# Patient Record
Sex: FEMALE | Race: WHITE | NOT HISPANIC OR LATINO | Employment: OTHER | URBAN - METROPOLITAN AREA
[De-identification: names, ages, dates, MRNs, and addresses within clinical notes are randomized per-mention and may not be internally consistent; named-entity substitution may affect disease eponyms.]

---

## 2017-02-28 ENCOUNTER — OFFICE VISIT (OUTPATIENT)
Dept: NEUROLOGY | Facility: CLINIC | Age: 52
End: 2017-02-28
Payer: COMMERCIAL

## 2017-02-28 VITALS
WEIGHT: 228.6 LBS | DIASTOLIC BLOOD PRESSURE: 67 MMHG | TEMPERATURE: 98.5 F | BODY MASS INDEX: 39.24 KG/M2 | SYSTOLIC BLOOD PRESSURE: 113 MMHG | HEART RATE: 77 BPM

## 2017-02-28 DIAGNOSIS — G43.009 MIGRAINE WITHOUT AURA AND WITHOUT STATUS MIGRAINOSUS, NOT INTRACTABLE: Primary | ICD-10-CM

## 2017-02-28 DIAGNOSIS — G25.81 RESTLESS LEGS SYNDROME (RLS): ICD-10-CM

## 2017-02-28 PROCEDURE — 99214 OFFICE O/P EST MOD 30 MIN: CPT | Performed by: PSYCHIATRY & NEUROLOGY

## 2017-02-28 RX ORDER — PRAMIPEXOLE DIHYDROCHLORIDE 0.25 MG/1
0.25 TABLET ORAL AT BEDTIME
Qty: 90 TABLET | Refills: 1 | Status: SHIPPED | OUTPATIENT
Start: 2017-02-28 | End: 2017-10-02

## 2017-02-28 ASSESSMENT — PAIN SCALES - GENERAL: PAINLEVEL: NO PAIN (0)

## 2017-02-28 NOTE — NURSING NOTE
"Chief Complaint   Patient presents with     RECHECK     Migraine       Initial /67 (BP Location: Right arm, Patient Position: Chair, Cuff Size: Adult Large)  Pulse 77  Temp 98.5  F (36.9  C) (Oral)  Wt 228 lb 9.6 oz (103.7 kg)  LMP 02/14/2017 (Approximate)  Breastfeeding? No  BMI 39.24 kg/m2 Estimated body mass index is 39.24 kg/(m^2) as calculated from the following:    Height as of 11/21/16: 5' 4\" (1.626 m).    Weight as of this encounter: 228 lb 9.6 oz (103.7 kg).  Medication Reconciliation: complete    Patient prefers to be contacted: Stacey Resendiz to leave detailed message on voicemail: n/a    Kathi WEISS-CMA    "

## 2017-02-28 NOTE — MR AVS SNAPSHOT
After Visit Summary   2/28/2017    Socorro Oakes    MRN: [id number]           Patient Information     Date Of Birth          1965        Visit Information        Provider Department      2/28/2017 9:30 AM Nancy Reed MD [facility]        Today's Diagnoses     Migraine without aura and without status migrainosus, not intractable    -  1    Restless legs syndrome (RLS)           Follow-ups after your visit        Follow-up notes from your care team     Return in about 6 months (around 8/28/2017).      Your next 10 appointments already scheduled     Aug 28, 2017  8:00 AM CDT   Return Visit with Nancy Reed MD   [facility]    [address], [place] [address] [phone]              Who to contact     If you have questions or need follow up information about today's clinic visit or your schedule please contact [facility] directly at [phone].  Normal or non-critical lab and imaging results will be communicated to you by [employer], letter or phone within 4 business days after the clinic has received the results. If you do not hear from us within 7 days, please contact the clinic through [employer] or phone. If you have a critical or abnormal lab result, we will notify you by phone as soon as possible.  Submit refill requests through [employer] or call your pharmacy and they will forward the refill request to us. Please allow 3 business days for your refill to be completed.          Additional Information About Your Visit        MyChart Information     [employer] gives you secure access to your electronic health record. If you see a primary care provider, you can also send messages to your care team and make appointments. If you have questions, please call your primary care clinic.  If you do not have a primary care provider, please call [phone] and they will assist you.        Care EveryWhere ID     This is  your Care EveryWhere ID. This could be used by other organizations to access your [place] medical records  [id number]        Your Vitals Were     Pulse Temperature Last Period Breastfeeding? BMI (Body Mass Index)       77 98.5  F (36.9  C) (Oral) 02/14/2017 (Approximate) No 39.24 kg/m2        Blood Pressure from Last 3 Encounters:   02/28/17 113/67   11/21/16 136/84   10/07/16 (!) 138/92    Weight from Last 3 Encounters:   02/28/17 228 lb 9.6 oz (103.7 kg)   11/21/16 235 lb (106.6 kg)   10/07/16 231 lb 12.8 oz (105.1 kg)              Today, you had the following     No orders found for display         Today's Medication Changes          These changes are accurate as of: 2/28/17 10:27 AM.  If you have any questions, ask your nurse or doctor.               These medicines have changed or have updated prescriptions.        Dose/Directions    prazosin 2 MG capsule   Commonly known as:  MINIPRESS   This may have changed:  Another medication with the same name was removed. Continue taking this medication, and follow the directions you see here.   Changed by:  Jennifer Riley APRN CNP        Dose:  2 mg   Take 2 mg by mouth 2 times daily   Refills:  0            Where to get your medicines      [place] [facility] [place] [address] [facility]  [address] [address] [address]     Phone:  [phone]     pramipexole 0.25 MG tablet                Primary Care Provider Office Phone # Fax #    RAJ Macias [phone]       [facility] [address] [address]        Goals        General    I want to be able to understand more about my medications and need for taking them routinly  (pt-stated)     Notes - Note created  2/4/2015  2:07 PM by Leia Xiao LSW    As of today's date 2/4/2015 goal is met at 51 - 75%.   Goal Status:  Active  Setting up MTM referral         I want to improve my independent living skills  (pt-stated)     Notes - Note created  2/4/2015  2:05 PM by Leia Xiao LSW    As of today's date 2/4/2015 goal is met at 0 - 25%.   Goal Status:  just starting  Just starting out          Thank you!     Thank you for choosing [facility]  for your care. Our goal is always to provide you with excellent care. Hearing back from our patients is one way we can continue to improve our services. Please take a few minutes to complete the written survey that you may receive in the mail after your visit with us. Thank you!             Your Updated Medication List - Protect others around you: Learn how to safely use, store and throw away your medicines at www.disposemymeds.org.          This list is accurate as of: 2/28/17 10:27 AM.  Always use your most recent med list.                   Brand Name Dispense Instructions for use    ADVIL PO      Take 400 mg by mouth every 6 hours as needed.       AMBIEN PO      Take 10 mg by mouth At Bedtime       amphetamine-dextroamphetamine 30 MG per 24 hr capsule    ADDERALL XR     Take 30 mg by mouth 2 times daily       doxepin 75 MG capsule    SINEquan     Take 75 mg by mouth At Bedtime       lamoTRIgine 150 MG tablet    LaMICtal     Take 150 mg by mouth every evening       LATUDA 80 MG Tabs tablet   Generic drug:  lurasidone      Take 80 mg by mouth every evening       lisinopril 10 MG tablet    PRINIVIL/ZESTRIL    30 tablet    Take 1 tablet (10 mg) by mouth daily       omeprazole 40 MG capsule    priLOSEC    90 capsule    Take 1 capsule (40 mg) by mouth daily Take 30-60 minutes before a meal.       order for ADELA Quintanilla was set up on replacement machine: [employer]; Type  Airsense 10; Serial Number: [id number]; Modem Number: 023; Pressure: SET PRESSURE 9 CM H20;  Mask of choice: none given; per patient she just opened a new one at home; Heated tubing       pramipexole 0.25 MG tablet    MIRAPEX    90 tablet    Take 1 tablet (0.25 mg) by mouth At Bedtime       prazosin  2 MG capsule    MINIPRESS     Take 2 mg by mouth 2 times daily       PRISTIQ PO      Take 150 mg by mouth every evening       * SUMAtriptan Succinate Refill 6 MG/0.5ML Soln    IMITREX    0.5 mL    Inject 6mg as needed. MR x1 after 1-2 hrs as needed. Limit 2 doses in 24 hrs.       * SUMAtriptan 100 MG tablet    IMITREX    6 tablet    Take 1 tablet (100 mg) by mouth at onset of headache for migraine May repeat in 2 hours if needed: max 2/day;       TYLENOL 500 MG tablet   Generic drug:  acetaminophen      2 TABLETS ORALLY 4 TIMES DAILY AS NEEDED       verapamil 120 MG 24 hr capsule    VERELAN    180 capsule    Take 1 capsule (120 mg) by mouth 2 times daily       WELLBUTRIN  MG 24 hr tablet   Generic drug:  buPROPion     30 tablet    Take 450 mg by mouth every morning       XANAX PO      Take 0.5-1 mg by mouth daily as needed       * Notice:  This list has 2 medication(s) that are the same as other medications prescribed for you. Read the directions carefully, and ask your doctor or other care provider to review them with you.

## 2017-02-28 NOTE — PROGRESS NOTES
ESTABLISHED PATIENT NEUROLOGY NOTE    DATE OF VISIT: 2/28/2017  MRN: [id number]  PATIENT NAME: Socorro Oakes  YOB: 1965    Chief Complaint   Patient presents with     RECHECK     Migraine     SUBJECTIVE:                                                      HISTORY OF PRESENT ILLNESS:  Socorro is here for follow up regarding migraine headaches and restless legs syndrome. The patient tells me that both issues are stable. She cannot remember when the last time she had a typical migraine was. She is tolerating the verapamil without difficulty. She did have one cluster of sharp pains around the Right ear last week. These were self-resolving. No recurrence. She does not really have any neck pain. She has occasional scalp tenderness. She says that she has tearing of the Right eye at night, especially if laying on that side. She has not been to the eye doctor recently and admits that she does not use her eyeglasses or contacts because they do not seem to be correcting the vision right (she cannot see close with the contacts and cannot see far away with the glasses on). She denies sensory changes, weakness, difficulties with swallow, eye pain.     The restless legs syndrome are stable. No concerns regarding this.     The patient also has a history of memory problems which have been felt to be related to her mental health issues. Most recent neuropsych testing in our record was in 10.2015. The patient was  hospitalized last fall for suicidal ideation. She says that she has been doing better since. She tells me she was diagnosed with PTSD at that time. In terms of the memory, she says she now rarely forgets her medications or mixes the am/pm doses. No new concerns.     Liver enzymes were normal when checked in October 2016.      CURRENT MEDICATIONS:     Current Outpatient Prescriptions on File Prior to Visit:  prazosin (MINIPRESS) 2 MG capsule Take 2 mg by mouth 2 times daily    pramipexole (MIRAPEX) 0.25 MG  tablet Take 1 tablet (0.25 mg) by mouth At Bedtime   SUMAtriptan (IMITREX) 100 MG tablet Take 1 tablet (100 mg) by mouth at onset of headache for migraine May repeat in 2 hours if needed: max 2/day;   omeprazole (PRILOSEC) 40 MG capsule Take 1 capsule (40 mg) by mouth daily Take 30-60 minutes before a meal.   DoxePIN (SINEQUAN) 75 MG capsule Take 75 mg by mouth At Bedtime   verapamil (VERELAN) 120 MG 24 hr capsule Take 1 capsule (120 mg) by mouth 2 times daily   amphetamine-dextroamphetamine (ADDERALL XR) 30 MG per capsule Take 30 mg by mouth 2 times daily   buPROPion (WELLBUTRIN XL) 150 MG 24 hr tablet Take 450 mg by mouth every morning    Zolpidem Tartrate (AMBIEN PO) Take 10 mg by mouth At Bedtime   ALPRAZolam (XANAX PO) Take 0.5-1 mg by mouth daily as needed   lamoTRIgine (LAMICTAL) 150 MG tablet Take 150 mg by mouth every evening    SUMAtriptan Succinate Refill (IMITREX) 6 MG/0.5ML SOLN Inject 6mg as needed. MR x1 after 1-2 hrs as needed. Limit 2 doses in 24 hrs.   Desvenlafaxine Succinate (PRISTIQ PO) Take 150 mg by mouth every evening    lurasidone (LATUDA) 80 MG TABS tablet Take 80 mg by mouth every evening    Ibuprofen (ADVIL PO) Take 400 mg by mouth every 6 hours as needed.   TYLENOL EXTRA STRENGTH 500 MG OR TABS 2 TABLETS ORALLY 4 TIMES DAILY AS NEEDED   [DISCONTINUED] prazosin (MINIPRESS) 2 MG capsule Take 1 capsule (2 mg) by mouth At Bedtime   lisinopril (PRINIVIL,ZESTRIL) 10 MG tablet Take 1 tablet (10 mg) by mouth daily (Patient not taking: Reported on 2/28/2017)   ORDER FOR ADELA Quintanilla was set up on replacement machine: [employer]; Type  DOCUSYSse 10; Serial Number: [id number]; Modem Number: 023; Pressure: SET PRESSURE 9 CM H20; Mask of choice: none given; per patient she just opened a new one at home; Heated tubing     No current facility-administered medications on file prior to visit.     RECENT DIAGNOSTIC STUDIES:   Labs:   Results for orders placed or performed in visit on 10/07/16   ALT   Result  Value Ref Range    ALT 26 0 - 50 U/L   AST   Result Value Ref Range    AST 13 0 - 45 U/L       REVIEW OF SYSTEMS:                                                      10-point review of systems is negative except as mentioned above in HPI.     EXAM:                                                      Physical Exam:   Vitals: /67 (BP Location: Right arm, Patient Position: Chair, Cuff Size: Adult Large)  Pulse 77  Temp 98.5  F (36.9  C) (Oral)  Wt 228 lb 9.6 oz (103.7 kg)  LMP 2017 (Approximate)  Breastfeeding? No  BMI 39.24 kg/m2  BMI= Body mass index is 39.24 kg/(m^2).  GENERAL: NAD. Blunted affect.  HEENT: No TTP of neck, scalp. External auditory canals and tympanic membranes appear normal.   Focused Neurologic:  MENTAL STATUS: Alert, attentive. Speech is fluent. Normal comprehension. Mini-co/5. Normal concentration. Adequate fund of knowledge.   CRANIAL NERVES: Discs flat. Visual fields intact to confrontation. Pupils equally, round and reactive to light. Facial sensation and movement normal. EOM full. Hearing intact to conversation. Trapezius strength intact. Palate moves symmetrically. Tongue midline.  MOTOR: 5/5 in proximal and distal muscle groups of upper and lower extremities. Tone and bulk normal.   DTRs: Intact and symmetric in UEs and patellae.  SENSATION: Normal light touch throughout.   COORDINATION: Normal fine motor movements.  STATION AND GAIT: Romberg negative. Tandem normal.  CV: RRR. S1, S2.   NECK: No bruits.      ASSESSMENT and PLAN:                                                      Assessment and Plan:    ICD-10-CM    1. Migraine without aura and without status migrainosus, not intractable G43.009    2. Restless legs syndrome (RLS) G25.81         Ms. Oakes is a pleasant 50 yo woman with history of migraine headaches and restless legs syndrome, doing well. She seems to be doing better from a mental health standpoint as well. I did recommend that she have an eye exam,  especially with the new symptom of tearing of the Right eye at night. I explained that eye strain can contribute to headaches, She denies side effects from the verapamil or Mirapex so we will plan to continue her medications at the current doses. In terms of memory, she feels that this is better too, especially with the current mental health management changes. We will plan to meet again in 6 months. I asked the patient to let me know if the pain around her ear returns in the meantime. The patient understands and agrees with the plan.      Verapamil 120mg BID.   Mirapex 0.25mg QHS.  Imitrex as needed.  Labs at next visit.     Total Time: 25 minutes were spent with the patient. More than 50% of the time spent on counseling (as described above in Assessment and Plan) /coordinating the care.    Nancy Reed MD  Neurology

## 2017-03-20 ENCOUNTER — OFFICE VISIT (OUTPATIENT)
Dept: FAMILY MEDICINE | Facility: CLINIC | Age: 52
End: 2017-03-20
Payer: COMMERCIAL

## 2017-03-20 VITALS
SYSTOLIC BLOOD PRESSURE: 132 MMHG | WEIGHT: 227 LBS | HEIGHT: 64 IN | OXYGEN SATURATION: 97 % | DIASTOLIC BLOOD PRESSURE: 82 MMHG | BODY MASS INDEX: 38.76 KG/M2

## 2017-03-20 DIAGNOSIS — M25.512 CHRONIC LEFT SHOULDER PAIN: Primary | ICD-10-CM

## 2017-03-20 DIAGNOSIS — G89.29 CHRONIC LEFT SHOULDER PAIN: Primary | ICD-10-CM

## 2017-03-20 PROCEDURE — 99213 OFFICE O/P EST LOW 20 MIN: CPT | Performed by: NURSE PRACTITIONER

## 2017-03-20 NOTE — PATIENT INSTRUCTIONS
1. Schedule an appointment with physical therapy          Thank you for choosing [facility].  You may be receiving a survey in the mail from Lisa Norris regarding your visit today.  Please take a few minutes to complete and return the survey to let us know how we are doing.      If you have questions or concerns, please contact us via [employer] or you can contact your care team at [phone].    Our Clinic hours are:  Monday 6:40 am  to 7:00 pm  Tuesday -Friday 6:40 am to 5:00 pm    The [place] outpatient lab hours are:  Monday - Friday 6:10 am to 4:45 pm  Saturdays 7:00 am to 11:00 am  Appointments are required, call [phone]    If you have clinical questions after hours or would like to schedule an appointment,  call the clinic at [phone].

## 2017-03-20 NOTE — PROGRESS NOTES
SUBJECTIVE:                                                    Socorro Oakes is a 51 year old female who presents to clinic today for the following health issues:      Joint Pain     Onset: 3 months    Description:   Location: left shoulder  Character: Sharp and Gnawing    Intensity: moderate    Progression of Symptoms: worse    Accompanying Signs & Symptoms:  Other symptoms: radiation of pain to arm   History:   Previous similar pain: no       Precipitating factors:   Trauma or overuse: YES- lifting and transferring/throwing cases of oranges    Alleviating factors:  Improved by: NSAID - IBuprofen       Therapies Tried and outcome: Ibuprofen, ice, some relief    Patient hurt her shoulder while lifting up box of oranges at food shelf. She reports a constant ache and sharp pain that wakes her up at night. No numbness or tingling in arm/hand. Weakness when lifting heavy things.       -------------------------------------    Problem list and histories reviewed & adjusted, as indicated.  Additional history: as documented    Patient Active Problem List   Diagnosis     MRSA     h/o multiple concussion      Hyperlipidemia LDL goal <160     Health Care Home     GERD (gastroesophageal reflux disease)     DJD (degenerative joint disease), lumbar     Sleep apnea     Migraine     Moderate major depression (H)     Schizophrenia (H)     ADD (attention deficit disorder with hyperactivity)     Restless legs syndrome (RLS)     Memory loss     Essential hypertension, benign     Past Surgical History   Procedure Laterality Date     Surgical history of -   10/17/05     left knee surgery      Tonsillectomy & adenoidectomy       Surgical history of -   1982,1983,1984x2,1993,     TMJ Surgery x5     Surgical history of -        ENT Tubes       Surgical history of -   1996     toe     Cholecystectomy, laporoscopic  4-30-09     Laparoscopic appendectomy       Inject epidural lumbar  7/11/2011     Procedure:INJECT EPIDURAL LUMBAR; BRIE with  "Jeano-Michelle; Surgeon:GENERIC ANESTHESIA PROVIDER; Location:[facility]     Inject epidural lumbar  9/28/2011     Procedure:INJECT EPIDURAL LUMBAR; BRIE with Jeano--; Surgeon:GENERIC ANESTHESIA PROVIDER; Location:[facility]     Inject epidural lumbar  12/12/2011     Procedure:INJECT EPIDURAL LUMBAR; BRIE with Fluoro - Dr. Landry; Surgeon:GENERIC ANESTHESIA PROVIDER; Location:[facility]     Knee surgery       1- 15 yr ago     Mandible surgery       6x surgeries from 1983- 1994     Colonoscopy N/A 6/5/2015     Procedure: COLONOSCOPY;  Surgeon: Robe Delgado MD;  Location: [facility]       Social History   Substance Use Topics     Smoking status: Never Smoker     Smokeless tobacco: Never Used     Alcohol use No     Family History   Problem Relation Age of Onset     Arthritis Mother      rheumatoid/had knee replacement     Bipolar Disorder Mother      Migraines Mother      Depression Father      comitted suicide age 48     Suicide Father      Alcohol/Drug Brother      Allergies Sister      Migraines Sister      Anxiety Disorder Maternal Grandfather      Bipolar Disorder Daughter      Migraines Brother      Migraines Sister      Migraines Brother      Migraines Sister      CEREBROVASCULAR DISEASE No family hx of            Reviewed and updated as needed this visit by clinical staff       Reviewed and updated as needed this visit by Provider         ROS:  Constitutional, HEENT, cardiovascular, pulmonary, GI, , musculoskeletal, neuro, skin, endocrine and psych systems are negative, except as otherwise noted.    OBJECTIVE:                                                    /82 (BP Location: Left arm, Patient Position: Chair, Cuff Size: Adult Large)  Ht 5' 4\" (1.626 m)  Wt 227 lb (103 kg)  LMP 02/14/2017 (Approximate)  SpO2 97%  BMI 38.96 kg/m2  Body mass index is 38.96 kg/(m^2).  GENERAL APPEARANCE: healthy, alert and no distress  RESP: Unlabored  ORTHO:   SHOULDER Exam-Left   Inspection: no swelling, no " bruising, no discoloration, no obvious deformity, no asymmetry, no glenohumeral joint anterior bulge, no distal clavicle elevation, no muscle atrophy, no scapular winging   Tenderness of: SC joint- no, clavicle(prox-mid)- no, clavicle-(mid-distal)- no, AC joint- no, acromion- no, anterior capsule- no, prox bicep tendon- no, greater tuberosity- no, prox humerus- no, supraspinatous- no, infraspinatous- no, superior trapezious- no, rhomboids- no   Range of Motion: Active- limited due to pain.  Range of Motion: Passive- not examined           ASSESSMENT/PLAN:                                                      1. Chronic left shoulder pain  Left shoulder pain started 3 months ago after lifting box of oranges. ? Nerve impingement- vs rotator cuff tear   - PHYSICAL THERAPY REFERRAL- patient prefers to do physical therapy close to her home at [facility].   - recommend to avoid aggravating activities  - Tylenol/Ibuprofen prn pain  - ICE after activity.       RAJ Macias [facility]

## 2017-03-20 NOTE — NURSING NOTE
"Initial /82 (BP Location: Left arm, Patient Position: Chair, Cuff Size: Adult Large)  Ht 5' 4\" (1.626 m)  Wt 227 lb (103 kg)  LMP 02/14/2017 (Approximate)  SpO2 97%  BMI 38.96 kg/m2 Estimated body mass index is 38.96 kg/(m^2) as calculated from the following:    Height as of this encounter: 5' 4\" (1.626 m).    Weight as of this encounter: 227 lb (103 kg). .    Stacy Robles    "

## 2017-03-20 NOTE — MR AVS SNAPSHOT
After Visit Summary   3/20/2017    Socorro Oakes    MRN: [id number]           Patient Information     Date Of Birth          1965        Visit Information        Provider Department      3/20/2017 8:40 AM Jennifer Riley APRN CNP [facility]        Today's Diagnoses     Chronic left shoulder pain    -  1      Care Instructions    1. Schedule an appointment with physical therapy          Thank you for choosing [facility].  You may be receiving a survey in the mail from [employer] regarding your visit today.  Please take a few minutes to complete and return the survey to let us know how we are doing.      If you have questions or concerns, please contact us via [employer] or you can contact your care team at [phone].    Our Clinic hours are:  Monday 6:40 am  to 7:00 pm  Tuesday -Friday 6:40 am to 5:00 pm    The [place] outpatient lab hours are:  Monday - Friday 6:10 am to 4:45 pm  Saturdays 7:00 am to 11:00 am  Appointments are required, call [phone]    If you have clinical questions after hours or would like to schedule an appointment,  call the clinic at [phone].        Follow-ups after your visit        Additional Services     PHYSICAL THERAPY REFERRAL       *This therapy referral will be filtered to a centralized scheduling office at [facility] and the patient will receive a call to schedule an appointment at a [place] location most convenient for them. *     [facility] provides Physical Therapy evaluation and treatment and many specialty services across the [place] system.  If requesting a specialty program, please choose from the list below.    If you have not heard from the scheduling office within 2 business days, please call [phone] for all locations, with the exception of [place], please call [phone].  Treatment: Evaluation & Treatment  Special Instructions/Modalities:   Special Programs:  None    Please be aware that coverage of these services is subject to the terms and limitations of your health insurance plan.  Call member services at your health plan with any benefit or coverage questions.      **Note to Provider:  If you are referring outside of [place] for the therapy appointment, please list the name of the location in the  special instructions  above, print the referral and give to the patient to schedule the appointment.                  Your next 10 appointments already scheduled     Aug 28, 2017  8:00 AM CDT   [facility] Visit with Nancy Reed MD   [facility]    [address] [place] [address] [phone]              Who to contact     If you have questions or need follow up information about today's clinic visit or your schedule please contact [facility] directly at [phone].  Normal or non-critical lab and imaging results will be communicated to you by [employer], letter or phone within 4 business days after the clinic has received the results. If you do not hear from us within 7 days, please contact the clinic through [employer] or phone. If you have a critical or abnormal lab result, we will notify you by phone as soon as possible.  Submit refill requests through [employer] or call your pharmacy and they will forward the refill request to us. Please allow 3 business days for your refill to be completed.          Additional Information About Your Visit        [employer] Information     [employer] gives you secure access to your electronic health record. If you see a primary care provider, you can also send messages to your care team and make appointments. If you have questions, please call your primary care clinic.  If you do not have a primary care provider, please call [phone] and they will assist you.        Care EveryWhere ID     This is your Care EveryWhere ID. This could be used by other organizations to access  "your [place] medical records  [id number]        Your Vitals Were     Height Last Period Pulse Oximetry BMI (Body Mass Index)          5' 4\" (1.626 m) 02/14/2017 (Approximate) 97% 38.96 kg/m2         Blood Pressure from Last 3 Encounters:   03/20/17 132/82   02/28/17 113/67   11/21/16 136/84    Weight from Last 3 Encounters:   03/20/17 227 lb (103 kg)   02/28/17 228 lb 9.6 oz (103.7 kg)   11/21/16 235 lb (106.6 kg)              We Performed the Following     PHYSICAL THERAPY REFERRAL        Primary Care Provider Office Phone # Fax #    Jennifer Castrot, APRN [facility] [phone]       [facility] [address] [address]        Goals        General    I want to be able to understand more about my medications and need for taking them routinly  (pt-stated)     Notes - Note created  2/4/2015  2:07 PM by Leia Xiao LSW    As of today's date 2/4/2015 goal is met at 51 - 75%.   Goal Status:  Active  Setting up MTM referral         I want to improve my independent living skills (pt-stated)     Notes - Note created  2/4/2015  2:05 PM by Leia Xiao LSW    As of today's date 2/4/2015 goal is met at 0 - 25%.   Goal Status:  just starting  Just starting out          Thank you!     Thank you for choosing [facility]  for your care. Our goal is always to provide you with excellent care. Hearing back from our patients is one way we can continue to improve our services. Please take a few minutes to complete the written survey that you may receive in the mail after your visit with us. Thank you!             Your Updated Medication List - Protect others around you: Learn how to safely use, store and throw away your medicines at www.disposemymeds.org.          This list is accurate as of: 3/20/17  8:58 AM.  Always use your most recent med list.                   Brand Name Dispense Instructions for use    ADVIL PO      Take 400 mg by mouth every 6 hours as needed.       AMBIEN " PO      Take 10 mg by mouth At Bedtime       amphetamine-dextroamphetamine 30 MG per 24 hr capsule    ADDERALL XR     Take 30 mg by mouth 2 times daily       doxepin 75 MG capsule    SINEquan     Take 75 mg by mouth At Bedtime       lamoTRIgine 150 MG tablet    LaMICtal     Take 150 mg by mouth every evening       LATUDA 80 MG Tabs tablet   Generic drug:  lurasidone      Take 80 mg by mouth every evening       omeprazole 40 MG capsule    priLOSEC    90 capsule    Take 1 capsule (40 mg) by mouth daily Take 30-60 minutes before a meal.       order for ADELA Quintanilla was set up on replacement machine: [employer]; Type  vogogo 10; Serial Number: [id number]; Modem Number: 023; Pressure: SET PRESSURE 9 CM H20;  Mask of choice: none given; per patient she just opened a new one at home; Heated tubing       pramipexole 0.25 MG tablet    MIRAPEX    90 tablet    Take 1 tablet (0.25 mg) by mouth At Bedtime       prazosin 2 MG capsule    MINIPRESS     Take 2 mg by mouth 2 times daily       PRISTIQ PO      Take 150 mg by mouth every evening       * SUMAtriptan Succinate Refill 6 MG/0.5ML Soln    IMITREX    0.5 mL    Inject 6mg as needed. MR x1 after 1-2 hrs as needed. Limit 2 doses in 24 hrs.       * SUMAtriptan 100 MG tablet    IMITREX    6 tablet    Take 1 tablet (100 mg) by mouth at onset of headache for migraine May repeat in 2 hours if needed: max 2/day;       TYLENOL 500 MG tablet   Generic drug:  acetaminophen      2 TABLETS ORALLY 4 TIMES DAILY AS NEEDED       verapamil 120 MG 24 hr capsule    VERELAN    180 capsule    Take 1 capsule (120 mg) by mouth 2 times daily       WELLBUTRIN  MG 24 hr tablet   Generic drug:  buPROPion     30 tablet    Take 450 mg by mouth every morning       XANAX PO      Take 0.5-1 mg by mouth daily as needed       * Notice:  This list has 2 medication(s) that are the same as other medications prescribed for you. Read the directions carefully, and ask your doctor or other care provider to  review them with you.

## 2017-04-20 ENCOUNTER — TELEPHONE (OUTPATIENT)
Dept: FAMILY MEDICINE | Facility: CLINIC | Age: 52
End: 2017-04-20

## 2017-04-20 NOTE — TELEPHONE ENCOUNTER
PHQ9 Due by:6/21/17    Please contact patient to complete follow up PHQ9 before their DUE DATE.     This is important feedback for your care team to monitor how you are doing while taking Wellbutrin.     You completed this same questionnaire   PHQ-9 SCORE 11/21/2016   Total Score -   Total Score -   Total Score 10   Total Score -       MA STAFF: If upon calling patient and PHQ9 score is higher that 10 route to the provider. You may also seek an RN for review.

## 2017-04-25 ASSESSMENT — ANXIETY QUESTIONNAIRES
7. FEELING AFRAID AS IF SOMETHING AWFUL MIGHT HAPPEN: NOT AT ALL
GAD7 TOTAL SCORE: 2
3. WORRYING TOO MUCH ABOUT DIFFERENT THINGS: NOT AT ALL
2. NOT BEING ABLE TO STOP OR CONTROL WORRYING: NOT AT ALL
IF YOU CHECKED OFF ANY PROBLEMS ON THIS QUESTIONNAIRE, HOW DIFFICULT HAVE THESE PROBLEMS MADE IT FOR YOU TO DO YOUR WORK, TAKE CARE OF THINGS AT HOME, OR GET ALONG WITH OTHER PEOPLE: SOMEWHAT DIFFICULT
6. BECOMING EASILY ANNOYED OR IRRITABLE: NOT AT ALL
1. FEELING NERVOUS, ANXIOUS, OR ON EDGE: SEVERAL DAYS
5. BEING SO RESTLESS THAT IT IS HARD TO SIT STILL: NOT AT ALL

## 2017-04-25 ASSESSMENT — PATIENT HEALTH QUESTIONNAIRE - PHQ9: 5. POOR APPETITE OR OVEREATING: SEVERAL DAYS

## 2017-04-25 NOTE — TELEPHONE ENCOUNTER
Spoke with patient and completed PHQ9 and GAD7. Also notified patient she is due for a mammogram, transferred patient to schedulers desk. No Carney CMA      Panel Management Review      Patient has the following on her problem list:     Depression / Dysthymia review  PHQ-9 SCORE 8/16/2016 11/21/2016 4/25/2017   Total Score - - -   Total Score - - -   Total Score 8 10 3   Total Score - - -      Patient is due for:  PHQ9    Hypertension   Last three blood pressure readings:  BP Readings from Last 3 Encounters:   03/20/17 132/82   02/28/17 113/67   11/21/16 136/84     Blood pressure: Passed    HTN Guidelines:  Age 18-59 BP range:  Less than 140/90  Age 60-85 with Diabetes:  Less than 140/90  Age 60-85 without Diabetes:  less than 150/90      Composite cancer screening  Chart review shows that this patient is due/due soon for the following Mammogram  Summary:    Patient is due/failing the following:   MAMMOGRAM and PHQ9    Action needed:   Patient needs to do PHQ9. and schedule a mammogram    Type of outreach:    Phone, spoke to patient.  completed PHQ9 and GAD7, transferred to  to set appt for mammogram    Questions for provider review:    None                                                                                                                                    No Carney CMA         Chart routed to none .

## 2017-04-26 ASSESSMENT — PATIENT HEALTH QUESTIONNAIRE - PHQ9: SUM OF ALL RESPONSES TO PHQ QUESTIONS 1-9: 3

## 2017-04-26 ASSESSMENT — ANXIETY QUESTIONNAIRES: GAD7 TOTAL SCORE: 2

## 2017-05-23 ENCOUNTER — HOSPITAL ENCOUNTER (OUTPATIENT)
Dept: MAMMOGRAPHY | Facility: CLINIC | Age: 52
Discharge: HOME | End: 2017-05-23
Attending: NURSE PRACTITIONER | Admitting: NURSE PRACTITIONER
Payer: COMMERCIAL

## 2017-05-23 DIAGNOSIS — Z12.31 VISIT FOR SCREENING MAMMOGRAM: ICD-10-CM

## 2017-05-23 PROCEDURE — G0202 SCR MAMMO BI INCL CAD: HCPCS

## 2017-05-23 PROCEDURE — 77063 BREAST TOMOSYNTHESIS BI: CPT

## 2017-05-23 NOTE — PROGRESS NOTES
Alissa Quintanilla    Your mammogram results came back within normal limits. Please let us know if you have any questions.     Thanks for coming in to the clinic.    RAJ Keller CNP

## 2017-07-07 DIAGNOSIS — G47.33 OBSTRUCTIVE SLEEP APNEA (ADULT) (PEDIATRIC): Primary | ICD-10-CM

## 2017-07-18 ENCOUNTER — TELEPHONE (OUTPATIENT)
Dept: FAMILY MEDICINE | Facility: CLINIC | Age: 52
End: 2017-07-18

## 2017-07-18 NOTE — TELEPHONE ENCOUNTER
Patient requesting treatment for conjunctivitis.  Unable to treat per protocol due to reported sty in her left eye.  Patient is asking for treatment because she is currently out of town in [place].  Please advise.    Melita Lakhani RN      RN Conjunctivitis Protocol: Ages 2 and older  Socorro Oakes is a 51 year old female is having symptoms reviewed for possible conjunctivitis.      ASSESSMENT/PLAN:  Allergy to Sulfa?  No   1.  Medication Indicated:  2.  Education regarding contact precautions, hand washing, avoid wearing contacts until finished with drops or until symptoms resolve, contact clinic if there is no improvement of symptoms within 3 days and if develops eye pain or sensitivity to light.   3.  Follow-up:   4.  Patient verbalized understanding of this plan and is agreeable.    SUBJECTIVE:     Conjunctival symptoms: redness, burning, itching and watery or pus discharge   Location: both eyes  Onset: 2 days ago  In addition notes: has a sty in left eye  Contact Lens use?: No  Complicating factors    Reports:Sensitivity to light  Denies:NONE     OBJECTIVE:     Encounter handled by: Nurse Triage.     Melita Lakhani RN

## 2017-07-18 NOTE — TELEPHONE ENCOUNTER
Reason for Call:  Other prescription    Detailed comments: pt is out of state and has pink eye and wants this treated     Phone Number Patient can be reached at: Home number on file [phone] (home)    Best Time: any     Can we leave a detailed message on this number? YES    Call taken on 7/18/2017 at 10:10 AM by Deonna Quiñones

## 2017-07-19 ENCOUNTER — VIRTUAL VISIT (OUTPATIENT)
Dept: FAMILY MEDICINE | Facility: CLINIC | Age: 52
End: 2017-07-19
Payer: COMMERCIAL

## 2017-07-19 ENCOUNTER — TELEPHONE (OUTPATIENT)
Dept: FAMILY MEDICINE | Facility: CLINIC | Age: 52
End: 2017-07-19

## 2017-07-19 DIAGNOSIS — H00.025 HORDEOLUM INTERNUM OF LEFT LOWER EYELID: ICD-10-CM

## 2017-07-19 DIAGNOSIS — H00.025 HORDEOLUM INTERNUM OF LEFT LOWER EYELID: Primary | ICD-10-CM

## 2017-07-19 PROCEDURE — 99441 ZZC PHYSICIAN TELEPHONE EVALUATION 5-10 MIN: CPT | Performed by: NURSE PRACTITIONER

## 2017-07-19 NOTE — MR AVS SNAPSHOT
After Visit Summary   7/19/2017    Socorro Oakes    MRN: [id number]           Patient Information     Date Of Birth          1965        Visit Information        Provider Department      7/19/2017 11:40 AM Jennifer Riley APRN CNP [facility]        Today's Diagnoses     Hordeolum internum of left lower eyelid    -  1       Follow-ups after your visit        Your next 10 appointments already scheduled     Aug 28, 2017  8:00 AM CDT   [facility]   [facility]    [address] [place] [address]   [phone]              Who to contact     If you have questions or need follow up information about today's clinic visit or your schedule please contact [facility] directly at [phone].  Normal or non-critical lab and imaging results will be communicated to you by MyChart, letter or phone within 4 business days after the clinic has received the results. If you do not hear from us within 7 days, please contact the clinic through [employer] or phone. If you have a critical or abnormal lab result, we will notify you by phone as soon as possible.  Submit refill requests through [employer] or call your pharmacy and they will forward the refill request to us. Please allow 3 business days for your refill to be completed.          Additional Information About Your Visit        MyChart Information     [employer] gives you secure access to your electronic health record. If you see a primary care provider, you can also send messages to your care team and make appointments. If you have questions, please call your primary care clinic.  If you do not have a primary care provider, please call [phone] and they will assist you.        Care EveryWhere ID     This is your Care EveryWhere ID. This could be used by other organizations to access your [place] medical records  [id number]         Blood Pressure from  Last 3 Encounters:   03/20/17 132/82   02/28/17 113/67   11/21/16 136/84    Weight from Last 3 Encounters:   03/20/17 227 lb (103 kg)   02/28/17 228 lb 9.6 oz (103.7 kg)   11/21/16 235 lb (106.6 kg)              Today, you had the following     No orders found for display         Today's Medication Changes          These changes are accurate as of: 7/19/17 11:59 PM.  If you have any questions, ask your nurse or doctor.               Start taking these medicines.        Dose/Directions    erythromycin 2 % Oint   Used for:  Hordeolum internum of left lower eyelid   Started by:  Jennifer Riley APRN CNP        Apply 1 cm ribbon in left eyelid.   Quantity:  1 Tube   Refills:  0            Where to get your medicines      These medications were sent to [id number] - [place], [place] - [address] AT [facility] OF [address]  [address], [place] [address]     Phone:  [phone]     erythromycin 2 % Oint                Primary Care Provider Office Phone # Fax #    RAJ Macias [phone]       [address] [address]        Goals        General    I want to be able to understand more about my medications and need for taking them routinly  (pt-stated)     Notes - Note created  2/4/2015  2:07 PM by Leia Xiao LSW    As of today's date 2/4/2015 goal is met at 51 - 75%.   Goal Status:  Active  Setting up MTM referral         I want to improve my independent living skills (pt-stated)     Notes - Note created  2/4/2015  2:05 PM by Leia Xiao LSW    As of today's date 2/4/2015 goal is met at 0 - 25%.   Goal Status:  just starting  Just starting out          Equal Access to Services     CARLOS ZAVALA AH: Michael Mckeon, waaxda luqadaha, qaybta kaalmasuzy randolph. An [facility] [phone].    ATENCIÓN: Si merry morrissey, tiene a perdue disposición servicios  dianne de asistencia lingüística. Oscar montero [phone].    We comply with applicable federal civil rights laws and [place] laws. We do not discriminate on the basis of race, color, national origin, age, disability sex, sexual orientation or gender identity.            Thank you!     Thank you for choosing [facility]  for your care. Our goal is always to provide you with excellent care. Hearing back from our patients is one way we can continue to improve our services. Please take a few minutes to complete the written survey that you may receive in the mail after your visit with us. Thank you!             Your Updated Medication List - Protect others around you: Learn how to safely use, store and throw away your medicines at www.disposemymeds.org.          This list is accurate as of: 7/19/17 11:59 PM.  Always use your most recent med list.                   Brand Name Dispense Instructions for use Diagnosis    ADVIL PO      Take 400 mg by mouth every 6 hours as needed.        AMBIEN PO      Take 10 mg by mouth At Bedtime        amphetamine-dextroamphetamine 30 MG per 24 hr capsule    ADDERALL XR     Take 30 mg by mouth 2 times daily        doxepin 75 MG capsule    SINEquan     Take 75 mg by mouth At Bedtime        erythromycin 2 % Oint     1 Tube    Apply 1 cm ribbon in left eyelid.    Hordeolum internum of left lower eyelid       lamoTRIgine 150 MG tablet    LaMICtal     Take 150 mg by mouth every evening        LATUDA 80 MG Tabs tablet   Generic drug:  lurasidone      Take 80 mg by mouth every evening        omeprazole 40 MG capsule    priLOSEC    90 capsule    Take 1 capsule (40 mg) by mouth daily Take 30-60 minutes before a meal.    GERD (gastroesophageal reflux disease)       order for ADELA Quintanilla was set up on replacement machine: [employer]; Type  Airsense 10; Serial Number: [id number]; Modem Number: 023; Pressure: SET PRESSURE 9 CM H20;  Mask of choice: none given; per patient she just  opened a new one at home; Heated tubing        pramipexole 0.25 MG tablet    MIRAPEX    90 tablet    Take 1 tablet (0.25 mg) by mouth At Bedtime    Restless legs syndrome (RLS)       prazosin 2 MG capsule    MINIPRESS     Take 2 mg by mouth 2 times daily        PRISTIQ PO      Take 150 mg by mouth every evening        * SUMAtriptan Succinate Refill 6 MG/0.5ML Soln    IMITREX    0.5 mL    Inject 6mg as needed. MR x1 after 1-2 hrs as needed. Limit 2 doses in 24 hrs.    Migraines       * SUMAtriptan 100 MG tablet    IMITREX    6 tablet    Take 1 tablet (100 mg) by mouth at onset of headache for migraine May repeat in 2 hours if needed: max 2/day;    Migraine without aura and without status migrainosus, not intractable       TYLENOL 500 MG tablet   Generic drug:  acetaminophen      2 TABLETS ORALLY 4 TIMES DAILY AS NEEDED        verapamil 120 MG 24 hr capsule    VERELAN    180 capsule    TAKE ONE CAPSULE BY MOUTH TWICE A DAY    Headache       WELLBUTRIN  MG 24 hr tablet   Generic drug:  buPROPion     30 tablet    Take 450 mg by mouth every morning        XANAX PO      Take 0.5-1 mg by mouth daily as needed        * Notice:  This list has 2 medication(s) that are the same as other medications prescribed for you. Read the directions carefully, and ask your doctor or other care provider to review them with you.

## 2017-07-19 NOTE — PROGRESS NOTES
"  SUBJECTIVE:                                                    Socorro Oakes is a 51 year old female who presents to clinic today for the following health issuues:    Virtual Visit: Authorization for Virtual Medical Visit reviewed with patient, signed and sent to be scanned.  Eye(s) Problem  Onset: 4 days    Description: symptoms started with a stye in left eye which improved and now right eye is swollen, watering, sensitive to light. No double vision. Denies mattering. Bottom eye lid looks like it has a stye- eye is not scratchy- no foreign body  Location: bilateral  Pain: YES  Redness: YES- Mostly in the morning    Accompanying Signs & Symptoms:  Discharge/mattering: YES- Right eye is tearing and both eyes have discharge- eye is not red. \"Looks like stye in lower left eyelid\".   Swelling: YES  Visual changes: YES  Fever: no   Nasal Congestion: YES- Slight  Bothered by bright lights: YES    History:   Trauma: no   Foreign body exposure: no     Precipitating factors:   Wearing contacts: YES- But not currently wearing them    Alleviating factors:  Improved by: None    Therapies Tried and outcome: OTC eye gtts      -------------------------------------    Problem list and histories reviewed & adjusted, as indicated.  Additional history: as documented    Patient Active Problem List   Diagnosis     MRSA     h/o multiple concussion      Hyperlipidemia LDL goal <160     Health Care Home     GERD (gastroesophageal reflux disease)     DJD (degenerative joint disease), lumbar     Sleep apnea     Migraine     Moderate major depression (H)     Schizophrenia (H)     ADD (attention deficit disorder with hyperactivity)     Restless legs syndrome (RLS)     Memory loss     Essential hypertension, benign     Past Surgical History:   Procedure Laterality Date     CHOLECYSTECTOMY, LAPOROSCOPIC  4-30-09     COLONOSCOPY N/A 6/5/2015    Procedure: COLONOSCOPY;  Surgeon: Robe Delgado MD;  Location: [facility]     INJECT EPIDURAL " LUMBAR  7/11/2011    Procedure:INJECT EPIDURAL LUMBAR; BRIE with Flouro--; Surgeon:GENERIC ANESTHESIA PROVIDER; Location:[facility]     INJECT EPIDURAL LUMBAR  9/28/2011    Procedure:INJECT EPIDURAL LUMBAR; BRIE with Flouro--; Surgeon:GENERIC ANESTHESIA PROVIDER; Location:[facility]     INJECT EPIDURAL LUMBAR  12/12/2011    Procedure:INJECT EPIDURAL LUMBAR; BRIE with Fluoro - Dr. Landry; Surgeon:GENERIC ANESTHESIA PROVIDER; Location:[facility]     KNEE SURGERY      1- 15 yr ago     LAPAROSCOPIC APPENDECTOMY       MANDIBLE SURGERY      6x surgeries from 1983- 1994     SURGICAL HISTORY OF -   10/17/05    left knee surgery      SURGICAL HISTORY OF -   1982,1983,1984x2,1993,    TMJ Surgery x5     SURGICAL HISTORY OF -       ENT Tubes       SURGICAL HISTORY OF -   1996    toe     TONSILLECTOMY & ADENOIDECTOMY         Social History   Substance Use Topics     Smoking status: Never Smoker     Smokeless tobacco: Never Used     Alcohol use No     Family History   Problem Relation Age of Onset     Arthritis Mother      rheumatoid/had knee replacement     Bipolar Disorder Mother      Migraines Mother      Depression Father      comitted suicide age 48     Suicide Father      Alcohol/Drug Brother      Allergies Sister      Migraines Sister      Anxiety Disorder Maternal Grandfather      Bipolar Disorder Daughter      Migraines Brother      Migraines Sister      Migraines Brother      Migraines Sister      CEREBROVASCULAR DISEASE No family hx of              Reviewed and updated as needed this visit by clinical staff     Reviewed and updated as needed this visit by Provider         ROS:  Constitutional, HEENT, cardiovascular, pulmonary, GI, , musculoskeletal, neuro, skin, endocrine and psych systems are negative, except as otherwise noted.      OBJECTIVE:   There were no vitals taken for this visit.  There is no height or weight on file to calculate BMI.  No physical exam was performed as this was a telephone visit.      Diagnostic Test Results:  none     ASSESSMENT/PLAN:       1. Hordeolum internum of left lower eyelid  Patient reports previously stye in right eye that resolved now has been having similar symptoms in left eye- she notes a stye in lower left eyelid.   She is not concerned about conjunctivitis -denies left eye redness.   - erythromycin 2 % OINT; Apply 1 cm ribbon in left eyelid.  Dispense: 1 Tube; Refill: 0  - do not wear contact until symptoms resolved.   - apply warm compress      > 10 minutes was spent on the telephone with this patient.       RAJ Macias [facility]

## 2017-07-19 NOTE — TELEPHONE ENCOUNTER
Please set this up as telephone visit with patient- ok to overbook my schedule with a time that works for patient this morning preferably.

## 2017-07-20 ENCOUNTER — TELEPHONE (OUTPATIENT)
Dept: FAMILY MEDICINE | Facility: CLINIC | Age: 52
End: 2017-07-20

## 2017-07-20 NOTE — TELEPHONE ENCOUNTER
Is it ok to give verbal ok for Erythromycin 0.5 % cream,   erythromycin  1 Tube 0 7/20/2017  No      Sig: Apply 1 cm ribbon in left eyelid four times a day     Thank you  Leia HANSEN RN

## 2017-07-20 NOTE — TELEPHONE ENCOUNTER
Reason for Call:  Other med request    Detailed comments: Pharmacy is calling stating the the Rx for erythromycin is still not correct.  They want Erythromycin.5% (point 5%)    Phone Number Pharmacy can be reached at: Other phone number:  [phone]    Best Time: asap      Can we leave a detailed message on this number? YES    Call taken on 7/20/2017 at 1:21 PM by Caitlyn Black

## 2017-09-06 ENCOUNTER — OFFICE VISIT (OUTPATIENT)
Dept: NEUROLOGY | Facility: CLINIC | Age: 52
End: 2017-09-06
Payer: COMMERCIAL

## 2017-09-06 VITALS
DIASTOLIC BLOOD PRESSURE: 68 MMHG | HEART RATE: 79 BPM | OXYGEN SATURATION: 97 % | WEIGHT: 211.6 LBS | BODY MASS INDEX: 36.32 KG/M2 | SYSTOLIC BLOOD PRESSURE: 125 MMHG

## 2017-09-06 DIAGNOSIS — Z79.899 ENCOUNTER FOR LONG-TERM (CURRENT) USE OF MEDICATIONS: ICD-10-CM

## 2017-09-06 DIAGNOSIS — G43.109 MIGRAINE WITH AURA AND WITHOUT STATUS MIGRAINOSUS, NOT INTRACTABLE: Primary | ICD-10-CM

## 2017-09-06 DIAGNOSIS — G25.81 RESTLESS LEG SYNDROME: ICD-10-CM

## 2017-09-06 DIAGNOSIS — R41.3 MEMORY DIFFICULTIES: ICD-10-CM

## 2017-09-06 LAB
ALBUMIN SERPL-MCNC: 3.8 G/DL (ref 3.4–5)
ALP SERPL-CCNC: 74 U/L (ref 40–150)
ALT SERPL W P-5'-P-CCNC: 21 U/L (ref 0–50)
ANION GAP SERPL CALCULATED.3IONS-SCNC: 7 MMOL/L (ref 3–14)
AST SERPL W P-5'-P-CCNC: 10 U/L (ref 0–45)
BILIRUB SERPL-MCNC: 0.3 MG/DL (ref 0.2–1.3)
BUN SERPL-MCNC: 11 MG/DL (ref 7–30)
CALCIUM SERPL-MCNC: 9.7 MG/DL (ref 8.5–10.1)
CHLORIDE SERPL-SCNC: 102 MMOL/L (ref 94–109)
CO2 SERPL-SCNC: 28 MMOL/L (ref 20–32)
CREAT SERPL-MCNC: 0.93 MG/DL (ref 0.52–1.04)
ERYTHROCYTE [DISTWIDTH] IN BLOOD BY AUTOMATED COUNT: 17 % (ref 10–15)
GFR SERPL CREATININE-BSD FRML MDRD: 63 ML/MIN/1.7M2
GLUCOSE SERPL-MCNC: 100 MG/DL (ref 70–99)
HCT VFR BLD AUTO: 38.2 % (ref 35–47)
HGB BLD-MCNC: 12.4 G/DL (ref 11.7–15.7)
MCH RBC QN AUTO: 26.4 PG (ref 26.5–33)
MCHC RBC AUTO-ENTMCNC: 32.5 G/DL (ref 31.5–36.5)
MCV RBC AUTO: 81 FL (ref 78–100)
PLATELET # BLD AUTO: 263 10E9/L (ref 150–450)
POTASSIUM SERPL-SCNC: 4.2 MMOL/L (ref 3.4–5.3)
PROT SERPL-MCNC: 6.9 G/DL (ref 6.8–8.8)
RBC # BLD AUTO: 4.69 10E12/L (ref 3.8–5.2)
SODIUM SERPL-SCNC: 137 MMOL/L (ref 133–144)
TSH SERPL DL<=0.005 MIU/L-ACNC: 3.45 MU/L (ref 0.4–4)
VIT B12 SERPL-MCNC: 428 PG/ML (ref 193–986)
WBC # BLD AUTO: 11.3 10E9/L (ref 4–11)

## 2017-09-06 PROCEDURE — 82607 VITAMIN B-12: CPT | Performed by: PSYCHIATRY & NEUROLOGY

## 2017-09-06 PROCEDURE — 85027 COMPLETE CBC AUTOMATED: CPT | Performed by: PSYCHIATRY & NEUROLOGY

## 2017-09-06 PROCEDURE — 84443 ASSAY THYROID STIM HORMONE: CPT | Performed by: PSYCHIATRY & NEUROLOGY

## 2017-09-06 PROCEDURE — 99215 OFFICE O/P EST HI 40 MIN: CPT | Performed by: PSYCHIATRY & NEUROLOGY

## 2017-09-06 PROCEDURE — 36415 COLL VENOUS BLD VENIPUNCTURE: CPT | Performed by: PSYCHIATRY & NEUROLOGY

## 2017-09-06 PROCEDURE — 80053 COMPREHEN METABOLIC PANEL: CPT | Performed by: PSYCHIATRY & NEUROLOGY

## 2017-09-06 RX ORDER — VERAPAMIL HYDROCHLORIDE 120 MG/1
120 CAPSULE, EXTENDED RELEASE ORAL 2 TIMES DAILY
Qty: 180 CAPSULE | Refills: 1 | Status: SHIPPED | OUTPATIENT
Start: 2017-09-06 | End: 2018-03-07

## 2017-09-06 ASSESSMENT — MONTREAL COGNITIVE ASSESSMENT (MOCA)
6. READ LIST OF DIGITS [FORWARD/BACKWARD]: 2
4. NAME EACH OF THE THREE ANIMALS SHOWN: 3
8. SERIAL SUBTRACTION OF 7S: 3
WHAT IS THE TOTAL SCORE (OUT OF 30): 27
7. [VIGILENCE] TAP WHEN HEARING DESIGNATED LETTER: 1
VISUOSPATIAL/EXECUTIVE SUBSCORE: 3
WHAT LEVEL OF EDUCATION WAS ATTAINED: 0
12. MEMORY INDEX SCORE: 4
13. ORIENTATION SUBSCORE: 6
9. REPEAT EACH SENTENCE: 2
10. [FLUENCY] NAME WORDS STARTING WITH DESIGNATED LETTER: 1
11. FOR EACH PAIR OF WORDS, WHAT CATEGORY DO THEY BELONG TO (OUT OF 2): 2

## 2017-09-06 NOTE — NURSING NOTE
"Chief Complaint   Patient presents with     RECHECK     memory and migraine       Initial /68 (BP Location: Right arm, Patient Position: Chair, Cuff Size: Adult Regular)  Pulse 79  Wt 211 lb 9.6 oz (96 kg)  LMP 08/23/2017 (Approximate)  SpO2 97%  Breastfeeding? No  BMI 36.32 kg/m2 Estimated body mass index is 36.32 kg/(m^2) as calculated from the following:    Height as of 3/20/17: 5' 4\" (1.626 m).    Weight as of this encounter: 211 lb 9.6 oz (96 kg).  Medication Reconciliation: complete    Patient prefers to be contacted: Yunier Resendiz to leave detailed message on voicemail: n/a    Kathi WEISS-CMA    "

## 2017-09-06 NOTE — PATIENT INSTRUCTIONS
Plan:    Labs today. We will notify you of the results.   Follow-up with your mental health provider and primary care provider regarding medications and mood/hair loss.   Continue the current doses of Verapamil (120mg twice daily) and Mirapex (0.25mg before bedtime).   Return to clinic in 6 months, or sooner if headaches worsen or memory problems increase.

## 2017-09-06 NOTE — PROGRESS NOTES
ESTABLISHED PATIENT NEUROLOGY NOTE    DATE OF VISIT: 9/6/2017  MRN: [id number]  PATIENT NAME: Socorro Oakes  YOB: 1965    Chief Complaint   Patient presents with     RECHECK     memory and migraine     SUBJECTIVE:                                                      HISTORY OF PRESENT ILLNESS:  Socorro is here for follow up regarding migraine headaches and some memory concerns. She told me at her visit about 7 months ago that her headaches were stable. She is on verapamil and had no concerns about side effects from this or from her Mirapex.     She has history of memory problems previously felt to be consistent with her mental health issues (Neuropsych testing 10.2015).     The headaches occur once in a while. No big change, except that she has had more headaches lately due to exercise, which has always been a trigger (1 or 2 days per week). She does not some new visual auras with the psat 2 headaches. If needed, she takes Tylenol or ibuprofen before trying the Imitrex.     She thinks maybe someone tried Indomethacin at some point. She says she is not sure if this will be helpful because the headaches are random.     The restless legs are well-controlled if she takes the Mirapex regularly. Symptoms are worse if she misses a dose.      She feels that the memory has gotten worse. She recently had trouble remembering details about a book she recently read. She also had some trouble with short term memory with regards to some financial decisions. She paid off her car, but didn't mean to and had to work with her bank to fix this. She does not notice good days/bad days. She has felt disoriented. She feels that things have gotten worse since her prior Neuropsych testing. No problems with ADLs or function at home in terms of hygiene. She has left the stove on, but says this is not unusual for her. She has not gotten lost driving. The patient lives alone. She uses alarms on her phone to help her remember.  "    Mood is okay \"most of the time.\" She says that she feels edgy. She does have a mental health provider whom she is not scheduled to see until November. She is thinking about sending him a message about increased anxiety related to recent increase in travel. She says that she has been losing her hair over the past month. She denies increased stress or medication changes.    Tremor is stable.  Denies recognizable side effects from her medications.     CURRENT MEDICATIONS:     Current Outpatient Prescriptions on File Prior to Visit:  pramipexole (MIRAPEX) 0.25 MG tablet Take 1 tablet (0.25 mg) by mouth At Bedtime   prazosin (MINIPRESS) 2 MG capsule Take 2 mg by mouth 2 times daily    SUMAtriptan (IMITREX) 100 MG tablet Take 1 tablet (100 mg) by mouth at onset of headache for migraine May repeat in 2 hours if needed: max 2/day;   omeprazole (PRILOSEC) 40 MG capsule Take 1 capsule (40 mg) by mouth daily Take 30-60 minutes before a meal.   DoxePIN (SINEQUAN) 75 MG capsule Take 75 mg by mouth At Bedtime   amphetamine-dextroamphetamine (ADDERALL XR) 30 MG per capsule Take 30 mg by mouth 2 times daily   buPROPion (WELLBUTRIN XL) 150 MG 24 hr tablet Take 450 mg by mouth every morning    Zolpidem Tartrate (AMBIEN PO) Take 10 mg by mouth At Bedtime   ALPRAZolam (XANAX PO) Take 0.5-1 mg by mouth daily as needed   lamoTRIgine (LAMICTAL) 150 MG tablet Take 150 mg by mouth every evening    SUMAtriptan Succinate Refill (IMITREX) 6 MG/0.5ML SOLN Inject 6mg as needed. MR x1 after 1-2 hrs as needed. Limit 2 doses in 24 hrs.   Desvenlafaxine Succinate (PRISTIQ PO) Take 150 mg by mouth every evening    lurasidone (LATUDA) 80 MG TABS tablet Take 80 mg by mouth every evening    Ibuprofen (ADVIL PO) Take 400 mg by mouth every 6 hours as needed.   TYLENOL EXTRA STRENGTH 500 MG OR TABS 2 TABLETS ORALLY 4 TIMES DAILY AS NEEDED   [DISCONTINUED] verapamil (VERELAN) 120 MG 24 hr capsule TAKE ONE CAPSULE BY MOUTH TWICE A DAY   ORDER FOR DME " Socorro was set up on replacement machine: RESmed; Type  Airsense 10; Serial Number: [id number]; Modem Number: 023; Pressure: SET PRESSURE 9 CM H20; Mask of choice: none given; per patient she just opened a new one at home; Heated tubing     No current facility-administered medications on file prior to visit.       REVIEW OF SYSTEMS:                                                      10-point review of systems is negative except as mentioned above in HPI    EXAM:                                                      Physical Exam:   Vitals: /68 (BP Location: Right arm, Patient Position: Chair, Cuff Size: Adult Regular)  Pulse 79  Wt 211 lb 9.6 oz (96 kg)  LMP 08/23/2017 (Approximate)  SpO2 97%  Breastfeeding? No  BMI 36.32 kg/m2  BMI= Body mass index is 36.32 kg/(m^2).  GENERAL: NAD. Blunted affect.  HEENT: No TTP of neck, scalp.   Focused Neurologic:  MENTAL STATUS: Alert, attentive. Speech is fluent. Normal comprehension. MoCA: 27/30 (normal is 26 and above). Normal concentration. Adequate fund of knowledge.   CRANIAL NERVES: Discs flat. Visual fields intact to confrontation. Pupils equally, round and reactive to light. Facial sensation and movement normal. EOM full. Hearing intact to conversation. Trapezius strength intact. Palate moves symmetrically. Tongue midline.  MOTOR: 5/5 in proximal and distal muscle groups of upper and lower extremities. Tone and bulk normal.   DTRs: Intact and symmetric in UEs and patellae.  SENSATION: Normal light touch throughout.   COORDINATION: Normal fine motor movements.  STATION AND GAIT: Romberg negative. Tandem normal.  CV: RRR. S1, S2.   NECK: No bruits.  Mild, high frequency tremor of the hands with posture and action.    ASSESSMENT and PLAN:                                                      Assessment and Plan:    ICD-10-CM    1. Encounter for long-term (current) use of medications Z79.899 CBC with platelets     Comprehensive metabolic panel (BMP + Alb, Alk  Phos, ALT, AST, Total. Bili, TP)   2. Memory difficulties R41.3 TSH with free T4 reflex     Vitamin B12   3. Migraine with aura and without status migrainosus, not intractable G43.109 verapamil (VERELAN) 120 MG 24 hr capsule     Ms. Oakes is a pleasant 50 yo woman with a complicated psychiatric history, migraine headaches, restless legs syndrome and memory concerns. The headaches seems largely stable, with one change of recent visual auras. With otherwise-unchanged headaches and recent normal eye exam, I do not think we need to investigate this further at this time.  Will monitor for additional changes and continue the verapamil and as-needed Imitrex. Restless legs syndrome is stable.     Regarding the hair loss, I note that Verapamil has <1% rate of alopecia as a side effect. This medication has been long-standing though. I think that review of Socorro's other medications and re-consideration of mood/stress management may be helpful in evaluating this problem. I do think that her anxiety seems to be playing a role in the subjective memory concerns as well.    We discussed her prior work-up for memory. She continues to struggle with memory problems but denies functional impairment or safety concerns for the most part. She scored within normal on the MoCA today with some trouble on executive function tests. I did offer referral back to Neuropsych for retesting, but we agreed that addressing the stress and monitoring clinically would be reasonable for now.     The patient understands and agrees with the plan.     Patient Instructions:  There are no Patient Instructions on file for this visit.    Total Time: 40 minutes were spent with the patient. More than 50% of the time spent on counseling (as described above in Assessment and Plan) /coordinating the care.    Nancy Reed MD  Neurology    CC: Jennifer Riley, FABIANA

## 2017-09-06 NOTE — MR AVS SNAPSHOT
After Visit Summary   9/6/2017    Socorro Oakes    MRN: [id number]           Patient Information     Date Of Birth          1965        Visit Information        Provider Department      9/6/2017 11:00 AM Nancy Reed MD [facility]        Today's Diagnoses     Encounter for long-term (current) use of medications    -  1    Memory difficulties        Migraine with aura and without status migrainosus, not intractable          Care Instructions    Plan:    Labs today. We will notify you of the results.   Follow-up with your mental health provider and primary care provider regarding medications and mood/hair loss.   Continue the current doses of Verapamil (120mg twice daily) and Mirapex (0.25mg before bedtime).   Return to clinic in 6 months, or sooner if headaches worsen or memory problems increase.           Follow-ups after your visit        Follow-up notes from your care team     Return in about 6 months (around 3/6/2018).      Who to contact     If you have questions or need follow up information about today's clinic visit or your schedule please contact [facility] directly at [phone].  Normal or non-critical lab and imaging results will be communicated to you by [employer], letter or phone within 4 business days after the clinic has received the results. If you do not hear from us within 7 days, please contact the clinic through thesixtyonet or phone. If you have a critical or abnormal lab result, we will notify you by phone as soon as possible.  Submit refill requests through [employer] or call your pharmacy and they will forward the refill request to us. Please allow 3 business days for your refill to be completed.          Additional Information About Your Visit        MyChart Information     [employer] gives you secure access to your electronic health record. If you see a primary care provider, you can also send messages to your care team and make appointments. If you  have questions, please call your primary care clinic.  If you do not have a primary care provider, please call [phone] and they will assist you.        Care EveryWhere ID     This is your Care EveryWhere ID. This could be used by other organizations to access your [facility] medical records  [id number]        Your Vitals Were     Pulse Last Period Pulse Oximetry Breastfeeding? BMI (Body Mass Index)       79 08/23/2017 (Approximate) 97% No 36.32 kg/m2        Blood Pressure from Last 3 Encounters:   09/06/17 125/68   03/20/17 132/82   02/28/17 113/67    Weight from Last 3 Encounters:   09/06/17 211 lb 9.6 oz (96 kg)   03/20/17 227 lb (103 kg)   02/28/17 228 lb 9.6 oz (103.7 kg)              We Performed the Following     CBC with platelets     Comprehensive metabolic panel (BMP + Alb, Alk Phos, ALT, AST, Total. Bili, TP)     TSH with free T4 reflex     Vitamin B12          Today's Medication Changes          These changes are accurate as of: 9/6/17 11:52 AM.  If you have any questions, ask your nurse or doctor.               These medicines have changed or have updated prescriptions.        Dose/Directions    verapamil 120 MG 24 hr capsule   Commonly known as:  VERELAN   This may have changed:  See the new instructions.   Used for:  Migraine with aura and without status migrainosus, not intractable   Changed by:  Nancy Reed MD        Dose:  120 mg   Take 1 capsule (120 mg) by mouth 2 times daily   Quantity:  180 capsule   Refills:  1            Where to get your medicines      These medications were sent to [facility] [place] - [address] [facility]  [address] [facility] [address]     Phone:  [phone]     verapamil 120 MG 24 hr capsule                Primary Care Provider Office Phone # Fax #    RAJ Macias [facility] [phone] [address] [facility] [address]        Goals        General    I want to be able to understand more about my  medications and need for taking them routinly  (pt-stated)     Notes - Note created  2/4/2015  2:07 PM by Leia Xiao LSW    As of today's date 2/4/2015 goal is met at 51 - 75%.   Goal Status:  Active  Setting up MTM referral         I want to improve my independent living skills (pt-stated)     Notes - Note created  2/4/2015  2:05 PM by Leia Xiao LSW    As of today's date 2/4/2015 goal is met at 0 - 25%.   Goal Status:  just starting  Just starting out          Equal Access to Services     [facility] : Hadii aad ku hadasho Soomaali, waaxda luqadaha, qaybta kaalmada adeegyada, waxay idiin hayaan adeeg nikhilarabraulio noriega . So [facility] [phone].    ATENCIÓN: Si habla español, tiene a perdue disposición servicios gratuitos de asistencia lingüística. Llame al [phone].    We comply with applicable federal civil rights laws and [place] laws. We do not discriminate on the basis of race, color, national origin, age, disability sex, sexual orientation or gender identity.            Thank you!     Thank you for choosing [facility]  for your care. Our goal is always to provide you with excellent care. Hearing back from our patients is one way we can continue to improve our services. Please take a few minutes to complete the written survey that you may receive in the mail after your visit with us. Thank you!             Your Updated Medication List - Protect others around you: Learn how to safely use, store and throw away your medicines at www.disposemymeds.org.          This list is accurate as of: 9/6/17 11:52 AM.  Always use your most recent med list.                   Brand Name Dispense Instructions for use Diagnosis    ADVIL PO      Take 400 mg by mouth every 6 hours as needed.        AMBIEN PO      Take 10 mg by mouth At Bedtime        amphetamine-dextroamphetamine 30 MG per 24 hr capsule    ADDERALL XR     Take 30 mg by mouth 2 times daily        doxepin 75 MG capsule    SINEquan     Take  75 mg by mouth At Bedtime        lamoTRIgine 150 MG tablet    LaMICtal     Take 150 mg by mouth every evening        LATUDA 80 MG Tabs tablet   Generic drug:  lurasidone      Take 80 mg by mouth every evening        omeprazole 40 MG capsule    priLOSEC    90 capsule    Take 1 capsule (40 mg) by mouth daily Take 30-60 minutes before a meal.    GERD (gastroesophageal reflux disease)       order for ADELA Quintanilla was set up on replacement machine: RESmed; Type  Airsense 10; Serial Number: [id number]; Modem Number: 023; Pressure: SET PRESSURE 9 CM H20;  Mask of choice: none given; per patient she just opened a new one at home; Heated tubing        pramipexole 0.25 MG tablet    MIRAPEX    90 tablet    Take 1 tablet (0.25 mg) by mouth At Bedtime    Restless legs syndrome (RLS)       prazosin 2 MG capsule    MINIPRESS     Take 2 mg by mouth 2 times daily        PRISTIQ PO      Take 150 mg by mouth every evening        * SUMAtriptan Succinate Refill 6 MG/0.5ML Soln    IMITREX    0.5 mL    Inject 6mg as needed. MR x1 after 1-2 hrs as needed. Limit 2 doses in 24 hrs.    Migraines       * SUMAtriptan 100 MG tablet    IMITREX    6 tablet    Take 1 tablet (100 mg) by mouth at onset of headache for migraine May repeat in 2 hours if needed: max 2/day;    Migraine without aura and without status migrainosus, not intractable       TYLENOL 500 MG tablet   Generic drug:  acetaminophen      2 TABLETS ORALLY 4 TIMES DAILY AS NEEDED        verapamil 120 MG 24 hr capsule    VERELAN    180 capsule    Take 1 capsule (120 mg) by mouth 2 times daily    Migraine with aura and without status migrainosus, not intractable       WELLBUTRIN  MG 24 hr tablet   Generic drug:  buPROPion     30 tablet    Take 450 mg by mouth every morning        XANAX PO      Take 0.5-1 mg by mouth daily as needed        * Notice:  This list has 2 medication(s) that are the same as other medications prescribed for you. Read the directions carefully, and ask  your doctor or other care provider to review them with you.

## 2017-09-07 NOTE — PROGRESS NOTES
Please advise Socorro Oakes,  1965, that her lab results were unremarkable except for a slightly elevated WBC count. No obvious signs of infection when we saw her yesterday, but would watch for fever, etc.  [phone] (home)   Nancy Reed

## 2017-10-02 DIAGNOSIS — K21.9 GERD (GASTROESOPHAGEAL REFLUX DISEASE): ICD-10-CM

## 2017-10-02 DIAGNOSIS — G25.81 RESTLESS LEGS SYNDROME (RLS): ICD-10-CM

## 2017-10-02 RX ORDER — PRAMIPEXOLE DIHYDROCHLORIDE 0.25 MG/1
0.25 TABLET ORAL AT BEDTIME
Qty: 90 TABLET | Refills: 1 | Status: SHIPPED | OUTPATIENT
Start: 2017-10-02 | End: 2018-03-07

## 2017-10-02 NOTE — TELEPHONE ENCOUNTER
Mirapex     Last Written Prescription Date: 02/28/2017  Last Fill Quantity: 90, # refills: 1  Last Office Visit with [facility], [facility] or [facility] prescribing provider: 09/06/2017        BP Readings from Last 3 Encounters:   09/06/17 125/68   03/20/17 132/82   02/28/17 113/67       See Nate  Pharmacy Technician, [place]  [facility]  Phone: [phone]  Fax: [phone]

## 2017-10-02 NOTE — TELEPHONE ENCOUNTER
Omeprazole      Last Written Prescription Date: 06/22/17  Last Fill Quantity: 90,  # refills: 3   Last Office Visit with G, P or [facility] prescribing provider: 07/19/17

## 2017-10-03 RX ORDER — OMEPRAZOLE 40 MG/1
CAPSULE, DELAYED RELEASE ORAL
Qty: 90 CAPSULE | Refills: 0 | Status: SHIPPED | OUTPATIENT
Start: 2017-10-03 | End: 2018-01-16

## 2017-10-03 NOTE — TELEPHONE ENCOUNTER
Prescription approved per [facility] Refill Protocol.  Patient has not been seen for GERD > 1 year  One time refill    Hermila MARTINEZ Rn

## 2018-01-12 DIAGNOSIS — K21.9 GERD (GASTROESOPHAGEAL REFLUX DISEASE): ICD-10-CM

## 2018-01-12 RX ORDER — OMEPRAZOLE 40 MG/1
CAPSULE, DELAYED RELEASE ORAL
Qty: 90 CAPSULE | Refills: 0 | OUTPATIENT
Start: 2018-01-12

## 2018-01-16 ENCOUNTER — OFFICE VISIT (OUTPATIENT)
Dept: FAMILY MEDICINE | Facility: CLINIC | Age: 53
End: 2018-01-16
Payer: COMMERCIAL

## 2018-01-16 VITALS
DIASTOLIC BLOOD PRESSURE: 81 MMHG | SYSTOLIC BLOOD PRESSURE: 134 MMHG | RESPIRATION RATE: 16 BRPM | HEART RATE: 83 BPM | HEIGHT: 64 IN | WEIGHT: 238 LBS | TEMPERATURE: 97 F | BODY MASS INDEX: 40.63 KG/M2

## 2018-01-16 DIAGNOSIS — Z23 NEED FOR PROPHYLACTIC VACCINATION AND INOCULATION AGAINST INFLUENZA: ICD-10-CM

## 2018-01-16 DIAGNOSIS — K21.9 GASTROESOPHAGEAL REFLUX DISEASE WITHOUT ESOPHAGITIS: Primary | ICD-10-CM

## 2018-01-16 PROCEDURE — 99213 OFFICE O/P EST LOW 20 MIN: CPT | Mod: 25 | Performed by: NURSE PRACTITIONER

## 2018-01-16 PROCEDURE — 90686 IIV4 VACC NO PRSV 0.5 ML IM: CPT | Performed by: NURSE PRACTITIONER

## 2018-01-16 PROCEDURE — G0008 ADMIN INFLUENZA VIRUS VAC: HCPCS | Performed by: NURSE PRACTITIONER

## 2018-01-16 RX ORDER — DIAZEPAM 10 MG
10 TABLET ORAL EVERY 6 HOURS PRN
COMMUNITY
End: 2021-01-22

## 2018-01-16 ASSESSMENT — ANXIETY QUESTIONNAIRES
1. FEELING NERVOUS, ANXIOUS, OR ON EDGE: NEARLY EVERY DAY
7. FEELING AFRAID AS IF SOMETHING AWFUL MIGHT HAPPEN: SEVERAL DAYS
3. WORRYING TOO MUCH ABOUT DIFFERENT THINGS: SEVERAL DAYS
IF YOU CHECKED OFF ANY PROBLEMS ON THIS QUESTIONNAIRE, HOW DIFFICULT HAVE THESE PROBLEMS MADE IT FOR YOU TO DO YOUR WORK, TAKE CARE OF THINGS AT HOME, OR GET ALONG WITH OTHER PEOPLE: SOMEWHAT DIFFICULT
5. BEING SO RESTLESS THAT IT IS HARD TO SIT STILL: NOT AT ALL
6. BECOMING EASILY ANNOYED OR IRRITABLE: NEARLY EVERY DAY
GAD7 TOTAL SCORE: 11
2. NOT BEING ABLE TO STOP OR CONTROL WORRYING: NOT AT ALL

## 2018-01-16 ASSESSMENT — PATIENT HEALTH QUESTIONNAIRE - PHQ9
SUM OF ALL RESPONSES TO PHQ QUESTIONS 1-9: 17
5. POOR APPETITE OR OVEREATING: NEARLY EVERY DAY

## 2018-01-16 NOTE — NURSING NOTE
"Chief Complaint   Patient presents with     Gastrophageal Reflux     Recheck/refills.       Initial /81  Pulse 83  Temp 97  F (36.1  C) (Tympanic)  Resp 16  Ht 5' 3.5\" (1.613 m)  Wt 238 lb (108 kg)  LMP 01/21/2017  BMI 41.5 kg/m2 Estimated body mass index is 41.5 kg/(m^2) as calculated from the following:    Height as of this encounter: 5' 3.5\" (1.613 m).    Weight as of this encounter: 238 lb (108 kg).    Medication Reconciliation:  complete    Miguelina Jennings CMA (AAMA)  "

## 2018-01-16 NOTE — PROGRESS NOTES

## 2018-01-16 NOTE — MR AVS SNAPSHOT
After Visit Summary   1/16/2018    Socorro Oakes    MRN: [id number]           Patient Information     Date Of Birth          1965        Visit Information        Provider Department      1/16/2018 10:00 AM Jennifer Riley APRN CNP [facility]        Today's Diagnoses     Gastroesophageal reflux disease without esophagitis    -  1      Care Instructions    1. Try to take 1 capsule of Omeprazole verses 2             Thank you for choosing [facility].  You may be receiving a survey in the mail from [facility] regarding your visit today.  Please take a few minutes to complete and return the survey to let us know how we are doing.      If you have questions or concerns, please contact us via [employer] or you can contact your care team at [phone].    Our Clinic hours are:  Monday 6:40 am  to 7:00 pm  Tuesday -Friday 6:40 am to 5:00 pm    The [place] outpatient lab hours are:  Monday - Friday 6:10 am to 4:45 pm  Saturdays 7:00 am to 11:00 am  Appointments are required, call [phone]    If you have clinical questions after hours or would like to schedule an appointment,  call the clinic at [phone].            Follow-ups after your visit        Your next 10 appointments already scheduled     Mar 07, 2018 11:00 AM CST   [facility]    [address] [address] [phone]              Who to contact     If you have questions or need follow up information about today's clinic visit or your schedule please contact [facility] directly at [phone].  Normal or non-critical lab and imaging results will be communicated to you by MyChart, letter or phone within 4 business days after the clinic has received the results. If you do not hear from us within 7 days, please contact the clinic through [employer] or phone. If you have a critical or abnormal lab result, we  "will notify you by phone as soon as possible.  Submit refill requests through [employer] or call your pharmacy and they will forward the refill request to us. Please allow 3 business days for your refill to be completed.          Additional Information About Your Visit        [employer] Information     [employer] gives you secure access to your electronic health record. If you see a primary care provider, you can also send messages to your care team and make appointments. If you have questions, please call your primary care clinic.  If you do not have a primary care provider, please call [phone] and they will assist you.        Care EveryWhere ID     This is your Care EveryWhere ID. This could be used by other organizations to access your [facility] medical records  [id number]        Your Vitals Were     Pulse Temperature Respirations Height Last Period BMI (Body Mass Index)    83 97  F (36.1  C) (Tympanic) 16 5' 3.5\" (1.613 m) 01/21/2017 41.5 kg/m2       Blood Pressure from Last 3 Encounters:   01/16/18 134/81   09/06/17 125/68   03/20/17 132/82    Weight from Last 3 Encounters:   01/16/18 238 lb (108 kg)   09/06/17 211 lb 9.6 oz (96 kg)   03/20/17 227 lb (103 kg)              Today, you had the following     No orders found for display         Today's Medication Changes          These changes are accurate as of: 1/16/18 10:23 AM.  If you have any questions, ask your nurse or doctor.               These medicines have changed or have updated prescriptions.        Dose/Directions    omeprazole 20 MG CR capsule   Commonly known as:  priLOSEC   This may have changed:  See the new instructions.   Used for:  Gastroesophageal reflux disease without esophagitis   Changed by:  Jennifer Riley APRN CNP        Dose:  40 mg   Take 2 capsules (40 mg) by mouth daily   Quantity:  60 capsule   Refills:  3            Where to get your medicines      These medications were sent to [facility] [address]  [address] [facility] [address] [facility] [address]     Phone:  [phone]     omeprazole 20 MG CR capsule                Primary Care Provider Office Phone # Fax #    RAJ Macias [phone] [address] [address]        Goals        General    I want to be able to understand more about my medications and need for taking them routinly  (pt-stated)     Notes - Note created  2/4/2015  2:07 PM by Leia Xiao LSW    As of today's date 2/4/2015 goal is met at 51 - 75%.   Goal Status:  Active  Setting up MTM referral         I want to improve my independent living skills (pt-stated)     Notes - Note created  2/4/2015  2:05 PM by Leia Xiao LSW    As of today's date 2/4/2015 goal is met at 0 - 25%.   Goal Status:  just starting  Just starting out          Equal Access to Services     CARLOS ZAVALA AH: Hadii aad ku hadasho Soomaali, waaxda luqadaha, qaybta kaalmada adeegyada, waxay idiin hayaan hillaryeg ana noriega . So [facility] [phone].    ATENCIÓN: Si habla español, tiene a perdue disposición servicios gratuitos de asistencia lingüística. Llame al [phone].    We comply with applicable federal civil rights laws and [place] laws. We do not discriminate on the basis of race, color, national origin, age, disability, sex, sexual orientation, or gender identity.            Thank you!     Thank you for choosing [facility]  for your care. Our goal is always to provide you with excellent care. Hearing back from our patients is one way we can continue to improve our services. Please take a few minutes to complete the written survey that you may receive in the mail after your visit with us. Thank you!             Your Updated Medication List - Protect others around you: Learn how to safely use, store and throw away your medicines at www.disposemymeds.org.          This list is accurate as of: 1/16/18 10:23 AM.  Always use your most recent med list.                    Brand Name Dispense Instructions for use Diagnosis    ADVIL PO      Take 400 mg by mouth every 6 hours as needed.        AMBIEN PO      Take 10 mg by mouth At Bedtime        amphetamine-dextroamphetamine 30 MG per 24 hr capsule    ADDERALL XR     Take 30 mg by mouth 2 times daily        diazepam 10 MG tablet    VALIUM     Take 10 mg by mouth every 6 hours as needed for anxiety or sleep        doxepin 75 MG capsule    SINEquan     Take 75 mg by mouth At Bedtime        lamoTRIgine 150 MG tablet    LaMICtal     Take 150 mg by mouth every evening        LATUDA 80 MG Tabs tablet   Generic drug:  lurasidone      Take 80 mg by mouth every evening        omeprazole 20 MG CR capsule    priLOSEC    60 capsule    Take 2 capsules (40 mg) by mouth daily    Gastroesophageal reflux disease without esophagitis       order for ADELA      Socorro was set up on replacement machine: [employer]; Type  Airsense 10; Serial Number: [id number]; Modem Number: 023; Pressure: SET PRESSURE 9 CM H20;  Mask of choice: none given; per patient she just opened a new one at home; Heated tubing        pramipexole 0.25 MG tablet    MIRAPEX    90 tablet    Take 1 tablet (0.25 mg) by mouth At Bedtime    Restless legs syndrome (RLS)       prazosin 2 MG capsule    MINIPRESS     Take 2 mg by mouth 2 times daily        PRISTIQ PO      Take 150 mg by mouth every evening        * SUMAtriptan Succinate Refill 6 MG/0.5ML Soln    IMITREX    0.5 mL    Inject 6mg as needed. MR x1 after 1-2 hrs as needed. Limit 2 doses in 24 hrs.    Migraines       * SUMAtriptan 100 MG tablet    IMITREX    6 tablet    Take 1 tablet (100 mg) by mouth at onset of headache for migraine May repeat in 2 hours if needed: max 2/day;    Migraine without aura and without status migrainosus, not intractable       TYLENOL 500 MG tablet   Generic drug:  acetaminophen      2 TABLETS ORALLY 4 TIMES DAILY AS NEEDED        verapamil 120 MG 24 hr capsule    VERELAN    180 capsule    Take 1  capsule (120 mg) by mouth 2 times daily    Migraine with aura and without status migrainosus, not intractable       WELLBUTRIN  MG 24 hr tablet   Generic drug:  buPROPion     30 tablet    Take 450 mg by mouth every morning        XANAX PO      Take 0.5-1 mg by mouth daily as needed        * Notice:  This list has 2 medication(s) that are the same as other medications prescribed for you. Read the directions carefully, and ask your doctor or other care provider to review them with you.

## 2018-01-16 NOTE — PROGRESS NOTES
SUBJECTIVE:   Socorro Oakes is a 52 year old female who presents to clinic today for the following health issues:      Medication Followup of OMEPRAZOLE 40 MG    Taking Medication as prescribed: yes    Side Effects:  None    Medication Helping Symptoms:  Yes    - patient states that if she misses the dose she has a lot of heartburn and reflux symptoms    - states that any of the OTC medications do not work for her such as TUMS and Zantac  Patient had EGD in 2009- which was normal.     -------------------------------------    Problem list and histories reviewed & adjusted, as indicated.  Additional history: as documented    Patient Active Problem List   Diagnosis     MRSA     h/o multiple concussion      Hyperlipidemia LDL goal <160     Health Care Home     GERD (gastroesophageal reflux disease)     DJD (degenerative joint disease), lumbar     Sleep apnea     Migraine     Moderate major depression (H)     Schizophrenia (H)     ADD (attention deficit disorder with hyperactivity)     Restless legs syndrome (RLS)     Memory loss     Essential hypertension, benign     Past Surgical History:   Procedure Laterality Date     CHOLECYSTECTOMY, LAPOROSCOPIC  4-30-09     COLONOSCOPY N/A 6/5/2015    Procedure: COLONOSCOPY;  Surgeon: Robe Delgado MD;  Location: [facility]     INJECT EPIDURAL LUMBAR  7/11/2011    Procedure:INJECT EPIDURAL LUMBAR; BRIE with Jeano--; Surgeon:GENERIC ANESTHESIA PROVIDER; Location:[facility]     INJECT EPIDURAL LUMBAR  9/28/2011    Procedure:INJECT EPIDURAL LUMBAR; BRIE with Flouro--; Surgeon:GENERIC ANESTHESIA PROVIDER; Location:[facility]     INJECT EPIDURAL LUMBAR  12/12/2011    Procedure:INJECT EPIDURAL LUMBAR; BRIE with Fluoro - Dr. Landry; Surgeon:GENERIC ANESTHESIA PROVIDER; Location:[facility]     KNEE SURGERY      1- 15 yr ago     LAPAROSCOPIC APPENDECTOMY       MANDIBLE SURGERY      6x surgeries from 1983- 1994     SURGICAL HISTORY OF -   10/17/05    left knee surgery       "SURGICAL HISTORY OF -   1982,1983,1984x2,1993,    TMJ Surgery x5     SURGICAL HISTORY OF -       ENT Tubes       SURGICAL HISTORY OF -   1996    toe     TONSILLECTOMY & ADENOIDECTOMY         Social History   Substance Use Topics     Smoking status: Never Smoker     Smokeless tobacco: Never Used     Alcohol use No     Family History   Problem Relation Age of Onset     Arthritis Mother      rheumatoid/had knee replacement     Bipolar Disorder Mother      Migraines Mother      Depression Father      comitted suicide age 48     Suicide Father      Alcohol/Drug Brother      Allergies Sister      Migraines Sister      Anxiety Disorder Maternal Grandfather      Bipolar Disorder Daughter      Migraines Brother      Migraines Sister      Migraines Brother      Migraines Sister      CEREBROVASCULAR DISEASE No family hx of              Reviewed and updated as needed this visit by clinical staffTobacco  Allergies  Med Hx  Surg Hx  Fam Hx  Soc Hx      Reviewed and updated as needed this visit by Provider         ROS:  Constitutional, HEENT, cardiovascular, pulmonary, GI, , musculoskeletal, neuro, skin, endocrine and psych systems are negative, except as otherwise noted.      OBJECTIVE:   /81  Pulse 83  Temp 97  F (36.1  C) (Tympanic)  Resp 16  Ht 5' 3.5\" (1.613 m)  Wt 238 lb (108 kg)  LMP 01/21/2017  BMI 41.5 kg/m2  Body mass index is 41.5 kg/(m^2).  GENERAL: alert, no distress and obese  RESP: unlabored  MS: no gross musculoskeletal defects noted, no edema    Diagnostic Test Results:  none     ASSESSMENT/PLAN:       1. Gastroesophageal reflux disease without esophagitis    - omeprazole (PRILOSEC) 20 MG CR capsule; Take 2 capsules (40 mg) by mouth daily  Dispense: 60 capsule; Refill: 3  - encouraged to lose weight- keep a food diary     RAJ Macias [facility]  "

## 2018-01-16 NOTE — PATIENT INSTRUCTIONS
1. Try to take 1 capsule of Omeprazole verses 2             Thank you for choosing [facility].  You may be receiving a survey in the mail from [employer] regarding your visit today.  Please take a few minutes to complete and return the survey to let us know how we are doing.      If you have questions or concerns, please contact us via [employer] or you can contact your care team at [phone].    Our Clinic hours are:  Monday 6:40 am  to 7:00 pm  Tuesday -Friday 6:40 am to 5:00 pm    The [place] outpatient lab hours are:  Monday - Friday 6:10 am to 4:45 pm  Saturdays 7:00 am to 11:00 am  Appointments are required, call [phone]    If you have clinical questions after hours or would like to schedule an appointment,  call the clinic at [phone].

## 2018-01-17 ASSESSMENT — ANXIETY QUESTIONNAIRES: GAD7 TOTAL SCORE: 11

## 2018-03-07 ENCOUNTER — OFFICE VISIT (OUTPATIENT)
Dept: NEUROLOGY | Facility: CLINIC | Age: 53
End: 2018-03-07
Payer: COMMERCIAL

## 2018-03-07 VITALS
DIASTOLIC BLOOD PRESSURE: 74 MMHG | HEART RATE: 70 BPM | RESPIRATION RATE: 16 BRPM | HEIGHT: 64 IN | TEMPERATURE: 98.1 F | BODY MASS INDEX: 40.46 KG/M2 | WEIGHT: 237 LBS | SYSTOLIC BLOOD PRESSURE: 129 MMHG

## 2018-03-07 DIAGNOSIS — G43.109 MIGRAINE WITH AURA AND WITHOUT STATUS MIGRAINOSUS, NOT INTRACTABLE: Primary | ICD-10-CM

## 2018-03-07 DIAGNOSIS — R41.89 SUBJECTIVE MEMORY COMPLAINTS: ICD-10-CM

## 2018-03-07 DIAGNOSIS — G25.81 RESTLESS LEGS SYNDROME (RLS): ICD-10-CM

## 2018-03-07 PROCEDURE — 99215 OFFICE O/P EST HI 40 MIN: CPT | Performed by: PSYCHIATRY & NEUROLOGY

## 2018-03-07 RX ORDER — PRAMIPEXOLE DIHYDROCHLORIDE 0.25 MG/1
0.25 TABLET ORAL AT BEDTIME
Qty: 90 TABLET | Refills: 1 | Status: SHIPPED | OUTPATIENT
Start: 2018-03-07 | End: 2018-09-05

## 2018-03-07 RX ORDER — VERAPAMIL HYDROCHLORIDE 120 MG/1
120 CAPSULE, EXTENDED RELEASE ORAL 2 TIMES DAILY
Qty: 180 CAPSULE | Refills: 1 | Status: SHIPPED | OUTPATIENT
Start: 2018-03-07 | End: 2019-03-06

## 2018-03-07 ASSESSMENT — MONTREAL COGNITIVE ASSESSMENT (MOCA)
4. NAME EACH OF THE THREE ANIMALS SHOWN: 3
10. [FLUENCY] NAME WORDS STARTING WITH DESIGNATED LETTER: 1
8. SERIAL SUBTRACTION OF 7S: 3
VISUOSPATIAL/EXECUTIVE SUBSCORE: 5
WHAT LEVEL OF EDUCATION WAS ATTAINED: 0
7. [VIGILENCE] TAP WHEN HEARING DESIGNATED LETTER: 1
13. ORIENTATION SUBSCORE: 6
9. REPEAT EACH SENTENCE: 2
11. FOR EACH PAIR OF WORDS, WHAT CATEGORY DO THEY BELONG TO (OUT OF 2): 1
6. READ LIST OF DIGITS [FORWARD/BACKWARD]: 2
12. MEMORY INDEX SCORE: 2
WHAT IS THE TOTAL SCORE (OUT OF 30): 26

## 2018-03-07 NOTE — PROGRESS NOTES
ESTABLISHED PATIENT NEUROLOGY NOTE    DATE OF VISIT: 3/7/2018  MRN: [id number]  PATIENT NAME: Socorro Oakes  YOB: 1965    Chief Complaint   Patient presents with     RECHECK     H/A, Memory issues      SUBJECTIVE:                                                      HISTORY OF PRESENT ILLNESS:  Socorro is here for follow up regarding migraine headache sand memory concerns. When I met with the patient about 6 months ago, she reported that the headaches were stable on Verapamil, Imitrex, ibuprofen and Tylenol as needed.     Her main concerns was worsening memory (details, short term recall, reliance on alarms). She had undergone Neuropsych testing in the past, actually several times. Most recently by Dr. Dobbs in 2015 all of which had questionable validity/underperformance. Her subjective cognitive concerns were felt to be 2/2 functional (psychiatric) factors.     I tested her memory in clinic 6 months ago and the MoCA score was normal. I offered to re-refer her back to Neuropsych for formalized retesting, but she was not interested. The patient preferred to try addressing some of her life stressors instead.     She also has a history of restless legs syndrome. She is on Mirapex for this and it was reportedly well-controlled at her last visit.     TSH and B12 levels were acceptable in 9.2017.     Today the patient tells me that headaches continue to be well-controlled. She rarely uses the Imitrex. She does use chocolate bars with marijuana from her daughter in [place], which she says have a positive impact on the headaches. She does mention that she has a hard time telling if her headaches will progress to a migraine. If she takes the Imitrex early, it does continue to work. She denies side effects from the medications.     No changes in the memory. She says that she switched from Xanax to Valium. She and her psychiatrist also decided to stopped the Wellbutrin and the Adderall and change the prazosin  dosing. She feels that her mood is okay. Sleep is okay. No better, no worse. She has ALEXA.    CURRENT MEDICATIONS:     Current Outpatient Prescriptions on File Prior to Visit:  diazepam (VALIUM) 10 MG tablet Take 10 mg by mouth every 6 hours as needed for anxiety or sleep   omeprazole (PRILOSEC) 20 MG CR capsule Take 2 capsules (40 mg) by mouth daily   pramipexole (MIRAPEX) 0.25 MG tablet Take 1 tablet (0.25 mg) by mouth At Bedtime   verapamil (VERELAN) 120 MG 24 hr capsule Take 1 capsule (120 mg) by mouth 2 times daily   prazosin (MINIPRESS) 2 MG capsule Take 2 mg by mouth 2 times daily    SUMAtriptan (IMITREX) 100 MG tablet Take 1 tablet (100 mg) by mouth at onset of headache for migraine May repeat in 2 hours if needed: max 2/day;   DoxePIN (SINEQUAN) 75 MG capsule Take 75 mg by mouth At Bedtime   ORDER FOR DME Socorro was set up on replacement machine: [employer]; Type  Fablistic 10; Serial Number: [id number]; Modem Number: 023; Pressure: SET PRESSURE 9 CM H20; Mask of choice: none given; per patient she just opened a new one at home; Heated tubing   Zolpidem Tartrate (AMBIEN PO) Take 10 mg by mouth At Bedtime   lamoTRIgine (LAMICTAL) 150 MG tablet Take 150 mg by mouth every evening    SUMAtriptan Succinate Refill (IMITREX) 6 MG/0.5ML SOLN Inject 6mg as needed. MR x1 after 1-2 hrs as needed. Limit 2 doses in 24 hrs.   Desvenlafaxine Succinate (PRISTIQ PO) Take 150 mg by mouth every evening    lurasidone (LATUDA) 80 MG TABS tablet Take 80 mg by mouth every evening    Ibuprofen (ADVIL PO) Take 400 mg by mouth every 6 hours as needed.   TYLENOL EXTRA STRENGTH 500 MG OR TABS 2 TABLETS ORALLY 4 TIMES DAILY AS NEEDED     No current facility-administered medications on file prior to visit.     RECENT DIAGNOSTIC STUDIES:   Labs:   Results for orders placed or performed in visit on 09/06/17   CBC with platelets   Result Value Ref Range    WBC 11.3 (H) 4.0 - 11.0 10e9/L    RBC Count 4.69 3.8 - 5.2 10e12/L    Hemoglobin 12.4 11.7  "- 15.7 g/dL    Hematocrit 38.2 35.0 - 47.0 %    MCV 81 78 - 100 fl    MCH 26.4 (L) 26.5 - 33.0 pg    MCHC 32.5 31.5 - 36.5 g/dL    RDW 17.0 (H) 10.0 - 15.0 %    Platelet Count 263 150 - 450 10e9/L   Comprehensive metabolic panel (BMP + Alb, Alk Phos, ALT, AST, Total. Bili, TP)   Result Value Ref Range    Sodium 137 133 - 144 mmol/L    Potassium 4.2 3.4 - 5.3 mmol/L    Chloride 102 94 - 109 mmol/L    Carbon Dioxide 28 20 - 32 mmol/L    Anion Gap 7 3 - 14 mmol/L    Glucose 100 (H) 70 - 99 mg/dL    Urea Nitrogen 11 7 - 30 mg/dL    Creatinine 0.93 0.52 - 1.04 mg/dL    GFR Estimate 63 >60 mL/min/1.7m2    GFR Estimate If Black 76 >60 mL/min/1.7m2    Calcium 9.7 8.5 - 10.1 mg/dL    Bilirubin Total 0.3 0.2 - 1.3 mg/dL    Albumin 3.8 3.4 - 5.0 g/dL    Protein Total 6.9 6.8 - 8.8 g/dL    Alkaline Phosphatase 74 40 - 150 U/L    ALT 21 0 - 50 U/L    AST 10 0 - 45 U/L   TSH with free T4 reflex   Result Value Ref Range    TSH 3.45 0.40 - 4.00 mU/L   Vitamin B12   Result Value Ref Range    Vitamin B12 428 193 - 986 pg/mL       REVIEW OF SYSTEMS:                                                      10-point review of systems is negative except as mentioned above in HPI.     EXAM:                                                      Physical Exam:   Vitals: /74 (BP Location: Right arm, Patient Position: Chair, Cuff Size: Adult Regular)  Pulse 70  Temp 98.1  F (36.7  C) (Tympanic)  Resp 16  Ht 1.613 m (5' 3.5\")  Wt 107.5 kg (237 lb)  BMI 41.32 kg/m2  BMI= Body mass index is 41.32 kg/(m^2).  GENERAL: NAD. Blunted affect.  HEENT: No TTP of neck, scalp.   Focused Neurologic:  MENTAL STATUS: Alert, attentive. Speech is fluent. Normal comprehension. MoCA: 26/30 (normal is 26 and above). Normal concentration. Adequate fund of knowledge.   CRANIAL NERVES: Discs flat. Visual fields intact to confrontation. Pupils equally, round and reactive to light. Facial sensation and movement normal. EOM full. Hearing intact to conversation. " Trapezius strength intact. Palate moves symmetrically. Tongue midline.  MOTOR: 5/5 in proximal and distal muscle groups of upper and lower extremities. Tone and bulk normal.   DTRs: Intact and symmetric in UEs and patellae.  SENSATION: Normal light touch throughout.   COORDINATION: Normal fine motor movements.  STATION AND GAIT: Romberg negative. Tandem normal.  CV: RRR. S1, S2.   NECK: No bruits.    ASSESSMENT and PLAN:                                                      Assessment and Plan:    ICD-10-CM    1. Migraine with aura and without status migrainosus, not intractable G43.109    2. Restless legs syndrome (RLS) G25.81    3. Subjective memory complaints R41.89         Ms. Oakes is a pleasant 53 yo woman with a complicated psychiatric history, seen in neurology for migraine headaches, restless legs syndrome and memory concerns. The headaches and restless legs syndrome symptoms seem to be well-controlled on her current medications, so we will not make any changes in these today. Plan is to continue the Verapamil 120mg BID, Imitrex as needed and the Mirapex 0.25mg QHS.     The patient's memory concerns are stable from her standpoint and she has not really had any additional functional impairment as a results of the memory. Her MoCA score was again normal in clinic today. I reassured the patient with continued efforts to stabilize her mood, I would expect to see improvement of the cognition. We may do some occupational therapy or repeat Neuropsych testing in the future. For now we will just plan to monitor clinically again in 6 months.    The patient understands and agrees with the plan.     Total Time: 40 minutes were spent with the patient. More than 50% of the time spent on counseling (as described above in Assessment and Plan) /coordinating the care.    Nancy Reed MD  Neurology

## 2018-03-07 NOTE — LETTER
3/7/2018         RE: Socorro Oakes  [address] [place] [address]        Dear Colleague,    Thank you for referring your patient, Socorro Oakes, to the [facility]. Please see a copy of my visit note below.    ESTABLISHED PATIENT NEUROLOGY NOTE    DATE OF VISIT: 3/7/2018  MRN: [id number]  PATIENT NAME: Socorro Oakes  YOB: 1965    Chief Complaint   Patient presents with     RECHECK     H/A, Memory issues      SUBJECTIVE:                                                      HISTORY OF PRESENT ILLNESS:  Socorro is here for follow up regarding migraine headache sand memory concerns. When I met with the patient about 6 months ago, she reported that the headaches were stable on Verapamil, Imitrex, ibuprofen and Tylenol as needed.     Her main concerns was worsening memory (details, short term recall, reliance on alarms). She had undergone Neuropsych testing in the past, actually several times. Most recently by Dr. Dobbs in 2015 all of which had questionable validity/underperformance. Her subjective cognitive concerns were felt to be 2/2 functional (psychiatric) factors.     I tested her memory in clinic 6 months ago and the MoCA score was normal. I offered to re-refer her back to Neuropsych for formalized retesting, but she was not interested. The patient preferred to try addressing some of her life stressors instead.     She also has a history of restless legs syndrome. She is on Mirapex for this and it was reportedly well-controlled at her last visit.     TSH and B12 levels were acceptable in 9.2017.     Today the patient tells me that headaches continue to be well-controlled. She rarely uses the Imitrex. She does use chocolate bars with marijuana from her daughter in [place], which she says have a positive impact on the headaches. She does mention that she has a hard time telling if her headaches will progress to a migraine. If she takes the Imitrex early, it does continue to work.  She denies side effects from the medications.     No changes in the memory. She says that she switched from Xanax to Valium. She and her psychiatrist also decided to stopped the Wellbutrin and the Adderall and change the prazosin dosing. She feels that her mood is okay. Sleep is okay. No better, no worse. She has ALEXA.    CURRENT MEDICATIONS:     Current Outpatient Prescriptions on File Prior to Visit:  diazepam (VALIUM) 10 MG tablet Take 10 mg by mouth every 6 hours as needed for anxiety or sleep   omeprazole (PRILOSEC) 20 MG CR capsule Take 2 capsules (40 mg) by mouth daily   pramipexole (MIRAPEX) 0.25 MG tablet Take 1 tablet (0.25 mg) by mouth At Bedtime   verapamil (VERELAN) 120 MG 24 hr capsule Take 1 capsule (120 mg) by mouth 2 times daily   prazosin (MINIPRESS) 2 MG capsule Take 2 mg by mouth 2 times daily    SUMAtriptan (IMITREX) 100 MG tablet Take 1 tablet (100 mg) by mouth at onset of headache for migraine May repeat in 2 hours if needed: max 2/day;   DoxePIN (SINEQUAN) 75 MG capsule Take 75 mg by mouth At Bedtime   ORDER FOR DME Socorro was set up on replacement machine: [employer]; Type  BeInSync 10; Serial Number: [id number]; Modem Number: 023; Pressure: SET PRESSURE 9 CM H20; Mask of choice: none given; per patient she just opened a new one at home; Heated tubing   Zolpidem Tartrate (AMBIEN PO) Take 10 mg by mouth At Bedtime   lamoTRIgine (LAMICTAL) 150 MG tablet Take 150 mg by mouth every evening    SUMAtriptan Succinate Refill (IMITREX) 6 MG/0.5ML SOLN Inject 6mg as needed. MR x1 after 1-2 hrs as needed. Limit 2 doses in 24 hrs.   Desvenlafaxine Succinate (PRISTIQ PO) Take 150 mg by mouth every evening    lurasidone (LATUDA) 80 MG TABS tablet Take 80 mg by mouth every evening    Ibuprofen (ADVIL PO) Take 400 mg by mouth every 6 hours as needed.   TYLENOL EXTRA STRENGTH 500 MG OR TABS 2 TABLETS ORALLY 4 TIMES DAILY AS NEEDED     No current facility-administered medications on file prior to visit.     RECENT  "DIAGNOSTIC STUDIES:   Labs:   Results for orders placed or performed in visit on 09/06/17   CBC with platelets   Result Value Ref Range    WBC 11.3 (H) 4.0 - 11.0 10e9/L    RBC Count 4.69 3.8 - 5.2 10e12/L    Hemoglobin 12.4 11.7 - 15.7 g/dL    Hematocrit 38.2 35.0 - 47.0 %    MCV 81 78 - 100 fl    MCH 26.4 (L) 26.5 - 33.0 pg    MCHC 32.5 31.5 - 36.5 g/dL    RDW 17.0 (H) 10.0 - 15.0 %    Platelet Count 263 150 - 450 10e9/L   Comprehensive metabolic panel (BMP + Alb, Alk Phos, ALT, AST, Total. Bili, TP)   Result Value Ref Range    Sodium 137 133 - 144 mmol/L    Potassium 4.2 3.4 - 5.3 mmol/L    Chloride 102 94 - 109 mmol/L    Carbon Dioxide 28 20 - 32 mmol/L    Anion Gap 7 3 - 14 mmol/L    Glucose 100 (H) 70 - 99 mg/dL    Urea Nitrogen 11 7 - 30 mg/dL    Creatinine 0.93 0.52 - 1.04 mg/dL    GFR Estimate 63 >60 mL/min/1.7m2    GFR Estimate If Black 76 >60 mL/min/1.7m2    Calcium 9.7 8.5 - 10.1 mg/dL    Bilirubin Total 0.3 0.2 - 1.3 mg/dL    Albumin 3.8 3.4 - 5.0 g/dL    Protein Total 6.9 6.8 - 8.8 g/dL    Alkaline Phosphatase 74 40 - 150 U/L    ALT 21 0 - 50 U/L    AST 10 0 - 45 U/L   TSH with free T4 reflex   Result Value Ref Range    TSH 3.45 0.40 - 4.00 mU/L   Vitamin B12   Result Value Ref Range    Vitamin B12 428 193 - 986 pg/mL       REVIEW OF SYSTEMS:                                                      10-point review of systems is negative except as mentioned above in HPI.     EXAM:                                                      Physical Exam:   Vitals: /74 (BP Location: Right arm, Patient Position: Chair, Cuff Size: Adult Regular)  Pulse 70  Temp 98.1  F (36.7  C) (Tympanic)  Resp 16  Ht 1.613 m (5' 3.5\")  Wt 107.5 kg (237 lb)  BMI 41.32 kg/m2  BMI= Body mass index is 41.32 kg/(m^2).  GENERAL: NAD. Blunted affect.  HEENT: No TTP of neck, scalp.   Focused Neurologic:  MENTAL STATUS: Alert, attentive. Speech is fluent. Normal comprehension. MoCA: 26/30 (normal is 26 and above). Normal " concentration. Adequate fund of knowledge.   CRANIAL NERVES: Discs flat. Visual fields intact to confrontation. Pupils equally, round and reactive to light. Facial sensation and movement normal. EOM full. Hearing intact to conversation. Trapezius strength intact. Palate moves symmetrically. Tongue midline.  MOTOR: 5/5 in proximal and distal muscle groups of upper and lower extremities. Tone and bulk normal.   DTRs: Intact and symmetric in UEs and patellae.  SENSATION: Normal light touch throughout.   COORDINATION: Normal fine motor movements.  STATION AND GAIT: Romberg negative. Tandem normal.  CV: RRR. S1, S2.   NECK: No bruits.    ASSESSMENT and PLAN:                                                      Assessment and Plan:    ICD-10-CM    1. Migraine with aura and without status migrainosus, not intractable G43.109    2. Restless legs syndrome (RLS) G25.81    3. Subjective memory complaints R41.89         Ms. Oakes is a pleasant 53 yo woman with a complicated psychiatric history, seen in neurology for migraine headaches, restless legs syndrome and memory concerns. The headaches and restless legs syndrome symptoms seem to be well-controlled on her current medications, so we will not make any changes in these today. Plan is to continue the Verapamil 120mg BID, Imitrex as needed and the Mirapex 0.25mg QHS.     The patient's memory concerns are stable from her standpoint and she has not really had any additional functional impairment as a results of the memory. Her MoCA score was again normal in clinic today. I reassured the patient with continued efforts to stabilize her mood, I would expect to see improvement of the cognition. We may do some occupational therapy or repeat Neuropsych testing in the future. For now we will just plan to monitor clinically again in 6 months.    The patient understands and agrees with the plan.     Total Time: 40 minutes were spent with the patient. More than 50% of the time spent on  counseling (as described above in Assessment and Plan) /coordinating the care.    Nancy Reed MD  Neurology                    Again, thank you for allowing me to participate in the care of your patient.        Sincerely,        Nancy Reed MD

## 2018-03-07 NOTE — NURSING NOTE
"Chief Complaint   Patient presents with     RECHECK     H/A, Memory issues        Initial /74 (BP Location: Right arm, Patient Position: Chair, Cuff Size: Adult Regular)  Pulse 70  Temp 98.1  F (36.7  C) (Tympanic)  Resp 16  Ht 1.613 m (5' 3.5\")  Wt 107.5 kg (237 lb)  BMI 41.32 kg/m2 Estimated body mass index is 41.32 kg/(m^2) as calculated from the following:    Height as of this encounter: 1.613 m (5' 3.5\").    Weight as of this encounter: 107.5 kg (237 lb).  BP completed using cuff size: regular long   Medications and allergies reviewed.      Winter REYNOLDS CMA     "

## 2018-03-12 ENCOUNTER — OFFICE VISIT (OUTPATIENT)
Dept: FAMILY MEDICINE | Facility: CLINIC | Age: 53
End: 2018-03-12
Payer: COMMERCIAL

## 2018-03-12 VITALS
BODY MASS INDEX: 40.98 KG/M2 | DIASTOLIC BLOOD PRESSURE: 75 MMHG | HEART RATE: 70 BPM | OXYGEN SATURATION: 98 % | WEIGHT: 235 LBS | SYSTOLIC BLOOD PRESSURE: 124 MMHG | TEMPERATURE: 96.8 F

## 2018-03-12 DIAGNOSIS — E55.9 VITAMIN D DEFICIENCY: ICD-10-CM

## 2018-03-12 DIAGNOSIS — B35.9 RINGWORM: Primary | ICD-10-CM

## 2018-03-12 DIAGNOSIS — Z79.899 HIGH RISK MEDICATIONS (NOT ANTICOAGULANTS) LONG-TERM USE: Primary | ICD-10-CM

## 2018-03-12 LAB
ALBUMIN SERPL-MCNC: 3.5 G/DL (ref 3.4–5)
ALP SERPL-CCNC: 72 U/L (ref 40–150)
ALT SERPL W P-5'-P-CCNC: 29 U/L (ref 0–50)
AST SERPL W P-5'-P-CCNC: 20 U/L (ref 0–45)
BASOPHILS # BLD AUTO: 0.1 10E9/L (ref 0–0.2)
BASOPHILS NFR BLD AUTO: 1.2 %
BILIRUB DIRECT SERPL-MCNC: <0.1 MG/DL (ref 0–0.2)
BILIRUB SERPL-MCNC: 0.3 MG/DL (ref 0.2–1.3)
CHOLEST SERPL-MCNC: 227 MG/DL
DEPRECATED CALCIDIOL+CALCIFEROL SERPL-MC: 26 UG/L (ref 20–75)
DIFFERENTIAL METHOD BLD: ABNORMAL
EOSINOPHIL # BLD AUTO: 0.2 10E9/L (ref 0–0.7)
EOSINOPHIL NFR BLD AUTO: 2.2 %
ERYTHROCYTE [DISTWIDTH] IN BLOOD BY AUTOMATED COUNT: 16.1 % (ref 10–15)
GLUCOSE SERPL-MCNC: 96 MG/DL (ref 70–99)
HBA1C MFR BLD: 6 % (ref 4.3–6)
HCT VFR BLD AUTO: 37.6 % (ref 35–47)
HDLC SERPL-MCNC: 58 MG/DL
HGB BLD-MCNC: 12 G/DL (ref 11.7–15.7)
LDLC SERPL CALC-MCNC: 137 MG/DL
LYMPHOCYTES # BLD AUTO: 1.8 10E9/L (ref 0.8–5.3)
LYMPHOCYTES NFR BLD AUTO: 22.9 %
MCH RBC QN AUTO: 25.2 PG (ref 26.5–33)
MCHC RBC AUTO-ENTMCNC: 31.9 G/DL (ref 31.5–36.5)
MCV RBC AUTO: 79 FL (ref 78–100)
MONOCYTES # BLD AUTO: 0.7 10E9/L (ref 0–1.3)
MONOCYTES NFR BLD AUTO: 9.4 %
NEUTROPHILS # BLD AUTO: 4.9 10E9/L (ref 1.6–8.3)
NEUTROPHILS NFR BLD AUTO: 64.3 %
NONHDLC SERPL-MCNC: 169 MG/DL
PLATELET # BLD AUTO: 292 10E9/L (ref 150–450)
PROLACTIN SERPL-MCNC: 62 UG/L (ref 3–27)
PROT SERPL-MCNC: 7 G/DL (ref 6.8–8.8)
RBC # BLD AUTO: 4.77 10E12/L (ref 3.8–5.2)
T4 FREE SERPL-MCNC: 0.81 NG/DL (ref 0.76–1.46)
TRIGL SERPL-MCNC: 162 MG/DL
TSH SERPL DL<=0.005 MIU/L-ACNC: 3.95 MU/L (ref 0.4–4)
WBC # BLD AUTO: 7.6 10E9/L (ref 4–11)

## 2018-03-12 PROCEDURE — 82947 ASSAY GLUCOSE BLOOD QUANT: CPT | Performed by: PSYCHIATRY & NEUROLOGY

## 2018-03-12 PROCEDURE — 84146 ASSAY OF PROLACTIN: CPT | Performed by: PSYCHIATRY & NEUROLOGY

## 2018-03-12 PROCEDURE — 84439 ASSAY OF FREE THYROXINE: CPT | Performed by: PSYCHIATRY & NEUROLOGY

## 2018-03-12 PROCEDURE — 83036 HEMOGLOBIN GLYCOSYLATED A1C: CPT | Performed by: PSYCHIATRY & NEUROLOGY

## 2018-03-12 PROCEDURE — 85025 COMPLETE CBC W/AUTO DIFF WBC: CPT | Performed by: PSYCHIATRY & NEUROLOGY

## 2018-03-12 PROCEDURE — 80076 HEPATIC FUNCTION PANEL: CPT | Performed by: PSYCHIATRY & NEUROLOGY

## 2018-03-12 PROCEDURE — 80061 LIPID PANEL: CPT | Performed by: PSYCHIATRY & NEUROLOGY

## 2018-03-12 PROCEDURE — 84443 ASSAY THYROID STIM HORMONE: CPT | Performed by: PSYCHIATRY & NEUROLOGY

## 2018-03-12 PROCEDURE — 99213 OFFICE O/P EST LOW 20 MIN: CPT | Performed by: NURSE PRACTITIONER

## 2018-03-12 PROCEDURE — 82784 ASSAY IGA/IGD/IGG/IGM EACH: CPT | Performed by: PSYCHIATRY & NEUROLOGY

## 2018-03-12 PROCEDURE — 36415 COLL VENOUS BLD VENIPUNCTURE: CPT | Performed by: PSYCHIATRY & NEUROLOGY

## 2018-03-12 PROCEDURE — 82306 VITAMIN D 25 HYDROXY: CPT | Performed by: PSYCHIATRY & NEUROLOGY

## 2018-03-12 RX ORDER — CLOTRIMAZOLE 1 %
CREAM (GRAM) TOPICAL 2 TIMES DAILY
Qty: 15 G | Refills: 1 | Status: SHIPPED | OUTPATIENT
Start: 2018-03-12 | End: 2018-09-05

## 2018-03-12 NOTE — PROGRESS NOTES
SUBJECTIVE:   Socorro Oakes is a 52 year old female who presents to clinic today for the following health issues:      Rash  Onset: 6 weeks    Description:   Location: right arm  Character: round, blotchy, raised, red  Itching (Pruritis): YES    Progression of Symptoms:  improving    Accompanying Signs & Symptoms:  Fever: no   Body aches or joint pain: no   Sore throat symptoms: no   Recent cold symptoms: no     History:   Previous similar rash: no     Precipitating factors:   Exposure to similar rash: no   New exposures: None   Recent travel: no     Alleviating factors:  Vanicream    Therapies Tried and outcome: Vanicream    -------------------------------------    Problem list and histories reviewed & adjusted, as indicated.  Additional history: as documented    Patient Active Problem List   Diagnosis     MRSA     h/o multiple concussion      Hyperlipidemia LDL goal <160     Health Care Home     GERD (gastroesophageal reflux disease)     DJD (degenerative joint disease), lumbar     Sleep apnea     Migraine     Moderate major depression (H)     Schizophrenia (H)     ADD (attention deficit disorder with hyperactivity)     Restless legs syndrome (RLS)     Memory loss     Essential hypertension, benign     Past Surgical History:   Procedure Laterality Date     CHOLECYSTECTOMY, LAPOROSCOPIC  4-30-09     COLONOSCOPY N/A 6/5/2015    Procedure: COLONOSCOPY;  Surgeon: Robe Delgado MD;  Location: [facility]     INJECT EPIDURAL LUMBAR  7/11/2011    Procedure:INJECT EPIDURAL LUMBAR; BRIE with Jeano--; Surgeon:GENERIC ANESTHESIA PROVIDER; Location:[facility]     INJECT EPIDURAL LUMBAR  9/28/2011    Procedure:INJECT EPIDURAL LUMBAR; BRIE with Flouro--; Surgeon:GENERIC ANESTHESIA PROVIDER; Location:[facility]     INJECT EPIDURAL LUMBAR  12/12/2011    Procedure:INJECT EPIDURAL LUMBAR; BRIE with Isela Landry; Surgeon:GENERIC ANESTHESIA PROVIDER; Location:[facility]     KNEE SURGERY      1- 15 yr ago      LAPAROSCOPIC APPENDECTOMY       MANDIBLE SURGERY      6x surgeries from 1983- 1994     SURGICAL HISTORY OF -   10/17/05    left knee surgery      SURGICAL HISTORY OF -   1982,1983,1984x2,1993,    TMJ Surgery x5     SURGICAL HISTORY OF -       ENT Tubes       SURGICAL HISTORY OF -   1996    toe     TONSILLECTOMY & ADENOIDECTOMY         Social History   Substance Use Topics     Smoking status: Never Smoker     Smokeless tobacco: Never Used     Alcohol use No     Family History   Problem Relation Age of Onset     Arthritis Mother      rheumatoid/had knee replacement     Bipolar Disorder Mother      Migraines Mother      Depression Father      comitted suicide age 48     Suicide Father      Alcohol/Drug Brother      Allergies Sister      Migraines Sister      Anxiety Disorder Maternal Grandfather      Bipolar Disorder Daughter      Migraines Brother      Migraines Sister      Migraines Brother      Migraines Sister      CEREBROVASCULAR DISEASE No family hx of            Reviewed and updated as needed this visit by clinical staff       Reviewed and updated as needed this visit by Provider         ROS:  Constitutional, HEENT, cardiovascular, pulmonary, GI, , musculoskeletal, neuro, skin, endocrine and psych systems are negative, except as otherwise noted.    OBJECTIVE:     /75 (BP Location: Left arm, Patient Position: Chair, Cuff Size: Adult Large)  Pulse 70  Temp 96.8  F (36  C) (Tympanic)  Wt 235 lb (106.6 kg)  SpO2 98%  BMI 40.98 kg/m2  Body mass index is 40.98 kg/(m^2).  GENERAL: healthy, alert and no distress  SKIN: right anti cubital fossa with circular patch     Diagnostic Test Results:  none     ASSESSMENT/PLAN:       1. Ringworm    - clotrimazole (LOTRIMIN) 1 % cream; Apply topically 2 times daily  Dispense: 15 g; Refill: 1        RAJ Macias [facility]

## 2018-03-12 NOTE — MR AVS SNAPSHOT
After Visit Summary   3/12/2018    Socorro Oakes    MRN: [id number]           Patient Information     Date Of Birth          1965        Visit Information        Provider Department      3/12/2018 8:00 AM Jennifer Riley APRN [facility]        Today's Diagnoses     Ringworm    -  1      Care Instructions      Ringworm of the Skin    Ringworm is a fungal infection of the skin. Despite the name, a worm doesn't cause it. The cause of ringworm is a fungus that infects the outer layers of the skin. It is also not caused by bed bugs, scabies, or lice. These are totally different.  The medical term for ringworm is tinea. It can affect most parts of your body, although it seems to do better in moist areas of the body and around hair. It can be on almost any part of your body, including:    Arms, hands, legs, chest, feet, and back    Scalp    Beard    Groin    Between the toes  Depending on where it is located, sometimes the name changes:    Tinea capitis (scalp)    Tinea cruris (groin)    Tinea corporis (body)    Tinea pedis (feet)  Causes  Ringworm is very common all over the world, including the [place] It can take less than 1 week up to 2 weeks before you develop the infection after being exposed. So, you may not figure out the exact cause.  It is spread through direct contact with:    An infected person or animal    Infected soil, or objects such as towels, clothing, and steele  Symptoms  At first you might not notice ringworm. Or you may just see a small, red, often raised itchy spot or pimple. Sometimes there may only be one spot. At other times there may be several. Ringworm can look slightly different on different parts of the body, but there are some things are always present:    Irregular, round, oval or ring-shaped, which is why it's called ringworm    Clearer or lighter color at the center, since it spreads from the center of the spot outward    Red or inflamed  look    Raised    Itchy    Scaly, dry, or flaky  Home care  Follow these tips to help care for yourself at home:    Leave it alone. Don't scratch at the rash or pick it. This can increase the chance of infection and scarring.    Take medicine as prescribed. If you were prescribed a cream, apply it exactly as directed. Make sure to put the cream not just on the rash, but also on the skin 1 or 2 inches around it. Medicine by mouth is sometimes needed, particularly for ringworm on the scalp. Take it as directed and until your healthcare provider says to stop.    Keep it from spreading to others. Untreated ringworm of the skin is contagious by skin-to-skin contact. Your child may return to school 2 days after treatment has started.  Prevention  To some degree, prevention depends on what part of your body was affected. In general, the following good hygiene can help.    Clean up after you get dirty or sweaty, or after using a locker room.    When possible, don t share steele and brushes.    Avoid having your skin and feet wet or damp for long periods.    Wear clean, loose-fitting underwear.  Follow-up care  Follow up with your healthcare provider as advised by our staff if the rash does not improve after 10 days of treatment or if the rash spreads to other areas of the body.  When to seek medical advice  Call your healthcare provider right away if any of these occur:    Redness around the rash gets worse    Fluid drains from the rash    Fever of 100.4 F (38 C) or higher, or as directed by your healthcare provider  Date Last Reviewed: 8/1/2016 2000-2017 [employer]. [address] [place] [address], [place] [address]. All rights reserved. This information is not intended as a substitute for professional medical care. Always follow your healthcare professional's instructions.                Follow-ups after your visit        Your next 10 appointments already scheduled     Mar 12, 2018  8:30 AM [place]   LAB with [facility]    [facility]    [address] [address]   [phone]           Please do not eat 10-12 hours before your appointment if you are coming in fasting for labs on lipids, cholesterol, or glucose (sugar). This does not apply to pregnant women. Water, hot tea and black coffee (with nothing added) are okay. Do not drink other fluids, diet soda or chew gum.            Sep 05, 2018 11:00 AM CDT   Return Visit with Nancy Reed MD   [facility]    [address] [address]   [phone]              Who to contact     If you have questions or need follow up information about today's clinic visit or your schedule please contact [facility] directly at [phone].  Normal or non-critical lab and imaging results will be communicated to you by MyChart, letter or phone within 4 business days after the clinic has received the results. If you do not hear from us within 7 days, please contact the clinic through [employer] or phone. If you have a critical or abnormal lab result, we will notify you by phone as soon as possible.  Submit refill requests through [employer] or call your pharmacy and they will forward the refill request to us. Please allow 3 business days for your refill to be completed.          Additional Information About Your Visit        [employer] Information     [employer] gives you secure access to your electronic health record. If you see a primary care provider, you can also send messages to your care team and make appointments. If you have questions, please call your primary care clinic.  If you do not have a primary care provider, please call [phone] and they will assist you.        Care EveryWhere ID     This is your Care EveryWhere ID. This could be used by other organizations to access your [place] medical records  [id number]        Your Vitals Were     Pulse  Temperature Pulse Oximetry BMI (Body Mass Index)          70 96.8  F (36  C) (Tympanic) 98% 40.98 kg/m2         Blood Pressure from Last 3 Encounters:   03/12/18 124/75   03/07/18 129/74   01/16/18 134/81    Weight from Last 3 Encounters:   03/12/18 235 lb (106.6 kg)   03/07/18 237 lb (107.5 kg)   01/16/18 238 lb (108 kg)              Today, you had the following     No orders found for display         Today's Medication Changes          These changes are accurate as of 3/12/18  8:15 AM.  If you have any questions, ask your nurse or doctor.               Start taking these medicines.        Dose/Directions    clotrimazole 1 % cream   Commonly known as:  LOTRIMIN   Used for:  Ringworm   Started by:  Jennifer Riley APRN CNP        Apply topically 2 times daily   Quantity:  15 g   Refills:  1            Where to get your medicines      These medications were sent to [facility] [id number] [facility] [address]     Phone:  [phone]     clotrimazole 1 % cream                Primary Care Provider Office Phone # Fax #    RAJ Macias [phone] [address] [address]        Goals        General    I want to be able to understand more about my medications and need for taking them routinly  (pt-stated)     Notes - Note created  2/4/2015  2:07 PM by Leia Xiao LSW    As of today's date 2/4/2015 goal is met at 51 - 75%.   Goal Status:  Active  Setting up MTM referral         I want to improve my independent living skills (pt-stated)     Notes - Note created  2/4/2015  2:05 PM by Leia Xiao LSW    As of today's date 2/4/2015 goal is met at 0 - 25%.   Goal Status:  just starting  Just starting out          Equal Access to Services     JAE ZAVALA AH: Hadii cele ramirez hadasho Soomaali, waaxda luqadaha, qaybta kaalmada adeegyashanelle, suzy whaley. [facility] [phone].    ATENCIÓN: Si  merry morrissey, tiene a perdue disposición servicios gratuitos de asistencia lingüística. Oscar montero [phone].    We comply with applicable federal civil rights laws and [place] laws. We do not discriminate on the basis of race, color, national origin, age, disability, sex, sexual orientation, or gender identity.            Thank you!     Thank you for choosing [facility]  for your care. Our goal is always to provide you with excellent care. Hearing back from our patients is one way we can continue to improve our services. Please take a few minutes to complete the written survey that you may receive in the mail after your visit with us. Thank you!             Your Updated Medication List - Protect others around you: Learn how to safely use, store and throw away your medicines at www.disposemymeds.org.          This list is accurate as of 3/12/18  8:15 AM.  Always use your most recent med list.                   Brand Name Dispense Instructions for use Diagnosis    ADVIL PO      Take 400 mg by mouth every 6 hours as needed.        AMBIEN PO      Take 10 mg by mouth At Bedtime        clotrimazole 1 % cream    LOTRIMIN    15 g    Apply topically 2 times daily    Ringworm       diazepam 10 MG tablet    VALIUM     Take 10 mg by mouth every 6 hours as needed for anxiety or sleep        doxepin 75 MG capsule    SINEquan     Take 75 mg by mouth At Bedtime        lamoTRIgine 150 MG tablet    LaMICtal     Take 150 mg by mouth every evening        LATUDA 80 MG Tabs tablet   Generic drug:  lurasidone      Take 80 mg by mouth every evening        omeprazole 20 MG CR capsule    priLOSEC    60 capsule    Take 2 capsules (40 mg) by mouth daily    Gastroesophageal reflux disease without esophagitis       order for DME      Socorro was set up on replacement machine: "[employer] Type  Airsense 10; Serial Number: [id number]; Modem Number: 023; Pressure: SET PRESSURE 9 CM H20;  Mask of choice: none given; per patient she just  opened a new one at home; Heated tubing        pramipexole 0.25 MG tablet    MIRAPEX    90 tablet    Take 1 tablet (0.25 mg) by mouth At Bedtime    Restless legs syndrome (RLS)       prazosin 2 MG capsule    MINIPRESS     Take 2 mg by mouth 2 times daily        PRISTIQ PO      Take 150 mg by mouth every evening        * SUMAtriptan Succinate Refill 6 MG/0.5ML Soln    IMITREX    0.5 mL    Inject 6mg as needed. MR x1 after 1-2 hrs as needed. Limit 2 doses in 24 hrs.    Migraines       * SUMAtriptan 100 MG tablet    IMITREX    6 tablet    Take 1 tablet (100 mg) by mouth at onset of headache for migraine May repeat in 2 hours if needed: max 2/day;    Migraine without aura and without status migrainosus, not intractable       TYLENOL 500 MG tablet   Generic drug:  acetaminophen      2 TABLETS ORALLY 4 TIMES DAILY AS NEEDED        verapamil 120 MG 24 hr capsule    VERELAN    180 capsule    Take 1 capsule (120 mg) by mouth 2 times daily    Migraine with aura and without status migrainosus, not intractable       * Notice:  This list has 2 medication(s) that are the same as other medications prescribed for you. Read the directions carefully, and ask your doctor or other care provider to review them with you.

## 2018-03-12 NOTE — PATIENT INSTRUCTIONS
Ringworm of the Skin    Ringworm is a fungal infection of the skin. Despite the name, a worm doesn't cause it. The cause of ringworm is a fungus that infects the outer layers of the skin. It is also not caused by bed bugs, scabies, or lice. These are totally different.  The medical term for ringworm is tinea. It can affect most parts of your body, although it seems to do better in moist areas of the body and around hair. It can be on almost any part of your body, including:    Arms, hands, legs, chest, feet, and back    Scalp    Beard    Groin    Between the toes  Depending on where it is located, sometimes the name changes:    Tinea capitis (scalp)    Tinea cruris (groin)    Tinea corporis (body)    Tinea pedis (feet)  Causes  Ringworm is very common all over the world, including the [place] It can take less than 1 week up to 2 weeks before you develop the infection after being exposed. So, you may not figure out the exact cause.  It is spread through direct contact with:    An infected person or animal    Infected soil, or objects such as towels, clothing, and steele  Symptoms  At first you might not notice ringworm. Or you may just see a small, red, often raised itchy spot or pimple. Sometimes there may only be one spot. At other times there may be several. Ringworm can look slightly different on different parts of the body, but there are some things are always present:    Irregular, round, oval or ring-shaped, which is why it's called ringworm    Clearer or lighter color at the center, since it spreads from the center of the spot outward    Red or inflamed look    Raised    Itchy    Scaly, dry, or flaky  Home care  Follow these tips to help care for yourself at home:    Leave it alone. Don't scratch at the rash or pick it. This can increase the chance of infection and scarring.    Take medicine as prescribed. If you were prescribed a cream, apply it exactly as directed. Make sure to put the cream not just on the  rash, but also on the skin 1 or 2 inches around it. Medicine by mouth is sometimes needed, particularly for ringworm on the scalp. Take it as directed and until your healthcare provider says to stop.    Keep it from spreading to others. Untreated ringworm of the skin is contagious by skin-to-skin contact. Your child may return to school 2 days after treatment has started.  Prevention  To some degree, prevention depends on what part of your body was affected. In general, the following good hygiene can help.    Clean up after you get dirty or sweaty, or after using a locker room.    When possible, don t share steele and brushes.    Avoid having your skin and feet wet or damp for long periods.    Wear clean, loose-fitting underwear.  Follow-up care  Follow up with your healthcare provider as advised by our staff if the rash does not improve after 10 days of treatment or if the rash spreads to other areas of the body.  When to seek medical advice  Call your healthcare provider right away if any of these occur:    Redness around the rash gets worse    Fluid drains from the rash    Fever of 100.4 F (38 C) or higher, or as directed by your healthcare provider  Date Last Reviewed: 8/1/2016 2000-2017 [employer]. [address] [place] [address], [place] [address]. All rights reserved. This information is not intended as a substitute for professional medical care. Always follow your healthcare professional's instructions.

## 2018-03-12 NOTE — NURSING NOTE
"Initial /75 (BP Location: Left arm, Patient Position: Chair, Cuff Size: Adult Large)  Pulse 70  Temp 96.8  F (36  C) (Tympanic)  Wt 235 lb (106.6 kg)  SpO2 98%  BMI 40.98 kg/m2 Estimated body mass index is 40.98 kg/(m^2) as calculated from the following:    Height as of 3/7/18: 5' 3.5\" (1.613 m).    Weight as of this encounter: 235 lb (106.6 kg). .    Stacy Robles    "

## 2018-03-13 ENCOUNTER — MYC MEDICAL ADVICE (OUTPATIENT)
Dept: FAMILY MEDICINE | Facility: CLINIC | Age: 53
End: 2018-03-13

## 2018-03-13 LAB — IGG SERPL-MCNC: 952 MG/DL (ref 695–1620)

## 2018-03-14 NOTE — TELEPHONE ENCOUNTER
Nissa-   Please see her mychart note.   Recent Labs   Lab Test  03/12/18   0828  03/13/14   1048  03/06/12   1051   CHOL  227*  214*  265*   HDL  58  65  67   LDL  137*  134*  171*   TRIG  162*  81  135   CHOLHDLRATIO   --   3.0  4.0     Elizabeth Reinoso RNC

## 2018-03-15 ENCOUNTER — OFFICE VISIT (OUTPATIENT)
Dept: FAMILY MEDICINE | Facility: CLINIC | Age: 53
End: 2018-03-15
Payer: COMMERCIAL

## 2018-03-15 VITALS
WEIGHT: 236.4 LBS | HEART RATE: 77 BPM | DIASTOLIC BLOOD PRESSURE: 72 MMHG | SYSTOLIC BLOOD PRESSURE: 129 MMHG | RESPIRATION RATE: 16 BRPM | BODY MASS INDEX: 40.36 KG/M2 | HEIGHT: 64 IN | TEMPERATURE: 97.5 F

## 2018-03-15 DIAGNOSIS — E78.5 HYPERLIPIDEMIA LDL GOAL <130: Primary | ICD-10-CM

## 2018-03-15 PROCEDURE — 99213 OFFICE O/P EST LOW 20 MIN: CPT | Performed by: NURSE PRACTITIONER

## 2018-03-15 ASSESSMENT — PATIENT HEALTH QUESTIONNAIRE - PHQ9: 5. POOR APPETITE OR OVEREATING: NEARLY EVERY DAY

## 2018-03-15 ASSESSMENT — ANXIETY QUESTIONNAIRES
IF YOU CHECKED OFF ANY PROBLEMS ON THIS QUESTIONNAIRE, HOW DIFFICULT HAVE THESE PROBLEMS MADE IT FOR YOU TO DO YOUR WORK, TAKE CARE OF THINGS AT HOME, OR GET ALONG WITH OTHER PEOPLE: VERY DIFFICULT
2. NOT BEING ABLE TO STOP OR CONTROL WORRYING: MORE THAN HALF THE DAYS
3. WORRYING TOO MUCH ABOUT DIFFERENT THINGS: MORE THAN HALF THE DAYS
5. BEING SO RESTLESS THAT IT IS HARD TO SIT STILL: MORE THAN HALF THE DAYS
6. BECOMING EASILY ANNOYED OR IRRITABLE: NEARLY EVERY DAY
GAD7 TOTAL SCORE: 16
7. FEELING AFRAID AS IF SOMETHING AWFUL MIGHT HAPPEN: SEVERAL DAYS
1. FEELING NERVOUS, ANXIOUS, OR ON EDGE: NEARLY EVERY DAY

## 2018-03-15 NOTE — NURSING NOTE
"Chief Complaint   Patient presents with     Lipids     Discuss results from 3/12/18, not currently on medication.       Initial /72 (BP Location: Left arm, Patient Position: Chair, Cuff Size: Adult Large)  Pulse 77  Temp 97.5  F (36.4  C) (Tympanic)  Resp 16  Ht 5' 3.5\" (1.613 m)  Wt 236 lb 6.4 oz (107.2 kg)  BMI 41.22 kg/m2 Estimated body mass index is 41.22 kg/(m^2) as calculated from the following:    Height as of this encounter: 5' 3.5\" (1.613 m).    Weight as of this encounter: 236 lb 6.4 oz (107.2 kg).  Medication Reconciliation: complete  "

## 2018-03-15 NOTE — PROGRESS NOTES
SUBJECTIVE:   Socorro Oakes is a 52 year old female who presents to clinic today for the following health issues:      Hyperlipidemia Follow-Up      Rate your low fat/cholesterol diet?: good- partial palio diet     Taking statin?  No    Other lipid medications/supplements?:  none      Amount of exercise or physical activity: 4 days per week for 1 hour- Has not been going as often as she typically does    Problems taking medications regularly: No    Medication side effects: none    Diet: low fat/cholesterol    No family history of CAD- patient is not a smoker     Triglycerides   Date Value Ref Range Status   03/12/2018 162 (H) <150 mg/dL Final     Comment:     Borderline high:  150-199 mg/dl  High:             200-499 mg/dl  Very high:       >499 mg/dl  Fasting specimen     ]  HDL Cholesterol   Date Value Ref Range Status   03/12/2018 58 >49 mg/dL Final   ]  LDL Cholesterol Calculated   Date Value Ref Range Status   03/12/2018 137 (H) <100 mg/dL Final     Comment:     Above desirable:  100-129 mg/dl  Borderline High:  130-159 mg/dL  High:             160-189 mg/dL  Very high:       >189 mg/dl     ]          -------------------------------------    Problem list and histories reviewed & adjusted, as indicated.  Additional history: as documented    Patient Active Problem List   Diagnosis     MRSA     h/o multiple concussion      Hyperlipidemia LDL goal <130     Health Care Home     GERD (gastroesophageal reflux disease)     DJD (degenerative joint disease), lumbar     Sleep apnea     Migraine     Moderate major depression (H)     Schizophrenia (H)     ADD (attention deficit disorder with hyperactivity)     Restless legs syndrome (RLS)     Memory loss     Essential hypertension, benign     Past Surgical History:   Procedure Laterality Date     CHOLECYSTECTOMY, LAPOROSCOPIC  4-30-09     COLONOSCOPY N/A 6/5/2015    Procedure: COLONOSCOPY;  Surgeon: Robe Delgado MD;  Location: [facility]     INJECT EPIDURAL LUMBAR   "7/11/2011    Procedure:INJECT EPIDURAL LUMBAR; BRIE with Flouro--; Surgeon:GENERIC ANESTHESIA PROVIDER; Location:[facility]     INJECT EPIDURAL LUMBAR  9/28/2011    Procedure:INJECT EPIDURAL LUMBAR; BRIE with Flouro--; Surgeon:GENERIC ANESTHESIA PROVIDER; Location:[facility]     INJECT EPIDURAL LUMBAR  12/12/2011    Procedure:INJECT EPIDURAL LUMBAR; BRIE with Fluoro - Dr. Landry; Surgeon:GENERIC ANESTHESIA PROVIDER; Location:[facility]     KNEE SURGERY      1- 15 yr ago     LAPAROSCOPIC APPENDECTOMY       MANDIBLE SURGERY      6x surgeries from 1983- 1994     SURGICAL HISTORY OF -   10/17/05    left knee surgery      SURGICAL HISTORY OF -   1982,1983,1984x2,1993,    TMJ Surgery x5     SURGICAL HISTORY OF -       ENT Tubes       SURGICAL HISTORY OF -   1996    toe     TONSILLECTOMY & ADENOIDECTOMY         Social History   Substance Use Topics     Smoking status: Never Smoker     Smokeless tobacco: Never Used     Alcohol use No     Family History   Problem Relation Age of Onset     Arthritis Mother      rheumatoid/had knee replacement     Bipolar Disorder Mother      Migraines Mother      Depression Father      comitted suicide age 48     Suicide Father      Alcohol/Drug Brother      Allergies Sister      Migraines Sister      Anxiety Disorder Maternal Grandfather      Bipolar Disorder Daughter      Migraines Brother      Migraines Sister      Migraines Brother      Migraines Sister      CEREBROVASCULAR DISEASE No family hx of            Reviewed and updated as needed this visit by clinical staff       Reviewed and updated as needed this visit by Provider         ROS:  Constitutional, HEENT, cardiovascular, pulmonary, GI, , musculoskeletal, neuro, skin, endocrine and psych systems are negative, except as otherwise noted.    OBJECTIVE:     /72 (BP Location: Left arm, Patient Position: Chair, Cuff Size: Adult Large)  Pulse 77  Temp 97.5  F (36.4  C) (Tympanic)  Resp 16  Ht 5' 3.5\" (1.613 m)  Wt 236 " "lb 6.4 oz (107.2 kg)  BMI 41.22 kg/m2  Body mass index is 41.22 kg/(m^2).  GENERAL: alert, no distress and over weight  RESP: lungs clear to auscultation - no rales, rhonchi or wheezes  CV: regular rate and rhythm, normal S1 S2, no S3 or S4, no murmur, click or rub, no peripheral edema and peripheral pulses strong  MS: no gross musculoskeletal defects noted, no edema    Diagnostic Test Results:  none     ASSESSMENT/PLAN:         1. Hyperlipidemia LDL goal <130  Patient LDL at 137- goal would be < 130  ASCVD risk score is 1.7% therefore medical therapy not indicated at this time- counseled patient on diet and exercise.     2. Class 3 obesity due to excess calories without serious comorbidity with body mass index (BMI) of 40.0 to 44.9 in adult (H)  Counseled patient on diet and exercise      Patient Instructions         Thank you for choosing [facility].  You may be receiving a survey in the mail from [employer] regarding your visit today.  Please take a few minutes to complete and return the survey to let us know how we are doing.      If you have questions or concerns, please contact us via [employer] or you can contact your care team at [phone].    Our Clinic hours are:  Monday 6:40 am  to 7:00 pm  Tuesday -Friday 6:40 am to 5:00 pm    The [place] outpatient lab hours are:  Monday - Friday 6:10 am to 4:45 pm  Saturdays 7:00 am to 11:00 am  Appointments are required, call [phone]    If you have clinical questions after hours or would like to schedule an appointment,  call the clinic at [phone].    Lipid Panel with Total Cholesterol: HDL Ratio  Does this test have other names?  Total cholesterol, HDL cholesterol, cholesterol HDL ratio, cholesterol panel  What is this test?  This group of tests measures the amount of cholesterol and triglycerides in your blood.  Cholesterol and triglycerides are fats (lipids). This panel measures:    Total cholesterol    HDL (\"good\") cholesterol    LDL (\"bad\") " cholesterol    Triglycerides  Total cholesterol is a measurement of both good and bad cholesterol. LDL cholesterol moves cholesterol into your arteries. HDL cholesterol moves cholesterol out of your arteries. A high HDL cholesterol number lowers your risk for coronary heart disease. A high LDL cholesterol number raises your risk for coronary heart disease.  By comparing your total cholesterol number with your HDL cholesterol number, your healthcare provider can get another number called your total-cholesterol-to-HDL ratio. These combined numbers help figure out your risk for coronary heart disease and stroke.  The American Heart Association recommends that all adults older than 20 have a lipid profile once every 5 years.  Why do I need this test?  You may have this test as part of your regular medical checkup. You may have this test done more often than every 5 years if:    Your total cholesterol is above 200 mg/dL    You are a woman older than 50    You are a man older than 45    You have other risk factors for coronary heart disease    Your HDL cholesterol is less than 40 mg/dL  What other tests might I have along with this test?  Your healthcare provider may also order other tests to check for other coronary heart disease risk factors. These may include other blood tests. They may also include tests for diabetes and diseases of your thyroid, liver, or kidneys.  What do my test results mean?  Many things may affect your lab test results. These include the method each lab uses to do the test. Even if your test results are different from the normal value, you may not have a problem. To learn what the results mean for you, talk with your healthcare provider.  Results are given in milligrams per deciliter (mg/dL). Here are the ranges for total cholesterol in adults:    Normal: Less than 200 mg/dL    Borderline high: 200 to 239 mg/dL    High: At or above 240 mg/dL  If your total cholesterol is high, you have twice the  risk for heart disease as a person with normal total cholesterol.  Here is the adult range for HDL cholesterol:    Normal: 35 to 65 mg/dL for men, 35 to 80 mg/dL for women  If your number is less than 25 mg/dL, your risk for coronary heart disease is doubled.  If your number is between 60 and 74 mg/dL, your risk for coronary heart disease is below average.  Your total-cholesterol-to-HDL ratio can be figured out by dividing your total cholesterol number by your HDL cholesterol number. Together these numbers provide more information about your coronary heart disease risk than knowing only one of the numbers.  In general:    The higher the ratio, the higher the risk.    Most healthcare providers want the ratio to be below 5:1.    A ratio below 3.5:1 is considered very good.  How is this test done?  The test requires a blood sample, which is drawn through a needle from a vein in your arm.  Does this test pose any risks?  Taking a blood sample with a needle carries risks that include bleeding, infection, bruising, or feeling dizzy. When the needle pricks your arm, you may feel a slight stinging sensation or pain. Afterward, the site may be slightly sore.  What might affect my test results?  Many things can affect your results. These include medicines, diet, physical activity, pregnancy, and recent heart attacks.  How do I get ready for this test?  You will need to not eat or drink anything but water for 9 to 12 hours before this test. Also let your healthcare provider know if:    Your diet has changed a lot in the past week    You've been drinking alcohol in the last 2 days    You've had a heart attack in the last 3 months  In addition, be sure your provider knows about all medicines, herbs, vitamins, and supplements you are taking. This includes medicines that don't need a prescription and any illicit drugs you may use.       3824-8977 [employer]. [address] [place] [address], [place] [address]. All rights  "reserved. This information is not intended as a substitute for professional medical care. Always follow your healthcare professional's instructions.        Lipid Panel with Non-HDL Cholesterol  Does this test have other names?  Non-HDL-C  What is this test?  This blood test checks the levels of cholesterol in your body. A lipid panel will show the levels of your total cholesterol, your LDL (\"bad\") cholesterol, and your HDL (\"good\") cholesterol. In general, the higher your total and LDL cholesterol levels, the higher your risk for coronary heart disease. But some heart attacks happen in people who don't have a high LDL level.  Some researchers believe that measuring your non-HDL cholesterol levels gives a better assessment of the risk for heart disease than measuring only LDL. This is especially true if you have high triglycerides. Your non-HDL cholesterol level is found by subtracting your HDL cholesterol from your total cholesterol.    Why do I need this test?  High cholesterol is one of the things that can tell you how likely you are to get heart disease, so it's important to know your numbers. When your LDL cholesterol level is high and HDL cholesterol is low, you may be at risk for a heart attack or stroke.  Here's a breakdown of LDL cholesterol levels and health:    Less than 70 milligrams per deciliter (mg/dL) - this is your goal if you're at very high risk for a heart attack    Less than 100 mg/dL - the goal for people with heart disease or diabetes    100 to 129 mg/dL - near or above the ideal level    130 to 159 mg/dL - borderline high    160 to 189 mg/dL - high    190 mg/dL and above - very high  Here's a breakdown of total cholesterol levels and health:    Less than 200 mg/dL - you are at low risk for heart disease    200 to 240 mg/dL - borderline high    240 mg/dL and above - high   Ideally, HDL cholesterol should be above 40 mg/dL for men and above 50 mg/dL for women. The higher your HDL level, the " better.  The test for non-HDL cholesterol isn't usually part of screening for your total cholesterol. But if you have high blood pressure, diabetes, or other risks for heart disease, your chances of having a heart attack are higher than normal. In these cases, your provider may calculate your non-HDL cholesterol, too.  Your provider may also order this test if a blood test shows you have high levels of triglycerides, another type of fat in the blood. A high non-HDL cholesterol level alone isn't a warning sign that something is wrong with your arteries or heart, but if your triglycerides measure more than 200 mg/dL, your provider may prescribe medicine to help lower both your LDL and your non-HDL cholesterol.   What other tests might I have along with this test?  If your provider suspects you have heart disease, you may also get these tests:    Electrocardiogram (ECG). This tests your heart's electrical impulses to see if your heart is beating normally.    Stress test. This test is done while you have an ECG. You may have to walk or run on a treadmill.    Echocardiogram. This is a picture of your heart made from sound waves.    Cardiac catheterization. A tube is put into your blood vessel and dye is injected. Clogs will then show up on an X-ray.  People with a history of artery disease, stroke, kidney disease, or diabetes are also at higher risk for heart disease, so tests may be done to look for these problems, too.   What do my test results mean?  Many things may affect your lab test results. These include the method each lab uses to do the test. Even if your test results are different from the normal value, you may not have a problem. To learn what the results mean for you, talk with your healthcare provider.  Although no clear standards exist for non-HDL levels, most medical experts believe that lowering LDL and non-HDL cholesterol at the same time will cut your heart disease risk.  According to federal  cholesterol program guidelines, your non-HDL cholesterol level goal should be 30 mg/dL higher than your LDL cholesterol level goal. For example, if you are aiming for an LDL cholesterol of 100 mg/dL, then your goal for non-HDL should be 130 mg/dL.  If you have diabetes, smoke, have a family history of heart disease, or have other risk factors, your cholesterol levels may need to be much lower. Talk with your healthcare provider about where your cholesterol levels should be.   How is this test done?  The test requires a blood sample, which is drawn through a needle from a vein in your arm.  Does this test pose any risks?  Taking a blood sample with a needle carries risks that include bleeding, infection, bruising, or feeling dizzy. When the needle pricks your arm, you may feel a slight stinging sensation or pain. Afterward, the site may be slightly sore.  What might affect my test results?  What you eat, how often you exercise, and whether you smoke can affect your cholesterol levels. So can certain medicines.  How do I get ready for this test?  A lipid test can be done with or without fasting. But if your triglycerides are going to be measured, you may need to fast. This means you can have nothing but water for 12 to 14 hours before the test.     [id number] [employer]. [address] [place] [address], [place] [id number]. All rights reserved. This information is not intended as a substitute for professional medical care. Always follow your healthcare professional's instructions.        Eating Heart-Healthy Foods  Eating has a big impact on your heart health. In fact, eating healthier can improve several of your heart risks at once. For instance, it helps you manage weight, cholesterol, and blood pressure. Here are ideas to help you make heart-healthy changes without giving up all the foods and flavors you love.  Getting started    Talk with your health care provider about eating plans, such as the DASH or  Mediterranean diet. You may also be referred to a dietitian.    Change a few things at a time. Give yourself time to get used to a few eating changes before adding more.    Work to create a tasty, healthy eating plan that you can stick to for the rest of your life.    Goals for healthy eating  Below are some tips to improve your eating habits:    Limit saturated fats and trans fats. Saturated fats raise your levels of cholesterol, so keep these fats to a minimum. They are found in foods such as fatty meats, whole milk, cheese, and palm and coconut oils. Avoid trans fats because they lower good cholesterol as well as raise bad cholesterol. Trans fats are most often found in processed foods.    Reduce sodium (salt) intake. Eating too much salt may increase your blood pressure. Limit your sodium intake to 2,300 milligrams (mg) per day, or less if your health care provider recommends it. Dining out less often and eating fewer processed foods are two great ways to decrease the amount of salt you consume.    Managing calories. A calorie is a unit of energy. Your body burns calories for fuel, but if you eat more calories than your body burns, the extras are stored as fat. Your health care provider can help you create a diet plan to manage your calories. This will likely include eating healthier foods as well as exercising regularly. To help you track your progress, keep a diary to record what you eat and how often you exercise.  Choose the right foods  Aim to make these foods staples of your diet. If you have diabetes, you may have different recommendations than what is listed here:    Fruits and vegetable provide plenty of nutrients without a lot of calories. At meals, fill half your plate with these foods. Split the other half of your plate between whole grains and lean protein.    Whole grains are high in fiber and rich in vitamins and nutrients. Good choices include whole-wheat bread, pasta, and brown rice.    Lean  proteins give you nutrition with less fat. Good choices include fish, skinless chicken, and beans.    Low-fat or nonfat dairy provides nutrients without a lot of fat. Try low-fat or nonfat milk, cheese, or yogurt.    Healthy fats can be good for you in small amounts. These are unsaturated fats, such as olive oil, nuts, and fish. Try to have at least 2 servings per week of fatty fish such as salmon, sardines, mackerel, rainbow trout, and albacore tuna. These contain omega-3 fatty acids, which are good for your heart. Flaxseed is another source of a heart-healthy fat.  More on heart healthy eating    Read food labels  Healthy eating starts at the grocery store. Be sure to pay attention to food labels on packaged foods. Look for products that are high in fiber and protein, and low in saturated fat, cholesterol, and sodium. Avoid products that contain trans fat. And pay close attention to serving size. For instance, if you plan to eat two servings, double all the numbers on the label.  Prepare food right  A key part of healthy cooking is cutting down on added fat and salt. Look on the internet for lower-fat, lower-sodium recipes. Also, try these tips:    Remove fat from meat and skin from poultry before cooking.    Skim fat from the surface of soups and sauces.    Broil, boil, bake, steam, grill, and microwave food without added fats.    Choose ingredients that spice up your food without adding calories, fat, or sodium. Try these items: horseradish, hot sauce, lemon, mustard, nonfat salad dressings, and vinegar. For salt-free herbs and spices, try basil, cilantro, cinnamon, pepper, and rosemary.  Date Last Reviewed: 6/25/2015 2000-2017 [employer] [address] [place] [address], [place] [address]. All rights reserved. This information is not intended as a substitute for professional medical care. Always follow your healthcare professional's instructions.            RAJ Macias [facility]  [place]

## 2018-03-15 NOTE — MR AVS SNAPSHOT
"              After Visit Summary   3/15/2018    Socorro Oakes    MRN: [id number]           Patient Information     Date Of Birth          1965        Visit Information        Provider Department      3/15/2018 10:40 AM Jennifer Riley APRN [facility]        Care Instructions          Thank you for choosing [facility].  You may be receiving a survey in the mail from [facility] regarding your visit today.  Please take a few minutes to complete and return the survey to let us know how we are doing.      If you have questions or concerns, please contact us via EasyPost or you can contact your care team at [phone].    Our Clinic hours are:  Monday 6:40 am  to 7:00 pm  Tuesday -Friday 6:40 am to 5:00 pm    The [place] outpatient lab hours are:  Monday - Friday 6:10 am to 4:45 pm  Saturdays 7:00 am to 11:00 am  Appointments are required, call [phone]    If you have clinical questions after hours or would like to schedule an appointment,  call the clinic at [phone].    Lipid Panel with Total Cholesterol: HDL Ratio  Does this test have other names?  Total cholesterol, HDL cholesterol, cholesterol HDL ratio, cholesterol panel  What is this test?  This group of tests measures the amount of cholesterol and triglycerides in your blood.  Cholesterol and triglycerides are fats (lipids). This panel measures:    Total cholesterol    HDL (\"good\") cholesterol    LDL (\"bad\") cholesterol    Triglycerides  Total cholesterol is a measurement of both good and bad cholesterol. LDL cholesterol moves cholesterol into your arteries. HDL cholesterol moves cholesterol out of your arteries. A high HDL cholesterol number lowers your risk for coronary heart disease. A high LDL cholesterol number raises your risk for coronary heart disease.  By comparing your total cholesterol number with your HDL cholesterol number, your healthcare provider can get another number called your total-cholesterol-to-HDL " ratio. These combined numbers help figure out your risk for coronary heart disease and stroke.  The American Heart Association recommends that all adults older than 20 have a lipid profile once every 5 years.  Why do I need this test?  You may have this test as part of your regular medical checkup. You may have this test done more often than every 5 years if:    Your total cholesterol is above 200 mg/dL    You are a woman older than 50    You are a man older than 45    You have other risk factors for coronary heart disease    Your HDL cholesterol is less than 40 mg/dL  What other tests might I have along with this test?  Your healthcare provider may also order other tests to check for other coronary heart disease risk factors. These may include other blood tests. They may also include tests for diabetes and diseases of your thyroid, liver, or kidneys.  What do my test results mean?  Many things may affect your lab test results. These include the method each lab uses to do the test. Even if your test results are different from the normal value, you may not have a problem. To learn what the results mean for you, talk with your healthcare provider.  Results are given in milligrams per deciliter (mg/dL). Here are the ranges for total cholesterol in adults:    Normal: Less than 200 mg/dL    Borderline high: 200 to 239 mg/dL    High: At or above 240 mg/dL  If your total cholesterol is high, you have twice the risk for heart disease as a person with normal total cholesterol.  Here is the adult range for HDL cholesterol:    Normal: 35 to 65 mg/dL for men, 35 to 80 mg/dL for women  If your number is less than 25 mg/dL, your risk for coronary heart disease is doubled.  If your number is between 60 and 74 mg/dL, your risk for coronary heart disease is below average.  Your total-cholesterol-to-HDL ratio can be figured out by dividing your total cholesterol number by your HDL cholesterol number. Together these numbers provide  "more information about your coronary heart disease risk than knowing only one of the numbers.  In general:    The higher the ratio, the higher the risk.    Most healthcare providers want the ratio to be below 5:1.    A ratio below 3.5:1 is considered very good.  How is this test done?  The test requires a blood sample, which is drawn through a needle from a vein in your arm.  Does this test pose any risks?  Taking a blood sample with a needle carries risks that include bleeding, infection, bruising, or feeling dizzy. When the needle pricks your arm, you may feel a slight stinging sensation or pain. Afterward, the site may be slightly sore.  What might affect my test results?  Many things can affect your results. These include medicines, diet, physical activity, pregnancy, and recent heart attacks.  How do I get ready for this test?  You will need to not eat or drink anything but water for 9 to 12 hours before this test. Also let your healthcare provider know if:    Your diet has changed a lot in the past week    You've been drinking alcohol in the last 2 days    You've had a heart attack in the last 3 months  In addition, be sure your provider knows about all medicines, herbs, vitamins, and supplements you are taking. This includes medicines that don't need a prescription and any illicit drugs you may use.       6081-9492 [employer]. [address] [place] [address]. All rights reserved. This information is not intended as a substitute for professional medical care. Always follow your healthcare professional's instructions.        Lipid Panel with Non-HDL Cholesterol  Does this test have other names?  Non-HDL-C  What is this test?  This blood test checks the levels of cholesterol in your body. A lipid panel will show the levels of your total cholesterol, your LDL (\"bad\") cholesterol, and your HDL (\"good\") cholesterol. In general, the higher your total and LDL cholesterol levels, the higher " your risk for coronary heart disease. But some heart attacks happen in people who don't have a high LDL level.  Some researchers believe that measuring your non-HDL cholesterol levels gives a better assessment of the risk for heart disease than measuring only LDL. This is especially true if you have high triglycerides. Your non-HDL cholesterol level is found by subtracting your HDL cholesterol from your total cholesterol.    Why do I need this test?  High cholesterol is one of the things that can tell you how likely you are to get heart disease, so it's important to know your numbers. When your LDL cholesterol level is high and HDL cholesterol is low, you may be at risk for a heart attack or stroke.  Here's a breakdown of LDL cholesterol levels and health:    Less than 70 milligrams per deciliter (mg/dL) - this is your goal if you're at very high risk for a heart attack    Less than 100 mg/dL - the goal for people with heart disease or diabetes    100 to 129 mg/dL - near or above the ideal level    130 to 159 mg/dL - borderline high    160 to 189 mg/dL - high    190 mg/dL and above - very high  Here's a breakdown of total cholesterol levels and health:    Less than 200 mg/dL - you are at low risk for heart disease    200 to 240 mg/dL - borderline high    240 mg/dL and above - high   Ideally, HDL cholesterol should be above 40 mg/dL for men and above 50 mg/dL for women. The higher your HDL level, the better.  The test for non-HDL cholesterol isn't usually part of screening for your total cholesterol. But if you have high blood pressure, diabetes, or other risks for heart disease, your chances of having a heart attack are higher than normal. In these cases, your provider may calculate your non-HDL cholesterol, too.  Your provider may also order this test if a blood test shows you have high levels of triglycerides, another type of fat in the blood. A high non-HDL cholesterol level alone isn't a warning sign that  something is wrong with your arteries or heart, but if your triglycerides measure more than 200 mg/dL, your provider may prescribe medicine to help lower both your LDL and your non-HDL cholesterol.   What other tests might I have along with this test?  If your provider suspects you have heart disease, you may also get these tests:    Electrocardiogram (ECG). This tests your heart's electrical impulses to see if your heart is beating normally.    Stress test. This test is done while you have an ECG. You may have to walk or run on a treadmill.    Echocardiogram. This is a picture of your heart made from sound waves.    Cardiac catheterization. A tube is put into your blood vessel and dye is injected. Clogs will then show up on an X-ray.  People with a history of artery disease, stroke, kidney disease, or diabetes are also at higher risk for heart disease, so tests may be done to look for these problems, too.   What do my test results mean?  Many things may affect your lab test results. These include the method each lab uses to do the test. Even if your test results are different from the normal value, you may not have a problem. To learn what the results mean for you, talk with your healthcare provider.  Although no clear standards exist for non-HDL levels, most medical experts believe that lowering LDL and non-HDL cholesterol at the same time will cut your heart disease risk.  According to federal cholesterol program guidelines, your non-HDL cholesterol level goal should be 30 mg/dL higher than your LDL cholesterol level goal. For example, if you are aiming for an LDL cholesterol of 100 mg/dL, then your goal for non-HDL should be 130 mg/dL.  If you have diabetes, smoke, have a family history of heart disease, or have other risk factors, your cholesterol levels may need to be much lower. Talk with your healthcare provider about where your cholesterol levels should be.   How is this test done?  The test requires a  blood sample, which is drawn through a needle from a vein in your arm.  Does this test pose any risks?  Taking a blood sample with a needle carries risks that include bleeding, infection, bruising, or feeling dizzy. When the needle pricks your arm, you may feel a slight stinging sensation or pain. Afterward, the site may be slightly sore.  What might affect my test results?  What you eat, how often you exercise, and whether you smoke can affect your cholesterol levels. So can certain medicines.  How do I get ready for this test?  A lipid test can be done with or without fasting. But if your triglycerides are going to be measured, you may need to fast. This means you can have nothing but water for 12 to 14 hours before the test.     1276-0727 [employer]. [address] [place] [address], [place] [id number]. All rights reserved. This information is not intended as a substitute for professional medical care. Always follow your healthcare professional's instructions.        Eating Heart-Healthy Foods  Eating has a big impact on your heart health. In fact, eating healthier can improve several of your heart risks at once. For instance, it helps you manage weight, cholesterol, and blood pressure. Here are ideas to help you make heart-healthy changes without giving up all the foods and flavors you love.  Getting started    Talk with your health care provider about eating plans, such as the DASH or Mediterranean diet. You may also be referred to a dietitian.    Change a few things at a time. Give yourself time to get used to a few eating changes before adding more.    Work to create a tasty, healthy eating plan that you can stick to for the rest of your life.    Goals for healthy eating  Below are some tips to improve your eating habits:    Limit saturated fats and trans fats. Saturated fats raise your levels of cholesterol, so keep these fats to a minimum. They are found in foods such as fatty meats, whole milk, cheese,  and palm and coconut oils. Avoid trans fats because they lower good cholesterol as well as raise bad cholesterol. Trans fats are most often found in processed foods.    Reduce sodium (salt) intake. Eating too much salt may increase your blood pressure. Limit your sodium intake to 2,300 milligrams (mg) per day, or less if your health care provider recommends it. Dining out less often and eating fewer processed foods are two great ways to decrease the amount of salt you consume.    Managing calories. A calorie is a unit of energy. Your body burns calories for fuel, but if you eat more calories than your body burns, the extras are stored as fat. Your health care provider can help you create a diet plan to manage your calories. This will likely include eating healthier foods as well as exercising regularly. To help you track your progress, keep a diary to record what you eat and how often you exercise.  Choose the right foods  Aim to make these foods staples of your diet. If you have diabetes, you may have different recommendations than what is listed here:    Fruits and vegetable provide plenty of nutrients without a lot of calories. At meals, fill half your plate with these foods. Split the other half of your plate between whole grains and lean protein.    Whole grains are high in fiber and rich in vitamins and nutrients. Good choices include whole-wheat bread, pasta, and brown rice.    Lean proteins give you nutrition with less fat. Good choices include fish, skinless chicken, and beans.    Low-fat or nonfat dairy provides nutrients without a lot of fat. Try low-fat or nonfat milk, cheese, or yogurt.    Healthy fats can be good for you in small amounts. These are unsaturated fats, such as olive oil, nuts, and fish. Try to have at least 2 servings per week of fatty fish such as salmon, sardines, mackerel, rainbow trout, and albacore tuna. These contain omega-3 fatty acids, which are good for your heart. Flaxseed is  another source of a heart-healthy fat.  More on heart healthy eating    Read food labels  Healthy eating starts at the grocery store. Be sure to pay attention to food labels on packaged foods. Look for products that are high in fiber and protein, and low in saturated fat, cholesterol, and sodium. Avoid products that contain trans fat. And pay close attention to serving size. For instance, if you plan to eat two servings, double all the numbers on the label.  Prepare food right  A key part of healthy cooking is cutting down on added fat and salt. Look on the internet for lower-fat, lower-sodium recipes. Also, try these tips:    Remove fat from meat and skin from poultry before cooking.    Skim fat from the surface of soups and sauces.    Broil, boil, bake, steam, grill, and microwave food without added fats.    Choose ingredients that spice up your food without adding calories, fat, or sodium. Try these items: horseradish, hot sauce, lemon, mustard, nonfat salad dressings, and vinegar. For salt-free herbs and spices, try basil, cilantro, cinnamon, pepper, and rosemary.  Date Last Reviewed: 6/25/2015 2000-2017 [employer]. [address] [place] [address], [place] [address]. All rights reserved. This information is not intended as a substitute for professional medical care. Always follow your healthcare professional's instructions.                Follow-ups after your visit        Your next 10 appointments already scheduled     Sep 05, 2018 11:00 AM CDT   Return Visit with Nancy Reed MD   [facility] ([facility])    [address] [place] [id number] [phone]              Who to contact     If you have questions or need follow up information about today's clinic visit or your schedule please contact [facility] directly at [phone].  Normal or non-critical lab and imaging results will be communicated to you by MyChart, letter or phone within 4  "business days after the clinic has received the results. If you do not hear from us within 7 days, please contact the clinic through NUOFFER or phone. If you have a critical or abnormal lab result, we will notify you by phone as soon as possible.  Submit refill requests through [employer]ER or call your pharmacy and they will forward the refill request to us. Please allow 3 business days for your refill to be completed.          Additional Information About Your Visit        [employer] Information     [employer] gives you secure access to your electronic health record. If you see a primary care provider, you can also send messages to your care team and make appointments. If you have questions, please call your primary care clinic.  If you do not have a primary care provider, please call [phone] and they will assist you.        Care EveryWhere ID     This is your Care EveryWhere ID. This could be used by other organizations to access your [place] medical records  [id number]        Your Vitals Were     Pulse Temperature Respirations Height BMI (Body Mass Index)       77 97.5  F (36.4  C) (Tympanic) 16 5' 3.5\" (1.613 m) 41.22 kg/m2        Blood Pressure from Last 3 Encounters:   03/15/18 129/72   03/12/18 124/75   03/07/18 129/74    Weight from Last 3 Encounters:   03/15/18 236 lb 6.4 oz (107.2 kg)   03/12/18 235 lb (106.6 kg)   03/07/18 237 lb (107.5 kg)              Today, you had the following     No orders found for display       Primary Care Provider Office Phone # Fax #    Jennifer GrigsbyRAJ [facility] [phone] [address] [facility] [address]        Goals        General    I want to be able to understand more about my medications and need for taking them routinly  (pt-stated)     Notes - Note created  2/4/2015  2:07 PM by Leia Xiao LSW    As of today's date 2/4/2015 goal is met at 51 - 75%.   Goal Status:  Active  Setting up MTM referral         I want to improve my independent " living skills (pt-stated)     Notes - Note created  2/4/2015  2:05 PM by Leia Xiao LSW    As of today's date 2/4/2015 goal is met at 0 - 25%.   Goal Status:  just starting  Just starting out          Equal Access to Services     CARLOS LUCAS AH: Hadii aad ku hadraphaelo Soomaali, waaxda luqadaha, qaybta kaalmada adeegyada, suzy idiin hayanan hillaryrodger perez laIsraelgisela whaley. So [facility] [phone].    ATENCIÓN: Si habla español, tiene a perdue disposición servicios gratuitos de asistencia lingüística. Llame al [phone].    We comply with applicable federal civil rights laws and [place] laws. We do not discriminate on the basis of race, color, national origin, age, disability, sex, sexual orientation, or gender identity.            Thank you!     Thank you for choosing [facility]  for your care. Our goal is always to provide you with excellent care. Hearing back from our patients is one way we can continue to improve our services. Please take a few minutes to complete the written survey that you may receive in the mail after your visit with us. Thank you!             Your Updated Medication List - Protect others around you: Learn how to safely use, store and throw away your medicines at www.disposemymeds.org.          This list is accurate as of 3/15/18 11:04 AM.  Always use your most recent med list.                   Brand Name Dispense Instructions for use Diagnosis    ADVIL PO      Take 400 mg by mouth every 6 hours as needed.        AMBIEN PO      Take 10 mg by mouth At Bedtime        clotrimazole 1 % cream    LOTRIMIN    15 g    Apply topically 2 times daily    Ringworm       diazepam 10 MG tablet    VALIUM     Take 10 mg by mouth every 6 hours as needed for anxiety or sleep        doxepin 75 MG capsule    SINEquan     Take 75 mg by mouth At Bedtime        lamoTRIgine 150 MG tablet    LaMICtal     Take 150 mg by mouth every evening        LATUDA 80 MG Tabs tablet   Generic drug:  lurasidone      Take 80  mg by mouth every evening        omeprazole 20 MG CR capsule    priLOSEC    60 capsule    Take 2 capsules (40 mg) by mouth daily    Gastroesophageal reflux disease without esophagitis       order for ADELA Quintanilla was set up on replacement machine: Digital Message Display; Type  Airsense 10; Serial Number: [id number]; Modem Number: 023; Pressure: SET PRESSURE 9 CM H20;  Mask of choice: none given; per patient she just opened a new one at home; Heated tubing        pramipexole 0.25 MG tablet    MIRAPEX    90 tablet    Take 1 tablet (0.25 mg) by mouth At Bedtime    Restless legs syndrome (RLS)       prazosin 2 MG capsule    MINIPRESS     Take 2 mg by mouth 2 times daily        PRISTIQ PO      Take 150 mg by mouth every evening        * SUMAtriptan Succinate Refill 6 MG/0.5ML Soln    IMITREX    0.5 mL    Inject 6mg as needed. MR x1 after 1-2 hrs as needed. Limit 2 doses in 24 hrs.    Migraines       * SUMAtriptan 100 MG tablet    IMITREX    6 tablet    Take 1 tablet (100 mg) by mouth at onset of headache for migraine May repeat in 2 hours if needed: max 2/day;    Migraine without aura and without status migrainosus, not intractable       TYLENOL 500 MG tablet   Generic drug:  acetaminophen      2 TABLETS ORALLY 4 TIMES DAILY AS NEEDED        verapamil 120 MG 24 hr capsule    VERELAN    180 capsule    Take 1 capsule (120 mg) by mouth 2 times daily    Migraine with aura and without status migrainosus, not intractable       * Notice:  This list has 2 medication(s) that are the same as other medications prescribed for you. Read the directions carefully, and ask your doctor or other care provider to review them with you.

## 2018-03-15 NOTE — PATIENT INSTRUCTIONS
"      Thank you for choosing [facility].  You may be receiving a survey in the mail from Lisa Norris regarding your visit today.  Please take a few minutes to complete and return the survey to let us know how we are doing.      If you have questions or concerns, please contact us via YuuConnect or you can contact your care team at [phone].    Our Clinic hours are:  Monday 6:40 am  to 7:00 pm  Tuesday -Friday 6:40 am to 5:00 pm    The [place] outpatient lab hours are:  Monday - Friday 6:10 am to 4:45 pm  Saturdays 7:00 am to 11:00 am  Appointments are required, call [phone]    If you have clinical questions after hours or would like to schedule an appointment,  call the clinic at [phone].    Lipid Panel with Total Cholesterol: HDL Ratio  Does this test have other names?  Total cholesterol, HDL cholesterol, cholesterol HDL ratio, cholesterol panel  What is this test?  This group of tests measures the amount of cholesterol and triglycerides in your blood.  Cholesterol and triglycerides are fats (lipids). This panel measures:    Total cholesterol    HDL (\"good\") cholesterol    LDL (\"bad\") cholesterol    Triglycerides  Total cholesterol is a measurement of both good and bad cholesterol. LDL cholesterol moves cholesterol into your arteries. HDL cholesterol moves cholesterol out of your arteries. A high HDL cholesterol number lowers your risk for coronary heart disease. A high LDL cholesterol number raises your risk for coronary heart disease.  By comparing your total cholesterol number with your HDL cholesterol number, your healthcare provider can get another number called your total-cholesterol-to-HDL ratio. These combined numbers help figure out your risk for coronary heart disease and stroke.  The American Heart Association recommends that all adults older than 20 have a lipid profile once every 5 years.  Why do I need this test?  You may have this test as part of your regular medical checkup. You may " have this test done more often than every 5 years if:    Your total cholesterol is above 200 mg/dL    You are a woman older than 50    You are a man older than 45    You have other risk factors for coronary heart disease    Your HDL cholesterol is less than 40 mg/dL  What other tests might I have along with this test?  Your healthcare provider may also order other tests to check for other coronary heart disease risk factors. These may include other blood tests. They may also include tests for diabetes and diseases of your thyroid, liver, or kidneys.  What do my test results mean?  Many things may affect your lab test results. These include the method each lab uses to do the test. Even if your test results are different from the normal value, you may not have a problem. To learn what the results mean for you, talk with your healthcare provider.  Results are given in milligrams per deciliter (mg/dL). Here are the ranges for total cholesterol in adults:    Normal: Less than 200 mg/dL    Borderline high: 200 to 239 mg/dL    High: At or above 240 mg/dL  If your total cholesterol is high, you have twice the risk for heart disease as a person with normal total cholesterol.  Here is the adult range for HDL cholesterol:    Normal: 35 to 65 mg/dL for men, 35 to 80 mg/dL for women  If your number is less than 25 mg/dL, your risk for coronary heart disease is doubled.  If your number is between 60 and 74 mg/dL, your risk for coronary heart disease is below average.  Your total-cholesterol-to-HDL ratio can be figured out by dividing your total cholesterol number by your HDL cholesterol number. Together these numbers provide more information about your coronary heart disease risk than knowing only one of the numbers.  In general:    The higher the ratio, the higher the risk.    Most healthcare providers want the ratio to be below 5:1.    A ratio below 3.5:1 is considered very good.  How is this test done?  The test requires a  "blood sample, which is drawn through a needle from a vein in your arm.  Does this test pose any risks?  Taking a blood sample with a needle carries risks that include bleeding, infection, bruising, or feeling dizzy. When the needle pricks your arm, you may feel a slight stinging sensation or pain. Afterward, the site may be slightly sore.  What might affect my test results?  Many things can affect your results. These include medicines, diet, physical activity, pregnancy, and recent heart attacks.  How do I get ready for this test?  You will need to not eat or drink anything but water for 9 to 12 hours before this test. Also let your healthcare provider know if:    Your diet has changed a lot in the past week    You've been drinking alcohol in the last 2 days    You've had a heart attack in the last 3 months  In addition, be sure your provider knows about all medicines, herbs, vitamins, and supplements you are taking. This includes medicines that don't need a prescription and any illicit drugs you may use.       4679-1971 [employer]. [address] [place] [address]. All rights reserved. This information is not intended as a substitute for professional medical care. Always follow your healthcare professional's instructions.        Lipid Panel with Non-HDL Cholesterol  Does this test have other names?  Non-HDL-C  What is this test?  This blood test checks the levels of cholesterol in your body. A lipid panel will show the levels of your total cholesterol, your LDL (\"bad\") cholesterol, and your HDL (\"good\") cholesterol. In general, the higher your total and LDL cholesterol levels, the higher your risk for coronary heart disease. But some heart attacks happen in people who don't have a high LDL level.  Some researchers believe that measuring your non-HDL cholesterol levels gives a better assessment of the risk for heart disease than measuring only LDL. This is especially true if you have high " triglycerides. Your non-HDL cholesterol level is found by subtracting your HDL cholesterol from your total cholesterol.    Why do I need this test?  High cholesterol is one of the things that can tell you how likely you are to get heart disease, so it's important to know your numbers. When your LDL cholesterol level is high and HDL cholesterol is low, you may be at risk for a heart attack or stroke.  Here's a breakdown of LDL cholesterol levels and health:    Less than 70 milligrams per deciliter (mg/dL)   this is your goal if you're at very high risk for a heart attack    Less than 100 mg/dL   the goal for people with heart disease or diabetes    100 to 129 mg/dL   near or above the ideal level    130 to 159 mg/dL   borderline high    160 to 189 mg/dL   high    190 mg/dL and above   very high  Here's a breakdown of total cholesterol levels and health:    Less than 200 mg/dL   you are at low risk for heart disease    200 to 240 mg/dL   borderline high    240 mg/dL and above   high   Ideally, HDL cholesterol should be above 40 mg/dL for men and above 50 mg/dL for women. The higher your HDL level, the better.  The test for non-HDL cholesterol isn't usually part of screening for your total cholesterol. But if you have high blood pressure, diabetes, or other risks for heart disease, your chances of having a heart attack are higher than normal. In these cases, your provider may calculate your non-HDL cholesterol, too.  Your provider may also order this test if a blood test shows you have high levels of triglycerides, another type of fat in the blood. A high non-HDL cholesterol level alone isn't a warning sign that something is wrong with your arteries or heart, but if your triglycerides measure more than 200 mg/dL, your provider may prescribe medicine to help lower both your LDL and your non-HDL cholesterol.   What other tests might I have along with this test?  If your provider suspects you have heart disease, you may  also get these tests:    Electrocardiogram (ECG). This tests your heart's electrical impulses to see if your heart is beating normally.    Stress test. This test is done while you have an ECG. You may have to walk or run on a treadmill.    Echocardiogram. This is a picture of your heart made from sound waves.    Cardiac catheterization. A tube is put into your blood vessel and dye is injected. Clogs will then show up on an X-ray.  People with a history of artery disease, stroke, kidney disease, or diabetes are also at higher risk for heart disease, so tests may be done to look for these problems, too.   What do my test results mean?  Many things may affect your lab test results. These include the method each lab uses to do the test. Even if your test results are different from the normal value, you may not have a problem. To learn what the results mean for you, talk with your healthcare provider.  Although no clear standards exist for non-HDL levels, most medical experts believe that lowering LDL and non-HDL cholesterol at the same time will cut your heart disease risk.  According to federal cholesterol program guidelines, your non-HDL cholesterol level goal should be 30 mg/dL higher than your LDL cholesterol level goal. For example, if you are aiming for an LDL cholesterol of 100 mg/dL, then your goal for non-HDL should be 130 mg/dL.  If you have diabetes, smoke, have a family history of heart disease, or have other risk factors, your cholesterol levels may need to be much lower. Talk with your healthcare provider about where your cholesterol levels should be.   How is this test done?  The test requires a blood sample, which is drawn through a needle from a vein in your arm.  Does this test pose any risks?  Taking a blood sample with a needle carries risks that include bleeding, infection, bruising, or feeling dizzy. When the needle pricks your arm, you may feel a slight stinging sensation or pain. Afterward, the  site may be slightly sore.  What might affect my test results?  What you eat, how often you exercise, and whether you smoke can affect your cholesterol levels. So can certain medicines.  How do I get ready for this test?  A lipid test can be done with or without fasting. But if your triglycerides are going to be measured, you may need to fast. This means you can have nothing but water for 12 to 14 hours before the test.     0718-5128 [employer]. [address] [place] [address], [place] [id number]. All rights reserved. This information is not intended as a substitute for professional medical care. Always follow your healthcare professional's instructions.        Eating Heart-Healthy Foods  Eating has a big impact on your heart health. In fact, eating healthier can improve several of your heart risks at once. For instance, it helps you manage weight, cholesterol, and blood pressure. Here are ideas to help you make heart-healthy changes without giving up all the foods and flavors you love.  Getting started    Talk with your health care provider about eating plans, such as the DASH or Mediterranean diet. You may also be referred to a dietitian.    Change a few things at a time. Give yourself time to get used to a few eating changes before adding more.    Work to create a tasty, healthy eating plan that you can stick to for the rest of your life.    Goals for healthy eating  Below are some tips to improve your eating habits:    Limit saturated fats and trans fats. Saturated fats raise your levels of cholesterol, so keep these fats to a minimum. They are found in foods such as fatty meats, whole milk, cheese, and palm and coconut oils. Avoid trans fats because they lower good cholesterol as well as raise bad cholesterol. Trans fats are most often found in processed foods.    Reduce sodium (salt) intake. Eating too much salt may increase your blood pressure. Limit your sodium intake to 2,300 milligrams (mg) per day,  or less if your health care provider recommends it. Dining out less often and eating fewer processed foods are two great ways to decrease the amount of salt you consume.    Managing calories. A calorie is a unit of energy. Your body burns calories for fuel, but if you eat more calories than your body burns, the extras are stored as fat. Your health care provider can help you create a diet plan to manage your calories. This will likely include eating healthier foods as well as exercising regularly. To help you track your progress, keep a diary to record what you eat and how often you exercise.  Choose the right foods  Aim to make these foods staples of your diet. If you have diabetes, you may have different recommendations than what is listed here:    Fruits and vegetable provide plenty of nutrients without a lot of calories. At meals, fill half your plate with these foods. Split the other half of your plate between whole grains and lean protein.    Whole grains are high in fiber and rich in vitamins and nutrients. Good choices include whole-wheat bread, pasta, and brown rice.    Lean proteins give you nutrition with less fat. Good choices include fish, skinless chicken, and beans.    Low-fat or nonfat dairy provides nutrients without a lot of fat. Try low-fat or nonfat milk, cheese, or yogurt.    Healthy fats can be good for you in small amounts. These are unsaturated fats, such as olive oil, nuts, and fish. Try to have at least 2 servings per week of fatty fish such as salmon, sardines, mackerel, rainbow trout, and albacore tuna. These contain omega-3 fatty acids, which are good for your heart. Flaxseed is another source of a heart-healthy fat.  More on heart healthy eating    Read food labels  Healthy eating starts at the grocery store. Be sure to pay attention to food labels on packaged foods. Look for products that are high in fiber and protein, and low in saturated fat, cholesterol, and sodium. Avoid products  that contain trans fat. And pay close attention to serving size. For instance, if you plan to eat two servings, double all the numbers on the label.  Prepare food right  A key part of healthy cooking is cutting down on added fat and salt. Look on the internet for lower-fat, lower-sodium recipes. Also, try these tips:    Remove fat from meat and skin from poultry before cooking.    Skim fat from the surface of soups and sauces.    Broil, boil, bake, steam, grill, and microwave food without added fats.    Choose ingredients that spice up your food without adding calories, fat, or sodium. Try these items: horseradish, hot sauce, lemon, mustard, nonfat salad dressings, and vinegar. For salt-free herbs and spices, try basil, cilantro, cinnamon, pepper, and rosemary.  Date Last Reviewed: 6/25/2015 2000-2017 [employer]. [address] [place] [address], [place] [address]. All rights reserved. This information is not intended as a substitute for professional medical care. Always follow your healthcare professional's instructions.

## 2018-03-16 ASSESSMENT — PATIENT HEALTH QUESTIONNAIRE - PHQ9: SUM OF ALL RESPONSES TO PHQ QUESTIONS 1-9: 20

## 2018-03-16 ASSESSMENT — ANXIETY QUESTIONNAIRES: GAD7 TOTAL SCORE: 16

## 2018-03-17 ENCOUNTER — HEALTH MAINTENANCE LETTER (OUTPATIENT)
Age: 53
End: 2018-03-17

## 2018-07-29 ENCOUNTER — HEALTH MAINTENANCE LETTER (OUTPATIENT)
Age: 53
End: 2018-07-29

## 2018-09-05 ENCOUNTER — OFFICE VISIT (OUTPATIENT)
Dept: NEUROLOGY | Facility: CLINIC | Age: 53
End: 2018-09-05
Payer: COMMERCIAL

## 2018-09-05 VITALS
RESPIRATION RATE: 16 BRPM | BODY MASS INDEX: 43.77 KG/M2 | HEART RATE: 99 BPM | WEIGHT: 251 LBS | DIASTOLIC BLOOD PRESSURE: 67 MMHG | SYSTOLIC BLOOD PRESSURE: 120 MMHG | TEMPERATURE: 98.3 F

## 2018-09-05 DIAGNOSIS — G25.81 RESTLESS LEGS SYNDROME (RLS): ICD-10-CM

## 2018-09-05 DIAGNOSIS — G43.009 MIGRAINE WITHOUT AURA AND WITHOUT STATUS MIGRAINOSUS, NOT INTRACTABLE: ICD-10-CM

## 2018-09-05 DIAGNOSIS — R41.3 MEMORY PROBLEM: ICD-10-CM

## 2018-09-05 DIAGNOSIS — Z79.899 ENCOUNTER FOR LONG-TERM (CURRENT) USE OF MEDICATIONS: Primary | ICD-10-CM

## 2018-09-05 LAB
ALBUMIN SERPL-MCNC: 3.5 G/DL (ref 3.4–5)
ALP SERPL-CCNC: 87 U/L (ref 40–150)
ALT SERPL W P-5'-P-CCNC: 44 U/L (ref 0–50)
ANION GAP SERPL CALCULATED.3IONS-SCNC: 5 MMOL/L (ref 3–14)
AST SERPL W P-5'-P-CCNC: 27 U/L (ref 0–45)
BILIRUB SERPL-MCNC: 0.2 MG/DL (ref 0.2–1.3)
BUN SERPL-MCNC: 7 MG/DL (ref 7–30)
CALCIUM SERPL-MCNC: 9.3 MG/DL (ref 8.5–10.1)
CHLORIDE SERPL-SCNC: 107 MMOL/L (ref 94–109)
CO2 SERPL-SCNC: 29 MMOL/L (ref 20–32)
CREAT SERPL-MCNC: 0.74 MG/DL (ref 0.52–1.04)
ERYTHROCYTE [DISTWIDTH] IN BLOOD BY AUTOMATED COUNT: 16.7 % (ref 10–15)
GFR SERPL CREATININE-BSD FRML MDRD: 82 ML/MIN/1.7M2
GLUCOSE SERPL-MCNC: 133 MG/DL (ref 70–99)
HCT VFR BLD AUTO: 32.9 % (ref 35–47)
HGB BLD-MCNC: 10.3 G/DL (ref 11.7–15.7)
MCH RBC QN AUTO: 24.4 PG (ref 26.5–33)
MCHC RBC AUTO-ENTMCNC: 31.3 G/DL (ref 31.5–36.5)
MCV RBC AUTO: 78 FL (ref 78–100)
PLATELET # BLD AUTO: 243 10E9/L (ref 150–450)
POTASSIUM SERPL-SCNC: 3.7 MMOL/L (ref 3.4–5.3)
PROT SERPL-MCNC: 6.9 G/DL (ref 6.8–8.8)
RBC # BLD AUTO: 4.22 10E12/L (ref 3.8–5.2)
SODIUM SERPL-SCNC: 141 MMOL/L (ref 133–144)
VIT B12 SERPL-MCNC: 470 PG/ML (ref 193–986)
WBC # BLD AUTO: 7.1 10E9/L (ref 4–11)

## 2018-09-05 PROCEDURE — 82607 VITAMIN B-12: CPT | Performed by: PSYCHIATRY & NEUROLOGY

## 2018-09-05 PROCEDURE — 83921 ORGANIC ACID SINGLE QUANT: CPT | Mod: 90 | Performed by: PSYCHIATRY & NEUROLOGY

## 2018-09-05 PROCEDURE — 80053 COMPREHEN METABOLIC PANEL: CPT | Performed by: PSYCHIATRY & NEUROLOGY

## 2018-09-05 PROCEDURE — 99000 SPECIMEN HANDLING OFFICE-LAB: CPT | Performed by: PSYCHIATRY & NEUROLOGY

## 2018-09-05 PROCEDURE — 99215 OFFICE O/P EST HI 40 MIN: CPT | Performed by: PSYCHIATRY & NEUROLOGY

## 2018-09-05 PROCEDURE — 36415 COLL VENOUS BLD VENIPUNCTURE: CPT | Performed by: PSYCHIATRY & NEUROLOGY

## 2018-09-05 PROCEDURE — 85027 COMPLETE CBC AUTOMATED: CPT | Performed by: PSYCHIATRY & NEUROLOGY

## 2018-09-05 RX ORDER — SUMATRIPTAN 100 MG/1
100 TABLET, FILM COATED ORAL
Qty: 6 TABLET | Refills: 11 | Status: CANCELLED | OUTPATIENT
Start: 2018-09-05

## 2018-09-05 RX ORDER — PRAMIPEXOLE DIHYDROCHLORIDE 0.25 MG/1
0.25 TABLET ORAL AT BEDTIME
Qty: 90 TABLET | Refills: 1 | Status: SHIPPED | OUTPATIENT
Start: 2018-09-05

## 2018-09-05 ASSESSMENT — MONTREAL COGNITIVE ASSESSMENT (MOCA)
8. SERIAL SUBTRACTION OF 7S: 3
6. READ LIST OF DIGITS [FORWARD/BACKWARD]: 2
10. [FLUENCY] NAME WORDS STARTING WITH DESIGNATED LETTER: 1
11. FOR EACH PAIR OF WORDS, WHAT CATEGORY DO THEY BELONG TO (OUT OF 2): 2
VISUOSPATIAL/EXECUTIVE SUBSCORE: 4
13. ORIENTATION SUBSCORE: 6
9. REPEAT EACH SENTENCE: 1
4. NAME EACH OF THE THREE ANIMALS SHOWN: 3
12. MEMORY INDEX SCORE: 2
WHAT LEVEL OF EDUCATION WAS ATTAINED: 0
WHAT IS THE TOTAL SCORE (OUT OF 30): 25
7. [VIGILENCE] TAP WHEN HEARING DESIGNATED LETTER: 1

## 2018-09-05 ASSESSMENT — PAIN SCALES - GENERAL: PAINLEVEL: NO PAIN (0)

## 2018-09-05 NOTE — NURSING NOTE
Patient prefers to be contacted: Yunier Mendozaay to leave detailed message on voicemail: n/a     Kathi WEISS-JOELLE

## 2018-09-05 NOTE — MR AVS SNAPSHOT
After Visit Summary   9/5/2018    Socorro Oakes    MRN: [id number]           Patient Information     Date Of Birth          1965        Visit Information        Provider Department      9/5/2018 11:00 AM Nancy Williamson MD [facility]        Today's Diagnoses     Encounter for long-term (current) use of medications    -  1    Migraine without aura and without status migrainosus, not intractable        Restless legs syndrome (RLS)        Memory problem          Care Instructions      See Assessment and Plan          Follow-ups after your visit        Follow-up notes from your care team     Return in about 6 months (around 3/5/2019).      Your next 10 appointments already scheduled     Sep 06, 2018  9:30 AM CDT   Return Sleep Patient with Everton Martínez MD   [facility] ([facility])    [address]  [facility] [address]   [phone]            Mar 06, 2019 10:15 AM CST   Return Visit with Nancy Bazan MD   [facility]    [address] [facility] [address]   [phone]              Who to contact     If you have questions or need follow up information about today's clinic visit or your schedule please contact [facility] directly at [phone].  Normal or non-critical lab and imaging results will be communicated to you by MyChart, letter or phone within 4 business days after the clinic has received the results. If you do not hear from us within 7 days, please contact the clinic through MyChart or phone. If you have a critical or abnormal lab result, we will notify you by phone as soon as possible.  Submit refill requests through [employer] or call your pharmacy and they will forward the refill request to us. Please allow 3 business days for your refill to be completed.          Additional Information About Your Visit        MyChart  Information     Stacey gives you secure access to your electronic health record. If you see a primary care provider, you can also send messages to your care team and make appointments. If you have questions, please call your primary care clinic.  If you do not have a primary care provider, please call [phone] and they will assist you.        Care EveryWhere ID     This is your Care EveryWhere ID. This could be used by other organizations to access your [facility] medical records  [id number]        Your Vitals Were     Pulse Temperature Respirations Last Period Breastfeeding? BMI (Body Mass Index)    99 98.3  F (36.8  C) (Oral) 16 08/15/2018 (Approximate) No 43.77 kg/m2       Blood Pressure from Last 3 Encounters:   09/05/18 120/67   03/15/18 129/72   03/12/18 124/75    Weight from Last 3 Encounters:   09/05/18 113.9 kg (251 lb)   03/15/18 107.2 kg (236 lb 6.4 oz)   03/12/18 106.6 kg (235 lb)              We Performed the Following     CBC with platelets     Comprehensive metabolic panel (BMP + Alb, Alk Phos, ALT, AST, Total. Bili, TP)     Methylmalonic acid     Vitamin B12          Where to get your medicines      These medications were sent to [facility] [address] [address]     Phone:  [phone]     pramipexole 0.25 MG tablet          Primary Care Provider Office Phone # Fax #    Jennifer Matos Osvaldo, RAJ [facility] [phone] [address] [facility] [address]        Goals        General    I want to be able to understand more about my medications and need for taking them routinly  (pt-stated)     Notes - Note created  2/4/2015  2:07 PM by Leia Xiao LSW    As of today's date 2/4/2015 goal is met at 51 - 75%.   Goal Status:  Active  Setting up MTM referral         I want to improve my independent living skills (pt-stated)     Notes - Note created  2/4/2015  2:05 PM by Leia Xiao LSW    As of  today's date 2/4/2015 goal is met at 0 - 25%.   Goal Status:  just starting  Just starting out          Equal Access to Services     MARCELAJAE LUCAS : Hadii cele ramirez jaja Mckeon, watatianada luibeth, qabeata kaalmada harjeet, suzy perez laIsraelgisela jovana. So [facility] [phone].    ATENCIÓN: Si habla español, tiene a perdue disposición servicios gratuitos de asistencia lingüística. Llame al [phone].    We comply with applicable federal civil rights laws and [place] laws. We do not discriminate on the basis of race, color, national origin, age, disability, sex, sexual orientation, or gender identity.            Thank you!     Thank you for choosing [facility]  for your care. Our goal is always to provide you with excellent care. Hearing back from our patients is one way we can continue to improve our services. Please take a few minutes to complete the written survey that you may receive in the mail after your visit with us. Thank you!             Your Updated Medication List - Protect others around you: Learn how to safely use, store and throw away your medicines at www.disposemymeds.org.          This list is accurate as of 9/5/18 11:50 AM.  Always use your most recent med list.                   Brand Name Dispense Instructions for use Diagnosis    ADVIL PO      Take 400 mg by mouth every 6 hours as needed.        AMBIEN PO      Take 10 mg by mouth At Bedtime        diazepam 10 MG tablet    VALIUM     Take 10 mg by mouth every 6 hours as needed for anxiety or sleep        doxepin 75 MG capsule    SINEquan     Take 75 mg by mouth At Bedtime        lamoTRIgine 150 MG tablet    LaMICtal     Take 150 mg by mouth every evening        LATUDA 80 MG Tabs tablet   Generic drug:  lurasidone      Take 60 mg by mouth every evening        omeprazole 20 MG CR capsule    priLOSEC    60 capsule    Take 2 capsules (40 mg) by mouth daily    Gastroesophageal reflux disease without esophagitis       order for  ADELA Quintanilla was set up on replacement machine: RESmed; Type  Airsense 10; Serial Number: [id number]; Modem Number: 023; Pressure: SET PRESSURE 9 CM H20;  Mask of choice: none given; per patient she just opened a new one at home; Heated tubing        pramipexole 0.25 MG tablet    MIRAPEX    90 tablet    Take 1 tablet (0.25 mg) by mouth At Bedtime    Restless legs syndrome (RLS)       prazosin 2 MG capsule    MINIPRESS     Take 2 mg by mouth 2 times daily        PRISTIQ PO      Take 150 mg by mouth every evening        * SUMAtriptan Succinate Refill 6 MG/0.5ML Soln    IMITREX    0.5 mL    Inject 6mg as needed. MR x1 after 1-2 hrs as needed. Limit 2 doses in 24 hrs.    Migraines       * SUMAtriptan 100 MG tablet    IMITREX    6 tablet    Take 1 tablet (100 mg) by mouth at onset of headache for migraine May repeat in 2 hours if needed: max 2/day;    Migraine without aura and without status migrainosus, not intractable       TYLENOL 500 MG tablet   Generic drug:  acetaminophen      2 TABLETS ORALLY 4 TIMES DAILY AS NEEDED        verapamil 120 MG 24 hr capsule    VERELAN    180 capsule    Take 1 capsule (120 mg) by mouth 2 times daily    Migraine with aura and without status migrainosus, not intractable       * Notice:  This list has 2 medication(s) that are the same as other medications prescribed for you. Read the directions carefully, and ask your doctor or other care provider to review them with you.

## 2018-09-05 NOTE — PROGRESS NOTES
ESTABLISHED PATIENT NEUROLOGY NOTE    DATE OF VISIT: 9/5/2018  MRN: [id number]  PATIENT NAME: Socorro Oakes  YOB: 1965    Chief Complaint   Patient presents with     RECHECK     Headache and memory     SUBJECTIVE:                                                      HISTORY OF PRESENT ILLNESS:  Socorro is here for follow up regarding headaches and subjective memory concerns.   MoCA x 2 have been normal in the past, most recently at our previous visit in March 2018. Neuropsych test results were previously questionable in terms of validity/underperformance. She has not been interested in repeat testing or occupational therapy, which I have offered in the past.    She reported good headache control at the previous visit. Regimen is Verapamil 120mg BID and as needed Imitrex. The patient tells me the headaches are stable. She cannot recall the last headache. No changes in headache characteristics. She is not sure when she last used Imitrex. She has used over the counter pain medications in the recent past for a tooth extraction and some bodily aches. No daily use.    The patient also has additional history of restless legs syndrome. She is asking for a Mirapex refill today. She takes 0.25mg at bedtime. She says this dose still adequately controls her symptoms. She denies changes in the memory. No functional changes at home in terms of memory. She says she did pull back on some volunteering just to simplify her life and she just did not feel motivated to continue. 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     No new concerns except that her cats are not getting along which causes some disruption at home. Sleep is ok most of the time. She does use CPAP.     She says that her mood is okay. She is on lamotrigine for mood, and Latuda (lurasidone). She mentions that she went off of one of her medications for a while (Latuda) and did not do well, so this was resumed. She sees  Kristopher Mchugh through [facility] for mental health services. She is very happy with the care she receives there.     No visual changes. No weakness or sensory changes. Bladder and bowel functions are normal.     CURRENT MEDICATIONS:     Current Outpatient Prescriptions on File Prior to Visit:  Desvenlafaxine Succinate (PRISTIQ PO) Take 150 mg by mouth every evening    diazepam (VALIUM) 10 MG tablet Take 10 mg by mouth every 6 hours as needed for anxiety or sleep   DoxePIN (SINEQUAN) 75 MG capsule Take 75 mg by mouth At Bedtime   Ibuprofen (ADVIL PO) Take 400 mg by mouth every 6 hours as needed.   lamoTRIgine (LAMICTAL) 150 MG tablet Take 150 mg by mouth every evening    lurasidone (LATUDA) 80 MG TABS tablet Take 60 mg by mouth every evening    omeprazole (PRILOSEC) 20 MG CR capsule Take 2 capsules (40 mg) by mouth daily   prazosin (MINIPRESS) 2 MG capsule Take 2 mg by mouth 2 times daily    SUMAtriptan (IMITREX) 100 MG tablet Take 1 tablet (100 mg) by mouth at onset of headache for migraine May repeat in 2 hours if needed: max 2/day;   SUMAtriptan Succinate Refill (IMITREX) 6 MG/0.5ML SOLN Inject 6mg as needed. MR x1 after 1-2 hrs as needed. Limit 2 doses in 24 hrs.   TYLENOL EXTRA STRENGTH 500 MG OR TABS 2 TABLETS ORALLY 4 TIMES DAILY AS NEEDED   verapamil (VERELAN) 120 MG 24 hr capsule Take 1 capsule (120 mg) by mouth 2 times daily   Zolpidem Tartrate (AMBIEN PO) Take 10 mg by mouth At Bedtime   ORDER FOR ADELA Quintanilla was set up on replacement machine: [employer]; Type  Airsense 10; Serial Number: [id number]; Modem Number: 023; Pressure: SET PRESSURE 9 CM H20; Mask of choice: none given; per patient she just opened a new one at home; Heated tubing   [DISCONTINUED] pramipexole (MIRAPEX) 0.25 MG tablet Take 1 tablet (0.25 mg) by mouth At Bedtime     No current facility-administered medications on file prior to visit.     RECENT DIAGNOSTIC STUDIES:   Labs:   Results for orders placed or performed in visit on  03/12/18   Prolactin   Result Value Ref Range    Prolactin 62 (H) 3 - 27 ug/L   Hepatic panel (Albumin, ALT, AST, Bili, Alk Phos, TP)   Result Value Ref Range    Bilirubin Direct <0.1 0.0 - 0.2 mg/dL    Bilirubin Total 0.3 0.2 - 1.3 mg/dL    Albumin 3.5 3.4 - 5.0 g/dL    Protein Total 7.0 6.8 - 8.8 g/dL    Alkaline Phosphatase 72 40 - 150 U/L    ALT 29 0 - 50 U/L    AST 20 0 - 45 U/L   Glucose   Result Value Ref Range    Glucose 96 70 - 99 mg/dL   Hemoglobin A1c   Result Value Ref Range    Hemoglobin A1C 6.0 4.3 - 6.0 %   Lipid panel reflex to direct LDL Fasting   Result Value Ref Range    Cholesterol 227 (H) <200 mg/dL    Triglycerides 162 (H) <150 mg/dL    HDL Cholesterol 58 >49 mg/dL    LDL Cholesterol Calculated 137 (H) <100 mg/dL    Non HDL Cholesterol 169 (H) <130 mg/dL   Vitamin D Deficiency   Result Value Ref Range    Vitamin D Deficiency screening 26 20 - 75 ug/L   IgG   Result Value Ref Range     695 - 1620 mg/dL   TSH   Result Value Ref Range    TSH 3.95 0.40 - 4.00 mU/L   T4, free   Result Value Ref Range    T4 Free 0.81 0.76 - 1.46 ng/dL   CBC with platelets and differential   Result Value Ref Range    WBC 7.6 4.0 - 11.0 10e9/L    RBC Count 4.77 3.8 - 5.2 10e12/L    Hemoglobin 12.0 11.7 - 15.7 g/dL    Hematocrit 37.6 35.0 - 47.0 %    MCV 79 78 - 100 fl    MCH 25.2 (L) 26.5 - 33.0 pg    MCHC 31.9 31.5 - 36.5 g/dL    RDW 16.1 (H) 10.0 - 15.0 %    Platelet Count 292 150 - 450 10e9/L    Diff Method Automated Method     % Neutrophils 64.3 %    % Lymphocytes 22.9 %    % Monocytes 9.4 %    % Eosinophils 2.2 %    % Basophils 1.2 %    Absolute Neutrophil 4.9 1.6 - 8.3 10e9/L    Absolute Lymphocytes 1.8 0.8 - 5.3 10e9/L    Absolute Monocytes 0.7 0.0 - 1.3 10e9/L    Absolute Eosinophils 0.2 0.0 - 0.7 10e9/L    Absolute Basophils 0.1 0.0 - 0.2 10e9/L       REVIEW OF SYSTEMS:                                                      10-point review of systems is negative except as mentioned above in HPI.     EXAM:                                                       Physical Exam:   Vitals: /67 (BP Location: Right arm, Patient Position: Sitting, Cuff Size: Adult Large)  Pulse 99  Temp 98.3  F (36.8  C) (Oral)  Resp 16  Wt 113.9 kg (251 lb)  LMP 08/15/2018 (Approximate)  Breastfeeding? No  BMI 43.77 kg/m2  BMI= Body mass index is 43.77 kg/(m^2).   GENERAL: NAD. Blunted affect.  HEENT: NC/AT.  CV: RRR. S1, S2.   NECK: No bruits.  Focused Neurologic:  MENTAL STATUS: Alert, attentive. Speech is fluent. Normal comprehension. MoCA: 25/30 (normal is 26 and above). Normal concentration. Adequate fund of knowledge.   CRANIAL NERVES: Discs flat. Visual fields intact to confrontation. Pupils equally, round and reactive to light. Facial sensation and movement normal. EOM full. Hearing intact to conversation. Trapezius strength intact. Palate moves symmetrically. Tongue midline.  MOTOR: 5/5 in proximal and distal muscle groups of upper and lower extremities. Tone and bulk normal.   DTRs: Brisk and symmetric in UEs and patellae. Babinski down-going bilaterally.  SENSATION: Normal light touch, pinprick throughout. Normal vibration and proprioception.   COORDINATION: Normal finger to nose, heel to shin. Mild high frequency/low amplitude tremor in hands with posture.   STATION AND GAIT: Romberg negative. Casual gait normal.    ASSESSMENT and PLAN:                                                      Assessment and Plan:    ICD-10-CM    1. Encounter for long-term (current) use of medications Z79.899 Comprehensive metabolic panel (BMP + Alb, Alk Phos, ALT, AST, Total. Bili, TP)     CBC with platelets   2. Migraine without aura and without status migrainosus, not intractable G43.009 Vitamin B12     Methylmalonic acid     Comprehensive metabolic panel (BMP + Alb, Alk Phos, ALT, AST, Total. Bili, TP)     CBC with platelets   3. Restless legs syndrome (RLS) G25.81 pramipexole (MIRAPEX) 0.25 MG tablet   4. Memory problem R41.3 Vitamin  "B12     Methylmalonic acid       Ms. Oakes is a pleasant 51 yo woman with complicated psychiatric history seen in neurology for headache and restless legs syndrome management as well as review of memory concerns. She scored 25/30 on the MoCA today which does slip one point below \"normal,\" but is close to the prior score. She is not experiencing any functional impairment at home except for perhaps a decrease in motivation, likely related to mood disorder and not memory. I did, however, offer to order repeat Neuropsych testing but she prefers to continue to monitor clinically for now.     The headaches continue to be under good control with the daily verapamil and rare Imitrex use. Mirapex is currently adequate as well, for the restless legs syndrome. We will not make any medication changes at this time. I would like to check some basic labs again for medication-monitoring purposes and updated B12. We will follow-up again in clinic in 6 months. The patient understands and agrees with the plan.     Total Time: 40 minutes were spent with the patient. More than 50% of the time spent on counseling (as described above in Assessment and Plan) /coordinating the care.    Nancy Bazan MD  Neurology                "

## 2018-09-05 NOTE — LETTER
9/5/2018         RE: Socorro Oakes  [address] [place] [address]        Dear Colleague,    Thank you for referring your patient, Socorro Oakes, to the [facility]. Please see a copy of my visit note below.    ESTABLISHED PATIENT NEUROLOGY NOTE    DATE OF VISIT: 9/5/2018  MRN: [id number]  PATIENT NAME: Socorro Oakes  YOB: 1965    Chief Complaint   Patient presents with     RECHECK     Headache and memory     SUBJECTIVE:                                                      HISTORY OF PRESENT ILLNESS:  Socorro is here for follow up regarding headaches and subjective memory concerns.   MoCA x 2 have been normal in the past, most recently at our previous visit in March 2018. Neuropsych test results were previously questionable in terms of validity/underperformance. She has not been interested in repeat testing or occupational therapy, which I have offered in the past.    She reported good headache control at the previous visit. Regimen is Verapamil 120mg BID and as needed Imitrex. The patient tells me the headaches are stable. She cannot recall the last headache. No changes in headache characteristics. She is not sure when she last used Imitrex. She has used over the counter pain medications in the recent past for a tooth extraction and some bodily aches. No daily use.    The patient also has additional history of restless legs syndrome. She is asking for a Mirapex refill today. She takes 0.25mg at bedtime. She says this dose still adequately controls her symptoms. She denies changes in the memory. No functional changes at home in terms of memory. She says she did pull back on some volunteering just to simplify her life and she just did not feel motivated to continue. 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     No new concerns except that her cats are not getting along which causes some disruption at home. Sleep is ok most of the time. She does use  CPAP.     She says that her mood is okay. She is on lamotrigine for mood, and Latuda (lurasidone). She mentions that she went off of one of her medications for a while (Latuda) and did not do well, so this was resumed. She sees Kristopher Mchugh through [facility] for mental health services. She is very happy with the care she receives there.     No visual changes. No weakness or sensory changes. Bladder and bowel functions are normal.     CURRENT MEDICATIONS:     Current Outpatient Prescriptions on File Prior to Visit:  Desvenlafaxine Succinate (PRISTIQ PO) Take 150 mg by mouth every evening    diazepam (VALIUM) 10 MG tablet Take 10 mg by mouth every 6 hours as needed for anxiety or sleep   DoxePIN (SINEQUAN) 75 MG capsule Take 75 mg by mouth At Bedtime   Ibuprofen (ADVIL PO) Take 400 mg by mouth every 6 hours as needed.   lamoTRIgine (LAMICTAL) 150 MG tablet Take 150 mg by mouth every evening    lurasidone (LATUDA) 80 MG TABS tablet Take 60 mg by mouth every evening    omeprazole (PRILOSEC) 20 MG CR capsule Take 2 capsules (40 mg) by mouth daily   prazosin (MINIPRESS) 2 MG capsule Take 2 mg by mouth 2 times daily    SUMAtriptan (IMITREX) 100 MG tablet Take 1 tablet (100 mg) by mouth at onset of headache for migraine May repeat in 2 hours if needed: max 2/day;   SUMAtriptan Succinate Refill (IMITREX) 6 MG/0.5ML SOLN Inject 6mg as needed. MR x1 after 1-2 hrs as needed. Limit 2 doses in 24 hrs.   TYLENOL EXTRA STRENGTH 500 MG OR TABS 2 TABLETS ORALLY 4 TIMES DAILY AS NEEDED   verapamil (VERELAN) 120 MG 24 hr capsule Take 1 capsule (120 mg) by mouth 2 times daily   Zolpidem Tartrate (AMBIEN PO) Take 10 mg by mouth At Bedtime   ORDER FOR ADELA Socorro was set up on replacement machine: [employer]; Type  [employer] 10; Serial Number: [id number]; Modem Number: 023; Pressure: SET PRESSURE 9 CM H20; Mask of choice: none given; per patient she just opened a new one at home; Heated tubing   [DISCONTINUED] pramipexole  (MIRAPEX) 0.25 MG tablet Take 1 tablet (0.25 mg) by mouth At Bedtime     No current facility-administered medications on file prior to visit.     RECENT DIAGNOSTIC STUDIES:   Labs:   Results for orders placed or performed in visit on 03/12/18   Prolactin   Result Value Ref Range    Prolactin 62 (H) 3 - 27 ug/L   Hepatic panel (Albumin, ALT, AST, Bili, Alk Phos, TP)   Result Value Ref Range    Bilirubin Direct <0.1 0.0 - 0.2 mg/dL    Bilirubin Total 0.3 0.2 - 1.3 mg/dL    Albumin 3.5 3.4 - 5.0 g/dL    Protein Total 7.0 6.8 - 8.8 g/dL    Alkaline Phosphatase 72 40 - 150 U/L    ALT 29 0 - 50 U/L    AST 20 0 - 45 U/L   Glucose   Result Value Ref Range    Glucose 96 70 - 99 mg/dL   Hemoglobin A1c   Result Value Ref Range    Hemoglobin A1C 6.0 4.3 - 6.0 %   Lipid panel reflex to direct LDL Fasting   Result Value Ref Range    Cholesterol 227 (H) <200 mg/dL    Triglycerides 162 (H) <150 mg/dL    HDL Cholesterol 58 >49 mg/dL    LDL Cholesterol Calculated 137 (H) <100 mg/dL    Non HDL Cholesterol 169 (H) <130 mg/dL   Vitamin D Deficiency   Result Value Ref Range    Vitamin D Deficiency screening 26 20 - 75 ug/L   IgG   Result Value Ref Range     695 - 1620 mg/dL   TSH   Result Value Ref Range    TSH 3.95 0.40 - 4.00 mU/L   T4, free   Result Value Ref Range    T4 Free 0.81 0.76 - 1.46 ng/dL   CBC with platelets and differential   Result Value Ref Range    WBC 7.6 4.0 - 11.0 10e9/L    RBC Count 4.77 3.8 - 5.2 10e12/L    Hemoglobin 12.0 11.7 - 15.7 g/dL    Hematocrit 37.6 35.0 - 47.0 %    MCV 79 78 - 100 fl    MCH 25.2 (L) 26.5 - 33.0 pg    MCHC 31.9 31.5 - 36.5 g/dL    RDW 16.1 (H) 10.0 - 15.0 %    Platelet Count 292 150 - 450 10e9/L    Diff Method Automated Method     % Neutrophils 64.3 %    % Lymphocytes 22.9 %    % Monocytes 9.4 %    % Eosinophils 2.2 %    % Basophils 1.2 %    Absolute Neutrophil 4.9 1.6 - 8.3 10e9/L    Absolute Lymphocytes 1.8 0.8 - 5.3 10e9/L    Absolute Monocytes 0.7 0.0 - 1.3 10e9/L    Absolute  Eosinophils 0.2 0.0 - 0.7 10e9/L    Absolute Basophils 0.1 0.0 - 0.2 10e9/L       REVIEW OF SYSTEMS:                                                      10-point review of systems is negative except as mentioned above in HPI.     EXAM:                                                      Physical Exam:   Vitals: /67 (BP Location: Right arm, Patient Position: Sitting, Cuff Size: Adult Large)  Pulse 99  Temp 98.3  F (36.8  C) (Oral)  Resp 16  Wt 113.9 kg (251 lb)  LMP 08/15/2018 (Approximate)  Breastfeeding? No  BMI 43.77 kg/m2  BMI= Body mass index is 43.77 kg/(m^2).   GENERAL: NAD. Blunted affect.  HEENT: NC/AT.  CV: RRR. S1, S2.   NECK: No bruits.  Focused Neurologic:  MENTAL STATUS: Alert, attentive. Speech is fluent. Normal comprehension. MoCA: 25/30 (normal is 26 and above). Normal concentration. Adequate fund of knowledge.   CRANIAL NERVES: Discs flat. Visual fields intact to confrontation. Pupils equally, round and reactive to light. Facial sensation and movement normal. EOM full. Hearing intact to conversation. Trapezius strength intact. Palate moves symmetrically. Tongue midline.  MOTOR: 5/5 in proximal and distal muscle groups of upper and lower extremities. Tone and bulk normal.   DTRs: Brisk and symmetric in UEs and patellae. Babinski down-going bilaterally.  SENSATION: Normal light touch, pinprick throughout. Normal vibration and proprioception.   COORDINATION: Normal finger to nose, heel to shin. Mild high frequency/low amplitude tremor in hands with posture.   STATION AND GAIT: Romberg negative. Casual gait normal.    ASSESSMENT and PLAN:                                                      Assessment and Plan:    ICD-10-CM    1. Encounter for long-term (current) use of medications Z79.899 Comprehensive metabolic panel (BMP + Alb, Alk Phos, ALT, AST, Total. Bili, TP)     CBC with platelets   2. Migraine without aura and without status migrainosus, not intractable G43.009 Vitamin B12      "Methylmalonic acid     Comprehensive metabolic panel (BMP + Alb, Alk Phos, ALT, AST, Total. Bili, TP)     CBC with platelets   3. Restless legs syndrome (RLS) G25.81 pramipexole (MIRAPEX) 0.25 MG tablet   4. Memory problem R41.3 Vitamin B12     Methylmalonic acid       Ms. Oakes is a pleasant 51 yo woman with complicated psychiatric history seen in neurology for headache and restless legs syndrome management as well as review of memory concerns. She scored 25/30 on the MoCA today which does slip one point below \"normal,\" but is close to the prior score. She is not experiencing any functional impairment at home except for perhaps a decrease in motivation, likely related to mood disorder and not memory. I did, however, offer to order repeat Neuropsych testing but she prefers to continue to monitor clinically for now.     The headaches continue to be under good control with the daily verapamil and rare Imitrex use. Mirapex is currently adequate as well, for the restless legs syndrome. We will not make any medication changes at this time. I would like to check some basic labs again for medication-monitoring purposes and updated B12. We will follow-up again in clinic in 6 months. The patient understands and agrees with the plan.     Total Time: 40 minutes were spent with the patient. More than 50% of the time spent on counseling (as described above in Assessment and Plan) /coordinating the care.    Nancy Bazan MD  Neurology                  Again, thank you for allowing me to participate in the care of your patient.        Sincerely,        Nancy Bazan MD    "

## 2018-09-06 ENCOUNTER — OFFICE VISIT (OUTPATIENT)
Dept: SLEEP MEDICINE | Facility: CLINIC | Age: 53
End: 2018-09-06
Payer: COMMERCIAL

## 2018-09-06 VITALS
HEIGHT: 64 IN | SYSTOLIC BLOOD PRESSURE: 126 MMHG | BODY MASS INDEX: 43.02 KG/M2 | DIASTOLIC BLOOD PRESSURE: 72 MMHG | HEART RATE: 77 BPM | WEIGHT: 252 LBS | OXYGEN SATURATION: 95 %

## 2018-09-06 DIAGNOSIS — G47.33 OSA (OBSTRUCTIVE SLEEP APNEA): Primary | ICD-10-CM

## 2018-09-06 PROCEDURE — 99214 OFFICE O/P EST MOD 30 MIN: CPT | Performed by: FAMILY MEDICINE

## 2018-09-06 NOTE — PATIENT INSTRUCTIONS
Your Body mass index is 43.94 kg/(m^2).  Weight management is a personal decision.  If you are interested in exploring weight loss strategies, the following discussion covers the approaches that may be successful. Body mass index (BMI) is one way to tell whether you are at a healthy weight, overweight, or obese. It measures your weight in relation to your height.  A BMI of 18.5 to 24.9 is in the healthy range. A person with a BMI of 25 to 29.9 is considered overweight, and someone with a BMI of 30 or greater is considered obese. More than two-thirds of American adults are considered overweight or obese.  Being overweight or obese increases the risk for further weight gain. Excess weight may lead to heart disease and diabetes.  Creating and following plans for healthy eating and physical activity may help you improve your health.  Weight control is part of healthy lifestyle and includes exercise, emotional health, and healthy eating habits. Careful eating habits lifelong are the mainstay of weight control. Though there are significant health benefits from weight loss, long-term weight loss with diet alone may be very difficult to achieve- studies show long-term success with dietary management in less than 10% of people. Attaining a healthy weight may be especially difficult to achieve in those with severe obesity. In some cases, medications, devices and surgical management might be considered.  What can you do?  If you are overweight or obese and are interested in methods for weight loss, you should discuss this with your provider.     Consider reducing daily calorie intake by 500 calories.     Keep a food journal.     Avoiding skipping meals, consider cutting portions instead.    Diet combined with exercise helps maintain muscle while optimizing fat loss. Strength training is particularly important for building and maintaining muscle mass. Exercise helps reduce stress, increase energy, and improves fitness.  Increasing exercise without diet control, however, may not burn enough calories to loose weight.       Start walking three days a week 10-20 minutes at a time    Work towards walking thirty minutes five days a week     Eventually, increase the speed of your walking for 1-2 minutes at time    In addition, we recommend that you review healthy lifestyles and methods for weight loss available through the National Institutes of Health patient information sites:  http://win.niddk.nih.gov/publications/index.htm    And look into health and wellness programs that may be available through your health insurance provider, employer, local community center, or babs club.    Weight management plan: Patient was referred to their PCP to discuss a diet and exercise plan.

## 2018-09-06 NOTE — MR AVS SNAPSHOT
After Visit Summary   9/6/2018    Socorro Oakes    MRN: [id number]           Patient Information     Date Of Birth          1965        Visit Information        Provider Department      9/6/2018 9:30 AM Everton Martínez MD [facility]        Today's Diagnoses     ALEXA (obstructive sleep apnea)    -  1      Care Instructions        Your Body mass index is 43.94 kg/(m^2).  Weight management is a personal decision.  If you are interested in exploring weight loss strategies, the following discussion covers the approaches that may be successful. Body mass index (BMI) is one way to tell whether you are at a healthy weight, overweight, or obese. It measures your weight in relation to your height.  A BMI of 18.5 to 24.9 is in the healthy range. A person with a BMI of 25 to 29.9 is considered overweight, and someone with a BMI of 30 or greater is considered obese. More than two-thirds of American adults are considered overweight or obese.  Being overweight or obese increases the risk for further weight gain. Excess weight may lead to heart disease and diabetes.  Creating and following plans for healthy eating and physical activity may help you improve your health.  Weight control is part of healthy lifestyle and includes exercise, emotional health, and healthy eating habits. Careful eating habits lifelong are the mainstay of weight control. Though there are significant health benefits from weight loss, long-term weight loss with diet alone may be very difficult to achieve- studies show long-term success with dietary management in less than 10% of people. Attaining a healthy weight may be especially difficult to achieve in those with severe obesity. In some cases, medications, devices and surgical management might be considered.  What can you do?  If you are overweight or obese and are interested in methods for weight loss, you should discuss this with your provider.     Consider  reducing daily calorie intake by 500 calories.     Keep a food journal.     Avoiding skipping meals, consider cutting portions instead.    Diet combined with exercise helps maintain muscle while optimizing fat loss. Strength training is particularly important for building and maintaining muscle mass. Exercise helps reduce stress, increase energy, and improves fitness. Increasing exercise without diet control, however, may not burn enough calories to loose weight.       Start walking three days a week 10-20 minutes at a time    Work towards walking thirty minutes five days a week     Eventually, increase the speed of your walking for 1-2 minutes at time    In addition, we recommend that you review healthy lifestyles and methods for weight loss available through the National Institutes of Health patient information sites:  http://win.niddk.nih.gov/publications/index.htm    And look into health and wellness programs that may be available through your health insurance provider, employer, local community center, or babs club.    Weight management plan: Patient was referred to their PCP to discuss a diet and exercise plan.            Follow-ups after your visit        Follow-up notes from your care team     Return in 2 years (on 9/6/2020) for PAP follow up.      Your next 10 appointments already scheduled     Mar 06, 2019 10:15 AM CST   [facility]    [address] [address] [phone]              Who to contact     If you have questions or need follow up information about today's clinic visit or your schedule please contact [facility] directly at [phone].  Normal or non-critical lab and imaging results will be communicated to you by MyChart, letter or phone within 4 business days after the clinic has received the results. If you do not hear from us within 7 days, please contact  "the clinic through [employer] or phone. If you have a critical or abnormal lab result, we will notify you by phone as soon as possible.  Submit refill requests through [employer] or call your pharmacy and they will forward the refill request to us. Please allow 3 business days for your refill to be completed.          Additional Information About Your Visit        [employer] Information     [employer] gives you secure access to your electronic health record. If you see a primary care provider, you can also send messages to your care team and make appointments. If you have questions, please call your primary care clinic.  If you do not have a primary care provider, please call [phone] and they will assist you.        Care EveryWhere ID     This is your Care EveryWhere ID. This could be used by other organizations to access your [facility] medical records  [id number]        Your Vitals Were     Pulse Height Last Period Pulse Oximetry BMI (Body Mass Index)       77 1.613 m (5' 3.5\") 08/15/2018 (Approximate) 95% 43.94 kg/m2        Blood Pressure from Last 3 Encounters:   09/06/18 126/72   09/05/18 120/67   03/15/18 129/72    Weight from Last 3 Encounters:   09/06/18 114.3 kg (252 lb)   09/05/18 113.9 kg (251 lb)   03/15/18 107.2 kg (236 lb 6.4 oz)              We Performed the Following     Comprehensive DME        Primary Care Provider Office Phone # Fax #    Jennifer Castrot, APRMARCY [facility] [phone] [address] [address]        Goals        General    I want to be able to understand more about my medications and need for taking them routinly  (pt-stated)     Notes - Note created  2/4/2015  2:07 PM by Leia Xiao LSW    As of today's date 2/4/2015 goal is met at 51 - 75%.   Goal Status:  Active  Setting up MTM referral         I want to improve my independent living skills (pt-stated)     Notes - Note created  2/4/2015  2:05 PM by Leia Xiao LSW    As of today's date 2/4/2015 goal " is met at 0 - 25%.   Goal Status:  just starting  Just starting out          Equal Access to Services     MARCELAJAE ORDONEZDADY : Hadii aad ku hadraphaelo Sokacieali, waaxda luqadaha, qaybta kayudida hillarydavid, suzy rodriguez renitagabrielle thorntonrodger perez leann whaley. So [facility] [phone].    ATENCIÓN: Si habla español, tiene a perdue disposición servicios gratuitos de asistencia lingüística. Llame al [phone].    We comply with applicable federal civil rights laws and [place] laws. We do not discriminate on the basis of race, color, national origin, age, disability, sex, sexual orientation, or gender identity.            Thank you!     Thank you for choosing [facility]  for your care. Our goal is always to provide you with excellent care. Hearing back from our patients is one way we can continue to improve our services. Please take a few minutes to complete the written survey that you may receive in the mail after your visit with us. Thank you!             Your Updated Medication List - Protect others around you: Learn how to safely use, store and throw away your medicines at www.disposemymeds.org.          This list is accurate as of 9/6/18 10:04 AM.  Always use your most recent med list.                   Brand Name Dispense Instructions for use Diagnosis    ADVIL PO      Take 400 mg by mouth every 6 hours as needed.        AMBIEN PO      Take 10 mg by mouth At Bedtime        diazepam 10 MG tablet    VALIUM     Take 10 mg by mouth every 6 hours as needed for anxiety or sleep        doxepin 75 MG capsule    SINEquan     Take 75 mg by mouth At Bedtime        lamoTRIgine 150 MG tablet    LaMICtal     Take 150 mg by mouth every evening        LATUDA 80 MG Tabs tablet   Generic drug:  lurasidone      Take 60 mg by mouth every evening        omeprazole 20 MG CR capsule    priLOSEC    60 capsule    Take 2 capsules (40 mg) by mouth daily    Gastroesophageal reflux disease without esophagitis       order for ADELA Quintanilla was set  up on replacement machine: RESmed; Type  Airsense 10; Serial Number: [id number]; Modem Number: 023; Pressure: SET PRESSURE 9 CM H20;  Mask of choice: none given; per patient she just opened a new one at home; Heated tubing        pramipexole 0.25 MG tablet    MIRAPEX    90 tablet    Take 1 tablet (0.25 mg) by mouth At Bedtime    Restless legs syndrome (RLS)       prazosin 2 MG capsule    MINIPRESS     Take 2 mg by mouth 2 times daily        PRISTIQ PO      Take 150 mg by mouth every evening        * SUMAtriptan Succinate Refill 6 MG/0.5ML Soln    IMITREX    0.5 mL    Inject 6mg as needed. MR x1 after 1-2 hrs as needed. Limit 2 doses in 24 hrs.    Migraines       * SUMAtriptan 100 MG tablet    IMITREX    6 tablet    Take 1 tablet (100 mg) by mouth at onset of headache for migraine May repeat in 2 hours if needed: max 2/day;    Migraine without aura and without status migrainosus, not intractable       TYLENOL 500 MG tablet   Generic drug:  acetaminophen      2 TABLETS ORALLY 4 TIMES DAILY AS NEEDED        verapamil 120 MG 24 hr capsule    VERELAN    180 capsule    Take 1 capsule (120 mg) by mouth 2 times daily    Migraine with aura and without status migrainosus, not intractable       * Notice:  This list has 2 medication(s) that are the same as other medications prescribed for you. Read the directions carefully, and ask your doctor or other care provider to review them with you.

## 2018-09-07 LAB — METHYLMALONATE SERPL-SCNC: 0.13 UMOL/L (ref 0–0.4)

## 2018-11-30 NOTE — PROGRESS NOTES
Please advise Socorro Oakes,  1965, that her lab results look ok except the glucose was a little high and she appears to be anemic (low blood count). I recommend she follow up with her primary care provider about these findings.  [phone] (home)   Nancy Bazan   Refer To    ORTHOPEDICS REFER TO:  [facility], Dr. Traylor, Dr. Miguel, Dr. Rand, Dr. Cross, Forest Barreto MD [address], [place] Office [phone]     [facility]; [address], [place] Office [phone] fax [phone]     Wilfred Morales MD; [address], [place] [phone] fax [phone]     CST

## 2018-12-18 DIAGNOSIS — K21.9 GASTROESOPHAGEAL REFLUX DISEASE WITHOUT ESOPHAGITIS: ICD-10-CM

## 2018-12-18 NOTE — TELEPHONE ENCOUNTER
"Requested Prescriptions   Pending Prescriptions Disp Refills     omeprazole (PRILOSEC) 20 MG DR capsule [Pharmacy Med Name: OMEPRAZOLE 20MG CPDR] 60 capsule 3    Last Written Prescription Date:  1/16/18  Last Fill Quantity: 60,  # refills: 3   Last office visit: 3/15/2018 with prescribing provider:  Osvaldo   Future Office Visit:   Next 5 appointments (look out 90 days)    Mar 06, 2019 10:15 AM CST  Return Visit with Nancy Bazan MD  [facility] [address] [place] [address]  [phone]          Sig: TAKE TWO CAPSULES BY MOUTH EVERY DAY    PPI Protocol Passed - 12/18/2018  5:08 AM       Passed - Not on Clopidogrel (unless Pantoprazole ordered)       Passed - No diagnosis of osteoporosis on record       Passed - Recent (12 mo) or future (30 days) visit within the authorizing provider's specialty    Patient had office visit in the last 12 months or has a visit in the next 30 days with authorizing provider or within the authorizing provider's specialty.  See \"Patient Info\" tab in inbasket, or \"Choose Columns\" in Meds & Orders section of the refill encounter.             Passed - Patient is age 18 or older       Passed - No active pregnacy on record       Passed - No positive pregnancy test in past 12 months          "

## 2018-12-19 NOTE — TELEPHONE ENCOUNTER
Prescription approved per [facility] Refill Protocol.    Kathi FONG RN

## 2019-03-06 ENCOUNTER — OFFICE VISIT (OUTPATIENT)
Dept: NEUROLOGY | Facility: CLINIC | Age: 54
End: 2019-03-06
Payer: COMMERCIAL

## 2019-03-06 VITALS
SYSTOLIC BLOOD PRESSURE: 135 MMHG | WEIGHT: 249 LBS | TEMPERATURE: 97.5 F | DIASTOLIC BLOOD PRESSURE: 75 MMHG | RESPIRATION RATE: 14 BRPM | BODY MASS INDEX: 43.42 KG/M2 | HEART RATE: 81 BPM

## 2019-03-06 DIAGNOSIS — G25.81 RESTLESS LEGS SYNDROME (RLS): ICD-10-CM

## 2019-03-06 DIAGNOSIS — R41.89 SUBJECTIVE MEMORY COMPLAINTS: ICD-10-CM

## 2019-03-06 DIAGNOSIS — G43.009 MIGRAINE WITHOUT AURA AND WITHOUT STATUS MIGRAINOSUS, NOT INTRACTABLE: Primary | ICD-10-CM

## 2019-03-06 PROCEDURE — 99215 OFFICE O/P EST HI 40 MIN: CPT | Performed by: PSYCHIATRY & NEUROLOGY

## 2019-03-06 RX ORDER — ZIPRASIDONE HYDROCHLORIDE 40 MG/1
40 CAPSULE ORAL DAILY
COMMUNITY
End: 2019-12-02

## 2019-03-06 ASSESSMENT — PAIN SCALES - GENERAL: PAINLEVEL: NO PAIN (0)

## 2019-03-06 NOTE — NURSING NOTE
Patient prefers to be contacted: Stacey Mendozaay to leave detailed message on voicemail: n/a     Kathi WEISSA

## 2019-03-06 NOTE — LETTER
3/6/2019         RE: Socorro Oakes  [address] [place] [address]        Dear Colleague,    Thank you for referring your patient, Socorro Oakes, to the [facility]. Please see a copy of my visit note below.    ESTABLISHED PATIENT NEUROLOGY NOTE    DATE OF VISIT: 3/6/2019  MRN: [id number]  PATIENT NAME: Socorro Oakes  YOB: 1965    Chief Complaint   Patient presents with     RECHECK     Migraine, RLS, memory     SUBJECTIVE:                                                      HISTORY OF PRESENT ILLNESS:  Socorro is here for follow up regarding headaches and subjective memory concerns. As previously documented by me (9.5.18):     MoCA x 2 have been normal in the past, most recently at our previous visit in March 2018. Neuropsych test results were previously questionable in terms of validity/underperformance. She has not been interested in repeat testing or occupational therapy, which I have offered in the past.     She reported good headache control at the previous visit. Regimen is Verapamil 120mg BID and as needed Imitrex. The patient tells me the headaches are stable. She cannot recall the last headache. No changes in headache characteristics. She is not sure when she last used Imitrex. She has used over the counter pain medications in the recent past for a tooth extraction and some bodily aches. No daily use.     The patient also has additional history of restless legs syndrome. She is asking for a Mirapex refill today. She takes 0.25mg at bedtime. She says this dose still adequately controls her symptoms. She denies changes in the memory. No functional changes at home in terms of memory. She says she did pull back on some volunteering just to simplify her life and she just did not feel motivated to continue. 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     MoCA was 25/30 at our previous visit. She did not describe any concerning functional  "problems at home at the time, but I did not that she described some mood-related amotivationalism. We decided to continue to monitor the memory clinically.     No changes were made in the Verapamil (120mg BID). She is on Imitrex as needed for the headaches. Mirapex 0.25mg for restless legs syndrome.    She says that the only medication change recently is replacing the Latuda with Geodon. Mood is \"ok.\" The Geodon has changed her taste buds. She no longer likes chocolate. This is fine with her.     She feels that the headaches are well-controlled. She ran out of the Verapamil in December and has not had it refilled. She has only minor headaches, and these are tolerable. She does not think she has any Imitrex that isn't . Not clear if she needs it.     She feels that the Mirapex is still working for the restless legs syndrome. Her psychiatrist has most recently filled this for her, she says. No change. She has not had symptoms for a long time, even when she misses doses. She thinks the memory is \"maybe the same.\" She does not thinks it is an issue. She says she does get lost if she gets pulled from a conversation. No safety concerns such as getting lost or function at home.     She does a lot of traveling. She takes care of her grandchild as well. She reminds me that she is disables so she does not work, though she does some volunteering.     CURRENT MEDICATIONS:     Current Outpatient Medications on File Prior to Visit:  Desvenlafaxine Succinate (PRISTIQ PO) Take 150 mg by mouth every evening    diazepam (VALIUM) 10 MG tablet Take 10 mg by mouth every 6 hours as needed for anxiety or sleep   DoxePIN (SINEQUAN) 75 MG capsule Take 75 mg by mouth At Bedtime   Ibuprofen (ADVIL PO) Take 400 mg by mouth every 6 hours as needed.   lamoTRIgine (LAMICTAL) 150 MG tablet Take 150 mg by mouth every evening    omeprazole (PRILOSEC) 20 MG DR capsule TAKE TWO CAPSULES BY MOUTH EVERY DAY   pramipexole (MIRAPEX) 0.25 MG tablet " Take 1 tablet (0.25 mg) by mouth At Bedtime   prazosin (MINIPRESS) 2 MG capsule Take 2 mg by mouth 2 times daily    SUMAtriptan (IMITREX) 100 MG tablet Take 1 tablet (100 mg) by mouth at onset of headache for migraine May repeat in 2 hours if needed: max 2/day;   SUMAtriptan Succinate Refill (IMITREX) 6 MG/0.5ML SOLN Inject 6mg as needed. MR x1 after 1-2 hrs as needed. Limit 2 doses in 24 hrs.   TYLENOL EXTRA STRENGTH 500 MG OR TABS 2 TABLETS ORALLY 4 TIMES DAILY AS NEEDED   ziprasidone (GEODON) 40 MG capsule Take 40 mg by mouth daily   Zolpidem Tartrate (AMBIEN PO) Take 10 mg by mouth At Bedtime   ORDER FOR ADELA Quintanilla was set up on replacement machine: [employer]; Type  Airsense 10; Serial Number: [id number]; Modem Number: 023; Pressure: SET PRESSURE 9 CM H20; Mask of choice: none given; per patient she just opened a new one at home; Heated tubing     No current facility-administered medications on file prior to visit.     RECENT DIAGNOSTIC STUDIES:   Labs:   Results for orders placed or performed in visit on 09/05/18   Vitamin B12   Result Value Ref Range    Vitamin B12 470 193 - 986 pg/mL   Methylmalonic acid   Result Value Ref Range    Methylmalonic Acid 0.13 0.00 - 0.40 umol/L   Comprehensive metabolic panel (BMP + Alb, Alk Phos, ALT, AST, Total. Bili, TP)   Result Value Ref Range    Sodium 141 133 - 144 mmol/L    Potassium 3.7 3.4 - 5.3 mmol/L    Chloride 107 94 - 109 mmol/L    Carbon Dioxide 29 20 - 32 mmol/L    Anion Gap 5 3 - 14 mmol/L    Glucose 133 (H) 70 - 99 mg/dL    Urea Nitrogen 7 7 - 30 mg/dL    Creatinine 0.74 0.52 - 1.04 mg/dL    GFR Estimate 82 >60 mL/min/1.7m2    GFR Estimate If Black >90 >60 mL/min/1.7m2    Calcium 9.3 8.5 - 10.1 mg/dL    Bilirubin Total 0.2 0.2 - 1.3 mg/dL    Albumin 3.5 3.4 - 5.0 g/dL    Protein Total 6.9 6.8 - 8.8 g/dL    Alkaline Phosphatase 87 40 - 150 U/L    ALT 44 0 - 50 U/L    AST 27 0 - 45 U/L   CBC with platelets   Result Value Ref Range    WBC 7.1 4.0 - 11.0 10e9/L    RBC  Count 4.22 3.8 - 5.2 10e12/L    Hemoglobin 10.3 (L) 11.7 - 15.7 g/dL    Hematocrit 32.9 (L) 35.0 - 47.0 %    MCV 78 78 - 100 fl    MCH 24.4 (L) 26.5 - 33.0 pg    MCHC 31.3 (L) 31.5 - 36.5 g/dL    RDW 16.7 (H) 10.0 - 15.0 %    Platelet Count 243 150 - 450 10e9/L       REVIEW OF SYSTEMS:                                                      10-point review of systems is negative except as mentioned above in HPI.     EXAM:                                                      Physical Exam:   Vitals: /75 (BP Location: Right arm, Patient Position: Sitting, Cuff Size: Adult Large)   Pulse 81   Temp 97.5  F (36.4  C) (Tympanic)   Resp 14   Wt 112.9 kg (249 lb)   BMI 43.42 kg/m     BMI= Body mass index is 43.42 kg/m .  GENERAL: NAD. Blunted affect. Appropriate expressions with interactions.  HEENT: NC/AT.  CV: RRR. S1, S2.   NECK: No bruits.  Focused Neurologic:  MENTAL STATUS: Alert, attentive. Speech is fluent. Normal comprehension. Mini-Co/5 (missed one word on recall). Normal concentration. Adequate fund of knowledge.   CRANIAL NERVES: Discs flat. Visual fields intact to confrontation. Pupils equally, round and reactive to light. Facial sensation and movement normal. EOM full. Hearing intact to conversation. Trapezius strength intact. Palate moves symmetrically. Tongue midline.  MOTOR: 5/5 in proximal and distal muscle groups of upper and lower extremities. Tone and bulk normal.   DTRs: Brisk and symmetric in UEs and patellae. Babinski down-going bilaterally.  SENSATION: Normal light touch, pinprick throughout. Normal vibration and proprioception.   COORDINATION: Normal finger to nose, heel to shin. Mild high frequency/low amplitude tremor in hands with posture.   STATION AND GAIT: Romberg negative. Casual gait normal.    ASSESSMENT and PLAN:                                                      Assessment and Plan:    ICD-10-CM    1. Migraine without aura and without status migrainosus, not intractable  G43.009    2. Restless legs syndrome (RLS) G25.81    3. Subjective memory complaints R41.89         Ms. Oakes is a pleasant 51 yo woman with a complicated psychiatric history seen in neurology for headache and restless legs syndrome management as well as review of memory concerns. She feels that all conditions are stable today. Regarding the memory, she did not think additional testing was necessary so MoCA was deferred for today.     She is no longer on the Verapamil for headaches and it has been a long time since she took the Imitrex for abortive treatment. She does not feel that she needs to resume either of these for now.     The restless legs syndrome is controlled with the Mirapex, and she is now having this medication filled through another provider. Because she is doing so well with regards to the conditions we met for, I told the patient that she can come back in 6 months if she'd like or otherwise just follow-up as needed, if things change.     Patient Instructions:  Monitor headaches and memory for now. I would like to see you back in 6 months unless you are feeling great and want to push this off until a change occurs.     Total Time: 40 minutes were spent with the patient. More than 50% of the time spent on counseling (as described above in Assessment and Plan/Instructions) /coordinating the care.    Nancy Bazan MD  Neurology    CC: Jennifer Riley CNP                    Again, thank you for allowing me to participate in the care of your patient.        Sincerely,        Nancy Bazan MD

## 2019-03-06 NOTE — PROGRESS NOTES
ESTABLISHED PATIENT NEUROLOGY NOTE    DATE OF VISIT: 3/6/2019  MRN: [id number]  PATIENT NAME: Socorro Oakes  YOB: 1965    Chief Complaint   Patient presents with     RECHECK     Migraine, RLS, memory     SUBJECTIVE:                                                      HISTORY OF PRESENT ILLNESS:  Socorro is here for follow up regarding headaches and subjective memory concerns. As previously documented by me (9.5.18):     MoCA x 2 have been normal in the past, most recently at our previous visit in March 2018. Neuropsych test results were previously questionable in terms of validity/underperformance. She has not been interested in repeat testing or occupational therapy, which I have offered in the past.     She reported good headache control at the previous visit. Regimen is Verapamil 120mg BID and as needed Imitrex. The patient tells me the headaches are stable. She cannot recall the last headache. No changes in headache characteristics. She is not sure when she last used Imitrex. She has used over the counter pain medications in the recent past for a tooth extraction and some bodily aches. No daily use.     The patient also has additional history of restless legs syndrome. She is asking for a Mirapex refill today. She takes 0.25mg at bedtime. She says this dose still adequately controls her symptoms. She denies changes in the memory. No functional changes at home in terms of memory. She says she did pull back on some volunteering just to simplify her life and she just did not feel motivated to continue. 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     MoCA was 25/30 at our previous visit. She did not describe any concerning functional problems at home at the time, but I did not that she described some mood-related amotivationalism. We decided to continue to monitor the memory clinically.     No changes were made in the Verapamil (120mg BID). She is on Imitrex as needed  "for the headaches. Mirapex 0.25mg for restless legs syndrome.    She says that the only medication change recently is replacing the Latuda with Geodon. Mood is \"ok.\" The Geodon has changed her taste buds. She no longer likes chocolate. This is fine with her.     She feels that the headaches are well-controlled. She ran out of the Verapamil in December and has not had it refilled. She has only minor headaches, and these are tolerable. She does not think she has any Imitrex that isn't . Not clear if she needs it.     She feels that the Mirapex is still working for the restless legs syndrome. Her psychiatrist has most recently filled this for her, she says. No change. She has not had symptoms for a long time, even when she misses doses. She thinks the memory is \"maybe the same.\" She does not thinks it is an issue. She says she does get lost if she gets pulled from a conversation. No safety concerns such as getting lost or function at home.     She does a lot of traveling. She takes care of her grandchild as well. She reminds me that she is disables so she does not work, though she does some volunteering.     CURRENT MEDICATIONS:     Current Outpatient Medications on File Prior to Visit:  Desvenlafaxine Succinate (PRISTIQ PO) Take 150 mg by mouth every evening    diazepam (VALIUM) 10 MG tablet Take 10 mg by mouth every 6 hours as needed for anxiety or sleep   DoxePIN (SINEQUAN) 75 MG capsule Take 75 mg by mouth At Bedtime   Ibuprofen (ADVIL PO) Take 400 mg by mouth every 6 hours as needed.   lamoTRIgine (LAMICTAL) 150 MG tablet Take 150 mg by mouth every evening    omeprazole (PRILOSEC) 20 MG DR capsule TAKE TWO CAPSULES BY MOUTH EVERY DAY   pramipexole (MIRAPEX) 0.25 MG tablet Take 1 tablet (0.25 mg) by mouth At Bedtime   prazosin (MINIPRESS) 2 MG capsule Take 2 mg by mouth 2 times daily    SUMAtriptan (IMITREX) 100 MG tablet Take 1 tablet (100 mg) by mouth at onset of headache for migraine May repeat in 2 hours " if needed: max 2/day;   SUMAtriptan Succinate Refill (IMITREX) 6 MG/0.5ML SOLN Inject 6mg as needed. MR x1 after 1-2 hrs as needed. Limit 2 doses in 24 hrs.   TYLENOL EXTRA STRENGTH 500 MG OR TABS 2 TABLETS ORALLY 4 TIMES DAILY AS NEEDED   ziprasidone (GEODON) 40 MG capsule Take 40 mg by mouth daily   Zolpidem Tartrate (AMBIEN PO) Take 10 mg by mouth At Bedtime   ORDER FOR ADELA Quintanilla was set up on replacement machine: RESmed; Type  Airsense 10; Serial Number: [id number]; Modem Number: 023; Pressure: SET PRESSURE 9 CM H20; Mask of choice: none given; per patient she just opened a new one at home; Heated tubing     No current facility-administered medications on file prior to visit.     RECENT DIAGNOSTIC STUDIES:   Labs:   Results for orders placed or performed in visit on 09/05/18   Vitamin B12   Result Value Ref Range    Vitamin B12 470 193 - 986 pg/mL   Methylmalonic acid   Result Value Ref Range    Methylmalonic Acid 0.13 0.00 - 0.40 umol/L   Comprehensive metabolic panel (BMP + Alb, Alk Phos, ALT, AST, Total. Bili, TP)   Result Value Ref Range    Sodium 141 133 - 144 mmol/L    Potassium 3.7 3.4 - 5.3 mmol/L    Chloride 107 94 - 109 mmol/L    Carbon Dioxide 29 20 - 32 mmol/L    Anion Gap 5 3 - 14 mmol/L    Glucose 133 (H) 70 - 99 mg/dL    Urea Nitrogen 7 7 - 30 mg/dL    Creatinine 0.74 0.52 - 1.04 mg/dL    GFR Estimate 82 >60 mL/min/1.7m2    GFR Estimate If Black >90 >60 mL/min/1.7m2    Calcium 9.3 8.5 - 10.1 mg/dL    Bilirubin Total 0.2 0.2 - 1.3 mg/dL    Albumin 3.5 3.4 - 5.0 g/dL    Protein Total 6.9 6.8 - 8.8 g/dL    Alkaline Phosphatase 87 40 - 150 U/L    ALT 44 0 - 50 U/L    AST 27 0 - 45 U/L   CBC with platelets   Result Value Ref Range    WBC 7.1 4.0 - 11.0 10e9/L    RBC Count 4.22 3.8 - 5.2 10e12/L    Hemoglobin 10.3 (L) 11.7 - 15.7 g/dL    Hematocrit 32.9 (L) 35.0 - 47.0 %    MCV 78 78 - 100 fl    MCH 24.4 (L) 26.5 - 33.0 pg    MCHC 31.3 (L) 31.5 - 36.5 g/dL    RDW 16.7 (H) 10.0 - 15.0 %    Platelet  Count 243 150 - 450 10e9/L       REVIEW OF SYSTEMS:                                                      10-point review of systems is negative except as mentioned above in HPI.     EXAM:                                                      Physical Exam:   Vitals: /75 (BP Location: Right arm, Patient Position: Sitting, Cuff Size: Adult Large)   Pulse 81   Temp 97.5  F (36.4  C) (Tympanic)   Resp 14   Wt 112.9 kg (249 lb)   BMI 43.42 kg/m    BMI= Body mass index is 43.42 kg/m .  GENERAL: NAD. Blunted affect. Appropriate expressions with interactions.  HEENT: NC/AT.  CV: RRR. S1, S2.   NECK: No bruits.  Focused Neurologic:  MENTAL STATUS: Alert, attentive. Speech is fluent. Normal comprehension. Mini-Co/5 (missed one word on recall). Normal concentration. Adequate fund of knowledge.   CRANIAL NERVES: Discs flat. Visual fields intact to confrontation. Pupils equally, round and reactive to light. Facial sensation and movement normal. EOM full. Hearing intact to conversation. Trapezius strength intact. Palate moves symmetrically. Tongue midline.  MOTOR: 5/5 in proximal and distal muscle groups of upper and lower extremities. Tone and bulk normal.   DTRs: Brisk and symmetric in UEs and patellae. Babinski down-going bilaterally.  SENSATION: Normal light touch, pinprick throughout. Normal vibration and proprioception.   COORDINATION: Normal finger to nose, heel to shin. Mild high frequency/low amplitude tremor in hands with posture.   STATION AND GAIT: Romberg negative. Casual gait normal.    ASSESSMENT and PLAN:                                                      Assessment and Plan:    ICD-10-CM    1. Migraine without aura and without status migrainosus, not intractable G43.009    2. Restless legs syndrome (RLS) G25.81    3. Subjective memory complaints R41.89         Ms. Oakes is a pleasant 51 yo woman with a complicated psychiatric history seen in neurology for headache and restless legs syndrome  management as well as review of memory concerns. She feels that all conditions are stable today. Regarding the memory, she did not think additional testing was necessary so MoCA was deferred for today.     She is no longer on the Verapamil for headaches and it has been a long time since she took the Imitrex for abortive treatment. She does not feel that she needs to resume either of these for now.     The restless legs syndrome is controlled with the Mirapex, and she is now having this medication filled through another provider. Because she is doing so well with regards to the conditions we met for, I told the patient that she can come back in 6 months if she'd like or otherwise just follow-up as needed, if things change.     Patient Instructions:  Monitor headaches and memory for now. I would like to see you back in 6 months unless you are feeling great and want to push this off until a change occurs.     Total Time: 40 minutes were spent with the patient. More than 50% of the time spent on counseling (as described above in Assessment and Plan/Instructions) /coordinating the care.    Nancy Bazan MD  Neurology    CC: Jennifer Riley, CNP

## 2019-03-06 NOTE — PATIENT INSTRUCTIONS
Plan:    Monitor headaches and memory for now. I would like to see you back in 6 months unless you are feeling great and want to push this off until a change occurs.

## 2019-05-14 ENCOUNTER — OFFICE VISIT - HEALTHEAST (OUTPATIENT)
Dept: FAMILY MEDICINE | Facility: CLINIC | Age: 54
End: 2019-05-14

## 2019-05-14 ENCOUNTER — OFFICE VISIT (OUTPATIENT)
Dept: FAMILY MEDICINE | Facility: CLINIC | Age: 54
End: 2019-05-14
Payer: COMMERCIAL

## 2019-05-14 ENCOUNTER — RECORDS - HEALTHEAST (OUTPATIENT)
Dept: ADMINISTRATIVE | Facility: OTHER | Age: 54
End: 2019-05-14

## 2019-05-14 ENCOUNTER — COMMUNICATION - HEALTHEAST (OUTPATIENT)
Dept: TELEHEALTH | Facility: CLINIC | Age: 54
End: 2019-05-14

## 2019-05-14 VITALS
HEIGHT: 64 IN | SYSTOLIC BLOOD PRESSURE: 118 MMHG | WEIGHT: 248 LBS | TEMPERATURE: 97.7 F | OXYGEN SATURATION: 97 % | DIASTOLIC BLOOD PRESSURE: 82 MMHG | RESPIRATION RATE: 15 BRPM | BODY MASS INDEX: 42.34 KG/M2 | HEART RATE: 75 BPM

## 2019-05-14 DIAGNOSIS — Z12.11 SCREEN FOR COLON CANCER: ICD-10-CM

## 2019-05-14 DIAGNOSIS — Z12.31 VISIT FOR SCREENING MAMMOGRAM: ICD-10-CM

## 2019-05-14 DIAGNOSIS — E88.810 METABOLIC SYNDROME X: ICD-10-CM

## 2019-05-14 DIAGNOSIS — E66.813 CLASS 3 SEVERE OBESITY DUE TO EXCESS CALORIES WITH SERIOUS COMORBIDITY AND BODY MASS INDEX (BMI) OF 40.0 TO 44.9 IN ADULT (H): ICD-10-CM

## 2019-05-14 DIAGNOSIS — G47.33 OSA (OBSTRUCTIVE SLEEP APNEA): ICD-10-CM

## 2019-05-14 DIAGNOSIS — Z13.220 SCREENING FOR CHOLESTEROL LEVEL: ICD-10-CM

## 2019-05-14 DIAGNOSIS — I10 BENIGN ESSENTIAL HYPERTENSION: ICD-10-CM

## 2019-05-14 DIAGNOSIS — R73.03 PREDIABETES: ICD-10-CM

## 2019-05-14 DIAGNOSIS — G47.30 SLEEP APNEA, UNSPECIFIED TYPE: ICD-10-CM

## 2019-05-14 DIAGNOSIS — R73.09 ELEVATED GLUCOSE: ICD-10-CM

## 2019-05-14 DIAGNOSIS — E66.01 CLASS 3 SEVERE OBESITY DUE TO EXCESS CALORIES WITH SERIOUS COMORBIDITY AND BODY MASS INDEX (BMI) OF 40.0 TO 44.9 IN ADULT (H): ICD-10-CM

## 2019-05-14 DIAGNOSIS — E66.01 CLASS 3 SEVERE OBESITY WITHOUT SERIOUS COMORBIDITY WITH BODY MASS INDEX (BMI) OF 40.0 TO 44.9 IN ADULT, UNSPECIFIED OBESITY TYPE (H): Primary | ICD-10-CM

## 2019-05-14 DIAGNOSIS — E66.813 CLASS 3 SEVERE OBESITY WITHOUT SERIOUS COMORBIDITY WITH BODY MASS INDEX (BMI) OF 40.0 TO 44.9 IN ADULT, UNSPECIFIED OBESITY TYPE (H): Primary | ICD-10-CM

## 2019-05-14 LAB — HBA1C MFR BLD: 5.9 % (ref 0–5.6)

## 2019-05-14 PROCEDURE — 36415 COLL VENOUS BLD VENIPUNCTURE: CPT | Performed by: NURSE PRACTITIONER

## 2019-05-14 PROCEDURE — 99214 OFFICE O/P EST MOD 30 MIN: CPT | Performed by: NURSE PRACTITIONER

## 2019-05-14 PROCEDURE — 83036 HEMOGLOBIN GLYCOSYLATED A1C: CPT | Performed by: NURSE PRACTITIONER

## 2019-05-14 RX ORDER — DEXTROAMPHETAMINE SACCHARATE, AMPHETAMINE ASPARTATE, DEXTROAMPHETAMINE SULFATE AND AMPHETAMINE SULFATE 7.5; 7.5; 7.5; 7.5 MG/1; MG/1; MG/1; MG/1
30 TABLET ORAL 2 TIMES DAILY
COMMUNITY
End: 2022-08-30

## 2019-05-14 ASSESSMENT — MIFFLIN-ST. JEOR
SCORE: 1701.98
SCORE: 1687.44

## 2019-05-14 NOTE — PROGRESS NOTES
SUBJECTIVE:   Socorro Oakes is a 53 year old female who presents to clinic today for the following   health issues:      Patient would like a referral to the weight loss clinic. She is planning to go to the [facility]. Patient's current weight is 248- patient states that her weight gain is due to antipsychotic medications. Patient feels very deconditioned- not currently working out . Feels like she does not eat very much during the day.     Patient would like her A1C level checked- she reports feeling shaking in the morning after eating breakfast and then feels tired and needs a nap. Patient has history of ALEXA and uses CPAP.   Lab Results   Component Value Date    A1C 6.0 03/12/2018       -------------------------------------    Additional history: as documented    Reviewed  and updated as needed this visit by clinical staff         Reviewed and updated as needed this visit by Provider         Patient Active Problem List   Diagnosis     MRSA     h/o multiple concussion      Hyperlipidemia LDL goal <130     Health Care Home     GERD (gastroesophageal reflux disease)     DJD (degenerative joint disease), lumbar     Sleep apnea     Migraine     Moderate major depression (H)     Schizophrenia (H)     ADD (attention deficit disorder with hyperactivity)     Restless legs syndrome (RLS)     Memory loss     Essential hypertension, benign     Class 3 obesity due to excess calories without serious comorbidity with body mass index (BMI) of 40.0 to 44.9 in adult     Morbid obesity (H)     Past Surgical History:   Procedure Laterality Date     CHOLECYSTECTOMY, LAPOROSCOPIC  4-30-09     COLONOSCOPY N/A 6/5/2015    Procedure: COLONOSCOPY;  Surgeon: Robe Delgado MD;  Location: [facility]     INJECT EPIDURAL LUMBAR  7/11/2011    Procedure:INJECT EPIDURAL LUMBAR; BRIE with Flouro--; Surgeon:GENERIC ANESTHESIA PROVIDER; Location:[facility]     INJECT EPIDURAL LUMBAR  9/28/2011     "Procedure:INJECT EPIDURAL LUMBAR; BRIE with Flouro--; Surgeon:GENERIC ANESTHESIA PROVIDER; Location:[facility]     INJECT EPIDURAL LUMBAR  12/12/2011    Procedure:INJECT EPIDURAL LUMBAR; BRIE with Fluoro - Dr. Landry; Surgeon:GENERIC ANESTHESIA PROVIDER; Location:[facility]     KNEE SURGERY      1- 15 yr ago     LAPAROSCOPIC APPENDECTOMY       MANDIBLE SURGERY      6x surgeries from 1983- 1994     SURGICAL HISTORY OF -   10/17/05    left knee surgery      SURGICAL HISTORY OF -   1982,1983,1984x2,1993,    TMJ Surgery x5     SURGICAL HISTORY OF -       ENT Tubes       SURGICAL HISTORY OF -   1996    toe     TONSILLECTOMY & ADENOIDECTOMY         Social History     Tobacco Use     Smoking status: Never Smoker     Smokeless tobacco: Never Used   Substance Use Topics     Alcohol use: No     Alcohol/week: 0.0 oz     Family History   Problem Relation Age of Onset     Arthritis Mother         rheumatoid/had knee replacement     Bipolar Disorder Mother      Migraines Mother      Depression Father         comitted suicide age 48     Suicide Father      Alcohol/Drug Brother      Allergies Sister      Migraines Sister      Anxiety Disorder Maternal Grandfather      Bipolar Disorder Daughter      Migraines Brother      Migraines Sister      Migraines Brother      Migraines Sister      Cerebrovascular Disease No family hx of            ROS:  Constitutional, HEENT, cardiovascular, pulmonary, GI, , musculoskeletal, neuro, skin, endocrine and psych systems are negative, except as otherwise noted.    OBJECTIVE:     /82   Pulse 75   Temp 97.7  F (36.5  C)   Resp 15   Ht 1.613 m (5' 3.5\")   Wt 112.5 kg (248 lb)   SpO2 97%   BMI 43.24 kg/m    Body mass index is 43.24 kg/m .  GENERAL: alert, no distress and obese  RESP: lungs clear to auscultation - no rales, rhonchi or wheezes  CV: regular rate and rhythm, normal S1 S2, no S3 or S4, no murmur, click or rub, no peripheral edema and peripheral pulses strong  MS: no gross " musculoskeletal defects noted, no edema    Diagnostic Test Results:  none     ASSESSMENT/PLAN:       1. Class 3 severe obesity without serious comorbidity with body mass index (BMI) of 40.0 to 44.9 in adult, unspecified obesity type (H)  - counseled on diet and exercise- patient reports weigh gain due to psych meds- she is working with her psychiatrist   - BARIATRIC ADULT REFERRAL    2. Elevated glucose  - patient reports feeling shaky before and after meals. Wants to r/u diabetes mellitus   - Hemoglobin A1c    3. Sleep apnea, unspecified type  Patient complaint with CPAP but may need updated appointment since she feels tired and requires a nap after breakfast. Recommend to follow up with sleep clinic         RAJ Macias CNP  [facility]

## 2019-05-14 NOTE — PATIENT INSTRUCTIONS
Thank you for choosing [facility].  You may be receiving an email and/or telephone survey request from [facility] [employer] Experience regarding your visit today.  Please take a few minutes to respond to the survey to let us know how we are doing.      If you have questions or concerns, please contact us via [employer] or you can contact your care team at [phone].    Our Clinic hours are:  Monday 6:40 am  to 7:00 pm  Tuesday -Friday 6:40 am to 5:00 pm    The [place] outpatient lab hours are:  Monday - Friday 6:10 am to 4:45 pm  Saturdays 7:00 am to 11:00 am  Appointments are required, call [phone]    If you have clinical questions after hours or would like to schedule an appointment,  call the clinic at [phone].

## 2019-05-15 ENCOUNTER — AMBULATORY - HEALTHEAST (OUTPATIENT)
Dept: LAB | Facility: CLINIC | Age: 54
End: 2019-05-15

## 2019-05-15 DIAGNOSIS — E66.01 CLASS 3 SEVERE OBESITY DUE TO EXCESS CALORIES WITH SERIOUS COMORBIDITY AND BODY MASS INDEX (BMI) OF 40.0 TO 44.9 IN ADULT (H): ICD-10-CM

## 2019-05-15 DIAGNOSIS — Z13.220 SCREENING FOR CHOLESTEROL LEVEL: ICD-10-CM

## 2019-05-15 DIAGNOSIS — E88.810 METABOLIC SYNDROME X: ICD-10-CM

## 2019-05-15 DIAGNOSIS — R73.03 PREDIABETES: ICD-10-CM

## 2019-05-15 DIAGNOSIS — I10 BENIGN ESSENTIAL HYPERTENSION: ICD-10-CM

## 2019-05-15 DIAGNOSIS — E66.813 CLASS 3 SEVERE OBESITY DUE TO EXCESS CALORIES WITH SERIOUS COMORBIDITY AND BODY MASS INDEX (BMI) OF 40.0 TO 44.9 IN ADULT (H): ICD-10-CM

## 2019-05-15 LAB
ALT SERPL W P-5'-P-CCNC: 25 U/L (ref 0–45)
ANION GAP SERPL CALCULATED.3IONS-SCNC: 10 MMOL/L (ref 5–18)
AST SERPL W P-5'-P-CCNC: 16 U/L (ref 0–40)
BUN SERPL-MCNC: 12 MG/DL (ref 8–22)
CALCIUM SERPL-MCNC: 9.3 MG/DL (ref 8.5–10.5)
CHLORIDE BLD-SCNC: 107 MMOL/L (ref 98–107)
CHOLEST SERPL-MCNC: 207 MG/DL
CO2 SERPL-SCNC: 22 MMOL/L (ref 22–31)
CREAT SERPL-MCNC: 0.8 MG/DL (ref 0.6–1.1)
FASTING STATUS PATIENT QL REPORTED: YES
GFR SERPL CREATININE-BSD FRML MDRD: >60 ML/MIN/1.73M2
GLUCOSE BLD-MCNC: 93 MG/DL (ref 70–125)
HDLC SERPL-MCNC: 50 MG/DL
LDLC SERPL CALC-MCNC: 132 MG/DL
POTASSIUM BLD-SCNC: 3.8 MMOL/L (ref 3.5–5)
SODIUM SERPL-SCNC: 139 MMOL/L (ref 136–145)
TRIGL SERPL-MCNC: 125 MG/DL

## 2019-05-22 ENCOUNTER — COMMUNICATION - HEALTHEAST (OUTPATIENT)
Dept: FAMILY MEDICINE | Facility: CLINIC | Age: 54
End: 2019-05-22

## 2019-06-17 ENCOUNTER — OFFICE VISIT - HEALTHEAST (OUTPATIENT)
Dept: FAMILY MEDICINE | Facility: CLINIC | Age: 54
End: 2019-06-17

## 2019-06-17 DIAGNOSIS — E66.01 CLASS 3 SEVERE OBESITY DUE TO EXCESS CALORIES WITH SERIOUS COMORBIDITY AND BODY MASS INDEX (BMI) OF 40.0 TO 44.9 IN ADULT (H): ICD-10-CM

## 2019-06-17 DIAGNOSIS — E66.813 CLASS 3 SEVERE OBESITY DUE TO EXCESS CALORIES WITH SERIOUS COMORBIDITY AND BODY MASS INDEX (BMI) OF 40.0 TO 44.9 IN ADULT (H): ICD-10-CM

## 2019-06-17 DIAGNOSIS — I10 BENIGN ESSENTIAL HYPERTENSION: ICD-10-CM

## 2019-07-15 ENCOUNTER — TRANSFERRED RECORDS (OUTPATIENT)
Dept: HEALTH INFORMATION MANAGEMENT | Facility: CLINIC | Age: 54
End: 2019-07-15

## 2019-07-15 ENCOUNTER — OFFICE VISIT - HEALTHEAST (OUTPATIENT)
Dept: FAMILY MEDICINE | Facility: CLINIC | Age: 54
End: 2019-07-15

## 2019-07-15 DIAGNOSIS — I10 BENIGN ESSENTIAL HYPERTENSION: ICD-10-CM

## 2019-07-15 DIAGNOSIS — E66.01 OBESITY, CLASS III, BMI 40-49.9 (MORBID OBESITY) (H): ICD-10-CM

## 2019-07-15 DIAGNOSIS — Z71.3 NUTRITIONAL COUNSELING: ICD-10-CM

## 2019-07-15 ASSESSMENT — MIFFLIN-ST. JEOR: SCORE: 1593.55

## 2019-07-22 ENCOUNTER — OFFICE VISIT - HEALTHEAST (OUTPATIENT)
Dept: FAMILY MEDICINE | Facility: CLINIC | Age: 54
End: 2019-07-22

## 2019-07-22 DIAGNOSIS — I10 BENIGN ESSENTIAL HYPERTENSION: ICD-10-CM

## 2019-07-22 DIAGNOSIS — E66.01 CLASS 2 SEVERE OBESITY DUE TO EXCESS CALORIES WITH SERIOUS COMORBIDITY AND BODY MASS INDEX (BMI) OF 39.0 TO 39.9 IN ADULT (H): ICD-10-CM

## 2019-07-22 DIAGNOSIS — R73.03 PREDIABETES: ICD-10-CM

## 2019-07-22 DIAGNOSIS — E66.812 CLASS 2 SEVERE OBESITY DUE TO EXCESS CALORIES WITH SERIOUS COMORBIDITY AND BODY MASS INDEX (BMI) OF 39.0 TO 39.9 IN ADULT (H): ICD-10-CM

## 2019-07-22 DIAGNOSIS — E88.810 METABOLIC SYNDROME X: ICD-10-CM

## 2019-08-29 ENCOUNTER — OFFICE VISIT - HEALTHEAST (OUTPATIENT)
Dept: FAMILY MEDICINE | Facility: CLINIC | Age: 54
End: 2019-08-29

## 2019-08-29 DIAGNOSIS — E66.01 CLASS 3 SEVERE OBESITY DUE TO EXCESS CALORIES WITH SERIOUS COMORBIDITY AND BODY MASS INDEX (BMI) OF 40.0 TO 44.9 IN ADULT (H): ICD-10-CM

## 2019-08-29 DIAGNOSIS — F32.3: ICD-10-CM

## 2019-08-29 DIAGNOSIS — R73.03 PREDIABETES: ICD-10-CM

## 2019-08-29 DIAGNOSIS — I10 BENIGN ESSENTIAL HYPERTENSION: ICD-10-CM

## 2019-08-29 DIAGNOSIS — E66.812 CLASS 2 SEVERE OBESITY DUE TO EXCESS CALORIES WITH SERIOUS COMORBIDITY AND BODY MASS INDEX (BMI) OF 38.0 TO 38.9 IN ADULT (H): ICD-10-CM

## 2019-08-29 DIAGNOSIS — E88.810 METABOLIC SYNDROME X: ICD-10-CM

## 2019-08-29 DIAGNOSIS — E66.813 CLASS 3 SEVERE OBESITY DUE TO EXCESS CALORIES WITH SERIOUS COMORBIDITY AND BODY MASS INDEX (BMI) OF 40.0 TO 44.9 IN ADULT (H): ICD-10-CM

## 2019-08-29 DIAGNOSIS — E66.01 CLASS 2 SEVERE OBESITY DUE TO EXCESS CALORIES WITH SERIOUS COMORBIDITY AND BODY MASS INDEX (BMI) OF 38.0 TO 38.9 IN ADULT (H): ICD-10-CM

## 2019-08-29 DIAGNOSIS — Z12.31 VISIT FOR SCREENING MAMMOGRAM: ICD-10-CM

## 2019-09-30 ENCOUNTER — OFFICE VISIT - HEALTHEAST (OUTPATIENT)
Dept: FAMILY MEDICINE | Facility: CLINIC | Age: 54
End: 2019-09-30

## 2019-09-30 DIAGNOSIS — E66.01 CLASS 2 SEVERE OBESITY DUE TO EXCESS CALORIES WITH SERIOUS COMORBIDITY AND BODY MASS INDEX (BMI) OF 38.0 TO 38.9 IN ADULT (H): ICD-10-CM

## 2019-09-30 DIAGNOSIS — E88.810 METABOLIC SYNDROME X: ICD-10-CM

## 2019-09-30 DIAGNOSIS — I10 BENIGN ESSENTIAL HYPERTENSION: ICD-10-CM

## 2019-09-30 DIAGNOSIS — Z12.31 VISIT FOR SCREENING MAMMOGRAM: ICD-10-CM

## 2019-09-30 DIAGNOSIS — R73.03 PREDIABETES: ICD-10-CM

## 2019-09-30 DIAGNOSIS — E66.812 CLASS 2 SEVERE OBESITY DUE TO EXCESS CALORIES WITH SERIOUS COMORBIDITY AND BODY MASS INDEX (BMI) OF 38.0 TO 38.9 IN ADULT (H): ICD-10-CM

## 2019-10-07 ENCOUNTER — TRANSFERRED RECORDS (OUTPATIENT)
Dept: HEALTH INFORMATION MANAGEMENT | Facility: CLINIC | Age: 54
End: 2019-10-07

## 2019-11-03 ENCOUNTER — HEALTH MAINTENANCE LETTER (OUTPATIENT)
Age: 54
End: 2019-11-03

## 2019-11-11 ENCOUNTER — RECORDS - HEALTHEAST (OUTPATIENT)
Dept: FAMILY MEDICINE | Facility: CLINIC | Age: 54
End: 2019-11-11

## 2019-11-11 ENCOUNTER — COMMUNICATION - HEALTHEAST (OUTPATIENT)
Dept: FAMILY MEDICINE | Facility: CLINIC | Age: 54
End: 2019-11-11

## 2019-11-11 DIAGNOSIS — R73.03 PREDIABETES: ICD-10-CM

## 2019-11-11 DIAGNOSIS — E66.813 CLASS 3 SEVERE OBESITY DUE TO EXCESS CALORIES WITH SERIOUS COMORBIDITY AND BODY MASS INDEX (BMI) OF 40.0 TO 44.9 IN ADULT (H): ICD-10-CM

## 2019-11-11 DIAGNOSIS — E88.810 METABOLIC SYNDROME X: ICD-10-CM

## 2019-11-11 DIAGNOSIS — E66.01 CLASS 3 SEVERE OBESITY DUE TO EXCESS CALORIES WITH SERIOUS COMORBIDITY AND BODY MASS INDEX (BMI) OF 40.0 TO 44.9 IN ADULT (H): ICD-10-CM

## 2019-11-25 ASSESSMENT — ENCOUNTER SYMPTOMS
COUGH: 0
BREAST MASS: 0
DIZZINESS: 0
MYALGIAS: 0
ABDOMINAL PAIN: 0
HEMATOCHEZIA: 0
HEMATURIA: 0
PALPITATIONS: 0
PARESTHESIAS: 0
SHORTNESS OF BREATH: 0
SORE THROAT: 0
FEVER: 0
ARTHRALGIAS: 0
CONSTIPATION: 0
WEAKNESS: 0
HEADACHES: 0
NERVOUS/ANXIOUS: 1
CHILLS: 0
EYE PAIN: 0
NAUSEA: 0
JOINT SWELLING: 0
DIARRHEA: 0
DYSURIA: 0
HEARTBURN: 1
FREQUENCY: 0

## 2019-11-25 ASSESSMENT — ACTIVITIES OF DAILY LIVING (ADL)
CURRENT_FUNCTION: MONEY MANAGEMENT REQUIRES ASSISTANCE
CURRENT_FUNCTION: NO ASSISTANCE NEEDED

## 2019-11-25 ASSESSMENT — PATIENT HEALTH QUESTIONNAIRE - PHQ9
10. IF YOU CHECKED OFF ANY PROBLEMS, HOW DIFFICULT HAVE THESE PROBLEMS MADE IT FOR YOU TO DO YOUR WORK, TAKE CARE OF THINGS AT HOME, OR GET ALONG WITH OTHER PEOPLE: EXTREMELY DIFFICULT
SUM OF ALL RESPONSES TO PHQ QUESTIONS 1-9: 16
SUM OF ALL RESPONSES TO PHQ QUESTIONS 1-9: 16

## 2019-11-26 ENCOUNTER — TELEPHONE (OUTPATIENT)
Dept: FAMILY MEDICINE | Facility: CLINIC | Age: 54
End: 2019-11-26

## 2019-11-26 ENCOUNTER — RESULT FOLLOW UP (OUTPATIENT)
Dept: FAMILY MEDICINE | Facility: CLINIC | Age: 54
End: 2019-11-26

## 2019-11-26 ASSESSMENT — PATIENT HEALTH QUESTIONNAIRE - PHQ9: SUM OF ALL RESPONSES TO PHQ QUESTIONS 1-9: 16

## 2019-11-26 NOTE — TELEPHONE ENCOUNTER
Please call patient to see if she is suicidal /has any active thoughts/plans of hurting herself or others. Recommend ER or you will need to call 911 to report patient concerns/wellfare check.

## 2019-11-26 NOTE — TELEPHONE ENCOUNTER
LULY-7 SCORE 4/25/2017 1/16/2018 3/15/2018   Total Score - - -   Total Score 2 11 16   Total Score BEH Adult - - -     PHQ-9 SCORE 1/16/2018 3/15/2018 11/25/2019   PHQ-9 Total Score - - -   PHQ-9 Total Score MyChart - - 16 (Moderately severe depression)   PHQ-9 Total Score - - -   PHQ-9 Total Score 17 20 16   PHQ-9 Total Score - - -

## 2019-11-26 NOTE — TELEPHONE ENCOUNTER
"S-(situation): I called pt.  She explains that she actually feeling \"pretty good.\"  She was updating her phq9.  Due to suicidal thoughts that were reported, RN was asked to reach out to pt.  Pt clarifies that she thinks about suicide but states that she would not act on it.  Pt denies a plan.  Pt says that she thinks about suicide when she gets \"frustrated.\"   Pt says that she is seeing a therapist weekly and sees her psychiatrist every 3 months.  Pt remains active.   She visits her daughter who lives in [place].  She went twice to [place] in October.  Pt volunteers weekly at a food shelf.  This work is very demanding and exhausts pt emotionally at times but it is very rewarding too.   Pt plays Bridge on Wednesdays.  Pt says that she isolates herself often.  If she does not have obligations, she does not leave her home.  Pt says that she has crisis line phone numbers and assures she would seek emergency assistance if needed.    B-(background): depression and anxiety.  Pt sees a therapist, a psychiatrist, and is working with a weight loss provider in [place].  Last seen by PCP 5/2019.    A-(assessment): suicidal ideation noted when pt was updating her phq9.    R-(recommendations): Routed to provider.  Pt states that she is stable.  She thinks about suicide when she gets frustrated but states she would not follow through nor has a plan.  Pt is engaged in mental health services.    Further instructions from provider please.    Thank you.  Blaire Henderson RN    "

## 2019-12-02 ENCOUNTER — OFFICE VISIT (OUTPATIENT)
Dept: FAMILY MEDICINE | Facility: CLINIC | Age: 54
End: 2019-12-02
Payer: COMMERCIAL

## 2019-12-02 ENCOUNTER — AMBULATORY - HEALTHEAST (OUTPATIENT)
Dept: MULTI SPECIALTY CLINIC | Facility: CLINIC | Age: 54
End: 2019-12-02

## 2019-12-02 VITALS
OXYGEN SATURATION: 99 % | RESPIRATION RATE: 12 BRPM | WEIGHT: 216.2 LBS | HEART RATE: 86 BPM | SYSTOLIC BLOOD PRESSURE: 136 MMHG | TEMPERATURE: 97.3 F | BODY MASS INDEX: 37.7 KG/M2 | DIASTOLIC BLOOD PRESSURE: 84 MMHG

## 2019-12-02 DIAGNOSIS — G47.33 OSA (OBSTRUCTIVE SLEEP APNEA): Primary | ICD-10-CM

## 2019-12-02 DIAGNOSIS — Z01.419 SMEAR, VAGINAL, AS PART OF ROUTINE GYNECOLOGICAL EXAMINATION: Primary | ICD-10-CM

## 2019-12-02 DIAGNOSIS — Z12.72 SMEAR, VAGINAL, AS PART OF ROUTINE GYNECOLOGICAL EXAMINATION: Primary | ICD-10-CM

## 2019-12-02 DIAGNOSIS — K21.9 GASTROESOPHAGEAL REFLUX DISEASE WITHOUT ESOPHAGITIS: ICD-10-CM

## 2019-12-02 LAB
HPV_EXT - HISTORICAL: NORMAL
PAP SMEAR - HIM PATIENT REPORTED: NORMAL

## 2019-12-02 PROCEDURE — G0145 SCR C/V CYTO,THINLAYER,RESCR: HCPCS | Performed by: NURSE PRACTITIONER

## 2019-12-02 PROCEDURE — G0476 HPV COMBO ASSAY CA SCREEN: HCPCS | Performed by: NURSE PRACTITIONER

## 2019-12-02 PROCEDURE — 99396 PREV VISIT EST AGE 40-64: CPT | Performed by: NURSE PRACTITIONER

## 2019-12-02 ASSESSMENT — ENCOUNTER SYMPTOMS
DYSURIA: 0
DIARRHEA: 0
WEAKNESS: 0
ABDOMINAL PAIN: 0
EYE PAIN: 0
COUGH: 0
HEARTBURN: 1
NAUSEA: 0
HEMATURIA: 0
MYALGIAS: 0
PARESTHESIAS: 0
FREQUENCY: 0
CHILLS: 0
ARTHRALGIAS: 0
HEADACHES: 0
PALPITATIONS: 0
CONSTIPATION: 0
SHORTNESS OF BREATH: 0
FEVER: 0
DIZZINESS: 0
JOINT SWELLING: 0
NERVOUS/ANXIOUS: 1
HEMATOCHEZIA: 0
SORE THROAT: 0
BREAST MASS: 0

## 2019-12-02 ASSESSMENT — ACTIVITIES OF DAILY LIVING (ADL)
CURRENT_FUNCTION: NO ASSISTANCE NEEDED
CURRENT_FUNCTION: MONEY MANAGEMENT REQUIRES ASSISTANCE

## 2019-12-02 NOTE — PATIENT INSTRUCTIONS
Thank you for choosing [facility].  You may be receiving an email and/or telephone survey request from [facility] Customer Experience regarding your visit today.  Please take a few minutes to respond to the survey to let us know how we are doing.      If you have questions or concerns, please contact us via [email or web address] or you can contact your care team at [phone].    Our Clinic hours are:  Monday 6:40 am  to 7:00 pm  Tuesday -Friday 6:40 am to 5:00 pm    The [place] outpatient lab hours are:  Monday - Friday 6:10 am to 4:45 pm  Saturdays 7:00 am to 11:00 am  Appointments are required, call [phone]    If you have clinical questions after hours or would like to schedule an appointment,  call the clinic at [phone].

## 2019-12-02 NOTE — PROGRESS NOTES
"   SUBJECTIVE:   CC: Socorro Oakes is an 53 year old woman who presents for preventive health visit.     Healthy Habits:     In general, how would you rate your overall health?  Fair    Frequency of exercise:  2-3 days/week    Duration of exercise:  15-30 minutes    Do you usually eat at least 4 servings of fruit and vegetables a day, include whole grains    & fiber and avoid regularly eating high fat or \"junk\" foods?  Yes    Taking medications regularly:  No    Barriers to taking medications:  Cost of medication and Side effects    Medication side effects:  Other    Ability to successfully perform activities of daily living:  Money management requires assistance and no assistance needed    Home Safety:  No safety concerns identified    Hearing Impairment:  No hearing concerns    In the past 6 months, have you been bothered by leaking of urine?  No    In general, how would you rate your overall mental or emotional health?  Fair      PHQ-2 Total Score: 4    Additional concerns today:  No    Dr. Mchugh- patient's psychiatrist. [facility]. Also see's Psychologist.     Weight management - Dr. Carvalho- at [facility]. Patient lost 40 lbs.       Medication Followup of omeprazole- only uses PRN.    Taking Medication as prescribed: yes    Side Effects:  None    Medication Helping Symptoms:  yes       Today's PHQ-2 Score:   PHQ-2 ( 1999 [employer]) 11/25/2019   Q1: Little interest or pleasure in doing things 3   Q2: Feeling down, depressed or hopeless 1   PHQ-2 Score 4   Q1: Little interest or pleasure in doing things Nearly every day   Q2: Feeling down, depressed or hopeless Several days   PHQ-2 Score 4       Abuse: Current or Past(Physical, Sexual or Emotional)- No  Do you feel safe in your environment? Yes        Social History     Tobacco Use     Smoking status: Never Smoker     Smokeless tobacco: Never Used   Substance Use Topics     Alcohol use: No     Alcohol/week: 0.0 standard drinks     If " you drink alcohol do you typically have >3 drinks per day or >7 drinks per week? No    Alcohol Use 11/25/2019   Prescreen: >3 drinks/day or >7 drinks/week? Not Applicable   Prescreen: >3 drinks/day or >7 drinks/week? -       Reviewed orders with patient.  Reviewed health maintenance and updated orders accordingly - Yes  BP Readings from Last 3 Encounters:   12/02/19 136/84   05/14/19 118/82   03/06/19 135/75    Wt Readings from Last 3 Encounters:   12/02/19 98.1 kg (216 lb 3.2 oz)   05/14/19 112.5 kg (248 lb)   03/06/19 112.9 kg (249 lb)                    Mammogram Screening: Patient over age 50, mutual decision to screen reflected in health maintenance.    Pertinent mammograms are reviewed under the imaging tab.  History of abnormal Pap smear:   Last 3 Pap and HPV Results:   PAP / HPV 3/13/2014 3/13/2014 1/7/2011   PAP - NIL NIL   HPVSUR RESULT Negative  Reference range: Negative  (Note)  INTERPRETIVE INFORMATION: HPV High Risk, Hybrid Capture,  SurePath    The performance characteristics of HPV testing using the  SurePath sample medium were determined by [employer]  in a validation study. The FDA has not approved the  SurePath sample medium for HPV testing. Specimens collected  in SurePath sample medium may produce false-negative  results under certain conditions, e.g., when specimens  exceed stability requirements. For HPV results using an  FDA-approved test, laboratories should collect and  transport specimens according to the instructions of  FDA-approved kits (e.g., ThinPrep medium or HPV Digene  collection kit).    This test detects the high-risk HPV genotypes 16, 18, 31,  33, 35, 39, 45, 51, 52, 56, 58, 59, and 68 associated with  cervical cancer and its precursor lesions. However,  cross-reactions with other genotypes may occur. Results  should be correlated with cytologic and histologic  findings. Sensitivity may be affected by specimen  cellularity.    This test is intended for medical purposes  only and is not  valid for the evaluation of suspected sexual abuse or for  other forensic purposes.    HPV testing should not be used for screening or management  of atypical squamous cells of undetermined significance  (ASCUS) in women under age 21.    Test developed and characteristics determined by [facility]. See Compliance Statement B: [employer]  Performed by [facility],  [address] [facility] [place] [address] [phone]  [email or web address], Jose Estrella MD, Lab. Director Duplicate request -     PAP / HPV 3/13/2014 3/13/2014 1/7/2011   PAP - NIL NIL   HPVSUR RESULT Negative  Reference range: Negative  (Note)  INTERPRETIVE INFORMATION: HPV High Risk, Hybrid Capture,  SurePath    The performance characteristics of HPV testing using the  SurePath sample medium were determined by [facility]  in a validation study. The FDA has not approved the  SurePath sample medium for HPV testing. Specimens collected  in SurePath sample medium may produce false-negative  results under certain conditions, e.g., when specimens  exceed stability requirements. For HPV results using an  FDA-approved test, laboratories should collect and  transport specimens according to the instructions of  FDA-approved kits (e.g., ThinPrep medium or HPV Digene  collection kit).    This test detects the high-risk HPV genotypes 16, 18, 31,  33, 35, 39, 45, 51, 52, 56, 58, 59, and 68 associated with  cervical cancer and its precursor lesions. However,  cross-reactions with other genotypes may occur. Results  should be correlated with cytologic and histologic  findings. Sensitivity may be affected by specimen  cellularity.    This test is intended for medical purposes only and is not  valid for the evaluation of suspected sexual abuse or for  other forensic purposes.    HPV testing should not be used for screening or management  of atypical squamous cells of undetermined significance  (ASCUS) in women under age 21.    Test  developed and characteristics determined by [facility]. See Compliance Statement B: [employer]  Performed by [facility],  [address] [place] [address] [phone]  [email or web address], Jose Estrella MD, Lab. Director Duplicate request -     Reviewed and updated as needed this visit by clinical staff  Meds         Reviewed and updated as needed this visit by Provider            Review of Systems   Constitutional: Negative for chills and fever.   HENT: Positive for congestion. Negative for ear pain, hearing loss and sore throat.    Eyes: Negative for pain and visual disturbance.   Respiratory: Negative for cough and shortness of breath.    Cardiovascular: Negative for chest pain, palpitations and peripheral edema.   Gastrointestinal: Positive for heartburn. Negative for abdominal pain, constipation, diarrhea, hematochezia and nausea.   Breasts:  Negative for tenderness, breast mass and discharge.   Genitourinary: Negative for dysuria, frequency, genital sores, hematuria, pelvic pain, urgency, vaginal bleeding and vaginal discharge.   Musculoskeletal: Negative for arthralgias, joint swelling and myalgias.   Skin: Negative for rash.   Neurological: Negative for dizziness, weakness, headaches and paresthesias.   Psychiatric/Behavioral: Positive for mood changes. The patient is nervous/anxious.      CONSTITUTIONAL: NEGATIVE for fever, chills, change in weight  INTEGUMENTARU/SKIN: NEGATIVE for worrisome rashes, moles or lesions  EYES: NEGATIVE for vision changes or irritation  ENT: NEGATIVE for ear, mouth and throat problems  RESP: NEGATIVE for significant cough or SOB  BREAST: NEGATIVE for masses, tenderness or discharge  CV: NEGATIVE for chest pain, palpitations or peripheral edema  GI: NEGATIVE for nausea, abdominal pain, heartburn, or change in bowel habits  : NEGATIVE for unusual urinary or vaginal symptoms. Periods are regular.  MUSCULOSKELETAL: NEGATIVE for significant arthralgias or  "myalgia  NEURO: NEGATIVE for weakness, dizziness or paresthesias  PSYCHIATRIC: NEGATIVE for changes in mood or affect     OBJECTIVE:   There were no vitals taken for this visit.  Physical Exam  GENERAL: healthy, alert and no distress  EYES: Eyes grossly normal to inspection, PERRL and conjunctivae and sclerae normal  HENT: ear canals and TM's normal, nose and mouth without ulcers or lesions  NECK: no adenopathy, no asymmetry, masses, or scars and thyroid normal to palpation  RESP: lungs clear to auscultation - no rales, rhonchi or wheezes  BREAST: normal without masses, tenderness or nipple discharge and no palpable axillary masses or adenopathy  CV: regular rate and rhythm, normal S1 S2, no S3 or S4, no murmur, click or rub, no peripheral edema and peripheral pulses strong  ABDOMEN: soft, nontender, no hepatosplenomegaly, no masses and bowel sounds normal   (female): normal female external genitalia, normal urethral meatus, vaginal mucosa pink, moist, well rugated, and normal cervix/adnexa/uterus without masses or discharge  MS: no gross musculoskeletal defects noted, no edema  SKIN: no suspicious lesions or rashes  NEURO: Normal strength and tone, mentation intact and speech normal  PSYCH: mentation appears normal, affect normal/bright    Diagnostic Test Results:  Labs reviewed in Epic    ASSESSMENT/PLAN:   1. Smear, vaginal, as part of routine gynecological examination    - Pap imaged thin layer screen with HPV - recommended age 30 - 65 years (select HPV order below)    2. Gastroesophageal reflux disease without esophagitis  Ok to taper use of Omeprazole- tyr to use Pepcid/TUMS OTC      COUNSELING:  Reviewed preventive health counseling, as reflected in patient instructions       Regular exercise       Healthy diet/nutrition    Estimated body mass index is 43.24 kg/m  as calculated from the following:    Height as of 5/14/19: 1.613 m (5' 3.5\").    Weight as of 5/14/19: 112.5 kg (248 lb).    Weight management " plan: Specific weight management program [facility] clinic [facility]     reports that she has never smoked. She has never used smokeless tobacco.      Counseling Resources:  ATP IV Guidelines  Pooled Cohorts Equation Calculator  Breast Cancer Risk Calculator  FRAX Risk Assessment  ICSI Preventive Guidelines  Dietary Guidelines for Americans, 2010  USDA's MyPlate  ASA Prophylaxis  Lung CA Screening    RAJ Macias CNP  [facility]  Answers for HPI/ROS submitted by the patient on 11/25/2019   Annual Exam:  If you checked off any problems, how difficult have these problems made it for you to do your work, take care of things at home, or get along with other people?: Extremely difficult  PHQ9 TOTAL SCORE: 16

## 2019-12-04 LAB
COPATH REPORT: NORMAL
PAP: NORMAL

## 2019-12-06 LAB
FINAL DIAGNOSIS: NORMAL
HPV HR 12 DNA CVX QL NAA+PROBE: NEGATIVE
HPV16 DNA SPEC QL NAA+PROBE: NEGATIVE
HPV18 DNA SPEC QL NAA+PROBE: NEGATIVE
SPECIMEN DESCRIPTION: NORMAL
SPECIMEN SOURCE CVX/VAG CYTO: NORMAL

## 2019-12-26 ENCOUNTER — HOSPITAL ENCOUNTER (OUTPATIENT)
Dept: MAMMOGRAPHY | Facility: CLINIC | Age: 54
Discharge: HOME | End: 2019-12-26
Attending: NURSE PRACTITIONER | Admitting: NURSE PRACTITIONER
Payer: COMMERCIAL

## 2019-12-26 ENCOUNTER — AMBULATORY - HEALTHEAST (OUTPATIENT)
Dept: MULTI SPECIALTY CLINIC | Facility: CLINIC | Age: 54
End: 2019-12-26

## 2019-12-26 DIAGNOSIS — Z12.31 VISIT FOR SCREENING MAMMOGRAM: ICD-10-CM

## 2019-12-26 PROCEDURE — 77063 BREAST TOMOSYNTHESIS BI: CPT

## 2020-01-15 ENCOUNTER — OFFICE VISIT (OUTPATIENT)
Dept: FAMILY MEDICINE | Facility: CLINIC | Age: 55
End: 2020-01-15
Payer: COMMERCIAL

## 2020-01-15 VITALS
WEIGHT: 211 LBS | HEART RATE: 70 BPM | DIASTOLIC BLOOD PRESSURE: 68 MMHG | HEIGHT: 64 IN | OXYGEN SATURATION: 95 % | BODY MASS INDEX: 36.02 KG/M2 | RESPIRATION RATE: 16 BRPM | SYSTOLIC BLOOD PRESSURE: 130 MMHG | TEMPERATURE: 96.9 F

## 2020-01-15 DIAGNOSIS — R09.89 CHEST CONGESTION: Primary | ICD-10-CM

## 2020-01-15 PROCEDURE — 99213 OFFICE O/P EST LOW 20 MIN: CPT | Performed by: NURSE PRACTITIONER

## 2020-01-15 ASSESSMENT — MIFFLIN-ST. JEOR: SCORE: 1529.15

## 2020-01-15 NOTE — PATIENT INSTRUCTIONS
Start cetirizine for allergies - 10 mg once daily.    May use Mucinex as an expectorant.      If no improvement in 2 weeks, restart the omeprazole 20 mg daily.    Then if no improvement in 2 weeks, follow up in clinic.        Thank you for choosing [facility].  You may be receiving an email and/or telephone survey request from [facility] Customer Experience regarding your visit today.  Please take a few minutes to respond to the survey to let us know how we are doing.      If you have questions or concerns, please contact us via [email or web address] or you can contact your care team at [phone].    Our Clinic hours are:  Monday 6:40 am  to 7:00 pm  Tuesday -Friday 6:40 am to 5:00 pm    The [place] outpatient lab hours are:  Monday - Friday 6:10 am to 4:45 pm  Saturdays 7:00 am to 11:00 am  Appointments are required, call [phone]    If you have clinical questions after hours or would like to schedule an appointment,  call the clinic at [phone].

## 2020-01-15 NOTE — PROGRESS NOTES
"Subjective     Socorro Oakes is a 54 year old female who presents to clinic today for the following health issues:    HPI   Throat problem      Duration: 4 months    Description (location/character/radiation): patient always feels like she is \"gagging on phlegm\"    Constant phlegm stuck in throat    Not coughing much    Throat clearing     Feels that her upper chest is \"congested\"    Otherwise doesn't feel sick    Worse in the morning and has trouble sleeping    Intensity:  moderate    Accompanying signs and symptoms:     No facial congestion    No runny nose    No sinus symptoms - no post nasal drip.    No fever or chills.    No sore throat.    History (similar episodes/previous evaluation): None    Patient denies h/o allergies.    Denies any new exposures.    Has h/o GERD - hasn't taken her omeprazole in one year - she doesn't feel that this is her acid reflux.    Precipitating or alleviating factors: None    Therapies tried and outcome:  Sudafed- doesn't help               Reviewed and updated as needed this visit by Provider         Review of Systems   ROS COMP: Constitutional, HEENT, cardiovascular, pulmonary, gi and gu systems are negative, except as otherwise noted.      Objective    /68 (BP Location: Right arm)   Pulse 70   Temp 96.9  F (36.1  C) (Tympanic)   Resp 16   Ht 1.613 m (5' 3.5\")   Wt 95.7 kg (211 lb)   SpO2 95%   BMI 36.79 kg/m    Body mass index is 36.79 kg/m .  Physical Exam   GENERAL: healthy, alert and no distress  HENT: ear canals and TM's normal, nose and mouth without ulcers or lesions  NECK: no adenopathy, no asymmetry, masses, or scars and thyroid normal to palpation  RESP: lungs clear to auscultation - no rales, rhonchi or wheezes  CV: regular rate and rhythm, normal S1 S2, no S3 or S4, no murmur, click or rub, no peripheral edema and peripheral pulses strong            Assessment & Plan       ICD-10-CM    1. Chest congestion R09.89         Patient Instructions   Start " cetirizine for allergies - 10 mg once daily.    May use Mucinex as an expectorant.      If no improvement in 2 weeks, restart the omeprazole 20 mg daily.    Then if no improvement in 2 weeks, follow up in clinic.        Thank you for choosing [facility].  You may be receiving an email and/or telephone survey request from [facility] Customer Experience regarding your visit today.  Please take a few minutes to respond to the survey to let us know how we are doing.      If you have questions or concerns, please contact us via [email or web address] or you can contact your care team at [phone].    Our Clinic hours are:  Monday 6:40 am  to 7:00 pm  Tuesday -Friday 6:40 am to 5:00 pm    The [place] outpatient lab hours are:  Monday - Friday 6:10 am to 4:45 pm  Saturdays 7:00 am to 11:00 am  Appointments are required, call [phone]    If you have clinical questions after hours or would like to schedule an appointment,  call the clinic at [phone].        Return in about 4 weeks (around 2/12/2020), or if symptoms worsen or fail to improve.    The risks, benefits and treatment options of prescribed medications or other treatments have been discussed with the patient. The patient verbalized their understanding and should call or follow up if no improvement or if they develop further problems.    RAJ Mccullough CNP  [facility]

## 2020-03-03 ENCOUNTER — HOSPITAL ENCOUNTER (EMERGENCY)
Facility: CLINIC | Age: 55
Discharge: HOME | End: 2020-03-03
Attending: EMERGENCY MEDICINE | Admitting: EMERGENCY MEDICINE
Payer: COMMERCIAL

## 2020-03-03 ENCOUNTER — NURSE TRIAGE (OUTPATIENT)
Dept: NURSING | Facility: CLINIC | Age: 55
End: 2020-03-03

## 2020-03-03 VITALS
RESPIRATION RATE: 18 BRPM | DIASTOLIC BLOOD PRESSURE: 96 MMHG | TEMPERATURE: 98.1 F | BODY MASS INDEX: 34.66 KG/M2 | OXYGEN SATURATION: 97 % | HEIGHT: 64 IN | SYSTOLIC BLOOD PRESSURE: 167 MMHG | WEIGHT: 203 LBS | HEART RATE: 90 BPM

## 2020-03-03 DIAGNOSIS — F32.1 MODERATE MAJOR DEPRESSION (H): ICD-10-CM

## 2020-03-03 LAB
CA-I SERPL ISE-MCNC: 4.9 MG/DL (ref 4.4–5.2)
TSH SERPL DL<=0.005 MIU/L-ACNC: 2.63 MU/L (ref 0.4–4)

## 2020-03-03 PROCEDURE — 82607 VITAMIN B-12: CPT | Performed by: EMERGENCY MEDICINE

## 2020-03-03 PROCEDURE — 99283 EMERGENCY DEPT VISIT LOW MDM: CPT | Performed by: EMERGENCY MEDICINE

## 2020-03-03 PROCEDURE — 82330 ASSAY OF CALCIUM: CPT | Performed by: EMERGENCY MEDICINE

## 2020-03-03 PROCEDURE — 84443 ASSAY THYROID STIM HORMONE: CPT | Performed by: EMERGENCY MEDICINE

## 2020-03-03 PROCEDURE — 99284 EMERGENCY DEPT VISIT MOD MDM: CPT | Mod: Z6 | Performed by: EMERGENCY MEDICINE

## 2020-03-03 PROCEDURE — 82306 VITAMIN D 25 HYDROXY: CPT | Performed by: EMERGENCY MEDICINE

## 2020-03-03 PROCEDURE — 82746 ASSAY OF FOLIC ACID SERUM: CPT | Performed by: EMERGENCY MEDICINE

## 2020-03-03 ASSESSMENT — ENCOUNTER SYMPTOMS
GASTROINTESTINAL NEGATIVE: 1
MUSCULOSKELETAL NEGATIVE: 1
CONSTITUTIONAL NEGATIVE: 1

## 2020-03-03 ASSESSMENT — MIFFLIN-ST. JEOR: SCORE: 1500.8

## 2020-03-03 NOTE — TELEPHONE ENCOUNTER
Pt states her psych MD ordered labs that must be done tonight. This doctor gave her a hard copy order. She asks where she may get these labs done tonight. She states she has to be at the airport tomorrow at 6 AM to go out of town. Advised pt try an Urgent Care Clinic to get the labs done. She wants to go to a non-[facility] in [place]. Advised pt check w/ her insurance to be sure this location is covered.     Reason for Disposition    General information question, no triage required and triager able to answer question    Additional Information    Negative: [1] Caller is not with the adult (patient) AND [2] reporting urgent symptoms    Negative: Lab result questions    Negative: Medication questions    Negative: Caller can't be reached by phone    Negative: Caller has already spoken to PCP or another triager    Negative: RN needs further essential information from caller in order to complete triage    Negative: Requesting regular office appointment    Negative: [1] Caller requesting NON-URGENT health information AND [2] PCP's office is the best resource    Negative: Health Information question, no triage required and triager able to answer question    Protocols used: INFORMATION ONLY CALL-A-

## 2020-03-03 NOTE — ED AVS SNAPSHOT
[facility]  [address] [address]  Phone:  [phone]  Fax:  [phone]                                    Socorro Oakes   MRN: [id number]    Department:  [facility] Emergency Department   Date of Visit:  3/3/2020           After Visit Summary Signature Page    I have received my discharge instructions, and my questions have been answered. I have discussed any challenges I see with this plan with the nurse or doctor.    ..........................................................................................................................................  Patient/Patient Representative Signature      ..........................................................................................................................................  Patient Representative Print Name and Relationship to Patient    ..................................................               ................................................  Date                                   Time    ..........................................................................................................................................  Reviewed by Signature/Title    ...................................................              ..............................................  Date                                               Time          22EPIC Rev 08/18

## 2020-03-04 LAB
DEPRECATED CALCIDIOL+CALCIFEROL SERPL-MC: 23 UG/L (ref 20–75)
FOLATE SERPL-MCNC: 13 NG/ML
VIT B12 SERPL-MCNC: 492 PG/ML (ref 193–986)

## 2020-03-04 NOTE — ED TRIAGE NOTES
Presents requesting lab work she sates her psychiatrist wants before she goes out of town tomorrow    Seen by dr curry

## 2020-03-04 NOTE — DISCHARGE INSTRUCTIONS
A few of your labs are pending.  These won't result for a day or two.   You can look at [employer] to review these.

## 2020-03-04 NOTE — ED PROVIDER NOTES
History     Chief Complaint   Patient presents with     Eval/Assessment     want slabs done per her psych dr request     HPI  Socorro Oakes is a 54 year old female who has past medical history significant for major depression disorder with psychotic features, presenting to the emergency department requesting that lab work be performed.  Patient tells me that her psychiatrist from [facility] requested that patient have blood work performed.  She brings with a copy of paper orders for various laboratory studies including B12 and folic acid, calcium, TSH and vitamin D levels.  Patient denies any suicidal or homicidal ideation.  States that she has been getting rid of many positions recently in her house.  States that she is leaving to the [facility] to visit family members tomorrow morning and wanted blood work performed.    Allergies:  Allergies   Allergen Reactions     Latex Rash     Linezolid Other (See Comments)     Zyvox, was told after surgery that she was allergic to this     Other [Seasonal Allergies]      Black dye from knee brace caused skin breakdown.     Paraphenylenediamine Itching, Swelling and Rash     Black dye       Problem List:    Patient Active Problem List    Diagnosis Date Noted     Health Care Home 04/20/2011     Priority: High     DX V65.8 REPLACED WITH 155[facility]ALTH CARE HOME (04/08/2013)         MRSA      Priority: High     SITE - WOUND 7/13/05; 8/12/2009:  Pt stated that she should be discontinued MRSA status as she had screening done. We could not obtain these records in her Medical chart, so we could not validate this.  Pt still MRSA + STATUS.       Morbid obesity (H) 05/14/2019     Priority: Medium     Class 3 obesity due to excess calories without serious comorbidity with body mass index (BMI) of 40.0 to 44.9 in adult 03/19/2018     Priority: Medium     Essential hypertension, benign 11/14/2016     Priority: Medium     Memory loss 11/27/2012     Priority: Medium      Moderate major depression (H) 03/06/2012     Priority: Medium     Schizophrenia (H) 03/06/2012     Priority: Medium     Follow with psychiatry        ADD (attention deficit disorder with hyperactivity) 03/06/2012     Priority: Medium     Problem list name updated by automated process. Provider to review and confirm       Restless legs syndrome (RLS) 03/06/2012     Priority: Medium     Migraine 01/25/2012     Priority: Medium     Started on verapamil.        Sleep apnea 10/26/2011     Priority: Medium     GERD (gastroesophageal reflux disease) 04/26/2011     Priority: Medium     DJD (degenerative joint disease), lumbar 04/26/2011     Priority: Medium     [facility] PAIN MANAGEMENT   ON VICODIN # 15   FLEXERIL # 60   PAIN CONTRACT SIGNED.        Hyperlipidemia LDL goal <130 12/23/2010     Priority: Medium     h/o multiple concussion  11/05/2008     Priority: Medium        Past Medical History:    Past Medical History:   Diagnosis Date     Arthritis      Cellulitis and abscess of unspecified site      Disturbance of skin sensation      Dizziness and giddiness      Drug-seeking behavior      GERD (gastroesophageal reflux disease)      MEDICAL HISTORY OF - 2006,2007,2008,2009     Moderate major depression (H) 3/6/2012     Other convulsions      Restless leg syndrome      Schizophrenia (H) 3/6/2012     Sleep apnea      Unspecified hereditary and idiopathic peripheral neuropathy      Variants of migraine, not elsewhere classified, without mention of intractable migraine without mention of status migrainosus        Past Surgical History:    Past Surgical History:   Procedure Laterality Date     CHOLECYSTECTOMY, LAPOROSCOPIC  4-30-09     COLONOSCOPY N/A 6/5/2015    Procedure: COLONOSCOPY;  Surgeon: Robe Delgado MD;  Location: [facility]     INJECT EPIDURAL LUMBAR  7/11/2011    Procedure:INJECT EPIDURAL LUMBAR; BRIE with Jeano--; Surgeon:GENERIC ANESTHESIA PROVIDER; Location:[facility]     INJECT EPIDURAL LUMBAR   9/28/2011    Procedure:INJECT EPIDURAL LUMBAR; BRIE with Flouro--; Surgeon:GENERIC ANESTHESIA PROVIDER; Location:[facility]     INJECT EPIDURAL LUMBAR  12/12/2011    Procedure:INJECT EPIDURAL LUMBAR; BRIE with Fluoro - Dr. Landry; Surgeon:GENERIC ANESTHESIA PROVIDER; Location:[facility]     KNEE SURGERY      1- 15 yr ago     LAPAROSCOPIC APPENDECTOMY       MANDIBLE SURGERY      6x surgeries from 1983- 1994     SURGICAL HISTORY OF -   10/17/05    left knee surgery      SURGICAL HISTORY OF -   1982,1983,1984x2,1993,    TMJ Surgery x5     SURGICAL HISTORY OF -       ENT Tubes       SURGICAL HISTORY OF -   1996    toe     TONSILLECTOMY & ADENOIDECTOMY         Family History:    Family History   Problem Relation Age of Onset     Arthritis Mother         rheumatoid/had knee replacement     Bipolar Disorder Mother      Migraines Mother      Depression Father         comitted suicide age 48     Suicide Father      Mental Illness Father      Alcohol/Drug Brother      Anxiety Disorder Brother      Mental Illness Brother      Allergies Sister      Migraines Sister      Anxiety Disorder Maternal Grandfather      Bipolar Disorder Daughter      Migraines Brother      Substance Abuse Brother      Migraines Sister      Migraines Brother      Migraines Sister      Anesthesia Reaction Other      Cerebrovascular Disease No family hx of        Social History:  Marital Status:   [4]  Social History     Tobacco Use     Smoking status: Never Smoker     Smokeless tobacco: Never Used   Substance Use Topics     Alcohol use: No     Alcohol/week: 0.0 standard drinks     Drug use: Never        Medications:    amphetamine-dextroamphetamine (ADDERALL) 30 MG tablet  Desvenlafaxine Succinate (PRISTIQ PO)  diazepam (VALIUM) 10 MG tablet  DoxePIN (SINEQUAN) 75 MG capsule  Ibuprofen (ADVIL PO)  lamoTRIgine (LAMICTAL) 150 MG tablet  metFORMIN (GLUCOPHAGE) 500 MG tablet  omeprazole (PRILOSEC) 20 MG DR capsule  ORDER FOR [facility]  pramipexole  "(MIRAPEX) 0.25 MG tablet  TYLENOL EXTRA STRENGTH 500 MG OR TABS  Zolpidem Tartrate (AMBIEN PO)          Review of Systems   Constitutional: Negative.    Gastrointestinal: Negative.    Musculoskeletal: Negative.        Physical Exam   BP: (!) 167/96  Pulse: 90  Temp: 98.1  F (36.7  C)  Resp: 18  Height: 162.6 cm (5' 4\")  Weight: 92.1 kg (203 lb)  SpO2: 97 %      Physical Exam  BP (!) 167/96   Pulse 90   Temp 98.1  F (36.7  C) (Oral)   Resp 18   Ht 1.626 m (5' 4\")   Wt 92.1 kg (203 lb)   SpO2 97%   BMI 34.84 kg/m    General: alert and in no acute distress  Head: atraumatic, normocephalic  Abd: nondistended  Musculoskel/Extremities: normal extremities, no apparent edema, and full AROM of major joints  Skin: no rashes, no diaphoresis and skin color normal  Neuro: Patient awake, alert, oriented, speech is fluent, gait is normal  Psychiatric: affect/mood normal, cooperative, normal judgement/insight and memory intact      ED Course        Procedures               Critical Care time:  none               Results for orders placed or performed during the hospital encounter of 03/03/20 (from the past 24 hour(s))   Calcium ionized   Result Value Ref Range    Calcium Ionized 4.9 4.4 - 5.2 mg/dL   TSH with free T4 reflex   Result Value Ref Range    TSH 2.63 0.40 - 4.00 mU/L       Medications - No data to display    Assessments & Plan (with Medical Decision Making)  54 year old female, presenting to the emergency department with request for blood draw.  Patient has history of depression with psychotic features, and is requesting blood work be performed as the patient had been recommended to have blood work performed by psychiatrist at [facility].  Patient denies any significant physical complaints.  Mood is been okay.  Has had follow-up with primary care providers.  No indication for additional blood testing, however given patient's request, I will order these blood tests.  I discussed that she can follow-up as " an outpatient as well for these blood test.  Patient to follow-up with primary care provider for remainder of blood test results.  I also discussed patient could follow-up in care everywhere.  I had notified patient prior to blood testing that not all of the results would return this evening.     I have reviewed the nursing notes.    I have reviewed the findings, diagnosis, plan and need for follow up with the patient.       Discharge Medication List as of 3/3/2020  9:31 PM          Final diagnoses:   Moderate major depression (H)       3/3/2020   [facility] EMERGENCY DEPARTMENT     Michael Castañeda MD  03/03/20 [id number]

## 2020-04-14 ENCOUNTER — MYC MEDICAL ADVICE (OUTPATIENT)
Dept: FAMILY MEDICINE | Facility: CLINIC | Age: 55
End: 2020-04-14

## 2020-04-20 ENCOUNTER — VIRTUAL VISIT (OUTPATIENT)
Dept: FAMILY MEDICINE | Facility: CLINIC | Age: 55
End: 2020-04-20
Payer: COMMERCIAL

## 2020-04-20 DIAGNOSIS — J30.2 SEASONAL ALLERGIC RHINITIS, UNSPECIFIED TRIGGER: ICD-10-CM

## 2020-04-20 DIAGNOSIS — R09.89 PHLEGM IN THROAT: ICD-10-CM

## 2020-04-20 DIAGNOSIS — K21.9 GASTROESOPHAGEAL REFLUX DISEASE WITHOUT ESOPHAGITIS: Primary | ICD-10-CM

## 2020-04-20 PROCEDURE — 99213 OFFICE O/P EST LOW 20 MIN: CPT | Mod: 95 | Performed by: NURSE PRACTITIONER

## 2020-04-20 RX ORDER — FEXOFENADINE HCL 60 MG/1
60 TABLET, FILM COATED ORAL 2 TIMES DAILY
Qty: 30 TABLET | Refills: 4 | Status: SHIPPED | OUTPATIENT
Start: 2020-04-20 | End: 2021-01-22

## 2020-04-20 RX ORDER — LITHIUM CARBONATE 300 MG/1
300 CAPSULE ORAL 2 TIMES DAILY WITH MEALS
COMMUNITY
End: 2020-09-09

## 2020-04-20 RX ORDER — BUPROPION HYDROCHLORIDE 300 MG/1
300 TABLET ORAL EVERY MORNING
COMMUNITY
End: 2021-04-21

## 2020-04-20 RX ORDER — HALOPERIDOL 5 MG/1
5 TABLET ORAL
COMMUNITY
End: 2020-09-09

## 2020-04-20 RX ORDER — OMEPRAZOLE 20 MG/1
20 TABLET, DELAYED RELEASE ORAL DAILY
Qty: 30 TABLET | Refills: 4 | Status: SHIPPED | OUTPATIENT
Start: 2020-04-20 | End: 2021-01-22

## 2020-05-05 ENCOUNTER — COMMUNICATION - HEALTHEAST (OUTPATIENT)
Dept: FAMILY MEDICINE | Facility: CLINIC | Age: 55
End: 2020-05-05

## 2020-05-06 ENCOUNTER — COMMUNICATION - HEALTHEAST (OUTPATIENT)
Dept: SURGERY | Facility: CLINIC | Age: 55
End: 2020-05-06

## 2020-05-07 ENCOUNTER — OFFICE VISIT - HEALTHEAST (OUTPATIENT)
Dept: FAMILY MEDICINE | Facility: CLINIC | Age: 55
End: 2020-05-07

## 2020-05-07 DIAGNOSIS — E66.01 CLASS 2 SEVERE OBESITY DUE TO EXCESS CALORIES WITH SERIOUS COMORBIDITY AND BODY MASS INDEX (BMI) OF 38.0 TO 38.9 IN ADULT (H): ICD-10-CM

## 2020-05-07 DIAGNOSIS — R73.03 PREDIABETES: ICD-10-CM

## 2020-05-07 DIAGNOSIS — E88.810 METABOLIC SYNDROME X: ICD-10-CM

## 2020-05-07 DIAGNOSIS — I10 BENIGN ESSENTIAL HYPERTENSION: ICD-10-CM

## 2020-05-07 DIAGNOSIS — E66.812 CLASS 2 SEVERE OBESITY DUE TO EXCESS CALORIES WITH SERIOUS COMORBIDITY AND BODY MASS INDEX (BMI) OF 38.0 TO 38.9 IN ADULT (H): ICD-10-CM

## 2020-09-09 VITALS — WEIGHT: 198 LBS | HEIGHT: 64 IN | BODY MASS INDEX: 33.8 KG/M2

## 2020-09-09 ASSESSMENT — MIFFLIN-ST. JEOR: SCORE: 1478.12

## 2020-09-09 NOTE — PATIENT INSTRUCTIONS
Your BMI is Body mass index is 33.99 kg/m .  Weight management is a personal decision.  If you are interested in exploring weight loss strategies, the following discussion covers the approaches that may be successful. Body mass index (BMI) is one way to tell whether you are at a healthy weight, overweight, or obese. It measures your weight in relation to your height.  A BMI of 18.5 to 24.9 is in the healthy range. A person with a BMI of 25 to 29.9 is considered overweight, and someone with a BMI of 30 or greater is considered obese. More than two-thirds of American adults are considered overweight or obese.  Being overweight or obese increases the risk for further weight gain. Excess weight may lead to heart disease and diabetes.  Creating and following plans for healthy eating and physical activity may help you improve your health.  Weight control is part of healthy lifestyle and includes exercise, emotional health, and healthy eating habits. Careful eating habits lifelong are the mainstay of weight control. Though there are significant health benefits from weight loss, long-term weight loss with diet alone may be very difficult to achieve- studies show long-term success with dietary management in less than 10% of people. Attaining a healthy weight may be especially difficult to achieve in those with severe obesity. In some cases, medications, devices and surgical management might be considered.  What can you do?  If you are overweight or obese and are interested in methods for weight loss, you should discuss this with your provider.     Consider reducing daily calorie intake by 500 calories.     Keep a food journal.     Avoiding skipping meals, consider cutting portions instead.    Diet combined with exercise helps maintain muscle while optimizing fat loss. Strength training is particularly important for building and maintaining muscle mass. Exercise helps reduce stress, increase energy, and improves fitness.  Increasing exercise without diet control, however, may not burn enough calories to loose weight.       Start walking three days a week 10-20 minutes at a time    Work towards walking thirty minutes five days a week     Eventually, increase the speed of your walking for 1-2 minutes at time    In addition, we recommend that you review healthy lifestyles and methods for weight loss available through the National Institutes of Health patient information sites:  http://win.niddk.nih.gov/publications/index.htm    And look into health and wellness programs that may be available through your health insurance provider, employer, local community center, or babs club.    Weight management plan: Discussed healthy diet and exercise guidelines

## 2020-09-09 NOTE — PROGRESS NOTES
"Socorro Oakes is a 54 year old female who is being evaluated via a billable video visit.      The patient has been notified of following:     \"This video visit will be conducted via a call between you and your physician/provider. We have found that certain health care needs can be provided without the need for an in-person physical exam.  This service lets us provide the care you need with a video conversation.  If a prescription is necessary we can send it directly to your pharmacy.  If lab work is needed we can place an order for that and you can then stop by our lab to have the test done at a later time.    Video visits are billed at different rates depending on your insurance coverage.  Please reach out to your insurance provider with any questions.    If during the course of the call the physician/provider feels a video visit is not appropriate, you will not be charged for this service.\"    Patient has given verbal consent for Video visit? Yes  How would you like to obtain your AVS? MyChart  If you are dropped from the video visit, the video invite should be resent to: Text to cell phone: [phone]   Will anyone else be joining your video visit? No      Video-Visit Details    Type of service:  Video Visit    Video Start Time: 2pm  Video End Time: 2:25pm    Originating Location (pt. Location): Home    Distant Location (provider location):  [facility]     Platform used for Video Visit: [employer]    Virtual visit for annual follow-up of moderate ALEXA managed with CPAP.    Assessment:  - Moderate ALEXA  - Obesity  - Nocturnal nasal / upper airway congestion    Plan:  - Appears well controlled with CPAP 10 cm H2O  - Continue CPAP on current settings  - Trial of fluticasone 1-2 sprays in each nostril at bed time    Pertinent PMHx of ADD, schizoaffective disorder, obesity.    LOV on 9/6/2018 and was doing well with CPAP 10 cm H2O.  No changes.    Today, she presents for annual follow-up to get " updated supply prescription and also is interested in a replacement CPAP (last supplied ~3/2015).  Otherwise, she feels she is sleeping well and has no concerns.  Denies difficulty falling or staying asleep.  Feels well rested.    CPAP download over the past 30 days on set pressure 10 cm H2O.  Used 30/30 days.  Average usage 579 minutes.  AHI 0.3.    Sleep Testing To Date:  11/16/2007 - Split-night PSG with AHI 19.2, cristopher desat listed as 59% and period of sustained hypoxemia, CPAP 9 optimal. Partial MSLT performed following day with no sleep nor SOREM on 2 naps  4/19/2016 - JOSE on CPAP 9 cm H2O.  Cristopher desat 75%, time < 88% of 27.4 minutes.  Graph suggestive of one period of sustained hypoxemia and two periods of repetitive oxygen desaturations consistent with residual ALEXA.  4/26/2016 - Titration PSG, CPAP 10 appearing optimal.    10 point ROS of systems including Constitutional, Eyes, Respiratory, Cardiovascular, Gastroenterology, Genitourinary, Integumentary, Muscularskeletal, Psychiatric were all negative except for pertinent positives noted in my HPI.    Physical Exam  Constitutional:       General: She is not in acute distress.     Appearance: Normal appearance. She is obese. She is not ill-appearing, toxic-appearing or diaphoretic.   HENT:      Head: Normocephalic and atraumatic.      Right Ear: External ear normal.      Left Ear: External ear normal.      Nose: Nose normal.   Eyes:      Conjunctiva/sclera: Conjunctivae normal.   Pulmonary:      Effort: Pulmonary effort is normal. No respiratory distress.   Skin:     Coloration: Skin is not jaundiced or pale.      Findings: No bruising or erythema.   Neurological:      General: No focal deficit present.      Mental Status: She is alert and oriented to person, place, and time.   Psychiatric:         Mood and Affect: Mood normal.         Behavior: Behavior normal.         Thought Content: Thought content normal.         Judgment: Judgment normal.            Everton Martínez MD, MD

## 2020-09-10 ENCOUNTER — VIRTUAL VISIT (OUTPATIENT)
Dept: SLEEP MEDICINE | Facility: CLINIC | Age: 55
End: 2020-09-10
Payer: COMMERCIAL

## 2020-09-10 DIAGNOSIS — G47.33 OSA (OBSTRUCTIVE SLEEP APNEA): Primary | ICD-10-CM

## 2020-09-10 PROCEDURE — 99214 OFFICE O/P EST MOD 30 MIN: CPT | Mod: 95 | Performed by: FAMILY MEDICINE

## 2020-09-10 RX ORDER — FLUTICASONE PROPIONATE 50 MCG
1 SPRAY, SUSPENSION (ML) NASAL AT BEDTIME
Qty: 18.2 ML | Refills: 1 | Status: SHIPPED | OUTPATIENT
Start: 2020-09-10 | End: 2021-01-22

## 2020-10-01 ENCOUNTER — DOCUMENTATION ONLY (OUTPATIENT)
Dept: SLEEP MEDICINE | Facility: CLINIC | Age: 55
End: 2020-10-01

## 2020-10-01 NOTE — PROGRESS NOTES
Patient was offered choice of vendor and chose [facility].  Patient Socorro Oakes was set up at [place]  on October 1, 2020. Patient received a Resmed AirSense 10 Auto. Pressures were set at 10 cm H2O.   Patient s ramp is 5 cm H2O for Auto and FLEX/EPR is 2.  Patient received a Resmed Mask name: [id number]  Nasal mask size Medium, heated tubing and heated humidifier.  Patient does need to meet compliance. Patient has a follow up on 11/13/20 with Dr. Martínez.    Ninfa Bernal

## 2020-10-05 ENCOUNTER — DOCUMENTATION ONLY (OUTPATIENT)
Dept: SLEEP MEDICINE | Facility: CLINIC | Age: 55
End: 2020-10-05

## 2020-10-05 NOTE — PROGRESS NOTES
3 DAY STM VISIT         Patient contacted for 3 day STM visit    Confirmed with patient at time of call- No Patient is still interested in STM service     Subjective measures:  Pt states things are going well and has no issues or complaints.  Pt is benefiting from therapy.      Replacement device: Yes  STM ordered by provider: Yes     Device type: CPAP  PAP settings from order::        CPAP fixed 10 cm  H20  Mask type:    Nasal Mask     Device settings from machine            CPAP fixed 10.0 cm  H20      EPR level Setting: THREE         Assessment: Nightly usage over four hours.  Action plan: Patient removed from STM, STM not required or ordered. . Patient has a follow up visit scheduled:   yes within 31-90 days of set up.     Total time spent on accessing and  interpreting remote patient PAP therapy data  12 minutes  Total time spent counseling, coaching  and reviewing PAP therapy data with patient  2 minutes  [id number] no

## 2020-11-12 NOTE — PATIENT INSTRUCTIONS

## 2020-11-12 NOTE — PROGRESS NOTES
"Socorro Oakes is a 54 year old female who is being evaluated via a billable video visit.      The patient has been notified of following:     \"This video visit will be conducted via a call between you and your physician/provider. We have found that certain health care needs can be provided without the need for an in-person physical exam.  This service lets us provide the care you need with a video conversation.  If a prescription is necessary we can send it directly to your pharmacy.  If lab work is needed we can place an order for that and you can then stop by our lab to have the test done at a later time.    Video visits are billed at different rates depending on your insurance coverage.  Please reach out to your insurance provider with any questions.    If during the course of the call the physician/provider feels a video visit is not appropriate, you will not be charged for this service.\"    Patient has given verbal consent for Video visit? Yes  How would you like to obtain your AVS? MyChart  If you are dropped from the video visit, the video invite should be resent to: Send to e-mail at: [email or web address]  Will anyone else be joining your video visit? No      Video-Visit Details    Type of service:  Video Visit    Video Start Time: 11:30am  Video End Time: 11:50am    Originating Location (pt. Location): Home    Distant Location (provider location):  [facility]     Platform used for Video Visit: [employer]     Virtual visit for CPAP compliance follow-up after receiving new CPAP equipment for management of moderate ALEXA.    Assessment:  - Moderate ALEXA  - Obesity  - Nocturnal nasal / upper airway congestion     Plan:  - Appears well controlled with CPAP 10 cm H2O  - Continue CPAP on current settings  - Trial of xyzal 5mg PO Qevening    53 yo F with PMHx of ADD, schizoaffective disorder, obesity.    Overall, she feels the CPAP is working well and no specific concerns with it.  Usage appears " adequate, AHI < 2.  She felt the nasal fluticasone had minimal benefit with primarily nocturnal nasal congestion / nasal drainage.  She recently tried changing antihistamine from zyrtec to claritin and omeprazole to lanproprazole without signficant change.    Sleep Testing To Date:  11/16/2007 - Split-night PSG with AHI 19.2, cristopher desat listed as 59% and period of sustained hypoxemia, CPAP 9 optimal. Partial MSLT performed following day with no sleep nor SOREM on 2 naps  4/19/2016 - JOSE on CPAP 9 cm H2O.  Cristopher desat 75%, time < 88% of 27.4 minutes.  Graph suggestive of one period of sustained hypoxemia and two periods of repetitive oxygen desaturations consistent with residual ALEXA.  4/26/2016 - Titration PSG, CPAP 10 appearing optimal.     10 point ROS of systems including Constitutional, Eyes, Respiratory, Cardiovascular, Gastroenterology, Genitourinary, Integumentary, Muscularskeletal, Psychiatric were all negative except for pertinent positives noted in my HPI.     Physical Exam  Constitutional:       General: She is not in acute distress.     Appearance: Normal appearance. She is obese. She is not ill-appearing, toxic-appearing or diaphoretic.   HENT:      Head: Normocephalic and atraumatic.      Right Ear: External ear normal.      Left Ear: External ear normal.      Nose: Nose normal.   Eyes:      Conjunctiva/sclera: Conjunctivae normal.   Pulmonary:      Effort: Pulmonary effort is normal. No respiratory distress.   Skin:     Coloration: Skin is not jaundiced or pale.      Findings: No bruising or erythema.   Neurological:      General: No focal deficit present.      Mental Status: She is alert and oriented to person, place, and time.   Psychiatric:         Mood and Affect: Mood normal.         Behavior: Behavior normal.         Thought Content: Thought content normal.         Judgment: Judgment normal.     Everton Martínez MD, MD

## 2020-11-13 ENCOUNTER — VIRTUAL VISIT (OUTPATIENT)
Dept: SLEEP MEDICINE | Facility: CLINIC | Age: 55
End: 2020-11-13
Payer: COMMERCIAL

## 2020-11-13 DIAGNOSIS — R09.81 CHRONIC NASAL CONGESTION: Primary | ICD-10-CM

## 2020-11-13 PROCEDURE — 99214 OFFICE O/P EST MOD 30 MIN: CPT | Mod: 95 | Performed by: FAMILY MEDICINE

## 2020-11-13 RX ORDER — LEVOCETIRIZINE DIHYDROCHLORIDE 5 MG/1
5 TABLET, FILM COATED ORAL EVERY EVENING
Qty: 30 TABLET | Refills: 3 | Status: SHIPPED | OUTPATIENT
Start: 2020-11-13 | End: 2021-04-13

## 2020-11-16 ENCOUNTER — HEALTH MAINTENANCE LETTER (OUTPATIENT)
Age: 55
End: 2020-11-16

## 2020-11-17 NOTE — NURSING NOTE
1 year reminder post card has been created to send to pt for follow up.  Chanel Bonilla [facility]

## 2020-12-21 ENCOUNTER — COMMUNICATION - HEALTHEAST (OUTPATIENT)
Dept: FAMILY MEDICINE | Facility: CLINIC | Age: 55
End: 2020-12-21

## 2020-12-21 DIAGNOSIS — E66.01 CLASS 3 SEVERE OBESITY DUE TO EXCESS CALORIES WITH SERIOUS COMORBIDITY AND BODY MASS INDEX (BMI) OF 40.0 TO 44.9 IN ADULT (H): ICD-10-CM

## 2020-12-21 DIAGNOSIS — E66.813 CLASS 3 SEVERE OBESITY DUE TO EXCESS CALORIES WITH SERIOUS COMORBIDITY AND BODY MASS INDEX (BMI) OF 40.0 TO 44.9 IN ADULT (H): ICD-10-CM

## 2020-12-21 DIAGNOSIS — E88.810 METABOLIC SYNDROME X: ICD-10-CM

## 2020-12-21 DIAGNOSIS — R73.03 PREDIABETES: ICD-10-CM

## 2021-01-15 ENCOUNTER — HEALTH MAINTENANCE LETTER (OUTPATIENT)
Age: 56
End: 2021-01-15

## 2021-01-22 ENCOUNTER — OFFICE VISIT (OUTPATIENT)
Dept: FAMILY MEDICINE | Facility: CLINIC | Age: 56
End: 2021-01-22
Payer: COMMERCIAL

## 2021-01-22 VITALS
WEIGHT: 202.2 LBS | SYSTOLIC BLOOD PRESSURE: 138 MMHG | TEMPERATURE: 98.9 F | BODY MASS INDEX: 34.52 KG/M2 | OXYGEN SATURATION: 97 % | HEIGHT: 64 IN | HEART RATE: 72 BPM | RESPIRATION RATE: 14 BRPM | DIASTOLIC BLOOD PRESSURE: 86 MMHG

## 2021-01-22 DIAGNOSIS — F32.1 MODERATE MAJOR DEPRESSION (H): ICD-10-CM

## 2021-01-22 DIAGNOSIS — R09.81 CHRONIC NASAL CONGESTION: Primary | ICD-10-CM

## 2021-01-22 DIAGNOSIS — F20.9 SCHIZOPHRENIA, UNSPECIFIED TYPE (H): ICD-10-CM

## 2021-01-22 PROCEDURE — 99214 OFFICE O/P EST MOD 30 MIN: CPT | Performed by: INTERNAL MEDICINE

## 2021-01-22 RX ORDER — AZELASTINE 1 MG/ML
1 SPRAY, METERED NASAL 2 TIMES DAILY
Qty: 30 ML | Refills: 1 | Status: SHIPPED | OUTPATIENT
Start: 2021-01-22 | End: 2021-04-05

## 2021-01-22 RX ORDER — ALPRAZOLAM 1 MG
1 TABLET ORAL 2 TIMES DAILY PRN
COMMUNITY
End: 2024-10-02

## 2021-01-22 RX ORDER — FLUTICASONE PROPIONATE 50 MCG
1 SPRAY, SUSPENSION (ML) NASAL 2 TIMES DAILY
Qty: 18.2 ML | Refills: 1 | Status: SHIPPED | OUTPATIENT
Start: 2021-01-22 | End: 2021-04-05

## 2021-01-22 ASSESSMENT — MIFFLIN-ST. JEOR: SCORE: 1488.2

## 2021-01-22 ASSESSMENT — ANXIETY QUESTIONNAIRES
3. WORRYING TOO MUCH ABOUT DIFFERENT THINGS: MORE THAN HALF THE DAYS
2. NOT BEING ABLE TO STOP OR CONTROL WORRYING: NOT AT ALL
6. BECOMING EASILY ANNOYED OR IRRITABLE: MORE THAN HALF THE DAYS
1. FEELING NERVOUS, ANXIOUS, OR ON EDGE: MORE THAN HALF THE DAYS
5. BEING SO RESTLESS THAT IT IS HARD TO SIT STILL: NEARLY EVERY DAY
7. FEELING AFRAID AS IF SOMETHING AWFUL MIGHT HAPPEN: NOT AT ALL
GAD7 TOTAL SCORE: 9
IF YOU CHECKED OFF ANY PROBLEMS ON THIS QUESTIONNAIRE, HOW DIFFICULT HAVE THESE PROBLEMS MADE IT FOR YOU TO DO YOUR WORK, TAKE CARE OF THINGS AT HOME, OR GET ALONG WITH OTHER PEOPLE: SOMEWHAT DIFFICULT

## 2021-01-22 ASSESSMENT — PATIENT HEALTH QUESTIONNAIRE - PHQ9
SUM OF ALL RESPONSES TO PHQ QUESTIONS 1-9: 16
5. POOR APPETITE OR OVEREATING: NOT AT ALL

## 2021-01-22 NOTE — PATIENT INSTRUCTIONS
Try taking both the Flonase and Astelin nasal sprays twice per day.   When spraying into the nose, aim towards the ears for the best effect.   Consider using these after doing a sinus rinse to make them more effective.

## 2021-01-22 NOTE — PROGRESS NOTES
Assessment & Plan     Chronic nasal congestion    Has not responded to multiple PO antihistamines, PPI, sinus flushes, saline nasal spray, or Flonase.  We'll try having her continue her continue Xyzal and resume Flonase but increase this to BID and add in Astelin BID as well.  If this still is not helping, see ENT for further eval.     - azelastine (ASTELIN) 0.1 % nasal spray; Spray 1 spray into both nostrils 2 times daily  - fluticasone (FLONASE) 50 MCG/ACT nasal spray; Spray 1 spray into both nostrils 2 times daily  - OTOLARYNGOLOGY REFERRAL    Moderate major depression (H)  and  Schizophrenia, unspecified type (H)     She reports mood is stable.  She is following with Chris.  She has no active SI and feels safe at home.      Nate Cuello MD  [facility] is a 55 year old who presents to clinic today for the following health issues     HPI       Concern - sinus/nasal congestion  Onset: about a year and a half. Longer than a year  Description: drainage. Gagging. No food tastes right  Intensity: severe at night. Wakes up patient.   Progression of Symptoms:  Worsening and constant  Accompanying Signs & Symptoms: very little coughing. No SOB.  No sinus pain, ear pain.  Not too much itching in the eye or throat.   Previous history of similar problem: on going as noted  Precipitating factors:        Worsened by: at night. No elevation     No known environmental allergies, but she does think she may have some seasonal allergies  Alleviating factors:        Improved by: nothing  Therapies tried and outcome: has tried antihistamines (Xyzal.  She has tried Zyrtec and Allegra in the past as well. And Claritin) and Neti pot. Nasal sprays (Flonase and saline).  Has tried a couple of different PPIs.  Nothing has helped      Depression and Anxiety Follow-Up  How are you doing with your depression since your last visit? No change    How are you doing with your anxiety since your  last visit?  No change    Are you having other symptoms that might be associated with depression or anxiety? No    Have you had a significant life event? No     Do you have any concerns with your use of alcohol or other drugs? No     She sees an outside provider for mental health at [facility]- Dr. Mchugh    Doesn't like to take the Xanax often    She reports some passive SI, no active thoughts or plans, feels safe at home      She checks her BPs at home and they are usually 130s/80s.      Social History     Tobacco Use     Smoking status: Never Smoker     Smokeless tobacco: Never Used   Substance Use Topics     Alcohol use: No     Alcohol/week: 0.0 standard drinks     Drug use: Never     PHQ 3/15/2018 11/25/2019 1/22/2021   PHQ-9 Total Score 20 16 16   Q9: Thoughts of better off dead/self-harm past 2 weeks Several days Several days Several days   F/U: Thoughts of suicide or self-harm - No -   F/U: Safety concerns - No -     LULY-7 SCORE 1/16/2018 3/15/2018 1/22/2021   Total Score - - -   Total Score 11 16 9   Total Score BEH Adult - - -     Last PHQ-9 1/22/2021   1.  Little interest or pleasure in doing things 1   2.  Feeling down, depressed, or hopeless 1   3.  Trouble falling or staying asleep, or sleeping too much 3   4.  Feeling tired or having little energy 2   5.  Poor appetite or overeating 3   6.  Feeling bad about yourself 0   7.  Trouble concentrating 2   8.  Moving slowly or restless 3   Q9: Thoughts of better off dead/self-harm past 2 weeks 1   PHQ-9 Total Score 16   Difficulty at work, home, or with people Somewhat difficult   In the past two weeks have you had thoughts of suicide or self harm? -   Do you have concerns about your personal safety or the safety of others? -     LULY-7  1/22/2021   1. Feeling nervous, anxious, or on edge 2   2. Not being able to stop or control worrying 0   3. Worrying too much about different things 2   4. Trouble relaxing 0   5. Being so restless that it is hard to sit still  "3   6. Becoming easily annoyed or irritable 2   7. Feeling afraid, as if something awful might happen 0   LULY-7 Total Score 9   If you checked any problems, how difficult have they made it for you to do your work, take care of things at home, or get along with other people? Somewhat difficult       Suicide Assessment Five-step Evaluation and Treatment (SAFE-T)      How many servings of fruits and vegetables do you eat daily?  2-3    On average, how many sweetened beverages do you drink each day (Examples: soda, juice, sweet tea, etc.  Do NOT count diet or artificially sweetened beverages)?   1    How many days per week do you exercise enough to make your heart beat faster? 3 or less    How many minutes a day do you exercise enough to make your heart beat faster? 30 - 60    How many days per week do you miss taking your medication? Have missed taking adderall 2-3 times in the last month          Review of Systems   Constitutional, HEENT systems are negative, except as otherwise noted.      Objective    /86   Pulse 72   Temp 98.9  F (37.2  C) (Tympanic)   Resp 14   Ht 1.619 m (5' 3.75\")   Wt 91.7 kg (202 lb 3.2 oz)   SpO2 97%   BMI 34.98 kg/m    Body mass index is 34.98 kg/m .  Physical Exam   GENERAL: healthy, alert and no distress  HENT: ear canals and TM's normal, nose and mouth without ulcers or lesions  NECK: no adenopathy, no asymmetry, masses, or scars and thyroid normal to palpation  RESP: lungs clear to auscultation - no rales, rhonchi or wheezes  CV: regular rate and rhythm, normal S1 S2, no S3 or S4, no murmur, click or rub            "

## 2021-01-23 ASSESSMENT — ANXIETY QUESTIONNAIRES: GAD7 TOTAL SCORE: 9

## 2021-02-07 ENCOUNTER — HEALTH MAINTENANCE LETTER (OUTPATIENT)
Age: 56
End: 2021-02-07

## 2021-02-19 DIAGNOSIS — F41.1 GENERALIZED ANXIETY DISORDER: Primary | ICD-10-CM

## 2021-02-19 DIAGNOSIS — Z00.00 ROUTINE HEALTH MAINTENANCE: ICD-10-CM

## 2021-02-19 DIAGNOSIS — F33.3 SEVERE RECURRENT MAJOR DEPRESSIVE DISORDER WITH PSYCHOTIC SYMPTOMS (H): ICD-10-CM

## 2021-02-19 LAB
ANION GAP SERPL CALCULATED.3IONS-SCNC: 7 MMOL/L (ref 3–14)
BUN SERPL-MCNC: 9 MG/DL (ref 7–30)
CALCIUM SERPL-MCNC: 9 MG/DL (ref 8.5–10.1)
CHLORIDE SERPL-SCNC: 106 MMOL/L (ref 94–109)
CHOLEST SERPL-MCNC: 217 MG/DL
CO2 SERPL-SCNC: 26 MMOL/L (ref 20–32)
CREAT SERPL-MCNC: 0.72 MG/DL (ref 0.52–1.04)
DEPRECATED CALCIDIOL+CALCIFEROL SERPL-MC: 26 UG/L (ref 20–75)
FERRITIN SERPL-MCNC: 4 NG/ML (ref 8–252)
FOLATE SERPL-MCNC: 13.6 NG/ML
GFR SERPL CREATININE-BSD FRML MDRD: >90 ML/MIN/{1.73_M2}
GLUCOSE SERPL-MCNC: 98 MG/DL (ref 70–99)
HBA1C MFR BLD: 5.9 % (ref 0–5.6)
HDLC SERPL-MCNC: 69 MG/DL
IRON SERPL-MCNC: 12 UG/DL (ref 35–180)
LDLC SERPL CALC-MCNC: 113 MG/DL
MAGNESIUM SERPL-MCNC: 1.9 MG/DL (ref 1.6–2.3)
NONHDLC SERPL-MCNC: 148 MG/DL
POTASSIUM SERPL-SCNC: 3.4 MMOL/L (ref 3.4–5.3)
SODIUM SERPL-SCNC: 139 MMOL/L (ref 133–144)
T3 SERPL-MCNC: 118 NG/DL (ref 60–181)
T4 FREE SERPL-MCNC: 0.96 NG/DL (ref 0.76–1.46)
TRIGL SERPL-MCNC: 177 MG/DL
TSH SERPL DL<=0.005 MIU/L-ACNC: 3.47 MU/L (ref 0.4–4)
VIT B12 SERPL-MCNC: 429 PG/ML (ref 193–986)

## 2021-02-19 PROCEDURE — 84439 ASSAY OF FREE THYROXINE: CPT | Performed by: PSYCHIATRY & NEUROLOGY

## 2021-02-19 PROCEDURE — 84480 ASSAY TRIIODOTHYRONINE (T3): CPT | Performed by: PSYCHIATRY & NEUROLOGY

## 2021-02-19 PROCEDURE — 83540 ASSAY OF IRON: CPT | Performed by: PSYCHIATRY & NEUROLOGY

## 2021-02-19 PROCEDURE — 82746 ASSAY OF FOLIC ACID SERUM: CPT | Performed by: PSYCHIATRY & NEUROLOGY

## 2021-02-19 PROCEDURE — 80061 LIPID PANEL: CPT | Performed by: PSYCHIATRY & NEUROLOGY

## 2021-02-19 PROCEDURE — 82306 VITAMIN D 25 HYDROXY: CPT | Performed by: PSYCHIATRY & NEUROLOGY

## 2021-02-19 PROCEDURE — 82607 VITAMIN B-12: CPT | Performed by: PSYCHIATRY & NEUROLOGY

## 2021-02-19 PROCEDURE — 99000 SPECIMEN HANDLING OFFICE-LAB: CPT | Performed by: PSYCHIATRY & NEUROLOGY

## 2021-02-19 PROCEDURE — 83735 ASSAY OF MAGNESIUM: CPT | Performed by: PSYCHIATRY & NEUROLOGY

## 2021-02-19 PROCEDURE — 36415 COLL VENOUS BLD VENIPUNCTURE: CPT | Performed by: PSYCHIATRY & NEUROLOGY

## 2021-02-19 PROCEDURE — 84630 ASSAY OF ZINC: CPT | Mod: 90 | Performed by: PSYCHIATRY & NEUROLOGY

## 2021-02-19 PROCEDURE — 83036 HEMOGLOBIN GLYCOSYLATED A1C: CPT | Performed by: PSYCHIATRY & NEUROLOGY

## 2021-02-19 PROCEDURE — 82728 ASSAY OF FERRITIN: CPT | Performed by: PSYCHIATRY & NEUROLOGY

## 2021-02-19 PROCEDURE — 84443 ASSAY THYROID STIM HORMONE: CPT | Performed by: PSYCHIATRY & NEUROLOGY

## 2021-02-19 PROCEDURE — 80048 BASIC METABOLIC PNL TOTAL CA: CPT | Performed by: PSYCHIATRY & NEUROLOGY

## 2021-02-21 LAB — ZINC SERPL-MCNC: 71.7 UG/DL (ref 60–120)

## 2021-02-26 ENCOUNTER — HOSPITAL ENCOUNTER (EMERGENCY)
Facility: CLINIC | Age: 56
Discharge: HOME | End: 2021-02-26
Attending: NURSE PRACTITIONER | Admitting: NURSE PRACTITIONER
Payer: COMMERCIAL

## 2021-02-26 VITALS
WEIGHT: 195 LBS | BODY MASS INDEX: 33.29 KG/M2 | TEMPERATURE: 98.5 F | OXYGEN SATURATION: 99 % | DIASTOLIC BLOOD PRESSURE: 102 MMHG | HEART RATE: 75 BPM | SYSTOLIC BLOOD PRESSURE: 139 MMHG | RESPIRATION RATE: 16 BRPM | HEIGHT: 64 IN

## 2021-02-26 DIAGNOSIS — R11.0 NAUSEA: ICD-10-CM

## 2021-02-26 DIAGNOSIS — K29.00 ACUTE GASTRITIS, PRESENCE OF BLEEDING UNSPECIFIED, UNSPECIFIED GASTRITIS TYPE: ICD-10-CM

## 2021-02-26 DIAGNOSIS — D50.9 IRON DEFICIENCY ANEMIA, UNSPECIFIED IRON DEFICIENCY ANEMIA TYPE: ICD-10-CM

## 2021-02-26 DIAGNOSIS — R10.13 ABDOMINAL PAIN, EPIGASTRIC: ICD-10-CM

## 2021-02-26 DIAGNOSIS — R19.7 DIARRHEA, UNSPECIFIED TYPE: ICD-10-CM

## 2021-02-26 LAB
ALBUMIN SERPL-MCNC: 3.7 G/DL (ref 3.4–5)
ALBUMIN UR-MCNC: 30 MG/DL
ALP SERPL-CCNC: 81 U/L (ref 40–150)
ALT SERPL W P-5'-P-CCNC: 21 U/L (ref 0–50)
ANION GAP SERPL CALCULATED.3IONS-SCNC: 4 MMOL/L (ref 3–14)
APPEARANCE UR: CLEAR
AST SERPL W P-5'-P-CCNC: 11 U/L (ref 0–45)
BACTERIA #/AREA URNS HPF: ABNORMAL /HPF
BASOPHILS # BLD AUTO: 0.1 10E9/L (ref 0–0.2)
BASOPHILS NFR BLD AUTO: 0.7 %
BILIRUB SERPL-MCNC: 0.2 MG/DL (ref 0.2–1.3)
BILIRUB UR QL STRIP: NEGATIVE
BUN SERPL-MCNC: 12 MG/DL (ref 7–30)
CALCIUM SERPL-MCNC: 8.9 MG/DL (ref 8.5–10.1)
CHLORIDE SERPL-SCNC: 109 MMOL/L (ref 94–109)
CO2 SERPL-SCNC: 25 MMOL/L (ref 20–32)
COLOR UR AUTO: YELLOW
CREAT SERPL-MCNC: 0.85 MG/DL (ref 0.52–1.04)
DIFFERENTIAL METHOD BLD: ABNORMAL
EOSINOPHIL # BLD AUTO: 0.1 10E9/L (ref 0–0.7)
EOSINOPHIL NFR BLD AUTO: 1.1 %
ERYTHROCYTE [DISTWIDTH] IN BLOOD BY AUTOMATED COUNT: 20.2 % (ref 10–15)
GFR SERPL CREATININE-BSD FRML MDRD: 77 ML/MIN/{1.73_M2}
GLUCOSE SERPL-MCNC: 97 MG/DL (ref 70–99)
GLUCOSE UR STRIP-MCNC: NEGATIVE MG/DL
HCT VFR BLD AUTO: 30 % (ref 35–47)
HGB BLD-MCNC: 8.8 G/DL (ref 11.7–15.7)
HGB UR QL STRIP: ABNORMAL
IMM GRANULOCYTES # BLD: 0.1 10E9/L (ref 0–0.4)
IMM GRANULOCYTES NFR BLD: 0.7 %
KETONES UR STRIP-MCNC: NEGATIVE MG/DL
LEUKOCYTE ESTERASE UR QL STRIP: NEGATIVE
LYMPHOCYTES # BLD AUTO: 1.6 10E9/L (ref 0.8–5.3)
LYMPHOCYTES NFR BLD AUTO: 14.7 %
MCH RBC QN AUTO: 20.5 PG (ref 26.5–33)
MCHC RBC AUTO-ENTMCNC: 29.3 G/DL (ref 31.5–36.5)
MCV RBC AUTO: 70 FL (ref 78–100)
MONOCYTES # BLD AUTO: 0.9 10E9/L (ref 0–1.3)
MONOCYTES NFR BLD AUTO: 7.9 %
MUCOUS THREADS #/AREA URNS LPF: PRESENT /LPF
NEUTROPHILS # BLD AUTO: 8.3 10E9/L (ref 1.6–8.3)
NEUTROPHILS NFR BLD AUTO: 74.9 %
NITRATE UR QL: NEGATIVE
NRBC # BLD AUTO: 0 10*3/UL
NRBC BLD AUTO-RTO: 0 /100
PH UR STRIP: 6 PH (ref 5–7)
PLATELET # BLD AUTO: 372 10E9/L (ref 150–450)
POTASSIUM SERPL-SCNC: 3.4 MMOL/L (ref 3.4–5.3)
PROT SERPL-MCNC: 7.2 G/DL (ref 6.8–8.8)
RBC # BLD AUTO: 4.3 10E12/L (ref 3.8–5.2)
RBC #/AREA URNS AUTO: >182 /HPF (ref 0–2)
SODIUM SERPL-SCNC: 138 MMOL/L (ref 133–144)
SOURCE: ABNORMAL
SP GR UR STRIP: 1 (ref 1–1.03)
SQUAMOUS #/AREA URNS AUTO: <1 /HPF (ref 0–1)
TSH SERPL DL<=0.005 MIU/L-ACNC: 3.4 MU/L (ref 0.4–4)
UROBILINOGEN UR STRIP-MCNC: 0 MG/DL (ref 0–2)
WBC # BLD AUTO: 11.1 10E9/L (ref 4–11)
WBC #/AREA URNS AUTO: 6 /HPF (ref 0–5)
WBC CLUMPS #/AREA URNS HPF: PRESENT /HPF

## 2021-02-26 PROCEDURE — 258N000003 HC RX IP 258 OP 636: Performed by: NURSE PRACTITIONER

## 2021-02-26 PROCEDURE — 81001 URINALYSIS AUTO W/SCOPE: CPT | Performed by: NURSE PRACTITIONER

## 2021-02-26 PROCEDURE — 85025 COMPLETE CBC W/AUTO DIFF WBC: CPT | Performed by: EMERGENCY MEDICINE

## 2021-02-26 PROCEDURE — 87086 URINE CULTURE/COLONY COUNT: CPT | Performed by: NURSE PRACTITIONER

## 2021-02-26 PROCEDURE — 99284 EMERGENCY DEPT VISIT MOD MDM: CPT | Mod: 25 | Performed by: NURSE PRACTITIONER

## 2021-02-26 PROCEDURE — 250N000011 HC RX IP 250 OP 636: Performed by: NURSE PRACTITIONER

## 2021-02-26 PROCEDURE — 80053 COMPREHEN METABOLIC PANEL: CPT | Performed by: EMERGENCY MEDICINE

## 2021-02-26 PROCEDURE — 96374 THER/PROPH/DIAG INJ IV PUSH: CPT | Performed by: NURSE PRACTITIONER

## 2021-02-26 PROCEDURE — 80175 DRUG SCREEN QUAN LAMOTRIGINE: CPT | Performed by: NURSE PRACTITIONER

## 2021-02-26 PROCEDURE — 87088 URINE BACTERIA CULTURE: CPT | Performed by: NURSE PRACTITIONER

## 2021-02-26 PROCEDURE — 87186 SC STD MICRODIL/AGAR DIL: CPT | Performed by: NURSE PRACTITIONER

## 2021-02-26 PROCEDURE — 84443 ASSAY THYROID STIM HORMONE: CPT | Performed by: NURSE PRACTITIONER

## 2021-02-26 PROCEDURE — 96361 HYDRATE IV INFUSION ADD-ON: CPT | Performed by: NURSE PRACTITIONER

## 2021-02-26 PROCEDURE — 99284 EMERGENCY DEPT VISIT MOD MDM: CPT | Performed by: NURSE PRACTITIONER

## 2021-02-26 RX ORDER — FAMOTIDINE 10 MG
10 TABLET ORAL 2 TIMES DAILY PRN
Qty: 20 TABLET | Refills: 0 | Status: SHIPPED | OUTPATIENT
Start: 2021-02-26 | End: 2023-01-30

## 2021-02-26 RX ORDER — ONDANSETRON 4 MG/1
4 TABLET, ORALLY DISINTEGRATING ORAL ONCE
Status: DISCONTINUED | OUTPATIENT
Start: 2021-02-26 | End: 2021-02-26 | Stop reason: HOSPADM

## 2021-02-26 RX ORDER — ONDANSETRON 2 MG/ML
4 INJECTION INTRAMUSCULAR; INTRAVENOUS ONCE
Status: COMPLETED | OUTPATIENT
Start: 2021-02-26 | End: 2021-02-26

## 2021-02-26 RX ADMIN — SODIUM CHLORIDE 1000 ML: 9 INJECTION, SOLUTION INTRAVENOUS at 15:54

## 2021-02-26 RX ADMIN — ONDANSETRON 4 MG: 2 INJECTION INTRAMUSCULAR; INTRAVENOUS at 15:54

## 2021-02-26 ASSESSMENT — ENCOUNTER SYMPTOMS
FATIGUE: 0
ABDOMINAL PAIN: 1
COUGH: 0
FEVER: 0
VOMITING: 0
FREQUENCY: 0
WEAKNESS: 1
CHILLS: 0
NAUSEA: 1
DIARRHEA: 1
MUSCULOSKELETAL NEGATIVE: 1
HEADACHES: 0
DIZZINESS: 1
SHORTNESS OF BREATH: 0
DYSURIA: 0
BLOOD IN STOOL: 0

## 2021-02-26 ASSESSMENT — MIFFLIN-ST. JEOR: SCORE: 1459.51

## 2021-02-26 NOTE — ED PROVIDER NOTES
"  History     Chief Complaint   Patient presents with     Abdominal Pain     Diarrhea     HPI     History difficulty to obtain because patient twitching her arms and legs, squinting eyelids on/off. Stops for brief moments to talk to me then starts twitching again.    Socorro Oakes is a 55 year old female with history of schizophrenia, ADD, GERD, RLS, Htn, memory loss, hyperlipidemia, and multiple concussions who presents to the emergency department for evaluation of abdominal pain, weakness and dizziness. Patient reports sudden onset of abdominal pain this morning immediately after she ate some food and drank milk. Described as abdominal cramping, epigastric region. She sat on the toilet on and off for 4 hours and then did have a large watery stool. Describes stool as muddy brown. No black or bloody stools. The abdominal pain resolved after the bowel movement. She started to feel weak and dizzy. A friend stopped over to visit that she has not seen in 6 months and suggested she come to the emergency department for evaluation because she was shaking, and states \"I feel like I have restless leg syndrome all over my body\". Complains of ongoing GERD symptoms for the last few months. Has been taking lansoprazole on/off, but did recently started taking it again. Reports recent med changes with her psychiatrist on 2/18 due to anxiety.   Lamictal increased from 200 mg daily to 250 mg daily.   Bupropion increased from 300 mg daily to 450 mg daily. Patient is taking her medications as prescribed. States her psychiatrist is checking her for anemia. (? Related to her lamictal dose increase)   Additionally, she is taking mirapex 0.25 mg at HS, adderrall (did not take yet today) 30 mg twice a day, Doxepin 75 mg at HS, Metformin 500 mg twice a day, and lansoprazxole (not taking).  Has Xanax at home but is not taking because she doesn't like the \"xanax hangover\", she did take it 1 week ago prior to dental " appt.        Allergies:  Allergies   Allergen Reactions     Latex Rash     Linezolid Other (See Comments)     Zyvox, was told after surgery that she was allergic to this     Other [Seasonal Allergies]      Black dye from knee brace caused skin breakdown.     Paraphenylenediamine Itching, Swelling and Rash     Black dye       Problem List:    Patient Active Problem List    Diagnosis Date Noted     Health Care Home 04/20/2011     Priority: High     DX V65.8 REPLACED WITH 03083 HEALTH CARE HOME (04/08/2013)         MRSA      Priority: High     SITE - WOUND 7/13/05; 8/12/2009:  Pt stated that she should be discontinued MRSA status as she had screening done. We could not obtain these records in her Medical chart, so we could not validate this.  Pt still MRSA + STATUS.       Morbid obesity (H) 05/14/2019     Priority: Medium     Class 3 obesity due to excess calories without serious comorbidity with body mass index (BMI) of 40.0 to 44.9 in adult 03/19/2018     Priority: Medium     Essential hypertension, benign 11/14/2016     Priority: Medium     Memory loss 11/27/2012     Priority: Medium     Moderate major depression (H) 03/06/2012     Priority: Medium     Schizophrenia (H) 03/06/2012     Priority: Medium     Follow with psychiatry        ADD (attention deficit disorder with hyperactivity) 03/06/2012     Priority: Medium     Problem list name updated by automated process. Provider to review and confirm       Restless legs syndrome (RLS) 03/06/2012     Priority: Medium     Migraine 01/25/2012     Priority: Medium     Started on verapamil.        Sleep apnea 10/26/2011     Priority: Medium     GERD (gastroesophageal reflux disease) 04/26/2011     Priority: Medium     DJD (degenerative joint disease), lumbar 04/26/2011     Priority: Medium     [facility] PAIN MANAGEMENT   ON VICODIN # 15   FLEXERIL # 60   PAIN CONTRACT SIGNED.        Hyperlipidemia LDL goal <130 12/23/2010     Priority: Medium     h/o multiple concussion   11/05/2008     Priority: Medium        Past Medical History:    Past Medical History:   Diagnosis Date     Arthritis      Cellulitis and abscess of unspecified site      Disturbance of skin sensation      Dizziness and giddiness      Drug-seeking behavior      GERD (gastroesophageal reflux disease)      MEDICAL HISTORY OF - 2006,2007,2008,2009     Moderate major depression (H) 3/6/2012     Other convulsions      Restless leg syndrome      Schizophrenia (H) 3/6/2012     Sleep apnea      Unspecified hereditary and idiopathic peripheral neuropathy      Variants of migraine, not elsewhere classified, without mention of intractable migraine without mention of status migrainosus        Past Surgical History:    Past Surgical History:   Procedure Laterality Date     CHOLECYSTECTOMY, LAPOROSCOPIC  4-30-09     COLONOSCOPY N/A 6/5/2015    Procedure: COLONOSCOPY;  Surgeon: Robe Delgado MD;  Location: [facility]     INJECT EPIDURAL LUMBAR  7/11/2011    Procedure:INJECT EPIDURAL LUMBAR; BRIE with Flouro--; Surgeon:GENERIC ANESTHESIA PROVIDER; Location:[facility]     INJECT EPIDURAL LUMBAR  9/28/2011    Procedure:INJECT EPIDURAL LUMBAR; BRIE with Flouro--; Surgeon:GENERIC ANESTHESIA PROVIDER; Location:[facility]     INJECT EPIDURAL LUMBAR  12/12/2011    Procedure:INJECT EPIDURAL LUMBAR; BRIE with Fluoro - Dr. Landry; Surgeon:GENERIC ANESTHESIA PROVIDER; Location:[facility]     KNEE SURGERY      1- 15 yr ago     LAPAROSCOPIC APPENDECTOMY       MANDIBLE SURGERY      6x surgeries from 1983- 1994     SURGICAL HISTORY OF -   10/17/05    left knee surgery      SURGICAL HISTORY OF -   1982,1983,1984x2,1993,    TMJ Surgery x5     SURGICAL HISTORY OF -       ENT Tubes       SURGICAL HISTORY OF -   1996    toe     TONSILLECTOMY & ADENOIDECTOMY         Family History:    Family History   Problem Relation Age of Onset     Arthritis Mother         rheumatoid/had knee replacement     Bipolar Disorder Mother      Migraines Mother       Depression Father         comitted suicide age 48     Suicide Father      Mental Illness Father      Alcohol/Drug Brother      Anxiety Disorder Brother      Mental Illness Brother      Allergies Sister      Migraines Sister      Anxiety Disorder Maternal Grandfather      Bipolar Disorder Daughter      Migraines Brother      Substance Abuse Brother      Migraines Sister      Migraines Brother      Migraines Sister      Anesthesia Reaction Other      Cerebrovascular Disease No family hx of        Social History:  Marital Status:   [4]  Social History     Tobacco Use     Smoking status: Never Smoker     Smokeless tobacco: Never Used   Substance Use Topics     Alcohol use: No     Alcohol/week: 0.0 standard drinks     Drug use: Never        Medications:    famotidine (PEPCID) 10 MG tablet  ALPRAZolam (XANAX) 1 MG tablet  amphetamine-dextroamphetamine (ADDERALL) 30 MG tablet  azelastine (ASTELIN) 0.1 % nasal spray  buPROPion (WELLBUTRIN XL) 300 MG 24 hr tablet  DoxePIN (SINEQUAN) 75 MG capsule  fluticasone (FLONASE) 50 MCG/ACT nasal spray  Ibuprofen (ADVIL PO)  lamoTRIgine (LAMICTAL) 150 MG tablet  Lansoprazole (PREVACID PO)  levocetirizine (XYZAL) 5 MG tablet  metFORMIN (GLUCOPHAGE) 500 MG tablet  ORDER FOR DME  pramipexole (MIRAPEX) 0.25 MG tablet  TYLENOL EXTRA STRENGTH 500 MG OR TABS  Zolpidem Tartrate (AMBIEN PO)          Review of Systems   Constitutional: Negative for chills, fatigue and fever.   HENT: Negative for congestion.    Respiratory: Negative for cough and shortness of breath.    Cardiovascular: Negative for chest pain.   Gastrointestinal: Positive for abdominal pain, diarrhea and nausea. Negative for blood in stool and vomiting.   Genitourinary: Negative for dysuria, frequency and urgency.   Musculoskeletal: Negative.    Skin: Negative.    Neurological: Positive for dizziness and weakness. Negative for headaches.   All other systems reviewed and are negative.      Physical Exam   BP: (!)  "145/83  Pulse: 71  Temp: 98.5  F (36.9  C)  Resp: 16  Height: 162.6 cm (5' 4\")  Weight: 88.5 kg (195 lb)  SpO2: 100 %      Physical Exam  Constitutional:       General: She is in acute distress.      Appearance: She is not ill-appearing, toxic-appearing or diaphoretic.   HENT:      Head: Normocephalic and atraumatic.      Right Ear: External ear normal.      Left Ear: External ear normal.      Nose: Nose normal.      Mouth/Throat:      Lips: Pink.      Mouth: Mucous membranes are moist.   Eyes:      General: No scleral icterus.     Conjunctiva/sclera: Conjunctivae normal.   Cardiovascular:      Rate and Rhythm: Normal rate and regular rhythm.      Heart sounds: No murmur.   Pulmonary:      Effort: Pulmonary effort is normal. No respiratory distress.      Breath sounds: Normal breath sounds.   Abdominal:      General: Bowel sounds are normal. There is no distension.      Palpations: Abdomen is soft.      Tenderness: There is no abdominal tenderness.   Musculoskeletal: Normal range of motion.   Skin:     General: Skin is warm and dry.   Neurological:      General: No focal deficit present.      Mental Status: She is alert and oriented to person, place, and time.      Comments: Dystonia - widespread, occurring on/off.         ED Course        Procedures               Results for orders placed or performed during the hospital encounter of 02/26/21 (from the past 24 hour(s))   CBC with platelets differential   Result Value Ref Range    WBC 11.1 (H) 4.0 - 11.0 10e9/L    RBC Count 4.30 3.8 - 5.2 10e12/L    Hemoglobin 8.8 (L) 11.7 - 15.7 g/dL    Hematocrit 30.0 (L) 35.0 - 47.0 %    MCV 70 (L) 78 - 100 fl    MCH 20.5 (L) 26.5 - 33.0 pg    MCHC 29.3 (L) 31.5 - 36.5 g/dL    RDW 20.2 (H) 10.0 - 15.0 %    Platelet Count 372 150 - 450 10e9/L    Diff Method Automated Method     % Neutrophils 74.9 %    % Lymphocytes 14.7 %    % Monocytes 7.9 %    % Eosinophils 1.1 %    % Basophils 0.7 %    % Immature Granulocytes 0.7 %    Nucleated " RBCs 0 0 /100    Absolute Neutrophil 8.3 1.6 - 8.3 10e9/L    Absolute Lymphocytes 1.6 0.8 - 5.3 10e9/L    Absolute Monocytes 0.9 0.0 - 1.3 10e9/L    Absolute Eosinophils 0.1 0.0 - 0.7 10e9/L    Absolute Basophils 0.1 0.0 - 0.2 10e9/L    Abs Immature Granulocytes 0.1 0 - 0.4 10e9/L    Absolute Nucleated RBC 0.0    Comprehensive metabolic panel   Result Value Ref Range    Sodium 138 133 - 144 mmol/L    Potassium 3.4 3.4 - 5.3 mmol/L    Chloride 109 94 - 109 mmol/L    Carbon Dioxide 25 20 - 32 mmol/L    Anion Gap 4 3 - 14 mmol/L    Glucose 97 70 - 99 mg/dL    Urea Nitrogen 12 7 - 30 mg/dL    Creatinine 0.85 0.52 - 1.04 mg/dL    GFR Estimate 77 >60 mL/min/[1.73_m2]    GFR Estimate If Black 89 >60 mL/min/[1.73_m2]    Calcium 8.9 8.5 - 10.1 mg/dL    Bilirubin Total 0.2 0.2 - 1.3 mg/dL    Albumin 3.7 3.4 - 5.0 g/dL    Protein Total 7.2 6.8 - 8.8 g/dL    Alkaline Phosphatase 81 40 - 150 U/L    ALT 21 0 - 50 U/L    AST 11 0 - 45 U/L   TSH with free T4 reflex   Result Value Ref Range    TSH 3.40 0.40 - 4.00 mU/L   UA with Microscopic   Result Value Ref Range    Color Urine Yellow     Appearance Urine Clear     Glucose Urine Negative NEG^Negative mg/dL    Bilirubin Urine Negative NEG^Negative    Ketones Urine Negative NEG^Negative mg/dL    Specific Gravity Urine 1.004 1.003 - 1.035    Blood Urine Large (A) NEG^Negative    pH Urine 6.0 5.0 - 7.0 pH    Protein Albumin Urine 30 (A) NEG^Negative mg/dL    Urobilinogen mg/dL 0.0 0.0 - 2.0 mg/dL    Nitrite Urine Negative NEG^Negative    Leukocyte Esterase Urine Negative NEG^Negative    Source Midstream Urine     WBC Urine 6 (H) 0 - 5 /HPF    RBC Urine >182 (H) 0 - 2 /HPF    WBC Clumps Present (A) NEG^Negative /HPF    Bacteria Urine Few (A) NEG^Negative /HPF    Squamous Epithelial /HPF Urine <1 0 - 1 /HPF    Mucous Urine Present (A) NEG^Negative /LPF   Urine Culture    Specimen: Midstream Urine   Result Value Ref Range    Specimen Description Midstream Urine     Special Requests  Specimen received in preservative     Culture Micro PENDING        Medications   0.9% sodium chloride BOLUS (0 mLs Intravenous Stopped 2/26/21 1907)   ondansetron (ZOFRAN) injection 4 mg (4 mg Intravenous Given 2/26/21 1554)       Assessments & Plan (with Medical Decision Making)   55 year old female with history of schizophrenia, ADD, GERD, RLS, Htn, memory loss, hyperlipidemia, and multiple concussions who presents to the emergency department for evaluation of abdominal pain, weakness and dizziness.  Patient had the abdominal pain at home prior to arrival that lasted for 4 hours until she had a watery stool then resolved. Patient arrives with widespread dystonia. No abdominal tenderness. Afebrile. BP mildly elevated. No tachycardia. Anemia with hgb 8.8 (microcytic anemia). Review of her past levels shows recent work-up consistent with RODERICK.  Remainder of her labs are normal. UA with large blood, 30 protein, 6 WBC, >182 RBCs, and WBC clumps. UC pending.after  Unclear cause for her episode of abdominal pain.  I have some concern for gastritis since it occurred sudden onset after eating/drinking. Her urine findings are concerning with RBCs, but she is not clinically acting like a kidney stone. She has not further abdominal pain here. No flank pain. Difficulty to assess with her psychiatric history. She did stop the dystonia after 3 hours here, and is sitting at the edge of the bed talking to her sister. I suspect she was having a conversion reaction when she arrived. I did express concern for further work-up regarding her anemia and she may need an upper GI for further investigation. She denies black or bloody stools. She has not seen her PCP in several months, and tells me she does most of her doctoring with her psychiatrist. I explained that she need to see medical doctor for management of medical conditions and that I am worried she may have some blood loss somewhere that needs to be followed. She agrees an clinic  appointment for recheck of her gastritis. She will consistently take her lansoprazole and I will prescribed her famotidine BID PRN. Patient instructed to return for any fevers, abdominal pain, vomiting, black or bloody stools or worse in any way.    I have reviewed the nursing notes.    I have reviewed the findings, diagnosis, plan and need for follow up with the patient.    Discharge Medication List as of 2/26/2021  7:08 PM      START taking these medications    Details   famotidine (PEPCID) 10 MG tablet Take 1 tablet (10 mg) by mouth 2 times daily as needed (reflux), Disp-20 tablet, R-0, E-Prescribe             Final diagnoses:   Abdominal pain, epigastric   Nausea   Diarrhea, unspecified type   Acute gastritis, presence of bleeding unspecified, unspecified gastritis type   Iron deficiency anemia, unspecified iron deficiency anemia type       2/26/2021   [facility] EMERGENCY DEPT     Archana, RAJ Ann CNP  02/26/21 [id number]

## 2021-02-26 NOTE — ED TRIAGE NOTES
Pt presents to ED with complaints of abd pain PTA.  Pt states that she had abd pain all day, then had 1 episode of diarrhea and now feels relief but feels dizzy. Pt states pain is gone, pt will not talk with eyes open.  Pt son is at bedside. A&Ox4.  VSS.

## 2021-02-27 NOTE — DISCHARGE INSTRUCTIONS
Continue Lansoprazole daily.  Add Famotidine (Pepcid) 10 mg twice a day as needed for reflux.  Follow-up on Tuesday with Dr. Mendoza, at 9:40am here in clinic.  ---Discuss further work-up for anemia, may need an upper GI (endoscopy) to evaluate for ulcer disease if you are still having symptoms.  Return to the emergency department for fevers, chest pain, abdominal pain, vomiting or worse in any way.

## 2021-02-28 ENCOUNTER — TELEPHONE (OUTPATIENT)
Dept: EMERGENCY MEDICINE | Facility: CLINIC | Age: 56
End: 2021-02-28

## 2021-02-28 DIAGNOSIS — N39.0 URINARY TRACT INFECTION: ICD-10-CM

## 2021-02-28 NOTE — TELEPHONE ENCOUNTER
[facility] Emergency Department/Urgent Care Lab result notification     Patient/parent Name  Socorro BREWER Assessment (Patient s current Symptoms), include time called.  [Insert Left message here if message left]  2:20   Patient returned call to ED lab results line. She denies any symptoms of urinary infection, no burning, no frequency, no urgency. Patient did say she feels that she has urgency but this is a long pattern with her. Nothing new.  Patient states she is comfortable waiting final urine culture. Did relay to patient she will get a call when final is ready.  Patient stated understanding.    RN Recommendations/Instructions per [place] ED lab result protocol  Patient notified of lab result and treatment recommendations.  Patient stated she will call lab line back if she would like treatment initiated prior to final. Did discuss once final is in the medication may need to be changed.  [RN Name]  Joceline Hernandez  [facility]  Emergency Dept Lab Result RN  Ph# [phone]

## 2021-02-28 NOTE — TELEPHONE ENCOUNTER
"[facility] Department Lab result notification [Adult-Female]    [facility] ED lab result protocol used  Urine Culture    Reason for call  Notify of lab results, assess symptoms,  review ED providers recommendations/discharge instructions (if necessary) and advise per ED lab result f/u protocol    Lab Result (including Rx patient on, if applicable)  Preliminary urine culture report on 2/28/21 shows the presence of bacteria(s):  10,000 to 50,000 colonies/mL   Streptococcus agalactiae sero group B and 10,000 to 50,000 colonies/mL Staphylococcus epidermidis  Emergency Dept/Urgent Care discharge antibiotic: None  Recommendations per [facility] ED Lab result protocol - Urine culture protocol.  Information table from ED Provider visit on 2/26/21  Symptoms reported at ED visit (Chief complaint, HPI) Abdominal Pain      Diarrhea      HPI      History difficulty to obtain because patient twitching her arms and legs, squinting eyelids on/off. Stops for brief moments to talk to me then starts twitching again.     Socorro Oakes is a 55 year old female with history of schizophrenia, ADD, GERD, RLS, Htn, memory loss, hyperlipidemia, and multiple concussions who presents to the emergency department for evaluation of abdominal pain, weakness and dizziness. Patient reports sudden onset of abdominal pain this morning immediately after she ate some food and drank milk. Described as abdominal cramping, epigastric region. She sat on the toilet on and off for 4 hours and then did have a large watery stool. Describes stool as muddy brown. No black or bloody stools. The abdominal pain resolved after the bowel movement. She started to feel weak and dizzy. A friend stopped over to visit that she has not seen in 6 months and suggested she come to the emergency department for evaluation because she was shaking, and states \"I feel like I have restless leg syndrome all over my body\". Complains of ongoing GERD symptoms for the " "last few months. Has been taking lansoprazole on/off, but did recently started taking it again. Reports recent med changes with her psychiatrist on 2/18 due to anxiety.              Lamictal increased from 200 mg daily to 250 mg daily.   Bupropion increased from 300 mg daily to 450 mg daily. Patient is taking her medications as prescribed. States her psychiatrist is checking her for anemia. (? Related to her lamictal dose increase)              Additionally, she is taking mirapex 0.25 mg at HS, adderrall (did not take yet today) 30 mg twice a day, Doxepin 75 mg at HS, Metformin 500 mg twice a day, and lansoprazxole (not taking).  Has Xanax at home but is not taking because she doesn't like the \"xanax hangover\", she did take it 1 week ago prior to dental appt.     Significant Medical hx, if applicable (i.e. CKD, diabetes) Reviewed   Allergies Allergies   Allergen Reactions     Latex Rash     Linezolid Other (See Comments)     Zyvox, was told after surgery that she was allergic to this     Other [Seasonal Allergies]      Black dye from knee brace caused skin breakdown.     Paraphenylenediamine Itching, Swelling and Rash     Black dye      Weight, if applicable Wt Readings from Last 2 Encounters:   02/26/21 88.5 kg (195 lb)   01/22/21 91.7 kg (202 lb 3.2 oz)      Coumadin/Warfarin [Yes /No] No   Creatinine Level (mg/dl) Creatinine   Date Value Ref Range Status   02/26/2021 0.85 0.52 - 1.04 mg/dL Final      Creatinine clearance (ml/min), if applicable Serum creatinine: 0.85 mg/dL 02/26/21 1551  Estimated creatinine clearance: 79.6 mL/min   Pregnant (Yes/No/NA) NA   Breastfeeding (Yes/No/NA) NA   ED providers Impression and Plan (applicable information) 55 year old female with history of schizophrenia, ADD, GERD, RLS, Htn, memory loss, hyperlipidemia, and multiple concussions who presents to the emergency department for evaluation of abdominal pain, weakness and dizziness.  Patient had the abdominal pain at home prior to " arrival that lasted for 4 hours until she had a watery stool then resolved. Patient arrives with widespread dystonia. No abdominal tenderness. Afebrile. BP mildly elevated. No tachycardia. Anemia with hgb 8.8 (microcytic anemia). Review of her past levels shows recent work-up consistent with RODERICK.  Remainder of her labs are normal. UA with large blood, 30 protein, 6 WBC, >182 RBCs, and WBC clumps. UC pending.after  Unclear cause for her episode of abdominal pain.  I have some concern for gastritis since it occurred sudden onset after eating/drinking. Her urine findings are concerning with RBCs, but she is not clinically acting like a kidney stone. She has not further abdominal pain here. No flank pain. Difficulty to assess with her psychiatric history. She did stop the dystonia after 3 hours here, and is sitting at the edge of the bed talking to her sister. I suspect she was having a conversion reaction when she arrived. I did express concern for further work-up regarding her anemia and she may need an upper GI for further investigation. She denies black or bloody stools. She has not seen her PCP in several months, and tells me she does most of her doctoring with her psychiatrist. I explained that she need to see medical doctor for management of medical conditions and that I am worried she may have some blood loss somewhere that needs to be followed. She agrees an clinic appointment for recheck of her gastritis. She will consistently take her lansoprazole and I will prescribed her famotidine BID PRN. Patient instructed to return for any fevers, abdominal pain, vomiting, black or bloody stools or worse in any way.   ED diagnosis Abdominal pain, epigastric   Nausea   Diarrhea, unspecified type   Acute gastritis, presence of bleeding unspecified, unspecified gastritis type   Iron deficiency anemia, unspecified iron deficiency anemia type            ED provider Sangeetha Magaña APRN CNP      RN Assessment  (Patient s current Symptoms), include time called.  [Insert Left message here if message left]  12:13  Left voicemail message requesting a call back to [facility] ED Lab Result RN at [phone].  RN is available every day between 10 a.m. and 6:30 p.m..        [RN Name]  Joceline Hernandez  [facility]  Emergency Dept Lab Result RN  Ph# [phone]    Copy of Lab result   Contains abnormal data Urine Culture  Order: [id number]  Status:  Preliminary result   Visible to patient:  No (not released) Dx:  Abdominal pain, epigastric  Specimen Information: Midstream Urine        Component 2d ago   Specimen Description Midstream Urine    Special Requests Specimen received in preservative P    Culture Micro Abnormal   10,000 to 50,000 colonies/mL   Streptococcus agalactiae sero group B   This organism is susceptible to ampicillin, penicillin, vancomycin and the cephalosporins.    If treatment is required AND your patient is allergic to penicillin, contact the   Microbiology Lab within 5 days to request susceptibility testing.   P      Culture Micro Abnormal   10,000 to 50,000 colonies/mL   Staphylococcus epidermidis   Susceptibility testing in progress   P      Resulting Agency [id number]         Specimen Collected: 02/26/21  5:30 PM Last Resulted: 02/28/21  9:49 AM

## 2021-03-01 LAB
BACTERIA SPEC CULT: ABNORMAL
BACTERIA SPEC CULT: ABNORMAL
LAMOTRIGINE SERPL-MCNC: 7.2 UG/ML (ref 2.5–15)
Lab: ABNORMAL
SPECIMEN SOURCE: ABNORMAL

## 2021-03-01 NOTE — TELEPHONE ENCOUNTER
"[facility] Emergency Department Lab result notification [Adult-Female]     [place] ED lab result protocol used  Urine Culture     Reason for call  Notify of lab results, assess symptoms,  review ED providers recommendations/discharge instructions (if necessary) and advise per ED lab result f/u protocol     Lab Result (including Rx patient on, if applicable)  Preliminary urine culture report on 2/28/21 shows the presence of bacteria(s):  10,000 to 50,000 colonies/mL   Streptococcus agalactiae sero group B and 10,000 to 50,000 colonies/mL Staphylococcus epidermidis  Emergency Dept/Urgent Care discharge antibiotic: None  Recommendations per [facility] ED Lab result protocol - Urine culture protocol.    RN Assessment (Patient s current Symptoms), include time called.  [Insert Left message here if message left]  10:44AM: Spoke with patient. She states she is ok. Has some \"slight\" abdominal pain. No back pain. No fever, no nausea or vomiting. Doesn't know if she has any blood in her urine, \"I haven't drank enough yet to know.\"     RN Recommendations/Instructions per [facility] ED lab result protocol  Patient notified of lab result and treatment recommendations.  Rx for Amoxicillin-Clavulanate (Augmentin) 875-125 mg PO tablet, 1 tablet by mouth 2 times daily for 3 days sent to [Pharmacy - [facility] in [facility] in [place]].  RN reviewed information about UTI's.   Advised to keep her follow up appointment with her clinic provider tomorrow.  The patient is comfortable with the information given and has no further questions.      Please Contact your PCP clinic or return to the Emergency department if your:    Symptoms worsen or other concerning symptom's.    PCP follow-up Questions asked: YES       [RN Name]  Jane Tavera RN  [facility] RN  Lung Nodule and ED Lab Result RN  Epic pool (ED late result f/u RN): [id number]  [facility] INCIDENTAL RADIOLOGY F/U NURSES: P [id number]  # [phone]    Copy of Lab result "   Contains abnormal data Urine Culture  Order: [id number]  Status:  Edited Result - FINAL   Visible to patient:  Yes (MyChart) Dx:  Abdominal pain, epigastric  Specimen Information: Midstream Urine        Component 3d ago   Specimen Description Midstream Urine    Special Requests Specimen received in preservative    Culture Micro Abnormal   10,000 to 50,000 colonies/mL   Streptococcus agalactiae sero group B   This organism is susceptible to ampicillin, penicillin, vancomycin and the cephalosporins.    If treatment is required AND your patient is allergic to penicillin, contact the   Microbiology Lab within 5 days to request susceptibility testing.     Culture Micro Abnormal   10,000 to 50,000 colonies/mL   Staphylococcus epidermidis     [facility]   Susceptibility     Staphylococcus epidermidis     INOCENCIO     CIPROFLOXACIN <=0.5 ug/mL Sensitive     GENTAMICIN <=0.5 ug/mL Sensitive     LEVOFLOXACIN <=0.12 ug/mL Sensitive     NITROFURANTOIN <=16 ug/mL Sensitive     OXACILLIN <=0.25 ug/mL Sensitive     TETRACYCLINE 2 ug/mL Sensitive     VANCOMYCIN 2 ug/mL Sensitive                 Specimen Collected: 02/26/21  5:30 PM Last Resulted: 03/01/21  7:52 AM

## 2021-03-02 ENCOUNTER — OFFICE VISIT (OUTPATIENT)
Dept: FAMILY MEDICINE | Facility: CLINIC | Age: 56
End: 2021-03-02
Payer: COMMERCIAL

## 2021-03-02 VITALS
HEIGHT: 64 IN | OXYGEN SATURATION: 99 % | RESPIRATION RATE: 14 BRPM | SYSTOLIC BLOOD PRESSURE: 138 MMHG | TEMPERATURE: 97.9 F | WEIGHT: 200.2 LBS | BODY MASS INDEX: 34.18 KG/M2 | DIASTOLIC BLOOD PRESSURE: 86 MMHG | HEART RATE: 82 BPM

## 2021-03-02 DIAGNOSIS — D50.9 IRON DEFICIENCY ANEMIA, UNSPECIFIED IRON DEFICIENCY ANEMIA TYPE: Primary | ICD-10-CM

## 2021-03-02 DIAGNOSIS — J31.0 CHRONIC RHINITIS: ICD-10-CM

## 2021-03-02 DIAGNOSIS — R19.8 GASTROINTESTINAL SYMPTOMS: ICD-10-CM

## 2021-03-02 PROCEDURE — 99214 OFFICE O/P EST MOD 30 MIN: CPT | Performed by: FAMILY MEDICINE

## 2021-03-02 RX ORDER — FERROUS SULFATE 325(65) MG
325 TABLET, DELAYED RELEASE (ENTERIC COATED) ORAL 3 TIMES DAILY
Qty: 180 TABLET | Refills: 0 | Status: SHIPPED | OUTPATIENT
Start: 2021-03-02 | End: 2021-04-21

## 2021-03-02 ASSESSMENT — MIFFLIN-ST. JEOR: SCORE: 1475.16

## 2021-03-02 NOTE — PROGRESS NOTES
Assessment & Plan     Iron deficiency anemia, unspecified iron deficiency anemia type  Asymptomatic moderate to severe anemia, new since 2018.  Unclear etiology.  Discussed work up for sources of bleeding. Patient concurred to start with colonsocopy.  Discussed treatment with ferrous sulfate, then recheck blood tests in 2 months.  Consider referral to hematology.  - ferrous sulfate (FE TABS) 325 (65 Fe) MG EC tablet  Dispense: 180 tablet; Refill: 0  - **Ferritin FUTURE 2mo  - CBC with platelets  - GASTROENTEROLOGY ADULT REF PROCEDURE ONLY    Chronic rhinitis  Patient asked for recommendation for persistent nasal allergies at end of visit.  Noted already on nasal corticosteroids.  Consult allergy clinic for futher management.  - ALLERGY/ASTHMA ADULT REFERRAL    Gastrointestinal symptoms  Resolved per patient.  Patient states she is taking all medications ginve at the ER - see ER notes below.    Patient Instructions   Start ferrous sulfate 1 tablet 3 x a day - take with a small glass of orange juice.  Schedule repeat blood test in May 2021.    Schedule colonoscopy to look for sources of blood loss.    Schedule allergy clinic consult.  Patient Education     Iron-Deficiency Anemia (Adult)  Red blood cells carry oxygen to the tissues of your body. Anemia is a condition in which you have too few red blood cells. You need iron to make red cells. Anemia makes you feel tired and run down. When anemia becomes severe, your skin becomes pale. You may feel short of breath after physical activity. Other symptoms include:    Headaches    Dizziness    Leg cramps with physical activity    Drowsiness    Restless legs  Your anemia is caused by not having enough iron in your body. This may be because of:    Loss of blood. This can be caused by heavy menstrual periods. It can also be caused by bleeding from the stomach or intestines.    Poor diet. You may not be eating enough foods that contain iron.    Inability to absorb iron from  "the foods you eat    Pregnancy  If your blood count is low enough, your healthcare provider may prescribe an iron supplement. It usually takes about 2 to 3 months of treatment with iron supplements to correct anemia. Severe cases of anemia need a blood transfusion to quickly ease symptoms and deliver more oxygen to the cells.  Home care  Follow these guidelines when caring for yourself at home:    Eat foods high in iron. This will boost the amount of iron stored in your body. It is a natural way to build up the number of blood cells. Good sources of iron include beef, liver, spinach and other dark green leafy vegetables, whole grains, beans, and nuts.    Don't overexert yourself.    Talk with your healthcare provider before traveling by air or traveling to high altitudes.  Follow-up care  Follow up with your healthcare provider in 2 months, or as advised. This is to have another red blood cell count to be sure your anemia has been fixed.  Call 911  Call 911 or seek immediate medical care if any of these occur:    Shortness of breath or chest pain    Dizziness or fainting    Vomiting blood or passing red or black-colored stool   [employer] last reviewed this educational content on 3/1/2018    2548-1370 [employer]. [address] [place] [address]. All rights reserved. This information is not intended as a substitute for professional medical care. Always follow your healthcare professional's instructions.               Return in about 2 months (around 5/2/2021) for Lab Test.    Billy Mendoza MD  [facility] VRENA Quintanilla is a 55 year old who presents for the following health issues.  ,rfv    HPI       ED/UC Followup:    Facility:  [facility]  Date of visit: 2/26/21  Reason for visit: abdominal pain  Current Status: Pt is doing fine, no concerns.     Per ER A/P:  \"  Assessments & Plan (with Medical Decision Making)   55 year old female with history of schizophrenia, " ADD, GERD, RLS, Htn, memory loss, hyperlipidemia, and multiple concussions who presents to the emergency department for evaluation of abdominal pain, weakness and dizziness.  Patient had the abdominal pain at home prior to arrival that lasted for 4 hours until she had a watery stool then resolved. Patient arrives with widespread dystonia. No abdominal tenderness. Afebrile. BP mildly elevated. No tachycardia. Anemia with hgb 8.8 (microcytic anemia). Review of her past levels shows recent work-up consistent with RODERICK.  Remainder of her labs are normal. UA with large blood, 30 protein, 6 WBC, >182 RBCs, and WBC clumps. UC pending.after  Unclear cause for her episode of abdominal pain.  I have some concern for gastritis since it occurred sudden onset after eating/drinking. Her urine findings are concerning with RBCs, but she is not clinically acting like a kidney stone. She has not further abdominal pain here. No flank pain. Difficulty to assess with her psychiatric history. She did stop the dystonia after 3 hours here, and is sitting at the edge of the bed talking to her sister. I suspect she was having a conversion reaction when she arrived. I did express concern for further work-up regarding her anemia and she may need an upper GI for further investigation. She denies black or bloody stools. She has not seen her PCP in several months, and tells me she does most of her doctoring with her psychiatrist. I explained that she need to see medical doctor for management of medical conditions and that I am worried she may have some blood loss somewhere that needs to be followed. She agrees an clinic appointment for recheck of her gastritis. She will consistently take her lansoprazole and I will prescribed her famotidine BID PRN. Patient instructed to return for any fevers, abdominal pain, vomiting, black or bloody stools or worse in any way.     I have reviewed the nursing notes.     I have reviewed the findings, diagnosis,  "plan and need for follow up with the patient.\"    Patient was not very informative of her history of symptoms except for her saying that her abd pain, nausea and diarrhea have resolved since ER visit.    Reviewed with her the ER progress notes, and l her labs. She was unaware of her anemia of 8.8 hgb.  She denies GI bleeding, other bleeding, fatigue, lightheadednes, dyspnea or poor nutrition.    Patient Active Problem List   Diagnosis     MRSA     h/o multiple concussion      Hyperlipidemia LDL goal <130     Health Care Home     GERD (gastroesophageal reflux disease)     DJD (degenerative joint disease), lumbar     Sleep apnea     Migraine     Moderate major depression (H)     Schizophrenia (H)     ADD (attention deficit disorder with hyperactivity)     Restless legs syndrome (RLS)     Memory loss     Essential hypertension, benign     Class 3 obesity due to excess calories without serious comorbidity with body mass index (BMI) of 40.0 to 44.9 in adult     Morbid obesity (H)     Past Surgical History:   Procedure Laterality Date     CHOLECYSTECTOMY, LAPOROSCOPIC  4-30-09     COLONOSCOPY N/A 6/5/2015    Procedure: COLONOSCOPY;  Surgeon: Robe Delgado MD;  Location: [facility]     INJECT EPIDURAL LUMBAR  7/11/2011    Procedure:INJECT EPIDURAL LUMBAR; BRIE with Flouro--; Surgeon:GENERIC ANESTHESIA PROVIDER; Location:[facility]     INJECT EPIDURAL LUMBAR  9/28/2011    Procedure:INJECT EPIDURAL LUMBAR; BRIE with Flouro--; Surgeon:GENERIC ANESTHESIA PROVIDER; Location:[facility]     INJECT EPIDURAL LUMBAR  12/12/2011    Procedure:INJECT EPIDURAL LUMBAR; BRIE with Fluoro - Dr. Landry; Surgeon:GENERIC ANESTHESIA PROVIDER; Location:[facility]     KNEE SURGERY      1- 15 yr ago     LAPAROSCOPIC APPENDECTOMY       MANDIBLE SURGERY      6x surgeries from 1983- 1994     ORTHOPEDIC SURGERY  01/01/1982    tmj x6 & knee & toe     SURGICAL HISTORY OF -   10/17/05    left knee surgery      SURGICAL HISTORY OF -   " 1982,1983,1984x2,1993,    TMJ Surgery x5     SURGICAL HISTORY OF -       ENT Tubes       SURGICAL HISTORY OF -   1996    toe     TONSILLECTOMY & ADENOIDECTOMY         Social History     Tobacco Use     Smoking status: Never Smoker     Smokeless tobacco: Never Used   Substance Use Topics     Alcohol use: No     Alcohol/week: 0.0 standard drinks     Family History   Problem Relation Age of Onset     Arthritis Mother         rheumatoid/had knee replacement     Bipolar Disorder Mother      Migraines Mother      Other Cancer Mother         lymphoma     Depression Father         comitted suicide age 48     Suicide Father      Mental Illness Father      Alcohol/Drug Brother      Anxiety Disorder Brother      Mental Illness Brother      Depression Brother      Allergies Sister      Migraines Sister      Anxiety Disorder Maternal Grandfather      Bipolar Disorder Daughter      Migraines Brother      Substance Abuse Brother      Migraines Sister      Migraines Brother      Migraines Sister      Anesthesia Reaction Other      Cerebrovascular Disease No family hx of          Current Outpatient Medications   Medication Sig Dispense Refill     ALPRAZolam (XANAX) 1 MG tablet Take 1 mg by mouth 2 times daily as needed for anxiety       amoxicillin-clavulanate (AUGMENTIN) 875-125 MG tablet Take 1 tablet by mouth 2 times daily for 3 days 6 tablet 0     amphetamine-dextroamphetamine (ADDERALL) 30 MG tablet Take 30 mg by mouth 2 times daily       azelastine (ASTELIN) 0.1 % nasal spray Spray 1 spray into both nostrils 2 times daily 30 mL 1     buPROPion (WELLBUTRIN XL) 300 MG 24 hr tablet Take 300 mg by mouth every morning       DoxePIN (SINEQUAN) 75 MG capsule Take 75 mg by mouth At Bedtime       famotidine (PEPCID) 10 MG tablet Take 1 tablet (10 mg) by mouth 2 times daily as needed (reflux) 20 tablet 0     ferrous sulfate (FE TABS) 325 (65 Fe) MG EC tablet Take 1 tablet (325 mg) by mouth 3 times daily 180 tablet 0     fluticasone  "(FLONASE) 50 MCG/ACT nasal spray Spray 1 spray into both nostrils 2 times daily 18.2 mL 1     lamoTRIgine (LAMICTAL) 150 MG tablet Take 200 mg by mouth every evening        Lansoprazole (PREVACID PO)        levocetirizine (XYZAL) 5 MG tablet Take 1 tablet (5 mg) by mouth every evening 30 tablet 3     metFORMIN (GLUCOPHAGE) 500 MG tablet Take 500 mg by mouth 2 times daily (with meals)       pramipexole (MIRAPEX) 0.25 MG tablet Take 1 tablet (0.25 mg) by mouth At Bedtime 90 tablet 1     Zolpidem Tartrate (AMBIEN PO) Take 12.5 mg by mouth At Bedtime        ORDER FOR DME [place] was set up on replacement machine: [email or web address]; Type  [employer] 10; Serial Number: [id number]; Modem Number: 023; Pressure: SET PRESSURE 9 CM H20;   Mask of choice: none given; per patient she just opened a new one at home; Heated tubing       Allergies   Allergen Reactions     Latex Rash     Linezolid Other (See Comments)     Zyvox, was told after surgery that she was allergic to this     Other [Seasonal Allergies]      Black dye from knee brace caused skin breakdown.     Paraphenylenediamine Itching, Swelling and Rash     Black dye       Review of Systems   Constitutional, HEENT, cardiovascular, pulmonary, GI, , musculoskeletal, neuro, skin, endocrine and psych systems are negative, except as otherwise noted.      Objective    /86   Pulse 82   Temp 97.9  F (36.6  C) (Tympanic)   Resp 14   Ht 1.613 m (5' 3.5\")   Wt 90.8 kg (200 lb 3.2 oz)   SpO2 99%   BMI 34.91 kg/m    Body mass index is 34.91 kg/m .  Physical Exam   GENERAL: obese, alert and no distress, ambulatory w/o assist  EYES: slightly pale palpebral conjunctivae, NECK: no tenderness, no adenopathy,  Thyroid not enlarged  RESP: lungs clear to auscultation - no rales, no rhonchi, no wheezes  CV: regular rates and rhythm, no murmur  MS: no edema  SKIN: no suspicious lesions, no rashes  ABD: protuberant, nontender, no HSM    Admission on 02/26/2021, Discharged on 02/26/2021 "   Component Date Value Ref Range Status     WBC 02/26/2021 11.1* 4.0 - 11.0 10e9/L Final     RBC Count 02/26/2021 4.30  3.8 - 5.2 10e12/L Final     Hemoglobin 02/26/2021 8.8* 11.7 - 15.7 g/dL Final     Hematocrit 02/26/2021 30.0* 35.0 - 47.0 % Final     MCV 02/26/2021 70* 78 - 100 fl Final     MCH 02/26/2021 20.5* 26.5 - 33.0 pg Final     MCHC 02/26/2021 29.3* 31.5 - 36.5 g/dL Final     RDW 02/26/2021 20.2* 10.0 - 15.0 % Final     Platelet Count 02/26/2021 372  150 - 450 10e9/L Final     Diff Method 02/26/2021 Automated Method   Final     % Neutrophils 02/26/2021 74.9  % Final     % Lymphocytes 02/26/2021 14.7  % Final     % Monocytes 02/26/2021 7.9  % Final     % Eosinophils 02/26/2021 1.1  % Final     % Basophils 02/26/2021 0.7  % Final     % Immature Granulocytes 02/26/2021 0.7  % Final     Nucleated RBCs 02/26/2021 0  0 /100 Final     Absolute Neutrophil 02/26/2021 8.3  1.6 - 8.3 10e9/L Final     Absolute Lymphocytes 02/26/2021 1.6  0.8 - 5.3 10e9/L Final     Absolute Monocytes 02/26/2021 0.9  0.0 - 1.3 10e9/L Final     Absolute Eosinophils 02/26/2021 0.1  0.0 - 0.7 10e9/L Final     Absolute Basophils 02/26/2021 0.1  0.0 - 0.2 10e9/L Final     Abs Immature Granulocytes 02/26/2021 0.1  0 - 0.4 10e9/L Final     Absolute Nucleated RBC 02/26/2021 0.0   Final     Sodium 02/26/2021 138  133 - 144 mmol/L Final     Potassium 02/26/2021 3.4  3.4 - 5.3 mmol/L Final     Chloride 02/26/2021 109  94 - 109 mmol/L Final     Carbon Dioxide 02/26/2021 25  20 - 32 mmol/L Final     Anion Gap 02/26/2021 4  3 - 14 mmol/L Final     Glucose 02/26/2021 97  70 - 99 mg/dL Final     Urea Nitrogen 02/26/2021 12  7 - 30 mg/dL Final     Creatinine 02/26/2021 0.85  0.52 - 1.04 mg/dL Final     GFR Estimate 02/26/2021 77  >60 mL/min/[1.73_m2] Final    Comment: Non  GFR Calc  Starting 12/18/2018, serum creatinine based estimated GFR (eGFR) will be   calculated using the Chronic Kidney Disease Epidemiology Collaboration    (CKD-EPI) equation.       GFR Estimate If Black 02/26/2021 89  >60 mL/min/[1.73_m2] Final    Comment:  GFR Calc  Starting 12/18/2018, serum creatinine based estimated GFR (eGFR) will be   calculated using the Chronic Kidney Disease Epidemiology Collaboration   (CKD-EPI) equation.       Calcium 02/26/2021 8.9  8.5 - 10.1 mg/dL Final     Bilirubin Total 02/26/2021 0.2  0.2 - 1.3 mg/dL Final     Albumin 02/26/2021 3.7  3.4 - 5.0 g/dL Final     Protein Total 02/26/2021 7.2  6.8 - 8.8 g/dL Final     Alkaline Phosphatase 02/26/2021 81  40 - 150 U/L Final     ALT 02/26/2021 21  0 - 50 U/L Final     AST 02/26/2021 11  0 - 45 U/L Final     TSH 02/26/2021 3.40  0.40 - 4.00 mU/L Final     Lamotrigine Level 02/26/2021 7.2  2.5 - 15.0 ug/mL Final    Comment: (Note)  INTERPRETIVE INFORMATION:  Lamotrigine  Therapeutic Range:  2.5-15.0 ug/mL             Toxic:  Not well established  Pharmacokinetics varies widely, particularly with   co-medications and/or compromised renal function.  Adverse   effects may include dizziness, somnolence, nausea and   vomiting.  Performed By: [email or web address]  [address] [place] [id number]  : Brit Jessica MD       Color Urine 02/26/2021 Yellow   Final     Appearance Urine 02/26/2021 Clear   Final     Glucose Urine 02/26/2021 Negative  NEG^Negative mg/dL Final     Bilirubin Urine 02/26/2021 Negative  NEG^Negative Final     Ketones Urine 02/26/2021 Negative  NEG^Negative mg/dL Final     Specific Gravity Urine 02/26/2021 1.004  1.003 - 1.035 Final     Blood Urine 02/26/2021 Large* NEG^Negative Final     pH Urine 02/26/2021 6.0  5.0 - 7.0 pH Final     Protein Albumin Urine 02/26/2021 30* NEG^Negative mg/dL Final     Urobilinogen mg/dL 02/26/2021 0.0  0.0 - 2.0 mg/dL Final     Nitrite Urine 02/26/2021 Negative  NEG^Negative Final     Leukocyte Esterase Urine 02/26/2021 Negative  NEG^Negative Final     Source 02/26/2021 Midstream Urine   Corrected     CORRECTED ON 02/26 AT 1759: PREVIOUSLY REPORTED AS Nasopharyngeal     WBC Urine 02/26/2021 6* 0 - 5 /HPF Final     RBC Urine 02/26/2021 >182* 0 - 2 /HPF Final     WBC Clumps 02/26/2021 Present* NEG^Negative /HPF Final     Bacteria Urine 02/26/2021 Few* NEG^Negative /HPF Final     Squamous Epithelial /HPF Urine 02/26/2021 <1  0 - 1 /HPF Final     Mucous Urine 02/26/2021 Present* NEG^Negative /LPF Final     Specimen Description 02/26/2021 Midstream Urine   Final     Special Requests 02/26/2021 Specimen received in preservative   Final     Culture Micro 02/26/2021 *  Final                    Value:10,000 to 50,000 colonies/mL  Streptococcus agalactiae sero group B  This organism is susceptible to ampicillin, penicillin, vancomycin and the cephalosporins.   If treatment is required AND your patient is allergic to penicillin, contact the   Microbiology Lab within 5 days to request susceptibility testing.       Culture Micro 02/26/2021 *  Final                    Value:10,000 to 50,000 colonies/mL  Staphylococcus epidermidis

## 2021-03-02 NOTE — PATIENT INSTRUCTIONS
Start ferrous sulfate 1 tablet 3 x a day - take with a small glass of orange juice.  Schedule repeat blood test in May 2021.    Schedule colonoscopy to look for sources of blood loss.    Schedule allergy clinic consult.  Patient Education     Iron-Deficiency Anemia (Adult)  Red blood cells carry oxygen to the tissues of your body. Anemia is a condition in which you have too few red blood cells. You need iron to make red cells. Anemia makes you feel tired and run down. When anemia becomes severe, your skin becomes pale. You may feel short of breath after physical activity. Other symptoms include:    Headaches    Dizziness    Leg cramps with physical activity    Drowsiness    Restless legs  Your anemia is caused by not having enough iron in your body. This may be because of:    Loss of blood. This can be caused by heavy menstrual periods. It can also be caused by bleeding from the stomach or intestines.    Poor diet. You may not be eating enough foods that contain iron.    Inability to absorb iron from the foods you eat    Pregnancy  If your blood count is low enough, your healthcare provider may prescribe an iron supplement. It usually takes about 2 to 3 months of treatment with iron supplements to correct anemia. Severe cases of anemia need a blood transfusion to quickly ease symptoms and deliver more oxygen to the cells.  Home care  Follow these guidelines when caring for yourself at home:    Eat foods high in iron. This will boost the amount of iron stored in your body. It is a natural way to build up the number of blood cells. Good sources of iron include beef, liver, spinach and other dark green leafy vegetables, whole grains, beans, and nuts.    Don't overexert yourself.    Talk with your healthcare provider before traveling by air or traveling to high altitudes.  Follow-up care  Follow up with your healthcare provider in 2 months, or as advised. This is to have another red blood cell count to be sure your  anemia has been fixed.  Call 911  Call 911 or seek immediate medical care if any of these occur:    Shortness of breath or chest pain    Dizziness or fainting    Vomiting blood or passing red or black-colored stool   [employer] last reviewed this educational content on 3/1/2018    7224-2455 [employer]. [address] [place] [address]. All rights reserved. This information is not intended as a substitute for professional medical care. Always follow your healthcare professional's instructions.

## 2021-03-08 ENCOUNTER — OFFICE VISIT - HEALTHEAST (OUTPATIENT)
Dept: FAMILY MEDICINE | Facility: CLINIC | Age: 56
End: 2021-03-08

## 2021-03-08 DIAGNOSIS — D50.9 MICROCYTIC ANEMIA: ICD-10-CM

## 2021-03-08 DIAGNOSIS — K21.9 GASTROESOPHAGEAL REFLUX DISEASE, UNSPECIFIED WHETHER ESOPHAGITIS PRESENT: ICD-10-CM

## 2021-03-08 ASSESSMENT — ANXIETY QUESTIONNAIRES
3. WORRYING TOO MUCH ABOUT DIFFERENT THINGS: NEARLY EVERY DAY
7. FEELING AFRAID AS IF SOMETHING AWFUL MIGHT HAPPEN: NEARLY EVERY DAY
4. TROUBLE RELAXING: NEARLY EVERY DAY
GAD7 TOTAL SCORE: 21
2. NOT BEING ABLE TO STOP OR CONTROL WORRYING: NEARLY EVERY DAY
6. BECOMING EASILY ANNOYED OR IRRITABLE: NEARLY EVERY DAY
1. FEELING NERVOUS, ANXIOUS, OR ON EDGE: NEARLY EVERY DAY
5. BEING SO RESTLESS THAT IT IS HARD TO SIT STILL: NEARLY EVERY DAY

## 2021-03-08 ASSESSMENT — PATIENT HEALTH QUESTIONNAIRE - PHQ9: SUM OF ALL RESPONSES TO PHQ QUESTIONS 1-9: 7

## 2021-03-11 ENCOUNTER — SURGERY - HEALTHEAST (OUTPATIENT)
Dept: SURGERY | Facility: CLINIC | Age: 56
End: 2021-03-11

## 2021-03-11 ENCOUNTER — COMMUNICATION - HEALTHEAST (OUTPATIENT)
Dept: SURGERY | Facility: CLINIC | Age: 56
End: 2021-03-11

## 2021-03-11 DIAGNOSIS — D50.9 MICROCYTIC ANEMIA: ICD-10-CM

## 2021-03-11 DIAGNOSIS — K21.9 GASTROESOPHAGEAL REFLUX DISEASE WITHOUT ESOPHAGITIS: ICD-10-CM

## 2021-03-12 ENCOUNTER — COMMUNICATION - HEALTHEAST (OUTPATIENT)
Dept: FAMILY MEDICINE | Facility: CLINIC | Age: 56
End: 2021-03-12

## 2021-03-12 ENCOUNTER — AMBULATORY - HEALTHEAST (OUTPATIENT)
Dept: FAMILY MEDICINE | Facility: CLINIC | Age: 56
End: 2021-03-12

## 2021-03-17 ENCOUNTER — AMBULATORY - HEALTHEAST (OUTPATIENT)
Dept: SURGERY | Facility: AMBULATORY SURGERY CENTER | Age: 56
End: 2021-03-17

## 2021-03-17 ENCOUNTER — COMMUNICATION - HEALTHEAST (OUTPATIENT)
Dept: SURGERY | Facility: CLINIC | Age: 56
End: 2021-03-17

## 2021-03-17 DIAGNOSIS — Z11.59 ENCOUNTER FOR SCREENING FOR OTHER VIRAL DISEASES: ICD-10-CM

## 2021-03-22 ENCOUNTER — AMBULATORY - HEALTHEAST (OUTPATIENT)
Dept: LAB | Facility: CLINIC | Age: 56
End: 2021-03-22

## 2021-03-22 DIAGNOSIS — Z11.59 ENCOUNTER FOR SCREENING FOR OTHER VIRAL DISEASES: ICD-10-CM

## 2021-03-22 ASSESSMENT — MIFFLIN-ST. JEOR: SCORE: 1454.51

## 2021-03-23 LAB
SARS-COV-2 PCR COMMENT: NORMAL
SARS-COV-2 RNA SPEC QL NAA+PROBE: NEGATIVE
SARS-COV-2 VIRUS SPECIMEN SOURCE: NORMAL

## 2021-03-24 ENCOUNTER — COMMUNICATION - HEALTHEAST (OUTPATIENT)
Dept: SCHEDULING | Facility: CLINIC | Age: 56
End: 2021-03-24

## 2021-03-24 ENCOUNTER — ANESTHESIA - HEALTHEAST (OUTPATIENT)
Dept: SURGERY | Facility: AMBULATORY SURGERY CENTER | Age: 56
End: 2021-03-24

## 2021-03-25 ENCOUNTER — SURGERY - HEALTHEAST (OUTPATIENT)
Dept: SURGERY | Facility: AMBULATORY SURGERY CENTER | Age: 56
End: 2021-03-25

## 2021-03-25 ASSESSMENT — MIFFLIN-ST. JEOR: SCORE: 1454.51

## 2021-03-29 ENCOUNTER — COMMUNICATION - HEALTHEAST (OUTPATIENT)
Dept: SURGERY | Facility: CLINIC | Age: 56
End: 2021-03-29

## 2021-03-30 ENCOUNTER — OFFICE VISIT (OUTPATIENT)
Dept: FAMILY MEDICINE | Facility: CLINIC | Age: 56
End: 2021-03-30
Payer: COMMERCIAL

## 2021-03-30 VITALS
TEMPERATURE: 98.1 F | WEIGHT: 202.8 LBS | BODY MASS INDEX: 35.36 KG/M2 | SYSTOLIC BLOOD PRESSURE: 152 MMHG | OXYGEN SATURATION: 93 % | HEART RATE: 75 BPM | DIASTOLIC BLOOD PRESSURE: 102 MMHG

## 2021-03-30 DIAGNOSIS — J02.9 ACUTE PHARYNGITIS, UNSPECIFIED ETIOLOGY: Primary | ICD-10-CM

## 2021-03-30 DIAGNOSIS — M54.2 NECK PAIN: ICD-10-CM

## 2021-03-30 DIAGNOSIS — J02.9 SORE THROAT: ICD-10-CM

## 2021-03-30 LAB
DEPRECATED S PYO AG THROAT QL EIA: NEGATIVE
SARS-COV-2 RNA RESP QL NAA+PROBE: NORMAL
SPECIMEN SOURCE: NORMAL
STREP GROUP A PCR: NOT DETECTED

## 2021-03-30 PROCEDURE — U0003 INFECTIOUS AGENT DETECTION BY NUCLEIC ACID (DNA OR RNA); SEVERE ACUTE RESPIRATORY SYNDROME CORONAVIRUS 2 (SARS-COV-2) (CORONAVIRUS DISEASE [COVID-19]), AMPLIFIED PROBE TECHNIQUE, MAKING USE OF HIGH THROUGHPUT TECHNOLOGIES AS DESCRIBED BY CMS-2020-01-R: HCPCS | Performed by: NURSE PRACTITIONER

## 2021-03-30 PROCEDURE — 99213 OFFICE O/P EST LOW 20 MIN: CPT | Performed by: NURSE PRACTITIONER

## 2021-03-30 PROCEDURE — U0005 INFEC AGEN DETEC AMPLI PROBE: HCPCS | Performed by: NURSE PRACTITIONER

## 2021-03-30 PROCEDURE — 99N1174 PR STATISTIC STREP A RAPID: Performed by: NURSE PRACTITIONER

## 2021-03-30 PROCEDURE — 87651 STREP A DNA AMP PROBE: CPT | Performed by: NURSE PRACTITIONER

## 2021-03-30 RX ORDER — TIZANIDINE HYDROCHLORIDE 4 MG/1
4 CAPSULE, GELATIN COATED ORAL 3 TIMES DAILY
Qty: 30 CAPSULE | Refills: 0 | Status: SHIPPED | OUTPATIENT
Start: 2021-03-30 | End: 2021-03-30

## 2021-03-30 RX ORDER — TIZANIDINE HYDROCHLORIDE 4 MG/1
4 CAPSULE, GELATIN COATED ORAL 3 TIMES DAILY
Qty: 30 CAPSULE | Refills: 0 | Status: SHIPPED | OUTPATIENT
Start: 2021-03-30 | End: 2021-04-14

## 2021-03-30 ASSESSMENT — ENCOUNTER SYMPTOMS
SINUS PAIN: 0
VOMITING: 0
DIZZINESS: 1
SINUS PRESSURE: 0
SORE THROAT: 1
FATIGUE: 0
RHINORRHEA: 0
RESPIRATORY NEGATIVE: 1
DIAPHORESIS: 0
FEVER: 0
LIGHT-HEADEDNESS: 1
CONSTIPATION: 1
APPETITE CHANGE: 0
NECK PAIN: 1
CARDIOVASCULAR NEGATIVE: 1
CHILLS: 0
ACTIVITY CHANGE: 0
EYES NEGATIVE: 1
TROUBLE SWALLOWING: 1
NAUSEA: 0

## 2021-03-30 ASSESSMENT — PAIN SCALES - GENERAL: PAINLEVEL: EXTREME PAIN (8)

## 2021-03-30 NOTE — PROGRESS NOTES
Assessment & Plan   Acute Pharyngitis  Take Acetaminophen (Tylenol) 325-650 mg every 4-6 hours as needed for pain or fever. Do not take more than 4000 mg in a 24 hour period.   Will contact patient with results of COVID and strep culture when able. Likely this is viral in nature and self limiting.     Neck pain  No radiculopathy symptoms or paresthesia down arms. Strength is normal bilaterally. Clinically this appears to be a myofascial neck strain on the left side. Due to her recent history of microcystic anemia and recent evaluation of bleeding I recommend she avoid NSAIDS and ASA.   Take Acetaminophen (Tylenol) 325-650 mg every 4-6 hours as needed for pain or fever. Do not take more than 4000 mg in a 24 hour period.   - tiZANidine (ZANAFLEX) 4 MG capsule  Dispense: 30 capsule; Refill: 0  I recommend that you don't take ibuprofen, aspirin, Excedrin, or Advil because they may may blood thinner.    Sore throat  - Symptomatic COVID-19 Virus (Coronavirus) by PCR  - Streptococcus A Rapid Scr w Reflx to PCR  - Symptomatic COVID-19 Virus (Coronavirus) by PCR      Review of prior notes    Return in about 1 week (around 4/6/2021), or if symptoms worsen or fail to improve, for Follow up.    RAJ Jackson CNP [facility] DONOVAN Quintanilla is a 55 year old who presents for the following health issues HPI     Acute Illness  Acute illness concerns: sore throat  Onset/Duration: Sunday evning  Symptoms:  Fever: no  Chills/Sweats: no  Headache (location?): no  Sinus Pressure: no  Conjunctivitis:  no  Ear Pain: YES- with the neck pain  Rhinorrhea: no  Congestion: no  Sore Throat: YES- started yesterday  Cough: no  Wheeze: no  Decreased Appetite: no  Nausea: no  Vomiting: no  Diarrhea: no  Dysuria/Freq.: no  Dysuria or Hematuria: no  Fatigue/Achiness: no  Sick/Strep Exposure: no  Therapies tried and outcome: tylenol and Advil for neck    No congestion, rhinorrhea, fevers, chills, body aches  except the neck pain. No cough, shortness of breath.   Neck Pain  Onset/Duration: Yesterday morning  Description:   Location: left side  Radiation: Does radiate up her head into her ear  Intensity: moderate to severe  Progression of Symptoms:  same  Accompanying Signs & Symptoms:  Burning, tingling, prickly sensation in arm(s): no  Numbness in arm(s): no  Weakness in arm(s):  no  Fever: no  Headache: no  Nausea and/or vomiting: no  History:   Trauma: no  Previous neck pain: no  Previous surgery or injections: no  Previous Imaging (MRI,X ray): no  Precipitating or alleviating factors: None  Does movement impact the pain:  YES- if she turns to her left or lifts her chin to the left she has an instant shooting pain.  Therapies tried and outcome: heat, ice, acetaminophen and Advil    Patient started with neck pain on Sunday, yesterday and today pain continues; Left paraspinal muscular pain. No radiation of pain down arms. No numbness and tingling pain. No history of neck pains. No known trauma or injury. She has had recent evaluation for microcytic anemia with an endoscopy upper GI last week patient states that the test did not go well she was also to have a colonoscopy but she had a panic attack and was unable to continue with any further testing.  Her recent CBC showed a hemoglobin of 8 and her primary care provider has been continuing the evaluation.      Review of Systems   Constitutional: Negative for activity change, appetite change, chills, diaphoresis, fatigue and fever.   HENT: Positive for sore throat and trouble swallowing. Negative for congestion, ear discharge, ear pain, hearing loss, postnasal drip, rhinorrhea, sinus pressure, sinus pain and sneezing.    Eyes: Negative.    Respiratory: Negative.    Cardiovascular: Negative.    Gastrointestinal: Positive for constipation. Negative for nausea and vomiting.   Musculoskeletal: Positive for neck pain.   Neurological: Positive for dizziness and light-headedness.    All other systems reviewed and are negative.           Objective    BP (!) 152/102   Pulse 75   Temp 98.1  F (36.7  C)   Wt 92 kg (202 lb 12.8 oz)   SpO2 93%   BMI 35.36 kg/m    Body mass index is 35.36 kg/m .  Physical Exam   General: Patient appears to be in no acute distress.  Ears: No nodules, lesions, masses, discharge or tenderness in auricles or mastoid area. No cerumen in ear canals. Tympanic membranes pearly gray, normal bony landmarks and cone of light bilaterally, no bulges or perforations.   Oropharynx: Buccal mucosa pink, moist, no lesions. Tongue midline, spongy, pink. Uvula midline. Pharynx with mild erythema, no exudates. Tonsils + 3.  Lymph: bilaterally submandibular Lymph nodes in cervical neck, are palpable and left side is tender.  Lungs: Unlabored. clear breath sounds heard throughout lung fields.   Heart: Regular rate and rhythm.

## 2021-03-30 NOTE — PATIENT INSTRUCTIONS
Take Acetaminophen (Tylenol) 325-650 mg every 4-6 hours as needed for pain or fever. Do not take more than 4000 mg in a 24 hour period.     I recommend that you don't take ibuprofen, aspirin, Excedrin, or Advil because they may may blood thinner.    Can use the muscle relaxant 3 times a day for neck pain.

## 2021-03-31 LAB
LABORATORY COMMENT REPORT: NORMAL
SARS-COV-2 RNA RESP QL NAA+PROBE: NEGATIVE
SPECIMEN SOURCE: NORMAL

## 2021-04-01 ENCOUNTER — TELEPHONE (OUTPATIENT)
Dept: URGENT CARE | Facility: URGENT CARE | Age: 56
End: 2021-04-01

## 2021-04-01 NOTE — TELEPHONE ENCOUNTER
Called and spoke to patient regarding her results, strep, culture, and covid were all negative. She states that her throat is still so sore and swollen that she can drink water. I recommend she be seen today in [facility] or in urgent care so that they can reassess her throat.     Patient agreed with plan. Marly Mcmanus, APRN, CNP

## 2021-04-02 ENCOUNTER — TELEPHONE (OUTPATIENT)
Dept: FAMILY MEDICINE | Facility: CLINIC | Age: 56
End: 2021-04-02

## 2021-04-02 ENCOUNTER — OFFICE VISIT (OUTPATIENT)
Dept: FAMILY MEDICINE | Facility: CLINIC | Age: 56
End: 2021-04-02
Payer: COMMERCIAL

## 2021-04-02 VITALS
WEIGHT: 205 LBS | RESPIRATION RATE: 16 BRPM | OXYGEN SATURATION: 98 % | TEMPERATURE: 97.7 F | DIASTOLIC BLOOD PRESSURE: 78 MMHG | SYSTOLIC BLOOD PRESSURE: 138 MMHG | HEIGHT: 64 IN | BODY MASS INDEX: 35 KG/M2 | HEART RATE: 72 BPM

## 2021-04-02 DIAGNOSIS — J32.9 SINUSITIS, UNSPECIFIED CHRONICITY, UNSPECIFIED LOCATION: Primary | ICD-10-CM

## 2021-04-02 DIAGNOSIS — Z12.31 ENCOUNTER FOR SCREENING MAMMOGRAM FOR BREAST CANCER: ICD-10-CM

## 2021-04-02 DIAGNOSIS — H92.02 OTALGIA, LEFT: ICD-10-CM

## 2021-04-02 PROCEDURE — 99214 OFFICE O/P EST MOD 30 MIN: CPT | Performed by: NURSE PRACTITIONER

## 2021-04-02 ASSESSMENT — MIFFLIN-ST. JEOR: SCORE: 1504.87

## 2021-04-02 NOTE — PROGRESS NOTES
Assessment & Plan     Sinusitis, unspecified chronicity, unspecified location    - amoxicillin-clavulanate (AUGMENTIN) 875-125 MG tablet; Take 1 tablet by mouth 2 times daily for 7 days  Discussed how to take the medication(s), expected outcomes, potential side effects.    Otalgia, left    - amoxicillin-clavulanate (AUGMENTIN) 875-125 MG tablet; Take 1 tablet by mouth 2 times daily for 7 days  Discussed how to take the medication(s), expected outcomes, potential side effects.    Breast cancer screening    - *MA Screening Digital Bilateral; Future    Encounter for screening mammogram for malignant neoplasm of breast     - *MA Screening Digital Bilateral; Future             See Patient Instructions  Patient Instructions   Take antibiotic as prescribed.  Follow up or seek emergent care if worsening.      Our Clinic hours are:  Mondays    7:20 am - 7 pm  Tues -  Fri  7:20 am - 5 pm    Clinic Phone: [phone]    The clinic lab opens at 7:30 am Mon - Fri and appointments are required.    [facility]. [phone]  Monday  8 am - 7pm  Tues - Fri 8 am - 5:30 pm             Return in about 1 week (around 4/9/2021) for or sooner if symptoms persist or worsen.    RAJ Stuart CNP  [facility]    Minnie Quintanilla is a 55 year old who presents for the following health issues  accompanied by her self :    HPI     Acute Illness  Acute illness concerns: sore throat   Onset/Duration: x 5 days   Symptoms:  Fever: no  Chills/Sweats: no  Headache (location?): YES- left side   Sinus Pressure: no  Conjunctivitis:  no  Ear Pain: YES: left  Rhinorrhea: no  Congestion: no  Sore Throat: no  Cough: no  Wheeze: no  Decreased Appetite: YES  Nausea: no  Vomiting: no  Diarrhea: no  Dysuria/Freq.: no  Dysuria or Hematuria: no  Fatigue/Achiness: YES  Sick/Strep Exposure: no  Therapies tried and outcome: Tylenol and Advil      Review of Systems   See above      Objective    BP  "138/78 (BP Location: Right arm, Patient Position: Chair, Cuff Size: Adult Large)   Pulse 72   Temp 97.7  F (36.5  C)   Resp 16   Ht 1.626 m (5' 4\")   Wt 93 kg (205 lb)   SpO2 98%   BMI 35.19 kg/m    Body mass index is 35.19 kg/m .  Physical Exam   GENERAL: healthy, alert and no distress  HENT: ear canals and TM's left is pink otherwise they appear normal, pharynx with mild erythema, mild left sided sinus tenderness  NECK: no adenopathy, no asymmetry  RESP: lungs clear to auscultation - no rales, rhonchi or wheezes  CV: regular rate and rhythm, normal S1 S2, no S3 or S4, no murmur  MS: no gross musculoskeletal defects noted                  "

## 2021-04-02 NOTE — TELEPHONE ENCOUNTER
Call placed to patient    Patient reports she was seen by GREG Jack on 3.30.2021 for a sore throat and sharp pains to left neck up to her left ear.   Patient was tested for Strep and Covid-19 with negative results  Patient was advised to use OTC Tylenol for pain and was prescribed Tizanidine for neck spasm.    Patient reports she has taken OTC Tylenol, Advil, and prescribed Tizanidine with no relief from sore throat or neck pain.     Patient reports the neck pain has returned; describes pain as shooting; intermittent; rates the pain an 8/10 on the pain scale.  States the Tizanidine is ineffective in relieving her pain    Patient reports throat pain is very severe and is very painful when attempting to swallow liquids or food.  Reports fluid and food intake has decreased with severe throat pain.     Denies sinus congestion  Denies fevers  Denies cough    Patient reports that she does not tolerate OTC throat spray medications in the past.   Denies drinking hot tea or hot water/tea with honey as painful to swallow.    Patient reports she can drink small amounts of fluid and was able to eat some Oatmeal and milk this morning --- though, patient reports very painful.  Urine output has decreased slightly, patient reports urinating this morning; no change in urine color  Reports feeling lightheaded this morning when getting up from a laying position but resolved quickly.   Patient advised if she is unable to eat or drink or had further episodes of dizziness / lightheadedness (concern for dehydration) she would need ED evaluation and possible intravenous fluids to rehydrate; verbalized understanding.   [facility]ayed [facility] and [facility] would need to make an appointment at another [facility]; patient agreeable.   Transferred call to AFR to assist with scheduling appointment.     Jae Us RN

## 2021-04-02 NOTE — PATIENT INSTRUCTIONS
Take antibiotic as prescribed.  Follow up or seek emergent care if worsening.      Our Clinic hours are:  Mondays    7:20 am - 7 pm  Tues -  Fri  7:20 am - 5 pm    Clinic Phone: [phone]    The clinic lab opens at 7:30 am Mon - Fri and appointments are required.    [facility] [place]  Ph. [phone]  Monday  8 am - 7pm  Tues - Fri 8 am - 5:30 pm

## 2021-04-02 NOTE — TELEPHONE ENCOUNTER
Reason for Call:  Other call back    Detailed comments: Patient called back and says she still has a bad sore throat.    Phone Number Patient can be reached at: Cell number on file:    Telephone Information:   Mobile [phone]       Best Time:     Can we leave a detailed message on this number? YES    Call taken on 4/2/2021 at 7:30 AM by Anne Zhao

## 2021-04-03 ENCOUNTER — HEALTH MAINTENANCE LETTER (OUTPATIENT)
Age: 56
End: 2021-04-03

## 2021-04-05 ENCOUNTER — OFFICE VISIT (OUTPATIENT)
Dept: FAMILY MEDICINE | Facility: CLINIC | Age: 56
End: 2021-04-05
Payer: COMMERCIAL

## 2021-04-05 VITALS
HEIGHT: 64 IN | DIASTOLIC BLOOD PRESSURE: 88 MMHG | WEIGHT: 201 LBS | RESPIRATION RATE: 16 BRPM | BODY MASS INDEX: 34.31 KG/M2 | HEART RATE: 68 BPM | TEMPERATURE: 96.8 F | SYSTOLIC BLOOD PRESSURE: 146 MMHG

## 2021-04-05 DIAGNOSIS — G50.0 TRIGEMINAL NEURALGIA: Primary | ICD-10-CM

## 2021-04-05 PROCEDURE — 99214 OFFICE O/P EST MOD 30 MIN: CPT | Performed by: FAMILY MEDICINE

## 2021-04-05 RX ORDER — TRAMADOL HYDROCHLORIDE 50 MG/1
50 TABLET ORAL EVERY 6 HOURS PRN
Qty: 15 TABLET | Refills: 0 | Status: SHIPPED | OUTPATIENT
Start: 2021-04-05 | End: 2021-04-08

## 2021-04-05 RX ORDER — GABAPENTIN 100 MG/1
100-300 CAPSULE ORAL 3 TIMES DAILY
Qty: 180 CAPSULE | Refills: 1 | Status: SHIPPED | OUTPATIENT
Start: 2021-04-05 | End: 2021-04-14

## 2021-04-05 ASSESSMENT — MIFFLIN-ST. JEOR: SCORE: 1478.79

## 2021-04-05 NOTE — PROGRESS NOTES
Assessment/Plan:    Socorro Oakes is a 55 year old female presenting for:    Trigeminal neuralgia  She will continue taking her antibiotics but I suspect that she does not have a sinus infection at this point.  Gabapentin sent to the pharmacy as well as tramadol to be used intermittently particularly at night.  Referral to neurology placed as well.  Patient believes that she is taking gabapentin in the past for her headaches.    We discussed the risks and side effects of all of the medications.  If she is not feeling improved over the next few days would potentially recommend a noncontrast MRI for further evaluation.  - gabapentin (NEURONTIN) 100 MG capsule  Dispense: 180 capsule; Refill: 1  - traMADol (ULTRAM) 50 MG tablet  Dispense: 15 tablet; Refill: 0  - NEUROLOGY ADULT REFERRAL      Medications Discontinued During This Encounter   Medication Reason     azelastine (ASTELIN) 0.1 % nasal spray      fluticasone (FLONASE) 50 MCG/ACT nasal spray            Chief Complaint:  RECHECK and Otalgia        Subjective:   Socorro Oakes is a 55-year-old female presenting to the clinic today with concerns over sharp shooting pain on her left lateral head shooting into her face.    Patient has been seen 2 times recently.  One time for acute pharyngitis.  Rapid strep and Covid testing was negative.  It was thought that she potentially sprained a muscle in her left-sided neck and was given tizanidine.  This was about a week ago.  4 days ago she was once again seen.  She was thought to have a sinus infection at that time and started on Augmentin.    She is taking antibiotics.  She is taking the muscle relaxant.  Nothing seems to really be helping.  Pain is described as sharp shooting pain starting on her left scalp just posterior to her ear and shooting up into her head as well as into her face.  She does not have any visual symptoms.  She states she could not sleep last night because of the pain.  She was using ice packs to the  area which helped a bit.    Tylenol has not been helpful.  She states that she has had these pains in the past about 15 years ago however they abated on their own.  She thinks at this time they are getting worse.    12 point review of systems completed and negative except for what has been described above    History   Smoking Status     Never Smoker   Smokeless Tobacco     Never Used         Current Outpatient Medications:      ALPRAZolam (XANAX) 1 MG tablet, Take 1 mg by mouth 2 times daily as needed for anxiety, Disp: , Rfl:      amoxicillin-clavulanate (AUGMENTIN) 875-125 MG tablet, Take 1 tablet by mouth 2 times daily for 7 days, Disp: 14 tablet, Rfl: 0     amphetamine-dextroamphetamine (ADDERALL) 30 MG tablet, Take 30 mg by mouth 2 times daily, Disp: , Rfl:      buPROPion (WELLBUTRIN XL) 300 MG 24 hr tablet, Take 300 mg by mouth every morning, Disp: , Rfl:      DoxePIN (SINEQUAN) 75 MG capsule, Take 75 mg by mouth At Bedtime, Disp: , Rfl:      famotidine (PEPCID) 10 MG tablet, Take 1 tablet (10 mg) by mouth 2 times daily as needed (reflux), Disp: 20 tablet, Rfl: 0     ferrous sulfate (FE TABS) 325 (65 Fe) MG EC tablet, Take 1 tablet (325 mg) by mouth 3 times daily, Disp: 180 tablet, Rfl: 0     gabapentin (NEURONTIN) 100 MG capsule, Take 1-3 capsules (100-300 mg) by mouth 3 times daily, Disp: 180 capsule, Rfl: 1     lamoTRIgine (LAMICTAL) 150 MG tablet, Take 200 mg by mouth every evening , Disp: , Rfl:      Lansoprazole (PREVACID PO), , Disp: , Rfl:      levocetirizine (XYZAL) 5 MG tablet, Take 1 tablet (5 mg) by mouth every evening, Disp: 30 tablet, Rfl: 3     metFORMIN (GLUCOPHAGE) 500 MG tablet, Take 500 mg by mouth 2 times daily (with meals), Disp: , Rfl:      ORDER FOR Socorro CHAPMAN was set up on replacement machine: [employer]; Type  Airsense 10; Serial Number: [id number]; Modem Number: 023; Pressure: SET PRESSURE 9 CM H20;  Mask of choice: none given; per patient she just opened a new one at home; Heated  "tubing, Disp: , Rfl:      pramipexole (MIRAPEX) 0.25 MG tablet, Take 1 tablet (0.25 mg) by mouth At Bedtime, Disp: 90 tablet, Rfl: 1     tiZANidine (ZANAFLEX) 4 MG capsule, Take 1 capsule (4 mg) by mouth 3 times daily, Disp: 30 capsule, Rfl: 0     traMADol (ULTRAM) 50 MG tablet, Take 1 tablet (50 mg) by mouth every 6 hours as needed for severe pain, Disp: 15 tablet, Rfl: 0     Zolpidem Tartrate (AMBIEN PO), Take 12.5 mg by mouth At Bedtime , Disp: , Rfl:       Objective:  Vitals:    04/05/21 0845 04/05/21 0906   BP: (!) 162/100 (!) 146/88   Pulse: 68    Resp: 16    Temp: 96.8  F (36  C)    TempSrc: Tympanic    Weight: 91.2 kg (201 lb)    Height: 1.613 m (5' 3.5\")        Body mass index is 35.05 kg/m .    Vital signs reviewed and stable  General: Patient does appear to be moderately uncomfortable today with intermittent wincing  Psych: Appropriate affect  HEENT: moist mucous membranes, pupils equal, round, reactive to light and accomodation, tympanic membranes are pearly grey bilaterally  Lymph: no cervical or supraclavicular lymphadenopathy  Cardiovascular: regular rate and rhythm with no murmur  Pulmonary: clear to auscultation bilaterally with no wheeze  Abdomen: soft, non tender, non distended with normo-active bowel sounds  Extremities: warm and well perfused with no edema  Skin: warm and dry with no rash         This note has been dictated and transcribed using voice recognition software.   Any errors in transcription are unintentional and inherent to the software.  "

## 2021-04-13 DIAGNOSIS — R09.81 CHRONIC NASAL CONGESTION: ICD-10-CM

## 2021-04-13 NOTE — TELEPHONE ENCOUNTER
Pended rx     levocetirizine (xyzal) 5 mg tabs, DISP:  30 tabs, R^3 , SIG:  Take 1 tablet (5mg) by mouth every evening.    Last Written Prescription Date:  11/13/2020  Last Fill Quantity: 30,   # refills: 3  Last Office Visit: 11/13/2020  Future Office visit:       Routed to DR. Martínez for review.       Next 5 appointments (look out 90 days)    Apr 14, 2021  9:00 AM  MyChart Long with Neelima Villatoro MD  [facility] ([facility] [address] [facility] [id number]  [phone]           Routing refill request to provider for review/approval because:  Drug not on the [facility], [facility] or [facility] refill protocol or controlled substance

## 2021-04-14 ENCOUNTER — OFFICE VISIT (OUTPATIENT)
Dept: FAMILY MEDICINE | Facility: CLINIC | Age: 56
End: 2021-04-14
Payer: COMMERCIAL

## 2021-04-14 VITALS
WEIGHT: 197.9 LBS | DIASTOLIC BLOOD PRESSURE: 82 MMHG | HEIGHT: 64 IN | HEART RATE: 88 BPM | SYSTOLIC BLOOD PRESSURE: 134 MMHG | TEMPERATURE: 96.8 F | BODY MASS INDEX: 33.79 KG/M2

## 2021-04-14 DIAGNOSIS — D50.9 IRON DEFICIENCY ANEMIA, UNSPECIFIED IRON DEFICIENCY ANEMIA TYPE: ICD-10-CM

## 2021-04-14 DIAGNOSIS — Z00.00 ENCOUNTER FOR MEDICAL EXAMINATION TO ESTABLISH CARE: Primary | ICD-10-CM

## 2021-04-14 LAB
ERYTHROCYTE [DISTWIDTH] IN BLOOD BY AUTOMATED COUNT: 25 % (ref 10–15)
FERRITIN SERPL-MCNC: 17 NG/ML (ref 8–252)
HCT VFR BLD AUTO: 38.5 % (ref 35–47)
HGB BLD-MCNC: 11.6 G/DL (ref 11.7–15.7)
MCH RBC QN AUTO: 22.3 PG (ref 26.5–33)
MCHC RBC AUTO-ENTMCNC: 30.1 G/DL (ref 31.5–36.5)
MCV RBC AUTO: 74 FL (ref 78–100)
PLATELET # BLD AUTO: 379 10E9/L (ref 150–450)
RBC # BLD AUTO: 5.21 10E12/L (ref 3.8–5.2)
WBC # BLD AUTO: 7.5 10E9/L (ref 4–11)

## 2021-04-14 PROCEDURE — 82728 ASSAY OF FERRITIN: CPT | Performed by: FAMILY MEDICINE

## 2021-04-14 PROCEDURE — 99214 OFFICE O/P EST MOD 30 MIN: CPT | Performed by: FAMILY MEDICINE

## 2021-04-14 PROCEDURE — 36415 COLL VENOUS BLD VENIPUNCTURE: CPT | Performed by: FAMILY MEDICINE

## 2021-04-14 PROCEDURE — 85027 COMPLETE CBC AUTOMATED: CPT | Performed by: FAMILY MEDICINE

## 2021-04-14 RX ORDER — LEVOCETIRIZINE DIHYDROCHLORIDE 5 MG/1
5 TABLET, FILM COATED ORAL EVERY EVENING
Qty: 30 TABLET | Refills: 3 | Status: SHIPPED | OUTPATIENT
Start: 2021-04-14 | End: 2021-04-16

## 2021-04-14 ASSESSMENT — MIFFLIN-ST. JEOR: SCORE: 1464.73

## 2021-04-14 NOTE — NURSING NOTE
"Chief Complaint   Patient presents with     Establish Care     Headache     Stabbing pain.        Initial /82   Pulse 88   Temp 96.8  F (36  C) (Tympanic)   Ht 1.613 m (5' 3.5\")   Wt 89.8 kg (197 lb 14.4 oz)   BMI 34.51 kg/m   Estimated body mass index is 34.51 kg/m  as calculated from the following:    Height as of this encounter: 1.613 m (5' 3.5\").    Weight as of this encounter: 89.8 kg (197 lb 14.4 oz).    Patient presents to the clinic using No DME    Health Maintenance that is potentially due pending provider review:  Mammogram (knows to schedule) and Tetanus and shingles- consider     Possibly completing today per provider review.    Is there anyone who you would like to be able to receive your results? No  If yes have patient fill out ANDREW  Cedric Simons M.A.        "

## 2021-04-15 ENCOUNTER — SURGERY - HEALTHEAST (OUTPATIENT)
Dept: SURGERY | Facility: CLINIC | Age: 56
End: 2021-04-15

## 2021-04-15 DIAGNOSIS — Z12.11 ENCOUNTER FOR SCREENING COLONOSCOPY: ICD-10-CM

## 2021-04-15 NOTE — PROGRESS NOTES
Assessment/Plan:    Socorro Oakes is a 55 year old female presenting for:    Encounter for medical examination to establish care  Discussed her history and updated her problem list    Iron deficiency anemia, unspecified iron deficiency anemia type  Recheck CBC today.  Continue lansoprazole.  Referral for colonoscopy placed.  - CBC with platelets  - Ferritin  - GASTROENTEROLOGY ADULT REF PROCEDURE ONLY      Medications Discontinued During This Encounter   Medication Reason     gabapentin (NEURONTIN) 100 MG capsule      tiZANidine (ZANAFLEX) 4 MG capsule            Chief Complaint:  Establish Care and Headache        Subjective:   Socorro Oakes is a pleasant 55-year-old female with a past medical history significant for schizophrenia (followed by psychiatry), ADD, GERD, restless leg syndrome, hypertension, memory loss, hyperlipidemia and history of multiple concussions who is presenting to the clinic today to establish care.    She does have a headache today but states that this is fairly normal for her.  She took some Tylenol this morning and is expecting that it will sravani.  She feels as though this is a tension headache.    When discussing her history she brings up that she was in the emergency department at the end of February of this year.  She was seen there due to a panic attack.  She was also found to have a UTI which she successfully treated.  It was also discovered that her hemoglobin was 8.8.  It was thought that this was most likely due to some blood loss anemia from a GI source.  She was having some reflux and not taking her lansoprazole at that time.  She has been better about taking that.  She did get an endoscopy which was not revealing of any bleeding.  It was recommended that she get a colonoscopy but she has not yet done that and wants my opinion regarding that.  Her previous colonoscopy was about 6 years ago - 10 year f/u.    Otherwise, she feels as though she eats adequate iron.  She unfortunately  had some issues with trigeminal neuralgia 2 weeks ago but is feeling better from that.    12 point review of systems completed and negative except for what has been described above    History   Smoking Status     Never Smoker   Smokeless Tobacco     Never Used         Current Outpatient Medications:      ALPRAZolam (XANAX) 1 MG tablet, Take 1 mg by mouth 2 times daily as needed for anxiety, Disp: , Rfl:      amphetamine-dextroamphetamine (ADDERALL) 30 MG tablet, Take 30 mg by mouth 2 times daily, Disp: , Rfl:      DoxePIN (SINEQUAN) 75 MG capsule, Take 75 mg by mouth At Bedtime, Disp: , Rfl:      famotidine (PEPCID) 10 MG tablet, Take 1 tablet (10 mg) by mouth 2 times daily as needed (reflux), Disp: 20 tablet, Rfl: 0     ferrous sulfate (FE TABS) 325 (65 Fe) MG EC tablet, Take 1 tablet (325 mg) by mouth 3 times daily, Disp: 180 tablet, Rfl: 0     lamoTRIgine (LAMICTAL) 150 MG tablet, Take 200 mg by mouth every evening , Disp: , Rfl:      Lansoprazole (PREVACID PO), , Disp: , Rfl:      levocetirizine (XYZAL) 5 MG tablet, Take 1 tablet (5 mg) by mouth every evening, Disp: 30 tablet, Rfl: 3     metFORMIN (GLUCOPHAGE) 500 MG tablet, Take 500 mg by mouth 2 times daily (with meals), Disp: , Rfl:      pramipexole (MIRAPEX) 0.25 MG tablet, Take 1 tablet (0.25 mg) by mouth At Bedtime, Disp: 90 tablet, Rfl: 1     Zolpidem Tartrate (AMBIEN PO), Take 12.5 mg by mouth At Bedtime , Disp: , Rfl:      buPROPion (WELLBUTRIN XL) 300 MG 24 hr tablet, Take 300 mg by mouth every morning, Disp: , Rfl:      ORDER FOR Socorro CHAPMAN was set up on replacement machine: [employer]; Type  Airsense 10; Serial Number: [id number]; Modem Number: 023; Pressure: SET PRESSURE 9 CM H20;  Mask of choice: none given; per patient she just opened a new one at home; Heated tubing, Disp: , Rfl:       Objective:  Vitals:    04/14/21 0904   BP: 134/82   Pulse: 88   Temp: 96.8  F (36  C)   TempSrc: Tympanic   Weight: 89.8 kg (197 lb 14.4 oz)   Height: 1.613 m (5'  "3.5\")       Body mass index is 34.51 kg/m .    Vital signs reviewed and stable  General: No acute distress  Psych: Appropriate affect  HEENT: moist mucous membranes, pupils equal, round, reactive to light and accomodation, tympanic membranes are pearly grey bilaterally  Lymph: no cervical or supraclavicular lymphadenopathy  Cardiovascular: regular rate and rhythm with no murmur  Pulmonary: clear to auscultation bilaterally with no wheeze  Abdomen: soft, non tender, non distended with normo-active bowel sounds  Extremities: warm and well perfused with no edema  Skin: warm and dry with no rash         This note has been dictated and transcribed using voice recognition software.   Any errors in transcription are unintentional and inherent to the software.  "

## 2021-04-16 ENCOUNTER — COMMUNICATION - HEALTHEAST (OUTPATIENT)
Dept: SURGERY | Facility: CLINIC | Age: 56
End: 2021-04-16

## 2021-04-16 NOTE — TELEPHONE ENCOUNTER
Pended RX levocetirizine Dihydrochloride oral tabs, again, first order was local print.  Should be eprescribed to [facility].  Routed to Dr. Martínez for review.

## 2021-04-17 ENCOUNTER — AMBULATORY - HEALTHEAST (OUTPATIENT)
Dept: SURGERY | Facility: AMBULATORY SURGERY CENTER | Age: 56
End: 2021-04-17

## 2021-04-17 DIAGNOSIS — Z11.59 ENCOUNTER FOR SCREENING FOR OTHER VIRAL DISEASES: ICD-10-CM

## 2021-04-19 ENCOUNTER — VIRTUAL VISIT (OUTPATIENT)
Dept: FAMILY MEDICINE | Facility: CLINIC | Age: 56
End: 2021-04-19
Payer: COMMERCIAL

## 2021-04-19 DIAGNOSIS — Z53.9 ERRONEOUS ENCOUNTER--DISREGARD: Primary | ICD-10-CM

## 2021-04-19 NOTE — PROGRESS NOTES
Patient is at [facility] ED right now for dizziness.  Rescheduled this appointment.  Brie Schuler CMA

## 2021-04-20 ENCOUNTER — COMMUNICATION - HEALTHEAST (OUTPATIENT)
Dept: SCHEDULING | Facility: CLINIC | Age: 56
End: 2021-04-20

## 2021-04-20 RX ORDER — LEVOCETIRIZINE DIHYDROCHLORIDE 5 MG/1
5 TABLET, FILM COATED ORAL EVERY EVENING
Qty: 30 TABLET | Refills: 3 | Status: SHIPPED | OUTPATIENT
Start: 2021-04-20 | End: 2022-08-30

## 2021-04-20 NOTE — TELEPHONE ENCOUNTER
Refill requested to be eprescribed to [facility].  Both refills went to local print, pharmacy sent refill request.    Routed pended RX to Dr. Martínez for eprescribed refill Levocetirizine (xyzal 5 mg) tabs, Disp: 90 tabs, SIG:  Take 1 tablet by mouth every evening.    Called Pharmacy, Pharmacist Rosa took verbal order on it,  Changed from DISP:  30 tabs with 3 refills, to 90 tabs, no refills.  Rosa said it would cost less for Socorro.

## 2021-04-21 ENCOUNTER — VIRTUAL VISIT (OUTPATIENT)
Dept: FAMILY MEDICINE | Facility: CLINIC | Age: 56
End: 2021-04-21
Payer: COMMERCIAL

## 2021-04-21 DIAGNOSIS — K58.0 IRRITABLE BOWEL SYNDROME WITH DIARRHEA: ICD-10-CM

## 2021-04-21 DIAGNOSIS — K21.9 GASTROESOPHAGEAL REFLUX DISEASE WITHOUT ESOPHAGITIS: Primary | ICD-10-CM

## 2021-04-21 PROCEDURE — 99213 OFFICE O/P EST LOW 20 MIN: CPT | Mod: 95 | Performed by: FAMILY MEDICINE

## 2021-04-21 RX ORDER — DOXEPIN HYDROCHLORIDE 25 MG/1
CAPSULE ORAL
COMMUNITY
Start: 2021-04-15 | End: 2022-08-30

## 2021-04-21 RX ORDER — TIZANIDINE HYDROCHLORIDE 4 MG/1
CAPSULE, GELATIN COATED ORAL
COMMUNITY
Start: 2021-03-30 | End: 2022-08-30

## 2021-04-21 RX ORDER — SODIUM FLUORIDE 1.1 G/100G
CREAM ORAL
COMMUNITY
Start: 2021-04-12

## 2021-04-21 RX ORDER — SODIUM FLUORIDE 5 MG/G
1 GEL, DENTIFRICE DENTAL
COMMUNITY
Start: 2021-02-20 | End: 2022-08-30

## 2021-04-21 RX ORDER — LANSOPRAZOLE 30 MG/1
30 CAPSULE, DELAYED RELEASE ORAL
COMMUNITY
End: 2023-01-30

## 2021-04-21 RX ORDER — LEVOCETIRIZINE DIHYDROCHLORIDE 5 MG/1
5 TABLET, FILM COATED ORAL EVERY EVENING
Qty: 90 TABLET | Refills: 0 | Status: CANCELLED | OUTPATIENT
Start: 2021-04-21

## 2021-04-21 RX ORDER — LAMOTRIGINE 200 MG/1
TABLET ORAL
COMMUNITY
Start: 2021-04-12

## 2021-04-21 RX ORDER — ZOLPIDEM TARTRATE 12.5 MG/1
12.5 TABLET, FILM COATED, EXTENDED RELEASE ORAL
COMMUNITY

## 2021-04-21 RX ORDER — BUPROPION HYDROCHLORIDE 150 MG/1
TABLET ORAL
COMMUNITY
Start: 2021-04-12

## 2021-04-21 RX ORDER — DEXTROAMPHETAMINE SACCHARATE, AMPHETAMINE ASPARTATE MONOHYDRATE, DEXTROAMPHETAMINE SULFATE AND AMPHETAMINE SULFATE 5; 5; 5; 5 MG/1; MG/1; MG/1; MG/1
20 CAPSULE, EXTENDED RELEASE ORAL
COMMUNITY
End: 2023-04-28 | Stop reason: DRUGHIGH

## 2021-04-21 RX ORDER — TRAMADOL HYDROCHLORIDE 50 MG/1
50 TABLET ORAL
COMMUNITY
End: 2022-08-30

## 2021-04-21 RX ORDER — LAMOTRIGINE 25 MG/1
TABLET ORAL
COMMUNITY
Start: 2021-04-12

## 2021-04-21 NOTE — PROGRESS NOTES
"Socorro is a 55 year old who is being evaluated via a billable video visit.      How would you like to obtain your AVS? MyChart  If the video visit is dropped, the invitation should be resent by: Text to cell phone: [phone]  Will anyone else be joining your video visit? No      Video Start Time: 11:42 AM    -------------------------    Assessment/Plan:    Socorro Oakes is a 55 year old female presenting for:    Gastroesophageal reflux disease without esophagitis  - GASTROENTEROLOGY ADULT REF CONSULT ONLY  - Tissue transglutaminase skylar IgA and IgG    Irritable bowel syndrome with diarrhea  Given her diarrhea I think it would be reasonable for her to see the gastroenterologist.  Referral will be placed today.  I did put an order for celiac testing as well and encouraged her to discuss this with the hospitalist as needed they would be able to try this at the hospital.  Otherwise, she can come in for a \"lab only\" appointment.  - GASTROENTEROLOGY ADULT REF CONSULT ONLY  - Tissue transglutaminase skylar IgA and IgG        Medications Discontinued During This Encounter   Medication Reason     ORDER FOR DME      buPROPion (WELLBUTRIN XL) 300 MG 24 hr tablet      ferrous sulfate (FE TABS) 325 (65 Fe) MG EC tablet      Lansoprazole (PREVACID PO)      Zolpidem Tartrate (AMBIEN PO)      lamoTRIgine (LAMICTAL) 150 MG tablet      metFORMIN (GLUCOPHAGE) 500 MG tablet            Chief Complaint:  Gastrointestinal Problem          Subjective:   Socorro Oakes is a pleasant 55 year old female being evaluated via video visit today for the following concern/s:    GI upset: Patient has noted for the last few years she has had fairly significant GI upset but this has been worse over the last few months.  She notes that she is having diarrhea 3-4 times daily and notes abdominal pain and cramping which resolves briefly with diarrhea.  She notes that she has been sick trying to significantly change when she is eating but is having a hard " time figuring out what is causing her issues.    She does have a colonoscopy scheduled for next month.  She has not noted any blood in her stool.  She had an endoscopy done about a month and a half ago which was normal.    She is wondering about testing for celiac's.  She has not seen a GI specialist and is wondering about that as well.    Interestingly, the patient is actually admitted to the hospital at this point with vertigo.  I discussed with the patient that I would be happy to reschedule her appointment today given that she is inpatient in the hospital where she could discuss this with the hospitalist however she wishes to discuss with me as she believes that this will be worked up as an outpatient.      12 point review of systems completed and negative except for what has been described above    History   Smoking Status     Never Smoker   Smokeless Tobacco     Never Used         Current Outpatient Medications:      ALPRAZolam (XANAX) 1 MG tablet, Take 1 mg by mouth 2 times daily as needed for anxiety, Disp: , Rfl:      amphetamine-dextroamphetamine (ADDERALL XR) 20 MG 24 hr capsule, Take 20 mg by mouth, Disp: , Rfl:      amphetamine-dextroamphetamine (ADDERALL) 30 MG tablet, Take 30 mg by mouth 2 times daily, Disp: , Rfl:      buPROPion (WELLBUTRIN XL) 150 MG 24 hr tablet, , Disp: , Rfl:      DENTA 5000 PLUS 1.1 % CREA, , Disp: , Rfl:      doxepin (SINEQUAN) 25 MG capsule, , Disp: , Rfl:      DoxePIN (SINEQUAN) 75 MG capsule, Take 75 mg by mouth At Bedtime, Disp: , Rfl:      famotidine (PEPCID) 10 MG tablet, Take 1 tablet (10 mg) by mouth 2 times daily as needed (reflux), Disp: 20 tablet, Rfl: 0     lamoTRIgine (LAMICTAL) 200 MG tablet, , Disp: , Rfl:      lamoTRIgine (LAMICTAL) 25 MG tablet, , Disp: , Rfl:      LANsoprazole (PREVACID) 30 MG DR capsule, Take 30 mg by mouth, Disp: , Rfl:      levocetirizine (XYZAL) 5 MG tablet, Take 1 tablet (5 mg) by mouth every evening, Disp: 30 tablet, Rfl: 3      "pramipexole (MIRAPEX) 0.25 MG tablet, Take 1 tablet (0.25 mg) by mouth At Bedtime, Disp: 90 tablet, Rfl: 1     sodium fluoride dental gel (PREVIDENT) 1.1 % GEL topical gel, Apply 1 Application to affected area, Disp: , Rfl:      tiZANidine (ZANAFLEX) 4 MG capsule, , Disp: , Rfl:      traMADol (ULTRAM) 50 MG tablet, Take 50 mg by mouth, Disp: , Rfl:      zolpidem ER (AMBIEN CR) 12.5 MG CR tablet, Take 12.5 mg by mouth, Disp: , Rfl:         Objective:  No vitals were done due to the nature of this visit  Vitals - Patient Reported 11/12/2020   Height (Patient Reported) 5' 4\"   Weight (Patient Reported) 195 lb   BMI (Based on Pt Reported Ht/Wt) 33.47 kg/m2               General: No acute distress  Psych: Appropriate affect  HEENT: moist mucous membranes  Pulmonary: Breathing comfortably, speaking in complete sentences  Extremities: warm and well perfused with no edema  Skin: warm and dry with no rash       This note has been dictated and transcribed using voice recognition software.   Any errors in transcription are unintentional and inherent to the software.      Video-Visit Details    Type of service:  Video Visit    Video End Time:1205pm    Originating Location (pt. Location): Other hospital     Distant Location (provider location):  [facility]     Platform used for Video Visit: Bisi  "

## 2021-04-28 ENCOUNTER — OFFICE VISIT (OUTPATIENT)
Dept: FAMILY MEDICINE | Facility: CLINIC | Age: 56
End: 2021-04-28
Payer: COMMERCIAL

## 2021-04-28 VITALS
SYSTOLIC BLOOD PRESSURE: 112 MMHG | DIASTOLIC BLOOD PRESSURE: 68 MMHG | RESPIRATION RATE: 16 BRPM | BODY MASS INDEX: 32.78 KG/M2 | OXYGEN SATURATION: 99 % | WEIGHT: 192 LBS | HEART RATE: 84 BPM | TEMPERATURE: 96.3 F | HEIGHT: 64 IN

## 2021-04-28 DIAGNOSIS — H53.2 DIPLOPIA: ICD-10-CM

## 2021-04-28 DIAGNOSIS — K21.9 GASTROESOPHAGEAL REFLUX DISEASE WITHOUT ESOPHAGITIS: ICD-10-CM

## 2021-04-28 DIAGNOSIS — R42 VERTIGO: ICD-10-CM

## 2021-04-28 DIAGNOSIS — K58.0 IRRITABLE BOWEL SYNDROME WITH DIARRHEA: ICD-10-CM

## 2021-04-28 DIAGNOSIS — Z09 HOSPITAL DISCHARGE FOLLOW-UP: Primary | ICD-10-CM

## 2021-04-28 PROCEDURE — 99214 OFFICE O/P EST MOD 30 MIN: CPT | Performed by: FAMILY MEDICINE

## 2021-04-28 PROCEDURE — 83516 IMMUNOASSAY NONANTIBODY: CPT | Performed by: FAMILY MEDICINE

## 2021-04-28 PROCEDURE — 36415 COLL VENOUS BLD VENIPUNCTURE: CPT | Performed by: FAMILY MEDICINE

## 2021-04-28 RX ORDER — ASPIRIN 81 MG
1 TABLET, DELAYED RELEASE (ENTERIC COATED) ORAL
COMMUNITY
Start: 2021-04-24 | End: 2021-07-23

## 2021-04-28 RX ORDER — MECLIZINE HCL 12.5 MG 12.5 MG/1
TABLET ORAL
COMMUNITY
Start: 2021-04-24 | End: 2021-06-16

## 2021-04-28 RX ORDER — FERROUS SULFATE 325(65) MG
325 TABLET ORAL
COMMUNITY
Start: 2021-04-24 | End: 2021-06-16

## 2021-04-28 RX ORDER — PROCHLORPERAZINE MALEATE 10 MG
TABLET ORAL
COMMUNITY
Start: 2021-04-24 | End: 2021-06-16

## 2021-04-28 RX ORDER — LISINOPRIL 10 MG/1
TABLET ORAL
COMMUNITY
Start: 2021-04-24 | End: 2021-04-28

## 2021-04-28 RX ORDER — PAROXETINE 20 MG/1
TABLET, FILM COATED ORAL
COMMUNITY
Start: 2021-04-27 | End: 2021-06-16

## 2021-04-28 ASSESSMENT — MIFFLIN-ST. JEOR: SCORE: 1437.97

## 2021-04-28 NOTE — PROGRESS NOTES
Assessment/Plan:    Post Discharge Medication Reconciliation Status: discharge medications reconciled and changed, per note/orders. (discontinue lisinopril)        Socorro Oakes is a 55 year old female presenting for:    1. Hospital discharge follow-up  Hospitalization reviewed    2. Vertigo  Slowly improving.  She will contact me if this worsens again.  She may follow-up with neurology if she would like.  She is to use a walker as needed    3. Diplopia  Also improving.    4. Gastroesophageal reflux disease without esophagitis  Labs below will be done.  Follow-up with GI as previously scheduled.  - Tissue transglutaminase skylar IgA and IgG    5. Irritable bowel syndrome with diarrhea    - Tissue transglutaminase skylar IgA and IgG        Medications Discontinued During This Encounter   Medication Reason     lisinopril (ZESTRIL) 10 MG tablet            Chief Complaint:  Hospital F/U        Subjective:   Socorro Oakes is a pleasant 55-year-old female presenting to the clinic today for hospital follow-up.    Patient was admitted to [facility] on 419 and discharged on 424.  She was admitted for severe vertigo.  She had had the symptoms for a few hours prior to presenting to the emergency department.  Imaging was done which was overall unremarkable.  Patient was unable to go home due to the severity of her symptoms.  She was admitted to the hospital.  Symptoms slowly resolved.  She did see physical therapy but did not think that the maneuvers physical therapy had done were that helpful.    She developed diplopia on the 22nd and a repeat MRI was done which showed no changes.    She slowly improved with her vertigo.  At this point she is able to walk in a straight line without much difficulty.  She is still living with her daughter for the next few days but thinks that she will be able to get back on her own.  She continues to have mild diplopia however this is slowly resolving as well.    She was started on lisinopril  in the hospital because her blood pressure was slightly elevated however she does not feel as though she necessarily needs it at this point.  She is not taking it for the last 2 days and her blood pressure today is 112/68.    Otherwise, we had a previous visit to discuss some GI symptoms.  She is hopeful to get her celiac testing done today as we had discussed previously.  She is following up with the gastroenterologist next month.  She also has a colonoscopy next month.    12 point review of systems completed and negative except for what has been described above    History   Smoking Status     Never Smoker   Smokeless Tobacco     Never Used         Current Outpatient Medications:      ALPRAZolam (XANAX) 1 MG tablet, Take 1 mg by mouth 2 times daily as needed for anxiety, Disp: , Rfl:      amphetamine-dextroamphetamine (ADDERALL XR) 20 MG 24 hr capsule, Take 20 mg by mouth, Disp: , Rfl:      amphetamine-dextroamphetamine (ADDERALL) 30 MG tablet, Take 30 mg by mouth 2 times daily, Disp: , Rfl:      buPROPion (WELLBUTRIN XL) 150 MG 24 hr tablet, , Disp: , Rfl:      DENTA 5000 PLUS 1.1 % CREA, , Disp: , Rfl:      doxepin (SINEQUAN) 25 MG capsule, , Disp: , Rfl:      DoxePIN (SINEQUAN) 75 MG capsule, Take 75 mg by mouth At Bedtime, Disp: , Rfl:      famotidine (PEPCID) 10 MG tablet, Take 1 tablet (10 mg) by mouth 2 times daily as needed (reflux), Disp: 20 tablet, Rfl: 0     ferrous sulfate (FEROSUL) 325 (65 Fe) MG tablet, Take 325 mg by mouth, Disp: , Rfl:      lamoTRIgine (LAMICTAL) 200 MG tablet, , Disp: , Rfl:      lamoTRIgine (LAMICTAL) 25 MG tablet, , Disp: , Rfl:      LANsoprazole (PREVACID) 30 MG DR capsule, Take 30 mg by mouth, Disp: , Rfl:      levocetirizine (XYZAL) 5 MG tablet, Take 1 tablet (5 mg) by mouth every evening, Disp: 30 tablet, Rfl: 3     meclizine (ANTIVERT) 12.5 MG tablet, , Disp: , Rfl:      multivitamin, therapeutic with minerals (THERA-VIT-M) TABS tablet, Take 1 tablet by mouth, Disp: , Rfl:  "     PARoxetine (PAXIL) 20 MG tablet, , Disp: , Rfl:      pramipexole (MIRAPEX) 0.25 MG tablet, Take 1 tablet (0.25 mg) by mouth At Bedtime, Disp: 90 tablet, Rfl: 1     prochlorperazine (COMPAZINE) 10 MG tablet, , Disp: , Rfl:      sodium fluoride dental gel (PREVIDENT) 1.1 % GEL topical gel, Apply 1 Application to affected area, Disp: , Rfl:      Thiamine HCl 250 MG TABS, Take 250 mg by mouth, Disp: , Rfl:      tiZANidine (ZANAFLEX) 4 MG capsule, , Disp: , Rfl:      traMADol (ULTRAM) 50 MG tablet, Take 50 mg by mouth, Disp: , Rfl:      zolpidem ER (AMBIEN CR) 12.5 MG CR tablet, Take 12.5 mg by mouth, Disp: , Rfl:         Objective:  Vitals:    04/28/21 1046   BP: 112/68   Pulse: 84   Resp: 16   Temp: 96.3  F (35.7  C)   TempSrc: Tympanic   SpO2: 99%   Weight: 87.1 kg (192 lb)   Height: 1.613 m (5' 3.5\")       Body mass index is 33.48 kg/m .    Vital signs reviewed and stable  General: No acute distress  Psych: Appropriate affect  HEENT: moist mucous membranes, pupils equal, round, reactive to light and accomodation, tympanic membranes are pearly grey bilaterally  Lymph: no cervical or supraclavicular lymphadenopathy  Cardiovascular: regular rate and rhythm with no murmur  Pulmonary: clear to auscultation bilaterally with no wheeze  Abdomen: soft, non tender, non distended with normo-active bowel sounds  Extremities: warm and well perfused with no edema  Skin: warm and dry with no rash         This note has been dictated and transcribed using voice recognition software.   Any errors in transcription are unintentional and inherent to the software.    "

## 2021-04-29 LAB
TTG IGA SER-ACNC: <1 U/ML
TTG IGG SER-ACNC: <1 U/ML

## 2021-05-07 ENCOUNTER — AMBULATORY - HEALTHEAST (OUTPATIENT)
Dept: LAB | Facility: CLINIC | Age: 56
End: 2021-05-07

## 2021-05-07 DIAGNOSIS — Z11.59 ENCOUNTER FOR SCREENING FOR OTHER VIRAL DISEASES: ICD-10-CM

## 2021-05-09 ENCOUNTER — COMMUNICATION - HEALTHEAST (OUTPATIENT)
Dept: SCHEDULING | Facility: CLINIC | Age: 56
End: 2021-05-09

## 2021-05-10 ENCOUNTER — RECORDS - HEALTHEAST (OUTPATIENT)
Dept: ADMINISTRATIVE | Facility: OTHER | Age: 56
End: 2021-05-10

## 2021-05-10 ENCOUNTER — ANESTHESIA - HEALTHEAST (OUTPATIENT)
Dept: SURGERY | Facility: AMBULATORY SURGERY CENTER | Age: 56
End: 2021-05-10

## 2021-05-10 ASSESSMENT — MIFFLIN-ST. JEOR: SCORE: 1431.83

## 2021-05-11 ENCOUNTER — SURGERY - HEALTHEAST (OUTPATIENT)
Dept: SURGERY | Facility: AMBULATORY SURGERY CENTER | Age: 56
End: 2021-05-11
Payer: COMMERCIAL

## 2021-05-11 ENCOUNTER — RECORDS - HEALTHEAST (OUTPATIENT)
Dept: ADMINISTRATIVE | Facility: OTHER | Age: 56
End: 2021-05-11

## 2021-05-11 ASSESSMENT — MIFFLIN-ST. JEOR: SCORE: 1431.83

## 2021-05-27 VITALS — WEIGHT: 202 LBS | BODY MASS INDEX: 34.67 KG/M2

## 2021-05-27 VITALS — WEIGHT: 190 LBS | BODY MASS INDEX: 32.61 KG/M2 | HEIGHT: 64 IN | BODY MASS INDEX: 32.61 KG/M2

## 2021-05-27 VITALS — BODY MASS INDEX: 32.61 KG/M2 | WEIGHT: 190 LBS

## 2021-05-27 VITALS — BODY MASS INDEX: 32.96 KG/M2 | HEIGHT: 64 IN

## 2021-05-28 ASSESSMENT — ANXIETY QUESTIONNAIRES: GAD7 TOTAL SCORE: 21

## 2021-05-28 NOTE — PATIENT INSTRUCTIONS - HE
Lunch: protein (eggs? Protein shakes)  Dinner: protein and veggies    Try to keep a window of 16+ hours without eating.    Minimize snacks.    Fasting lab work next time      [place] to [facility]    Minimum 3 meals per day, control daily calories   - 1200 female   - 1600 male  Minimum of 4 palm servings of protein daily.  Begin everyday with protein    - ~80 gm for female   - 120 gm for male  Be mindful of net carbs  50-75 gm/day   - (Total carbs - fiber)   - Less than 5 gm of carbs with breakfast    Supplementation   - 1 multivitamin    - clear and copious urination (adequate water consumption)   - 2000 mg fish oil capsules    - 2000 iu Vitamin D (I will be checking your level today and may send a prescription to your pharmacy if necessary)

## 2021-05-28 NOTE — PROGRESS NOTES
Assessment/Plan:    Class 3 severe obesity due to excess calories with serious comorbidity and body mass index (BMI) of 40.0 to 44.9 in adult (H)  53 y.o. female in clinic today to discuss treatment of the following conditions through radical diet change, lifestyle modification and weight loss:  1. Class 3 severe obesity due to excess calories with serious comorbidity and body mass index (BMI) of 40.0 to 44.9 in adult (H)    2. ALEXA (obstructive sleep apnea)    3. Metabolic syndrome X    4. Benign essential hypertension    5. Prediabetes    6. Screen for colon cancer    7. Visit for screening mammogram    8. Screening for cholesterol level        Contributing factors to her weight gain include medications/iatrogenic, disordered sleep, mental illness without comorbid eating disorder, low knowledge of healthy good/health and ALEXA.  This patient has a long and significant history of traumatic brain injury as well as schizophrenia which requires the medications that she is currently taking.  She is previously done well on Latuda but switched to Geodon because of cost.  This is a significant cost to her approximately $400 per month.  She will consider resuming Latuda in favor of Geodon.  She has a history of pancreatitis which unfortunately suggests that the patient cannot be prescribed a GLP-1 receptor agonist (would likely be too expensive anyway).  -Trial of metformin.  - The patient is not overeating.  We will restructure the quality and the timing of her meals to compensate intermittent fast.  She will work to improve the quality of her lunch.  She will reduce her process/refined carbohydrates.  - We will continue to encourage the patient exercise at future visits.  This patient is a candidate for bariatric surgery (based on BMI or BMI + comorbidities).  This option was discussed.    Barriers to weight loss that are not modifiable at this time include mental health medications, sedentary lifestyle, chronic pain,  poorly controlled mental illness or poor sleep pattern..  This was discussed in detail and will continue to be discussed.    [place] for the follow-up appointment will include goals of therapy, definition of success and focus on physical activity/exercise plan.      Return to clinic in 4 weeks.  Meet with bariatric dietitian.  The patient should return to meet with me in 8 weeks for medication focus visit.             Discussed all weight loss options with the patient including bariatric surgery. The patient expressed understanding and wishes to pursue medical weight loss at this time., Labs ordered and will review and discuss with the patient., Body composition analyzer done and results reviewed with the patient. Please see scanned results in chart. and Recommend 2000 mg of fish oil daily, a multivitamin daily, and vitamin D supplementation as directed.    Dietary Intervention: Reduced calorie, reduced carbohydrates, whole food diet., Recommend increasing movement throughout the day--sitting less, moving more. Will increase activity over time to reach a goal of at least 150 minutes of moderate exercise each week., Recommend journaling and tracking food intake using either an online program such as myfitnesspal.com or loseit.com or tracking using a paper and pencil. Advised paying particular attention to total carbohydrates, fiber, protein, calories, and fats, and added sugars. and Greater than 50% of this 45 minute visit was spent in counseling regarding obesity is a disease as well as the nutritional and exercise recommendations of our program as it pertains to the patient's own individual healthcare needs.           This note has been dictated using voice recognition software. Any grammatical or context distortions are unintentional and inherent to the software    ______________________________    SUBJECTIVE:    53 y.o. year old female with past medical history including schizophrenia, traumatic brain injury,  iatrogenic weight gain, evidence of metabolic syndrome presenting to clinic today to discuss treatment of these conditions through radical diet change, lifestyle modification, and weight loss (intensive therapeutic lifestyle interventions including nutrition, physical activity, and behavior management).    This patient was referred to [facility] by her psychiatrist after a conversation in which the patient voiced her frustration regarding the weight gain associated with her mental health medications.  Patient states that is recently as a 10 years ago she is approximately 80 pounds lighter than she is right now.  She notes that with each new additional medication she has some amount of weight gain.  This led her to consider completely stopping her medications.  She is sedentary and anhedonic.  She has poor appetite and generally eats 1-2 meals per day.  Breakfast is often rice crispies or fast food.  Dinner is often some type of protein (mostly meat base) and vegetables when they are in season.  The patient cooks for self but sometimes struggles because she is cooking for one.    Current strategy for menu/meal planning:   -Ad franklin.  She eats breakfast (risk crispies) because she believes this is the most important meal of the day.  She forces herself to eat dinner.   - the patient shop for groceries on a normal basis.   - the patient does eat convenience (fastfood, gas station food, frozen meals, meal replacements) foods. he eats away from home 1-2 times per week.     - the patient has not used a tracking quiana.   - the patient does cook.  The patient does not enjoy cooking.      Weight loss treatment history:    - Previous efforts at weight loss: calorie restriction/portion control.  The most successful effort was none.    - The patient does a history of using previous weight loss medications. She endorses success in the 1990s.    - the patient does not have a history of eating disorder.     - the  patient does not have a history of substance abuse (alcohol, drugs).    Health and social history related to weight:   - the patient has been overweight for 10+ years.     - the patient does not exercise.     - the patient does identify problems (physical, logistical, financial, time availability) with exercise.   The patient does enjoy exercise.     - the patient does not eat nutritiously.     - the patient does not overeat.     - the patient does snore.  The patient has had a sleep study.  The result was consistent with obstructive sleep apnea.      - the patient works ZERO but voluntters 10+ hours per week.     - Number of children:  2   - women's health: not pegnant    Social effects of obesity: none identified.    Patient Active Problem List   Diagnosis     Attention deficit disorder with hyperactivity     Benign essential hypertension     Benzodiazepine dependence (H)     Chronic pain     Class 3 severe obesity due to excess calories with serious comorbidity and body mass index (BMI) of 40.0 to 44.9 in adult (H)     DJD (degenerative joint disease), lumbar     GERD (gastroesophageal reflux disease)     Head injury     Headache     Major depressive disorder, single episode, severe, with psychotic behavior (H)     Posttraumatic stress disorder       Past Medical History:   Diagnosis Date     Appendicitis      Depression      Schizophrenia (H)      TBI (traumatic brain injury) (H)        History reviewed. No pertinent surgical history.    Current Outpatient Medications on File Prior to Visit   Medication Sig Dispense Refill     buPROPion (WELLBUTRIN XL) 150 MG 24 hr tablet Take 450 mg by mouth every morning.              desvenlafaxine succinate (PRISTIQ) 100 MG 24 hr tablet Take 150 mg by mouth daily.              dextroamphetamine-amphetamine (ADDERALL XR) 30 MG 24 hr capsule Take 30 mg by mouth 2 (two) times a day.              diazePAM (VALIUM) 10 MG tablet Take 10 mg by mouth every 8 (eight) hours as needed.  "             doxepin (SINEQUAN) 75 MG capsule Take 75 mg by mouth.       ibuprofen (ADVIL,MOTRIN) 600 MG tablet Take 600 mg by mouth every 6 (six) hours as needed.  0     lamoTRIgine (LAMICTAL) 150 MG tablet Take 150 mg by mouth daily.              MAPAP ARTHRITIS PAIN 650 mg CR tablet Take 650 mg by mouth 4 (four) times a day as needed.  0     omeprazole (PRILOSEC) 20 MG capsule Take 20 mg by mouth daily before breakfast.              pramipexole (MIRAPEX) 0.25 MG tablet Take 0.25 mg by mouth at bedtime.              ziprasidone (GEODON) 40 MG capsule Take 40 mg by mouth daily.              zolpidem (AMBIEN CR) 12.5 MG CR tablet Take 10 mg by mouth at bedtime as needed.              No current facility-administered medications on file prior to visit.        Allergies   Allergen Reactions     Black Dye Angioedema and Itching     Had written word in her planner of allergy to \"p-phenylenediamine\" that pt said is black dye     Linezolid Other (See Comments)     Zyvox, was told after surgery that she was allergic to this     Other Environmental Allergy      Black dye from knee brace caused skin breakdown.     1,4-Diaminobenzene Itching, Rash and Swelling     Black dye     Latex Rash         Family History   Problem Relation Age of Onset     Rheum arthritis Mother      Depression Mother      Suicidality Father      Depression Father      Depression Sister      Down syndrome Brother      Depression Brother      Depression Sister      Depression Sister      Depression Brother      Depression Brother      Depression Brother      Bipolar disorder Daughter      Social History     Socioeconomic History     Marital status:      Spouse name: None     Number of children: None     Years of education: None     Highest education level: None   Occupational History     None   Social Needs     Financial resource strain: None     Food insecurity:     Worry: None     Inability: None     Transportation needs:     Medical: None     " Non-medical: None   Tobacco Use     Smoking status: Never Smoker     Smokeless tobacco: Never Used   Substance and Sexual Activity     Alcohol use: None     Drug use: None     Sexual activity: None   Lifestyle     Physical activity:     Days per week: None     Minutes per session: None     Stress: None   Relationships     Social connections:     Talks on phone: None     Gets together: None     Attends Anabaptism service: None     Active member of club or organization: None     Attends meetings of clubs or organizations: None     Relationship status: None     Intimate partner violence:     Fear of current or ex partner: None     Emotionally abused: None     Physically abused: None     Forced sexual activity: None   Other Topics Concern     None   Social History Narrative     None     ROS  A comprehensive review of systems was negative.  Pertinent items are noted in HPI.  Patient denies chest pain or pressure, shortness of breath, exertional intolerance, palpitations, or lightheadedness.    Vitals:    05/14/19 0953   BP: 128/72   Pulse: 74     Weight: (!) 247 lb (112 kg)    Body mass index is 43.07 kg/m .    EXAM  Gen: Alert, pleasant, cooperative, no distress, appears stated age, patient is obese.  The patient hasandroid body morphology.  Eyes: No conjunctival injection, no scleral icterus.  Neck: Supple, symmetrical, trachea midline.  No adenopathy.  Thyroid is without enlargement/tenderness/nodules.  Cardiac: Regular rate and rhythm, normal S1/S2, no murmurs or gallops, no edema at ankles bilaterally.  Respiratory: Clear to auscultation bilaterally.  Abdomen: Soft, nontender, no hepatosplenomegaly.  Extremities: Warm, well-perfused, no edema.     Skin: Skin, turgor, texture color is normal. Skin tags: Yes, striae: Yes, hirsutism: no, acanthosis nigricans: No.  Neurologic: Cranial nerves II through XII grossly intact.  2+ reflexes at the patella bilaterally.  Psych: Normal affect.  Normal rate of speech.  No  tangentiality.      Body composition analyzer done and results reviewed with the patient.  Please see scanned results in chart.  Labs ordered and will review and discuss with the patient.    No results found for this or any previous visit (from the past 24 hour(s)).    ECG: NA  Other studies:: A1c is 5.9 this morning.  History of hypertriglyceridemia.

## 2021-05-29 NOTE — PROGRESS NOTES
"Assessment and Plan:     Class 3 severe obesity due to excess calories with serious comorbidity and body mass index (BMI) of 40.0 to 44.9 in adult (H)  53 y.o. year old female in clinic today to discuss treatment of the following conditions through diet and lifestyle modification and weight loss:  1. Class 3 severe obesity due to excess calories with serious comorbidity and body mass index (BMI) of 40.0 to 44.9 in adult (H)    2. Benign essential hypertension      The patient's weight loss result since the last visit was successful from a weight loss prospective.  Unclear cause of weight loss.  Appetite is poor (iatrogenic?).     - she has occasional hunger (welcome)   - energy/congnition poor in the evening.  Focus on nutrition early in the day.   - improve size/quantity of breakfast.     - medications: stopped geodon 7 days ago.  It is too early to determine the effect of changing.     - return to clinic in 4-6 weeks   - discussed 24 week program vs customized program.     Continue supplements.    Recommend increasing movement throughout the day--sitting less, moving more.  Will increase activity over time to reach a goal of at least 150 minutes of moderate exercise each week.  Behavior modification:  Cognitive restructuring exercises, journaling stressors, triggers for food cravings.  Dietary Intervention:  Reduced calorie, reduced carbohydrates, whole food diet.  Greater than 50% of this 15 minute visit was spent in counseling regarding patient's obesity, medications, dietary, exercise, and behavior modification recommendations.    Subjective  Patient presents for treatment of chronic, comorbid conditions using intensive therapeutic lifestyle interventions including nutrition, physical activity, and behavior management.   - successes: more eggs but now sick of eating eggs.     - struggles: appetite is poor.  \"Energy:\"  Not great.  Recent medication changes.  Off Geodon for the past week.  This was a change " through psychiatry. She struggles to take metformin because of poor appetite.   - exercise plan: no   - tracking/journaling: no   - following nutritional plan: ?.  Deviations from plan: inadequate nutrition   - hunger: no   - medication side effects: none   - Barriers to losing weight:  Behavior:  inadequate exercise    Patient Active Problem List   Diagnosis     Attention deficit disorder with hyperactivity     Benign essential hypertension     Benzodiazepine dependence (H)     Chronic pain     Class 3 severe obesity due to excess calories with serious comorbidity and body mass index (BMI) of 40.0 to 44.9 in adult (H)     DJD (degenerative joint disease), lumbar     GERD (gastroesophageal reflux disease)     Head injury     Headache     Major depressive disorder, single episode, severe, with psychotic behavior (H)     Posttraumatic stress disorder       Current Outpatient Medications on File Prior to Visit   Medication Sig Dispense Refill     buPROPion (WELLBUTRIN XL) 150 MG 24 hr tablet Take 450 mg by mouth every morning.              desvenlafaxine succinate (PRISTIQ) 100 MG 24 hr tablet Take 150 mg by mouth daily.              diazePAM (VALIUM) 10 MG tablet Take 10 mg by mouth every 8 (eight) hours as needed.              doxepin (SINEQUAN) 75 MG capsule Take 75 mg by mouth at bedtime.              ibuprofen (ADVIL,MOTRIN) 600 MG tablet Take 600 mg by mouth every 6 (six) hours as needed.  0     lamoTRIgine (LAMICTAL) 150 MG tablet Take 150 mg by mouth daily.              metFORMIN (GLUCOPHAGE XR) 500 MG 24 hr tablet Take 1 tablet (500 mg total) by mouth daily. 30 tablet 2     omeprazole (PRILOSEC) 20 MG capsule Take 20 mg by mouth daily before breakfast.              pramipexole (MIRAPEX) 0.25 MG tablet Take 0.25 mg by mouth at bedtime.              zolpidem (AMBIEN CR) 12.5 MG CR tablet Take 10 mg by mouth at bedtime as needed.              [DISCONTINUED] dextroamphetamine-amphetamine (ADDERALL XR) 30 MG 24 hr  capsule Take 30 mg by mouth 2 (two) times a day.              [DISCONTINUED] MAPAP ARTHRITIS PAIN 650 mg CR tablet Take 650 mg by mouth 4 (four) times a day as needed.  0     [DISCONTINUED] ziprasidone (GEODON) 40 MG capsule Take 40 mg by mouth daily.              No current facility-administered medications on file prior to visit.        Objective:  Vitals:    06/17/19 1313   BP: 128/80   Pulse: 88     Initial Weight: 247 lbs  Weight: (!) 235 lb (106.6 kg)  Weight loss from initial: 12  % Weight loss: 4.86 %    Body mass index is 40.98 kg/m .  Patient's last menstrual period was 06/10/2019.  General:  Patient is alert, pleasant, no distress.  Patient is obese.    This note has been dictated using voice recognition software. Any grammatical or context distortions are unintentional and inherent to the software

## 2021-05-30 NOTE — PROGRESS NOTES
Medical  Weight Loss Initial Diet Evaluation    [place] is presenting today for a new weight management nutrition consultation. Pt has also had an initial appointment with Bariatrician Dr. Mcmanus.   Patient reports a poor appetite. She states she eats because she has to eat and not because she is hungry. Pt is doing intermittent fasting 10a-6p eating window.   Nutrition Assessment:   Anthropometrics:  Pt's Initial Weight: 247 lbs  Weight: (!) 226 lb 4.8 oz (102.6 kg)  Weight loss from initial: 20.7  % Weight loss: 8.38 %    BMI: Body mass index is 39.46 kg/m .  IBW: 115-125lb  Estimated RMR (Carroll-[place] equation): 1608kcal  Recommended protein needs: 69-92g    Medical History:  Patient Active Problem List   Diagnosis     Attention deficit disorder with hyperactivity     Benign essential hypertension     Benzodiazepine dependence (H)     Chronic pain     Class 3 severe obesity due to excess calories with serious comorbidity and body mass index (BMI) of 40.0 to 44.9 in adult (H)     DJD (degenerative joint disease), lumbar     GERD (gastroesophageal reflux disease)     Head injury     Headache     Major depressive disorder, single episode, severe, with psychotic behavior (H)     Posttraumatic stress disorder     Nutrition History:   Food allergies/intolerances/restrictions: No   Weight loss history: none  Biggest weight loss struggle per pt: doesn't feel hungry   Vitamins/Mineral Supplementation: none    Dietary Recall:  Breakfast: 10a- hutch[place] and eggs at times OR couple pieces of toast  Snack: sometimes fruit  Lunch:3-4p- rotisserie chicken, coleslaw or a bag of veggies  Snack: none  Dinner:none  Snack: non  Overnight eating: No    Hydration (type/oz. per day):  Water: 6-8 cups of water per day  Caffeine/carbonation: none  Juice: sometimes will drink milk  Alcohol : none    Exercise:  Routine exercise established: No  Trying to get out and walk- Tuesdays more so than other days   Nutrition Diagnosis (PES  statement):   (NB-1.7) Undesirable food choices related to food and nutrition related knowledge deficit as evidenced by patient report of skipping meals and stating she does not get hungry.   Nutrition Intervention:  1. Discussed with patient how to build a meal: the importance of including a lean/low fat protein at each meal, include a source of vegetables at a minimum of lunch and dinner, and limiting carbohydrate intake to 1 serving (15 grams) per meal.  2. Placed emphasis on importance of developing a healthy eating routine, aiming for 3 meals a day and no snacks.   3. Educated on sources of lean protein, portion sizes, and the amount of grams found in each source. Recommend pt to aim for 20-30 grams of protein at each meal; 60-80 grams per day.  4. Discussed using a protein supplement as a meal replacement  Handouts provided:  Plate Method  List of Lean Protein Sources  Nutrition Monitoring/Evaluation:   Nutrition Goal Established by Patient:  1. Aim to get at least 2 balanced meals per day    Follow up/Monitoring:  Will monitor weight change, protein intake, hydration, fruit and vegetable intake and exercise for next visit.  Follow up with RD in 1 month      Time Spent with patient 30 minutes  ABN: Yes

## 2021-05-30 NOTE — PROGRESS NOTES
"Assessment and Plan:     Class 2 severe obesity due to excess calories with serious comorbidity and body mass index (BMI) of 39.0 to 39.9 in adult (H)  53 y.o. year old female in clinic today to discuss treatment of the following conditions through diet and lifestyle modification and weight loss:  1. Class 2 severe obesity due to excess calories with serious comorbidity and body mass index (BMI) of 39.0 to 39.9 in adult (H)    2. Benign essential hypertension    3. Prediabetes    4. Metabolic syndrome X      The patient's weight loss result since the last visit was successful based on weight loss.  I suspect this is a result of mental health medication changes.   - increase exercise (\"lose weight in order to exercise\")   - if she goes back on geodon (or other medication), make case for liraglutide to offset metabolic affects of mental health medications   - reviewed dietician notes.  Continue to add variety and improve quality of food   - return to clinic in 4 weeks    Continue supplements.    Recommend increasing movement throughout the day--sitting less, moving more.  Will increase activity over time to reach a goal of at least 150 minutes of moderate exercise each week.  Behavior modification:  Cognitive restructuring exercises, journaling stressors, triggers for food cravings.  Dietary Intervention:  Reduced calorie, reduced carbohydrates, whole food diet.  Greater than 50% of this 15 minute visit was spent in counseling regarding patient's obesity, medications, dietary, exercise, and behavior modification recommendations.    Subjective  Patient presents for treatment of chronic, comorbid conditions using intensive therapeutic lifestyle interventions including nutrition, physical activity, and behavior management.   - successes: \"okay.\"  Went to the grocery store last week.  She purchased a variety of food.  She tries to get there every 1-2 weeks.  Some increase in anxiety.  Less childcare responsibility.    - " struggles: eating.  Struggling to eat enough.  Struggles to get out of the house (mental health).   - exercise plan: more active but has to force activities   - tracking/journaling: no   - following nutritional plan: ?  Deviations from plan: skipping meals?   - hunger: controlled.   - medication side effects: NA   - Barriers to losing weight:  Mental health.      Patient Active Problem List   Diagnosis     Attention deficit disorder with hyperactivity     Benign essential hypertension     Benzodiazepine dependence (H)     Chronic pain     Class 2 severe obesity due to excess calories with serious comorbidity and body mass index (BMI) of 39.0 to 39.9 in adult (H)     DJD (degenerative joint disease), lumbar     GERD (gastroesophageal reflux disease)     Head injury     Headache     Major depressive disorder, single episode, severe, with psychotic behavior (H)     Posttraumatic stress disorder     Prediabetes     Metabolic syndrome X       Current Outpatient Medications on File Prior to Visit   Medication Sig Dispense Refill     buPROPion (WELLBUTRIN XL) 150 MG 24 hr tablet Take 450 mg by mouth every morning.              desvenlafaxine succinate (PRISTIQ) 100 MG 24 hr tablet Take 150 mg by mouth daily.              diazePAM (VALIUM) 10 MG tablet Take 10 mg by mouth every 8 (eight) hours as needed.              doxepin (SINEQUAN) 75 MG capsule Take 75 mg by mouth at bedtime.              ibuprofen (ADVIL,MOTRIN) 600 MG tablet Take 600 mg by mouth every 6 (six) hours as needed.  0     lamoTRIgine (LAMICTAL) 150 MG tablet Take 150 mg by mouth daily.              omeprazole (PRILOSEC) 20 MG capsule Take 20 mg by mouth daily before breakfast.              pramipexole (MIRAPEX) 0.25 MG tablet Take 0.25 mg by mouth at bedtime.              zolpidem (AMBIEN CR) 12.5 MG CR tablet Take 10 mg by mouth at bedtime as needed.              metFORMIN (GLUCOPHAGE XR) 500 MG 24 hr tablet Take 1 tablet (500 mg total) by mouth daily. 30  tablet 2     No current facility-administered medications on file prior to visit.        Objective:  Vitals:    07/22/19 0858   BP: 134/80   Pulse: 86     Initial Weight: 247 lbs  Weight: (!) 224 lb (101.6 kg)  Weight loss from initial: 23  % Weight loss: 9.31 %    Body mass index is 39.06 kg/m .  Patient's last menstrual period was 07/01/2019.  General:  Patient is alert, pleasant, no distress.  Patient is obese.    This note has been dictated using voice recognition software. Any grammatical or context distortions are unintentional and inherent to the software

## 2021-05-31 NOTE — PROGRESS NOTES
Assessment and Plan:     Class 2 severe obesity due to excess calories with serious comorbidity and body mass index (BMI) of 38.0 to 38.9 in adult (H)  53 y.o. year old female in clinic today to discuss treatment of the following conditions through diet and lifestyle modification and weight loss:  1. Class 2 severe obesity due to excess calories with serious comorbidity and body mass index (BMI) of 38.0 to 38.9 in adult (H)    2. Benign essential hypertension    3. Metabolic syndrome X    4. Prediabetes      The patient's weight loss result since the last visit was successful based on weight loss.  She is struggling in a number of ways (not planning, no shopping, not tracking, social calories).  At the same time her ad [place]lin eating style has resulted in ~10% weight loss over the summer. I am concerned that she will regain weight unless she starts to employ a more mindful strategy to eat.  She has AM vegetative symptoms.  I am also concerned that she will regain weight is she is back on medications.     - continue metformin   - meet with PCP or psychiatry for mental health?  Resume latuda?  Cost is a barrier.      Follow-up: 1 months      Major depressive disorder, single episode, severe, with psychotic behavior (H)  Anhedonia.  Vegetative symptoms.  If her symptoms worse, this will undermind her weight loss effort.  Follow-up with psychiatry.  Resume latuda if possible?    Continue supplements.    Recommend increasing movement throughout the day--sitting less, moving more.  Will increase activity over time to reach a goal of at least 150 minutes of moderate exercise each week.  Behavior modification:  Cognitive restructuring exercises, journaling stressors, triggers for food cravings.  Dietary Intervention:  Reduced calorie, reduced carbohydrates, whole food diet.  Greater than 50% of this 15 minute visit was spent in counseling regarding patient's obesity, medications, dietary, exercise, and behavior modification  "recommendations.    Subjective  Patient presents for treatment of chronic, comorbid conditions using intensive therapeutic lifestyle interventions including nutrition, physical activity, and behavior management.   - successes/struggles: \"I don't know.\"  [place].  \"I ate too much.\"  Cheese on a stick.  Ice cream.  Cookies.  No too much fried food.  Not cooking meals.  She buys food, often from a deli.  She will get chicken (friend or bucket).    Avoiding sugar.  Has gone fishing.     - exercise plan: not frequently.     - mood: doing okay.  Continues off geodon.  She has morning vegetative symptoms.  Low energy.  Ahedonia.       - Barriers to losing weight:  Behavior:  inadequate exercise    Patient Active Problem List   Diagnosis     Attention deficit disorder with hyperactivity     Benign essential hypertension     Benzodiazepine dependence (H)     Chronic pain     Class 2 severe obesity due to excess calories with serious comorbidity and body mass index (BMI) of 38.0 to 38.9 in adult (H)     DJD (degenerative joint disease), lumbar     GERD (gastroesophageal reflux disease)     Head injury     Headache     Major depressive disorder, single episode, severe, with psychotic behavior (H)     Posttraumatic stress disorder     Prediabetes     Metabolic syndrome X       Current Outpatient Medications on File Prior to Visit   Medication Sig Dispense Refill     buPROPion (WELLBUTRIN XL) 150 MG 24 hr tablet Take 450 mg by mouth every morning.              desvenlafaxine succinate (PRISTIQ) 100 MG 24 hr tablet Take 150 mg by mouth daily.              diazePAM (VALIUM) 10 MG tablet Take 10 mg by mouth every 8 (eight) hours as needed.              doxepin (SINEQUAN) 75 MG capsule Take 75 mg by mouth at bedtime.              ibuprofen (ADVIL,MOTRIN) 600 MG tablet Take 600 mg by mouth every 6 (six) hours as needed.  0     lamoTRIgine (LAMICTAL) 150 MG tablet Take 150 mg by mouth daily. "              omeprazole (PRILOSEC) 20 MG capsule Take 20 mg by mouth daily before breakfast.              pramipexole (MIRAPEX) 0.25 MG tablet Take 0.25 mg by mouth at bedtime.              zolpidem (AMBIEN CR) 12.5 MG CR tablet Take 10 mg by mouth at bedtime as needed.              [DISCONTINUED] metFORMIN (GLUCOPHAGE XR) 500 MG 24 hr tablet Take 1 tablet (500 mg total) by mouth daily. 30 tablet 2     No current facility-administered medications on file prior to visit.        Objective:  Vitals:    08/29/19 1306   BP: 128/74   Pulse: 74     Initial Weight: 247 lbs  Weight: 221 lb 8 oz (100.5 kg)  Weight loss from initial: 25.5  % Weight loss: 10.32 %    Body mass index is 38.62 kg/m .  Patient's last menstrual period was 08/22/2019.  General:  Patient is alert, pleasant, no distress.  Patient is obese.    This note has been dictated using voice recognition software. Any grammatical or context distortions are unintentional and inherent to the software

## 2021-06-01 NOTE — PROGRESS NOTES
Assessment and Plan:     Class 2 severe obesity due to excess calories with serious comorbidity and body mass index (BMI) of 38.0 to 38.9 in adult (H)  53 y.o. year old female in clinic today to discuss treatment of the following conditions through diet and lifestyle modification and weight loss:  1. Class 2 severe obesity due to excess calories with serious comorbidity and body mass index (BMI) of 38.0 to 38.9 in adult (H)    2. Visit for screening mammogram    3. Benign essential hypertension    4. Metabolic syndrome X    5. Prediabetes      The patient's weight loss result since the last visit was mixed based on weight stability.  She followed some components of her plan over the past month.   - anhedonia is likely part of overall mental health picture   - discussed dietary goals.  Discussed replacing foods with new/different options.    - discussed walking daily.  [place]   - plan for trip to [place] next month.   - return to clinic in 6-8 weeks.    Continue supplements.    Recommend increasing movement throughout the day--sitting less, moving more.  Will increase activity over time to reach a goal of at least 150 minutes of moderate exercise each week.  Behavior modification:  Cognitive restructuring exercises, journaling stressors, triggers for food cravings.  Dietary Intervention:  Reduced calorie, reduced carbohydrates, whole food diet.  Greater than 50% of this 15 minute visit was spent in counseling regarding patient's obesity, medications, dietary, exercise, and behavior modification recommendations.    Subjective  Patient presents for treatment of chronic, comorbid conditions using intensive therapeutic lifestyle interventions including nutrition, physical activity, and behavior management.   - successes: weight stability.  Feels okay overall.   - struggles: lots of traveling.  She visited her daughter and had to live according to a different schedule.  She had a donut and  then felt sick.     - exercise plan: walking   - energy level overall remains low.  Little motivation to take care of self and home (laundry).     - tracking/journaling: no   - following nutritional plan: yes?  Deviations from plan: social/restaurants   - hunger: controlled.   - medication side effects: NA   - Barriers to losing weight:  Behavior:  inadequate exercise    Patient Active Problem List   Diagnosis     Attention deficit disorder with hyperactivity     Benign essential hypertension     Benzodiazepine dependence (H)     Chronic pain     Class 2 severe obesity due to excess calories with serious comorbidity and body mass index (BMI) of 38.0 to 38.9 in adult (H)     DJD (degenerative joint disease), lumbar     GERD (gastroesophageal reflux disease)     Head injury     Headache     Major depressive disorder, single episode, severe, with psychotic behavior (H)     Posttraumatic stress disorder     Prediabetes     Metabolic syndrome X       Current Outpatient Medications on File Prior to Visit   Medication Sig Dispense Refill     buPROPion (WELLBUTRIN XL) 150 MG 24 hr tablet Take 450 mg by mouth every morning.              desvenlafaxine succinate (PRISTIQ) 100 MG 24 hr tablet Take 150 mg by mouth daily.              dextroamphetamine-amphetamine (ADDERALL) 30 mg Tab Take 30 mg by mouth 2 (two) times a day.       diazePAM (VALIUM) 10 MG tablet Take 10 mg by mouth every 8 (eight) hours as needed.              doxepin (SINEQUAN) 75 MG capsule Take 75 mg by mouth at bedtime.              ibuprofen (ADVIL,MOTRIN) 600 MG tablet Take 600 mg by mouth every 6 (six) hours as needed.  0     lamoTRIgine (LAMICTAL) 150 MG tablet Take 150 mg by mouth daily.              metFORMIN (GLUCOPHAGE XR) 500 MG 24 hr tablet Take 1 tablet (500 mg total) by mouth daily. 30 tablet 2     omeprazole (PRILOSEC) 20 MG capsule Take 20 mg by mouth daily before breakfast.              pramipexole (MIRAPEX) 0.25 MG tablet Take 0.25 mg by mouth  at bedtime.              zolpidem (AMBIEN CR) 12.5 MG CR tablet Take 10 mg by mouth at bedtime as needed.              No current facility-administered medications on file prior to visit.        Objective:  Vitals:    09/30/19 1017   BP: 134/72   Pulse: 84     Initial Weight: 247 lbs  Weight: 222 lb (100.7 kg)  Weight loss from initial: 25  % Weight loss: 10.12 %    Body mass index is 38.71 kg/m .  Patient's last menstrual period was 09/07/2019.  General:  Patient is alert, pleasant, no distress.  Patient is obese.    This note has been dictated using voice recognition software. Any grammatical or context distortions are unintentional and inherent to the software

## 2021-06-02 ENCOUNTER — HOSPITAL ENCOUNTER (OUTPATIENT)
Dept: MAMMOGRAPHY | Facility: CLINIC | Age: 56
Discharge: HOME | End: 2021-06-02
Attending: NURSE PRACTITIONER | Admitting: NURSE PRACTITIONER
Payer: COMMERCIAL

## 2021-06-02 DIAGNOSIS — Z12.31 ENCOUNTER FOR SCREENING MAMMOGRAM FOR BREAST CANCER: ICD-10-CM

## 2021-06-02 PROCEDURE — 77063 BREAST TOMOSYNTHESIS BI: CPT

## 2021-06-03 VITALS
BODY MASS INDEX: 38.71 KG/M2 | WEIGHT: 222 LBS | HEART RATE: 84 BPM | DIASTOLIC BLOOD PRESSURE: 72 MMHG | SYSTOLIC BLOOD PRESSURE: 134 MMHG

## 2021-06-03 VITALS — HEIGHT: 64 IN | WEIGHT: 247 LBS | BODY MASS INDEX: 42.17 KG/M2

## 2021-06-03 VITALS — WEIGHT: 226.3 LBS | BODY MASS INDEX: 38.64 KG/M2 | HEIGHT: 64 IN

## 2021-06-03 VITALS — BODY MASS INDEX: 40.98 KG/M2 | WEIGHT: 235 LBS

## 2021-06-03 VITALS — WEIGHT: 221.5 LBS | BODY MASS INDEX: 38.62 KG/M2

## 2021-06-03 VITALS — BODY MASS INDEX: 39.06 KG/M2 | WEIGHT: 224 LBS

## 2021-06-03 NOTE — TELEPHONE ENCOUNTER
Medication Question or Clarification  Who is calling: Pharmacy: [facility]  What medication are you calling about? (include dose and sig) Metformin  mg tablets, one tablet daily  Who prescribed the medication?: Dr. Mcmanus  What is your question/concern?: Could patient have 90 day supply and 3 refills.  Pharmacy: [facility]  Okay to leave a detailed message?: No  Site CMT - Please call the pharmacy to obtain any additional needed information.

## 2021-06-07 NOTE — TELEPHONE ENCOUNTER
Left message to call back for: [place]   Information to relay to patient:  We are reaching out to our weight loss patients, checking to see if they would like to restart/continue the program, if interested please schedule a video visit.    Thank you,  Leia Jimenez LPN

## 2021-06-08 NOTE — PROGRESS NOTES
"Socorro Oakes is a 54 y.o. female who is being evaluated via a billable video visit.      The patient has been notified of following:     \"This video visit will be conducted via a call between you and your physician/provider. We have found that certain health care needs can be provided without the need for an in-person physical exam.  This service lets us provide the care you need with a video conversation.  If a prescription is necessary we can send it directly to your pharmacy.  If lab work is needed we can place an order for that and you can then stop by our lab to have the test done at a later time.    Video visits are billed at different rates depending on your insurance coverage. Please reach out to your insurance provider with any questions.     If during the course of the call the physician/provider feels a video visit is not appropriate, you will not be charged for this service.\"    Patient has given verbal consent to a Video visit? Yes    Patient would like to receive their AVS by AVS Preference: Stacey.    Patient would like the video invitation sent by: Text to cell phone: [phone]    Will anyone else be joining your video visit? No    Video Start Time: 2:11 PM    Additional provider notes:     Assessment and Plan:     Class 2 severe obesity due to excess calories with serious comorbidity and body mass index (BMI) of 38.0 to 38.9 in adult (H)  54 y.o. year old female in clinic today to discuss treatment of the following conditions through diet and lifestyle modification and weight loss:  1. Class 2 severe obesity due to excess calories with serious comorbidity and body mass index (BMI) of 38.0 to 38.9 in adult (H)    2. Metabolic syndrome X    3. Prediabetes    4. Benign essential hypertension      The patient's weight loss result since the last visit was mixed based on struggles with nutrition, mood and weight.  The patient reports struggling with mental health on and off for the past few months.  " "She has been working with her team to develop a treatment plan.  Appetite remains low which maybe result of medications. She also has headaches.   - weight continues to decline although this is more likely a result of medication changes and mental health.  She is down ~44 lbs in the past year.  Given appetite suppression, it may be worthwhile to look for other causes of weight gain. The patient is not concerned about this possibility.    - continue metformin   - continue to refine diet.  We discussed that she may consider TR schedule given lack of hunger in the mornings.     - I suggested that she reduce NSAIDs and excedrin given cough/GERD/reflux symptoms.        Continue supplements.    Recommend increasing movement throughout the day--sitting less, moving more.  Will increase activity over time to reach a goal of at least 150 minutes of moderate exercise each week.  Behavior modification:  Cognitive restructuring exercises, journaling stressors, triggers for food cravings.  Dietary Intervention:  Reduced calorie, reduced carbohydrates, whole food diet.  Greater than 50% of this 25 minute visit was spent in counseling regarding patient's obesity, medications, dietary, exercise, and behavior modification recommendations.    Subjective  Patient presents for treatment of chronic, comorbid conditions using intensive therapeutic lifestyle interventions including nutrition, physical activity, and behavior management.   - successes: \"This week not much.\"   - struggles: appetite is low.  She gets hungry in the evenings.  She does not overeat in the evening.     - she has a .  Tonight she gets to eat salmon.  Some pasta and casserole.  The patient gets 5 containers on Monday.     - she started the day with \"7 day hot cereal.\"  She says that she just ran out.    - she continues to struggle to find a stable medication regimen.  \"Not a good 6 months.\"  She has not gotten back on track and she associates this with " medication changes.     - headaches recently.    No flowsheet data found.    Patient Active Problem List   Diagnosis     Attention deficit disorder with hyperactivity     Benign essential hypertension     Benzodiazepine dependence (H)     Chronic pain     Class 2 severe obesity due to excess calories with serious comorbidity and body mass index (BMI) of 38.0 to 38.9 in adult (H)     DJD (degenerative joint disease), lumbar     GERD (gastroesophageal reflux disease)     Head injury     Headache     Major depressive disorder, single episode, severe, with psychotic behavior (H)     Posttraumatic stress disorder     Prediabetes     Metabolic syndrome X       Current Outpatient Medications on File Prior to Visit   Medication Sig Dispense Refill     ALPRAZolam (XANAX) 1 MG tablet Take 1 mg by mouth 2 (two) times a day as needed for sleep.       dextroamphetamine-amphetamine (ADDERALL) 20 mg Tab Take 20 mg by mouth daily.        doxepin (SINEQUAN) 75 MG capsule Take 75 mg by mouth at bedtime.              haloperidoL (HALDOL) 5 MG tablet Take 5 mg by mouth at bedtime.       ibuprofen (ADVIL,MOTRIN) 600 MG tablet Take 600 mg by mouth every 6 (six) hours as needed.  0     lamoTRIgine (LAMICTAL) 150 MG tablet Take 150 mg by mouth daily.              lithium 300 mg tablet Take 300 mg by mouth 2 (two) times a day.       metFORMIN (GLUCOPHAGE XR) 500 MG 24 hr tablet Take 1 tablet (500 mg total) by mouth daily. 90 tablet 3     omeprazole (PRILOSEC) 20 MG capsule Take 20 mg by mouth daily before breakfast.              pramipexole (MIRAPEX) 0.25 MG tablet Take 0.25 mg by mouth at bedtime.              zolpidem (AMBIEN CR) 12.5 MG CR tablet Take 12.5 mg by mouth at bedtime as needed.        [DISCONTINUED] buPROPion (WELLBUTRIN XL) 150 MG 24 hr tablet Take 450 mg by mouth every morning.              [DISCONTINUED] desvenlafaxine succinate (PRISTIQ) 100 MG 24 hr tablet Take 150 mg by mouth daily.              [DISCONTINUED] diazePAM  (VALIUM) 10 MG tablet Take 10 mg by mouth every 8 (eight) hours as needed.              No current facility-administered medications on file prior to visit.        Objective:  There were no vitals filed for this visit.  Weight: 202 lb (91.6 kg)    Body mass index is 35.22 kg/m .  No LMP recorded. Patient is perimenopausal.  General:  Patient is alert, pleasant, no distress.  Patient is obese.    This note has been dictated using voice recognition software. Any grammatical or context distortions are unintentional and inherent to the software    Video-Visit Details    Type of service:  Video Visit    Video End Time (time video stopped): 2:36 PM  Originating Location (pt. Location): Home    Distant Location (provider location):  [facility]     Platform used for Video Visit: Bisi Jack MD

## 2021-06-08 NOTE — TELEPHONE ENCOUNTER
Patient Returning Call  Reason for call:    Returning call  Information relayed to patient:    Relayed below message to patient.  Patient has additional questions:  No  If YES, what are your questions/concerns:    No additional questions at this time.  Transferred caller to scheduling.  Okay to leave a detailed message?: No call back needed

## 2021-06-11 ENCOUNTER — HOSPITAL ENCOUNTER (EMERGENCY)
Dept: EMERGENCY MEDICINE | Facility: HOSPITAL | Age: 56
Discharge: HOME | End: 2021-06-12
Attending: EMERGENCY MEDICINE
Payer: COMMERCIAL

## 2021-06-11 DIAGNOSIS — K92.2 GASTROINTESTINAL HEMORRHAGE, UNSPECIFIED GASTROINTESTINAL HEMORRHAGE TYPE: ICD-10-CM

## 2021-06-11 DIAGNOSIS — K52.9 COLITIS: ICD-10-CM

## 2021-06-11 LAB
ALBUMIN SERPL-MCNC: 3.9 G/DL (ref 3.5–5)
ALP SERPL-CCNC: 75 U/L (ref 45–120)
ALT SERPL W P-5'-P-CCNC: 13 U/L (ref 0–45)
ANION GAP SERPL CALCULATED.3IONS-SCNC: 11 MMOL/L (ref 5–18)
AST SERPL W P-5'-P-CCNC: 13 U/L (ref 0–40)
BASOPHILS # BLD AUTO: 0 THOU/UL (ref 0–0.2)
BASOPHILS NFR BLD AUTO: 0 % (ref 0–2)
BILIRUB SERPL-MCNC: 0.4 MG/DL (ref 0–1)
BUN SERPL-MCNC: 9 MG/DL (ref 8–22)
CALCIUM SERPL-MCNC: 9.3 MG/DL (ref 8.5–10.5)
CHLORIDE BLD-SCNC: 104 MMOL/L (ref 98–107)
CO2 SERPL-SCNC: 26 MMOL/L (ref 22–31)
CREAT SERPL-MCNC: 0.79 MG/DL (ref 0.6–1.1)
EOSINOPHIL # BLD AUTO: 0.1 THOU/UL (ref 0–0.4)
EOSINOPHIL NFR BLD AUTO: 1 % (ref 0–6)
ERYTHROCYTE [DISTWIDTH] IN BLOOD BY AUTOMATED COUNT: 19.9 % (ref 11–14.5)
GFR SERPL CREATININE-BSD FRML MDRD: >60 ML/MIN/1.73M2
GLUCOSE BLD-MCNC: 103 MG/DL (ref 70–125)
HCT VFR BLD AUTO: 37.9 % (ref 35–47)
HGB BLD-MCNC: 12.1 G/DL (ref 12–16)
IMM GRANULOCYTES # BLD: 0.1 THOU/UL
IMM GRANULOCYTES NFR BLD: 0 %
LIPASE SERPL-CCNC: 16 U/L (ref 0–52)
LYMPHOCYTES # BLD AUTO: 2 THOU/UL (ref 0.8–4.4)
LYMPHOCYTES NFR BLD AUTO: 13 % (ref 20–40)
MCH RBC QN AUTO: 24.9 PG (ref 27–34)
MCHC RBC AUTO-ENTMCNC: 31.9 G/DL (ref 32–36)
MCV RBC AUTO: 78 FL (ref 80–100)
MONOCYTES # BLD AUTO: 1.2 THOU/UL (ref 0–0.9)
MONOCYTES NFR BLD AUTO: 8 % (ref 2–10)
NEUTROPHILS # BLD AUTO: 11.7 THOU/UL (ref 2–7.7)
NEUTROPHILS NFR BLD AUTO: 78 % (ref 50–70)
PLATELET # BLD AUTO: 290 THOU/UL (ref 140–440)
PMV BLD AUTO: 9 FL (ref 8.5–12.5)
POTASSIUM BLD-SCNC: 3.5 MMOL/L (ref 3.5–5)
PROT SERPL-MCNC: 6.7 G/DL (ref 6–8)
RBC # BLD AUTO: 4.85 MILL/UL (ref 3.8–5.4)
SODIUM SERPL-SCNC: 141 MMOL/L (ref 136–145)
WBC: 15.1 THOU/UL (ref 4–11)

## 2021-06-11 ASSESSMENT — MIFFLIN-ST. JEOR: SCORE: 1436.83

## 2021-06-12 LAB
ALBUMIN UR-MCNC: NEGATIVE G/DL
APPEARANCE UR: CLEAR
BILIRUB UR QL STRIP: NEGATIVE
COLOR UR AUTO: COLORLESS
GLUCOSE UR STRIP-MCNC: NEGATIVE MG/DL
HGB UR QL STRIP: NEGATIVE
KETONES UR STRIP-MCNC: NEGATIVE MG/DL
LEUKOCYTE ESTERASE UR QL STRIP: NEGATIVE
NITRATE UR QL: NEGATIVE
PH UR STRIP: 7.5 [PH] (ref 5–8)
SP GR UR STRIP: 1.05 (ref 1–1.03)
UROBILINOGEN UR STRIP-ACNC: ABNORMAL

## 2021-06-14 NOTE — TELEPHONE ENCOUNTER
RN cannot approve Refill Request    RN can NOT refill this medication Protocol failed and NO refill given. Last office visit: 9/30/2019 René Jack MD Last Physical: Visit date not found Last MTM visit: Visit date not found Last visit same specialty: 9/30/2019 René Jack MD.  Next visit within 3 mo: Visit date not found  Next physical within 3 mo: Visit date not found      Aranza Wells, [facility] Triage/Med Refill 12/23/2020    Requested Prescriptions   Pending Prescriptions Disp Refills     metFORMIN (GLUCOPHAGE-XR) 500 MG 24 hr tablet [Pharmacy Med Name: METFORMIN HCL ER TABS 500MG] 90 tablet 3     Sig: TAKE 1 TABLET DAILY       Metformin Refill Protocol Failed - 12/21/2020 11:27 AM        Failed - Blood pressure in last 12 months     BP Readings from Last 1 Encounters:   09/30/19 134/72             Failed - LFT or AST or ALT in last 12 months     AST   Date Value Ref Range Status   05/15/2019 16 0 - 40 U/L Final     ALT   Date Value Ref Range Status   05/15/2019 25 0 - 45 U/L Final                Failed - GFR or Serum Creatinine in last 6 months     GFR MDRD Non Af Amer   Date Value Ref Range Status   05/15/2019 >60 >60 mL/min/1.73m2 Final     GFR MDRD Af Amer   Date Value Ref Range Status   05/15/2019 >60 >60 mL/min/1.73m2 Final             Failed - Visit with PCP or prescribing provider visit in last 6 months or next 3 months     Last office visit with prescriber/PCP: Visit date not found OR same dept: Visit date not found OR same specialty: 9/30/2019 René Jack MD Last physical: Visit date not found Last MTM visit: Visit date not found         Next appt within 3 mo: Visit date not found  Next physical within 3 mo: Visit date not found  Prescriber OR PCP: René Jack MD  Last diagnosis associated with med order: 1. Class 3 severe obesity due to excess calories with serious comorbidity and body mass index (BMI) of 40.0 to 44.9 in adult (H)  - metFORMIN (GLUCOPHAGE-XR) 500 MG  24 hr tablet [Pharmacy Med Name: METFORMIN HCL ER TABS 500MG]; TAKE 1 TABLET DAILY  Dispense: 90 tablet; Refill: 3    2. Metabolic syndrome X  - metFORMIN (GLUCOPHAGE-XR) 500 MG 24 hr tablet [Pharmacy Med Name: METFORMIN HCL ER TABS 500MG]; TAKE 1 TABLET DAILY  Dispense: 90 tablet; Refill: 3    3. Prediabetes  - metFORMIN (GLUCOPHAGE-XR) 500 MG 24 hr tablet [Pharmacy Med Name: METFORMIN HCL ER TABS 500MG]; TAKE 1 TABLET DAILY  Dispense: 90 tablet; Refill: 3     If protocol passes may refill for 12 months if within 3 months of last provider visit (or a total of 15 months).           Failed - A1C in last 6 months     No results found for: HGBA1C            Failed - Microalbumin in last year      No results found for: MICROALBUR

## 2021-06-15 NOTE — PROGRESS NOTES
Type of service:  Video Visit    Socorro Oakes is a 55 y.o. female who is being evaluated via a billable video visit.      How would you like to obtain your AVS? MyChart.  If dropped from the video visit, the video invitation should be resent by: Text to cell phone: [phone]  Will anyone else be joining your video visit? No    Video Start Time: 1:23 PM  Video End Time (time video stopped): 1:58  Originating Location (pt. Location): Home  Distant Location (provider location):  [facility]   Platform used for Video Visit: [employer]    Assessment/Plan:    GERD (gastroesophageal reflux disease)  This patient presented to clinic to discuss the findings of the laboratory test during her emergency department visit and to place this in the context of the family medicine visit that she had last week in which she left confused.  She was surprised to learn that she was anemic.  As far she knows, she is not losing blood.  She had no changes in her stooling.  She does admit to having symptoms of acid reflux for quite some time including nightly phlegm and discomfort when she is laying in a prone position.  She has had a colonoscopy, most recently 2015.  -We will start with upper GI study.  Referral placed for this procedure.  -Anticipate that we will also need to consider lower GI study.  She is aware that she might need to go back for a colonoscopy.  -We discussed the nature of microcytic anemia.  I suspect that some of her recent struggles with mood might be attributable to anemia.  I do not believe this is dietary.  Today's visit was done via video and so additional iron studies were not feasible but we would consider in the future when she is in clinic.    38 minutes spent on the date of the encounter doing chart review, history and exam, documentation and further activities as noted above    No follow-ups on file.    René Jack MD  _______________________________    Chief Complaint   Patient  "presents with     Follow-up     ED abdominal pain  ( please send text to her phone)      Anxiety     Subjective: Socorro Oakes is a 55 y.o. year old female who returns to clinic for the following chronic complaints/concerns:     Anemia / abdominal pain:   - she was at the ED because of a panic attack. \"I thought I was going to die.\"  \"Bad couple of months.\"  Partial hospitalization program started today.  Mental health: she expresses some optimism that things will go okay.     - she is surprised that she had a bladder infection.  She started the antibiotics.    - she says that she is not a vegetarian.  She eats meat/protein.  Taste: \" I don't have taste buds.\"  Food does not taste good.  Weight has been stable.    - she struggles with heart burn.  She has seen her clinic in the past \"trying to figure out.\"  She says that she wakes up with a sense of gagging.  She has been told that she has allergies.  She has taken lansoprazole.  She is now on famotidine.      - no black or tarry stools.    Review of systems is negative except for as shown in the HPI.    The following portions of the patient's history were reviewed and updated as appropriate: allergies, current medications, past medical history and problem list.    Objective:    vitals were not taken for this visit.   Physical Exam  Constitutional:       General: She is not in acute distress.     Appearance: Normal appearance.   HENT:      Head: Normocephalic and atraumatic.   Eyes:      General: No scleral icterus.     Conjunctiva/sclera: Conjunctivae normal.   Pulmonary:      Effort: Pulmonary effort is normal.   Skin:     Findings: No rash.   Neurological:      General: No focal deficit present.      Mental Status: She is alert and oriented to person, place, and time.   Psychiatric:         Mood and Affect: Mood is anxious.         Behavior: Behavior normal.         PHQ-9 Total Score: 7 (3/8/2021  1:18 PM)    LULY 7 Total Score: 21 (3/8/2021  1:00 PM)    No data " recorded  No data recorded    No results found for this or any previous visit (from the past 24 hour(s)).  Results from her emergency department visit on 2/26 and her gastroenterology consultation last week were reviewed as part of today's visit.  Her hemoglobin was measured at 8.8 with hematocrit of 30 and MCV of 70.  Looking back at past labs, she has been consistently anemic but never below 10.1 other iron studies not completed.  She was seen in primary care by a provider she had not previously met.  The patient states that she left confused as it was recommended that she proceed with a colonoscopy and not upper endoscopy.    This note has been dictated using voice recognition software. Any grammatical or context distortions are unintentional and inherent to the software

## 2021-06-15 NOTE — TELEPHONE ENCOUNTER
Called pt she is having no symptoms at this time and does not wish to be re-tested. She will reach out to us if she has any other concerns/questions.    Leia Jimenez LPN

## 2021-06-15 NOTE — TELEPHONE ENCOUNTER
"The patient requested orders for a urinalysis to confirm clearance of bladder infection.  From the request:    \"My mental healthcare providers would like to have another urine test to make sure the UTI is gone.   They made the decision to have me dropped from the PHP to focus on my physical health instead right now.  Thank you\"    Does this patient have a history of recurrent urinary tract infections that required multiple courses of antibiotics?  In general, it is not necessary to confirm clearance of a urinary tract infection.  Certainly, if she has symptoms we can proceed and check for a urinary tract infection.    What does PHP mean?  Is this her intensive outpatient mental health therapy?    "

## 2021-06-15 NOTE — TELEPHONE ENCOUNTER
Spoke with Socorro. She would like our surgeons to do her EGD. We have her tentatively scheduled on March 25th with DR. Meier. She is aware that this procedure date/time is not confirmed. We will send her a confirmation message to her MyChart as soon as can. Waiting for surgeons orders before we can schedule.    Veronica ELKINS  Surgery Scheduler  [facility]  Surgery [facility]  Direct line: [phone]   Main Line: [phone]  Direct Fax: [phone]

## 2021-06-16 ENCOUNTER — OFFICE VISIT (OUTPATIENT)
Dept: FAMILY MEDICINE | Facility: CLINIC | Age: 56
End: 2021-06-16
Payer: COMMERCIAL

## 2021-06-16 ENCOUNTER — VIRTUAL VISIT (OUTPATIENT)
Dept: GASTROENTEROLOGY | Facility: CLINIC | Age: 56
End: 2021-06-16
Attending: FAMILY MEDICINE
Payer: COMMERCIAL

## 2021-06-16 VITALS
DIASTOLIC BLOOD PRESSURE: 78 MMHG | WEIGHT: 188 LBS | BODY MASS INDEX: 32.1 KG/M2 | HEART RATE: 72 BPM | HEIGHT: 64 IN | RESPIRATION RATE: 16 BRPM | SYSTOLIC BLOOD PRESSURE: 132 MMHG | TEMPERATURE: 97.3 F

## 2021-06-16 DIAGNOSIS — R93.5 ABNORMAL CT OF THE ABDOMEN: ICD-10-CM

## 2021-06-16 DIAGNOSIS — J30.1 ALLERGIC RHINITIS DUE TO POLLEN, UNSPECIFIED SEASONALITY: ICD-10-CM

## 2021-06-16 DIAGNOSIS — K21.9 GASTROESOPHAGEAL REFLUX DISEASE WITHOUT ESOPHAGITIS: ICD-10-CM

## 2021-06-16 DIAGNOSIS — Z09 HOSPITAL DISCHARGE FOLLOW-UP: Primary | ICD-10-CM

## 2021-06-16 DIAGNOSIS — K58.0 IRRITABLE BOWEL SYNDROME WITH DIARRHEA: Primary | ICD-10-CM

## 2021-06-16 DIAGNOSIS — R43.9 SMELL OR TASTE SENSATION DISTURBANCE: ICD-10-CM

## 2021-06-16 DIAGNOSIS — K52.9 COLITIS: ICD-10-CM

## 2021-06-16 PROCEDURE — 99214 OFFICE O/P EST MOD 30 MIN: CPT | Performed by: FAMILY MEDICINE

## 2021-06-16 PROCEDURE — 99204 OFFICE O/P NEW MOD 45 MIN: CPT | Mod: 95 | Performed by: INTERNAL MEDICINE

## 2021-06-16 RX ORDER — METFORMIN HCL 500 MG
TABLET, EXTENDED RELEASE 24 HR ORAL
COMMUNITY
Start: 2021-04-26 | End: 2021-08-26

## 2021-06-16 RX ORDER — CIPROFLOXACIN 500 MG/1
TABLET, FILM COATED ORAL
COMMUNITY
Start: 2021-06-12 | End: 2022-08-30

## 2021-06-16 RX ORDER — METRONIDAZOLE 500 MG/1
TABLET ORAL
COMMUNITY
Start: 2021-06-12 | End: 2022-08-30

## 2021-06-16 ASSESSMENT — MIFFLIN-ST. JEOR: SCORE: 1427.76

## 2021-06-16 NOTE — PATIENT INSTRUCTIONS
Please have stool studies done. If your diarrhea resolves before you have this, you don't need to turn them in    Please get a gastric emptying scan.

## 2021-06-16 NOTE — ANESTHESIA CARE TRANSFER NOTE
Last vitals:   Vitals:    03/25/21 1235   BP: (P) 113/56   Pulse: (P) 83   Resp: (P) 16   Temp: (P) 36.8  C (98.3  F)   SpO2: (P) 96%     Patient's level of consciousness is drowsy  Spontaneous respirations: yes  Maintains airway independently: yes  Dentition unchanged: yes  Oropharynx: oropharynx clear of all foreign objects    QCDR Measures:  ASA# 20 - Surgical Safety Checklist: WHO surgical safety checklist completed prior to induction    PQRS# 430 - Adult PONV Prevention: 4558F - Pt received => 2 anti-emetic agents (different classes) preop & intraop  ASA# 8 - Peds PONV Prevention: NA - Not pediatric patient, not GA or 2 or more risk factors NOT present  PQRS# 424 - Amber-op Temp Management: 4559F - At least one body temp DOCUMENTED => 35.5C or 95.9F within required timeframe  PQRS# 426 - PACU Transfer Protocol: - Transfer of care checklist used  ASA# 14 - Acute Post-op Pain: ASA14B - Patient did NOT experience pain >= 7 out of 10

## 2021-06-16 NOTE — PROGRESS NOTES
HPI:    Socorro presents today for a video visit to discuss multiple GI symptoms.  Main complaint which has been going on for the last year is a bad taste - said food just doesn't taste like how it did in the past which causes her not to want to eat.  Also reports frequent gagging.  Does get full quickly and frequently throws away most of her food. Has been losing weight with this. Was not ill at all around the time these symptoms began.  No changes in medications (although is currently on flagyl).    Additionally - last Friday she developed LLQ pain and bloody diarrhea - no sick contacts or anything different that she ate that she can pinpoint.  Was seen in the ER and had a CT which showed L sided colitis.  Was given cipro/flagyl.  Pain has improved and no more bleeding but continues to have watery diarrhea.    Past Medical History:   Diagnosis Date     Arthritis      Cellulitis and abscess of unspecified site      Depressive disorder 01/01/2001     Disturbance of skin sensation     hand & foot      Dizziness and giddiness      Drug-seeking behavior      Essential hypertension, benign      GERD (gastroesophageal reflux disease)      MEDICAL HISTORY OF - 2006,2007,2008,2009    multiple psych. hopitalizations     Moderate major depression (H) 3/6/2012     Other convulsions     secondary to head injury     Restless leg syndrome      Schizophrenia (H) 3/6/2012    Follow with psychiatry       Sleep apnea      Unspecified hereditary and idiopathic peripheral neuropathy      Variants of migraine, not elsewhere classified, without mention of intractable migraine without mention of status migrainosus        Past Surgical History:   Procedure Laterality Date     CHOLECYSTECTOMY, LAPOROSCOPIC  4-30-09     COLONOSCOPY N/A 6/5/2015    Procedure: COLONOSCOPY;  Surgeon: Robe Delgado MD;  Location: [facility]     INJECT EPIDURAL LUMBAR  7/11/2011    Procedure:INJECT EPIDURAL LUMBAR; BRIE with Flouro--;  Surgeon:GENERIC ANESTHESIA PROVIDER; Location:[facility]     INJECT EPIDURAL LUMBAR  9/28/2011    Procedure:INJECT EPIDURAL LUMBAR; BRIE with Flouro--; Surgeon:GENERIC ANESTHESIA PROVIDER; Location:[facility]     INJECT EPIDURAL LUMBAR  12/12/2011    Procedure:INJECT EPIDURAL LUMBAR; BRIE with Fluoro - Dr. Landry; Surgeon:GENERIC ANESTHESIA PROVIDER; Location:[facility]     KNEE SURGERY      1- 15 yr ago     LAPAROSCOPIC APPENDECTOMY       MANDIBLE SURGERY      6x surgeries from 1983- 1994     ORTHOPEDIC SURGERY  01/01/1982    tmj x6 & knee & toe     SURGICAL HISTORY OF -   10/17/05    left knee surgery      SURGICAL HISTORY OF -   1982,1983,1984x2,1993,    TMJ Surgery x5     SURGICAL HISTORY OF -       ENT Tubes       SURGICAL HISTORY OF -   1996    toe     TONSILLECTOMY & ADENOIDECTOMY         Family History   Problem Relation Age of Onset     Arthritis Mother         rheumatoid/had knee replacement     Bipolar Disorder Mother      Migraines Mother      Other Cancer Mother         lymphoma     Depression Father         comitted suicide age 48     Suicide Father      Mental Illness Father      Alcohol/Drug Brother      Anxiety Disorder Brother      Mental Illness Brother      Depression Brother      Allergies Sister      Migraines Sister      Anxiety Disorder Maternal Grandfather      Bipolar Disorder Daughter      Migraines Brother      Substance Abuse Brother      Migraines Sister      Migraines Brother      Migraines Sister      Anesthesia Reaction Other      Cerebrovascular Disease No family hx of        Social History     Tobacco Use     Smoking status: Never Smoker     Smokeless tobacco: Never Used   Substance Use Topics     Alcohol use: No     Alcohol/week: 0.0 standard drinks        O:    Gen: no acute distress  HEENT: NCAT  Neck: normal ROM  Resp: nonlabored breathing  Neuro: no gross deficits  Psych: appropriate mood and affect    c-scope 5/2021 it Dr. Meier:  Normal    EGD 3/2021 - retained food but  otherwise normal      Assessment and Plan:    # early satiety, altered taste sensation - recent B12 normal.  EGD in March with retained food but otherwise unremarkable.  Did discuss that adderall could be contributing and patient reports this is already being weaned.  Will also get gastric emptying scan for further evaluation    # LLQ pain, bloody diarrhea - sudden onset and improving rapidly - likely infectious vs ischemic etiology. If diarrhea persists, will get stool studies for further evaluation.  For now - advised to complete course of antibiotics.  No need for repeat endoscopic evaluation given normal c-scope last month unless she has persistent symptoms.         RTC 3 months    Afsaneh Horowitz DO     Video-Visit Details     Type of service:  Video Visit     Video Start Time: 2:05 PM  Video End Time (time video stopped): 2:24 PM    Originating Location (pt. Location): home     Distant Location (provider location):  [facility]      Mode of Communication:  Video Conference via [employer]

## 2021-06-16 NOTE — ANESTHESIA PREPROCEDURE EVALUATION
Anesthesia Evaluation      Patient summary reviewed   History of anesthetic complications (PONV)     Airway   Mallampati: III  Neck ROM: full   Pulmonary - normal exam   (+) sleep apnea on CPAP, ,                          Cardiovascular - normal exam  Exercise tolerance: > or = 4 METS  (+) hypertension, ,     ECG reviewed        Neuro/Psych    (+) seizures (last seizure years ago, on no medication now.), depression, anxiety/panic attacks,     Comments: Schizophrenia  TBI    Endo/Other    (+) obesity,      GI/Hepatic/Renal    (+) GERD well controlled,             Dental    (+) upper dentures                       Anesthesia Plan  Planned anesthetic: MAC    ASA 3     Anesthetic plan and risks discussed with: patient    Post-op plan: routine recovery

## 2021-06-16 NOTE — ANESTHESIA POSTPROCEDURE EVALUATION
Patient: Socorro Oakes  Procedure(s):  ESOPHAGOGASTRODUODENOSCOPY (EGD)  Anesthesia type: MAC    Patient location: Phase II Recovery  Last vitals:   Vitals Value Taken Time   /59 03/25/21 1245   Temp 36.8  C (98.3  F) 03/25/21 1235   Pulse 85 03/25/21 1254   Resp 16 03/25/21 1235   SpO2 96 % 03/25/21 1254   Vitals shown include unvalidated device data.  Post vital signs: stable  Level of consciousness: awake and responds to simple questions  Post-anesthesia pain: pain controlled  Post-anesthesia nausea and vomiting: no  Pulmonary: unassisted, return to baseline  Cardiovascular: stable and blood pressure at baseline  Hydration: adequate  Anesthetic events: no    QCDR Measures:  ASA# 11 - Amber-op Cardiac Arrest: ASA11B - Patient did NOT experience unanticipated cardiac arrest  ASA# 12 - Amber-op Mortality Rate: ASA12B - Patient did NOT die  ASA# 13 - PACU Re-Intubation Rate: NA - No ETT / LMA used for case  ASA# 10 - Composite Anes Safety: ASA10A - No serious adverse event    Additional Notes:

## 2021-06-16 NOTE — PROGRESS NOTES
Socorro is a 55 year old who is being evaluated via a billable video visit.      How would you like to obtain your AVS? MyChart  If the video visit is dropped, the invitation should be resent by: Send to e-mail at: [email or web address]   Will anyone else be joining your video visit? No      Video Start Time:   Video-Visit Details    Type of service:  Video Visit    Video End Time:    Originating Location (pt. Location):     Distant Location (provider location):  [facility]     Platform used for Video Visit:   Nestor Rodríguez CMA

## 2021-06-17 NOTE — PROGRESS NOTES
Assessment/Plan:    Post Discharge Medication Reconciliation Status: discharge medications reconciled, continue medications without change.        Socorro Oakes is a 55 year old female presenting for:    1. Hospital discharge follow-up  Patient does seem to be slowly improving.  She will continue the antibiotics.  She will continue to slowly increase her diet    Reviewed laboratory testing hospital.  Only thing abnormal was slightly increased white blood cell count.  Discussed retesting today but I do not think it is necessary given that she is feeling improved.    2. Colitis    3. Allergic rhinitis due to pollen, unspecified seasonality  Referral to ENT  - OTOLARYNGOLOGY REFERRAL    4. Smell or taste sensation disturbance  Referral to ENT per GI  - OTOLARYNGOLOGY REFERRAL        Medications Discontinued During This Encounter   Medication Reason     meclizine (ANTIVERT) 12.5 MG tablet      prochlorperazine (COMPAZINE) 10 MG tablet      ferrous sulfate (FEROSUL) 325 (65 Fe) MG tablet      PARoxetine (PAXIL) 20 MG tablet            Chief Complaint:  Hospital F/U        Subjective:   Socorro Oakes is a pleasant 55-year-old female presenting to the clinic today for hospital discharge follow-up.    She was admitted to [facility] from 6 11-6 13.  She went into the hospital due to abdominal pain and bloody diarrhea.  CT scan showed nonspecific colitis.  She was started on antibiotics in the hospital.  She slowly improved.    She is continue to take the antibiotics.  She continues to have diarrhea about 4 times daily but does state that it is improved from previous.  No further blood in stool.    She otherwise really has no specific questions or concerns today.  She does note that she has not really been able to eat anything.  She had some toast which caused some abdominal pain.  She is not having any nausea.  She had some applesauce which went okay.  She plans on trying some soup this evening.    The patient also  notes that she has a altered sense of taste.  This has been going on for several years.  She saw the GI physician who recommended she see ENT.  She will be working with GI for intermittent abdominal pain and potentially slow gastric emptying.    12 point review of systems completed and negative except for what has been described above    History   Smoking Status     Never Smoker   Smokeless Tobacco     Never Used         Current Outpatient Medications:      ALPRAZolam (XANAX) 1 MG tablet, Take 1 mg by mouth 2 times daily as needed for anxiety, Disp: , Rfl:      amphetamine-dextroamphetamine (ADDERALL XR) 20 MG 24 hr capsule, Take 20 mg by mouth, Disp: , Rfl:      amphetamine-dextroamphetamine (ADDERALL) 30 MG tablet, Take 30 mg by mouth 2 times daily, Disp: , Rfl:      buPROPion (WELLBUTRIN XL) 150 MG 24 hr tablet, , Disp: , Rfl:      ciprofloxacin (CIPRO) 500 MG tablet, , Disp: , Rfl:      DENTA 5000 PLUS 1.1 % CREA, , Disp: , Rfl:      doxepin (SINEQUAN) 25 MG capsule, , Disp: , Rfl:      DoxePIN (SINEQUAN) 75 MG capsule, Take 75 mg by mouth At Bedtime, Disp: , Rfl:      famotidine (PEPCID) 10 MG tablet, Take 1 tablet (10 mg) by mouth 2 times daily as needed (reflux), Disp: 20 tablet, Rfl: 0     lamoTRIgine (LAMICTAL) 200 MG tablet, , Disp: , Rfl:      lamoTRIgine (LAMICTAL) 25 MG tablet, , Disp: , Rfl:      LANsoprazole (PREVACID) 30 MG DR capsule, Take 30 mg by mouth, Disp: , Rfl:      levocetirizine (XYZAL) 5 MG tablet, Take 1 tablet (5 mg) by mouth every evening, Disp: 30 tablet, Rfl: 3     metFORMIN (GLUCOPHAGE-XR) 500 MG 24 hr tablet, , Disp: , Rfl:      metroNIDAZOLE (FLAGYL) 500 MG tablet, , Disp: , Rfl:      multivitamin, therapeutic with minerals (THERA-VIT-M) TABS tablet, Take 1 tablet by mouth, Disp: , Rfl:      pramipexole (MIRAPEX) 0.25 MG tablet, Take 1 tablet (0.25 mg) by mouth At Bedtime, Disp: 90 tablet, Rfl: 1     sodium fluoride dental gel (PREVIDENT) 1.1 % GEL topical gel, Apply 1 Application  "to affected area, Disp: , Rfl:      tiZANidine (ZANAFLEX) 4 MG capsule, , Disp: , Rfl:      traMADol (ULTRAM) 50 MG tablet, Take 50 mg by mouth, Disp: , Rfl:      zolpidem ER (AMBIEN CR) 12.5 MG CR tablet, Take 12.5 mg by mouth, Disp: , Rfl:         Objective:  Vitals:    06/16/21 1522   BP: 132/78   Pulse: 72   Resp: 16   Temp: 97.3  F (36.3  C)   TempSrc: Tympanic   Weight: 85.3 kg (188 lb)   Height: 1.626 m (5' 4\")       Body mass index is 32.27 kg/m .    Vital signs reviewed and stable  General: No acute distress  Psych: Appropriate affect  HEENT: moist mucous membranes, pupils equal, round, reactive to light and accomodation, tympanic membranes are pearly grey bilaterally  Lymph: no cervical or supraclavicular lymphadenopathy  Cardiovascular: regular rate and rhythm with no murmur  Pulmonary: clear to auscultation bilaterally with no wheeze  Abdomen: soft, non tender, non distended with normo-active bowel sounds  Extremities: warm and well perfused with no edema  Skin: warm and dry with no rash         This note has been dictated and transcribed using voice recognition software.   Any errors in transcription are unintentional and inherent to the software.    "

## 2021-06-17 NOTE — ANESTHESIA POSTPROCEDURE EVALUATION
Patient: Socorro Oakes  Procedure(s):  COLONOSCOPY  Anesthesia type: MAC    Patient location: Phase II Recovery  Last vitals:   Vitals Value Taken Time   /58 05/11/21 1105   Temp  05/11/21 1132   Pulse 71 05/11/21 1125   Resp  05/11/21 1132   SpO2 97 % 05/11/21 1125   Vitals shown include unvalidated device data.  Post vital signs: stable  Level of consciousness: awake and responds to simple questions  Post-anesthesia pain: pain controlled  Post-anesthesia nausea and vomiting: no  Pulmonary: unassisted, return to baseline  Cardiovascular: stable and blood pressure at baseline  Hydration: adequate  Anesthetic events: no    QCDR Measures:  ASA# 11 - Amber-op Cardiac Arrest: ASA11B - Patient did NOT experience unanticipated cardiac arrest  ASA# 12 - Amber-op Mortality Rate: ASA12B - Patient did NOT die  ASA# 13 - PACU Re-Intubation Rate: ASA13B - Patient did NOT require a new airway mgmt  ASA# 10 - Composite Anes Safety: ASA10A - No serious adverse event    Additional Notes:

## 2021-06-17 NOTE — ANESTHESIA PREPROCEDURE EVALUATION
Anesthesia Evaluation      Patient summary reviewed   History of anesthetic complications (PONV)     Airway   Mallampati: IV  Neck ROM: full  Comment: < TM  Small mouth opening   Pulmonary - normal exam   (+) sleep apnea on CPAP, moderate,                          Cardiovascular - normal exam  Exercise tolerance: > or = 4 METS  (+) hypertension, ,     (-) angina  ECG reviewed        Neuro/Psych    (+) seizures (last seizure years ago, on no medication now.), depression, anxiety/panic attacks,     Comments: Schizophrenia  TBI    Endo/Other    (+) obesity,      GI/Hepatic/Renal    (+) GERD,        Other findings:     NPO > 8 hrs        Dental    (+) upper dentures                         Anesthesia Plan  Planned anesthetic: MAC      Propofol gtt  Robinul  Lidocaine  Zofran, decadron 4 mg  LMA prn      ASA 3     Anesthetic plan and risks discussed with: patient  Anesthesia plan special considerations: antiemetics,   Post-op plan: routine recovery

## 2021-06-17 NOTE — ANESTHESIA CARE TRANSFER NOTE
Last vitals:   Vitals:    05/11/21 1100   BP: (P) 126/58   Pulse: 80   Resp: (P) 14   Temp: (P) 36.4  C (97.6  F)   SpO2: (P) 99%     Pt brought to phase 2 on 6L O2. Monitors applied. VSS.    Patient's level of consciousness is drowsy  Spontaneous respirations: yes  Maintains airway independently: yes  Dentition unchanged: yes  Oropharynx: oropharynx clear of all foreign objects    QCDR Measures:  ASA# 20 - Surgical Safety Checklist: WHO surgical safety checklist completed prior to induction    PQRS# 430 - Adult PONV Prevention: 4558F - Pt received => 2 anti-emetic agents (different classes) preop & intraop  ASA# 8 - Peds PONV Prevention: NA - Not pediatric patient, not GA or 2 or more risk factors NOT present  PQRS# 424 - Amber-op Temp Management: NA - MAC anesthesia or case < 60 minutes  PQRS# 426 - PACU Transfer Protocol: - Transfer of care checklist used  ASA# 14 - Acute Post-op Pain: ASA14B - Patient did NOT experience pain >= 7 out of 10

## 2021-06-18 NOTE — PATIENT INSTRUCTIONS - HE
Patient Instructions by René Jack MD at 5/7/2020  2:00 PM     Author: René Jack MD Service: -- Author Type: Physician    Filed: 5/7/2020  2:35 PM Encounter Date: 5/7/2020 Status: Signed    : René Jack MD (Physician)

## 2021-06-21 NOTE — LETTER
Letter by Carey Low RN at      Author: Carey Low RN Service: -- Author Type: --    Filed:  Encounter Date: 3/29/2021 Status: (Other)       3/29/2021    Socorro Oakes  [address] [place] [id number]    Re: Recent Endocopy    Dear Socorro Oakes.    We are writing with results from your recent colonoscopy which showed:     Your biopsy results from your upper endoscopy show some minimal inflammation of the biopsies taken from the stomach, but no significant concerns were identified with these biopsies.  The biopsies from your esophagus did not show significant troubles either. There is evidence of reflux disease, but otherwise the biopsies do not show any cancer or any precancer or any concerns of that nature.     We sincerely appreciate the opportunity to provide your care. If you have any questions please feel free to contact us at [phone].    Sincerely,     Davi Meier MD

## 2021-06-23 DIAGNOSIS — K21.9 GASTROESOPHAGEAL REFLUX DISEASE WITHOUT ESOPHAGITIS: ICD-10-CM

## 2021-06-23 DIAGNOSIS — K58.0 IRRITABLE BOWEL SYNDROME WITH DIARRHEA: ICD-10-CM

## 2021-06-23 DIAGNOSIS — R93.5 ABNORMAL CT OF THE ABDOMEN: ICD-10-CM

## 2021-06-23 LAB
C DIFF TOX B STL QL: NEGATIVE
SPECIMEN SOURCE: NORMAL

## 2021-06-23 PROCEDURE — 87209 SMEAR COMPLEX STAIN: CPT | Performed by: INTERNAL MEDICINE

## 2021-06-23 PROCEDURE — 87493 C DIFF AMPLIFIED PROBE: CPT | Mod: 59 | Performed by: INTERNAL MEDICINE

## 2021-06-23 PROCEDURE — 87506 IADNA-DNA/RNA PROBE TQ 6-11: CPT | Performed by: INTERNAL MEDICINE

## 2021-06-23 PROCEDURE — 83993 ASSAY FOR CALPROTECTIN FECAL: CPT | Performed by: INTERNAL MEDICINE

## 2021-06-23 PROCEDURE — 87177 OVA AND PARASITES SMEARS: CPT | Performed by: INTERNAL MEDICINE

## 2021-06-24 LAB
C COLI+JEJUNI+LARI FUSA STL QL NAA+PROBE: NOT DETECTED
EC STX1 GENE STL QL NAA+PROBE: NOT DETECTED
EC STX2 GENE STL QL NAA+PROBE: NOT DETECTED
ENTERIC PATHOGEN COMMENT: NORMAL
NOROV GI+II ORF1-ORF2 JNC STL QL NAA+PR: NOT DETECTED
O+P STL MICRO: NORMAL
O+P STL MICRO: NORMAL
RVA NSP5 STL QL NAA+PROBE: NOT DETECTED
SALMONELLA SP RPOD STL QL NAA+PROBE: NOT DETECTED
SHIGELLA SP+EIEC IPAH STL QL NAA+PROBE: NOT DETECTED
SPECIMEN SOURCE: NORMAL
V CHOL+PARA RFBL+TRKH+TNAA STL QL NAA+PR: NOT DETECTED
Y ENTERO RECN STL QL NAA+PROBE: NOT DETECTED

## 2021-06-25 LAB — CALPROTECTIN STL-MCNT: 49.2 MG/KG (ref 0–49.9)

## 2021-06-25 NOTE — ED TRIAGE NOTES
Pt ate hutchison and eggs at three am this morning and started having abdominal pain at four am. Two hours ago, pt started having bloody diarrhea, although she states it was pure liquid and is unsure if there was any stool present.

## 2021-06-26 NOTE — ED PROVIDER NOTES
EMERGENCY DEPARTMENT ENCOUnter      NAME: Socorro Oakes  AGE: 55 y.o. female  YOB: 1965  MRN: [id number]  EVALUATION DATE & TIME: 6/11/2021  9:17 PM    PCP: Neelima Villatoro MD    ED PROVIDER: Andria Young M.D.      FINAL IMPRESSION:  1. Colitis    2. Gastrointestinal hemorrhage, unspecified gastrointestinal hemorrhage type          ED COURSE & MEDICAL DECISION MAKING:    Patient seen and examined.  Prior history and records reviewed.    ED Course as of Jun 12 0034 Fri Jun 11, 2021 2126 Afebrile.  Vital signs are unremarkable.  Patient is coming in with abdominal pain as well as bloody diarrhea.  No recent trips or travel.  No recent antibiotic usage.  No extensive NSAID usage.  No pain prior to today.  Pain is just generalized across her abdomen.  Not associated with nausea.  No radiation to her back.  Had one episode of bright red liquid blood without blood clots.  No change in the pain with bowel movement.Physical exam for patient here fairly unremarkable with the exception she does have mild diffuse tenderness to palpation across her abdomen without any focality but without rebound or guarding.  Rectal exam shows multiple external hemorrhoids, no internal hemorrhoids palpated with bright red mucousy blood in rectal vault.Certainly concern here for rectal bleed, patient is not on any blood thinner medications.  She is not symptomatic with this in terms that she is not dizzy or lightheaded.  Vital signs are stable without evidence of tachycardia.  She did have a colonoscopy performed 5/11 that was unremarkable.  We will CT scan her belly to evaluate for signs of infection.  Check labs, give fluids, pain control nausea control.    []   6705 Patient's occult blood here came back negative however I did rectal exam for patient and she does have red mucousy stool in rectal vault.    [JH]      ED Course User Index  [JH] Andria Young MD     Patient's labs back here unremarkable. Her CT  scan does show some colitis. She has not had any other episodes of bloody diarrhea since she has been here. Secondary to this I feel we will discharge her on antibiotics and discharge her. Pain is better controlled. Discussed return precautions with patient. She was in agreement with plan.      9:17 PM Met with patient for initial interview and exam. Discussed initial plan for care for their stay in the emergency department.   12:33 AM I updated patient on results. We discussed plans for discharge including supportive cares, symptomatic treatment, outpatient follow up, and reasons to return to the emergency department.    At the conclusion of the encounter I discussed the results of all of the tests and the disposition. The questions were answered. The patient or family acknowledged understanding and was agreeable with the care plan.     PROCEDURES:       MEDICATIONS GIVEN IN THE EMERGENCY:  Medications   pantoprazole 40 mg injection (40 mg Intravenous Given 6/11/21 2232)   sodium chloride 0.9% 1,000 mL (0 mL Intravenous Stopped 6/12/21 0025)   morphine injection 4 mg (4 mg Intravenous Given 6/11/21 2226)   ondansetron injection 4 mg (ZOFRAN) (4 mg Intravenous Given 6/11/21 2223)   iopamidol solution 100 mL (ISOVUE-370) (100 mL Intravenous Given 6/11/21 2337)   ciprofloxacin HCl tablet 500 mg (CIPRO) (500 mg Oral Given 6/12/21 0024)   metroNIDAZOLE tablet 500 mg (FLAGYL) (500 mg Oral Given 6/12/21 0024)       NEW PRESCRIPTIONS STARTED AT TODAY'S ER VISIT  Current Discharge Medication List      START taking these medications    Details   ciprofloxacin HCl (CIPRO) 500 MG tablet Take 1 tablet (500 mg total) by mouth 2 (two) times a day for 10 days.  Qty: 20 tablet, Refills: 0    Associated Diagnoses: Colitis; Gastrointestinal hemorrhage, unspecified gastrointestinal hemorrhage type      metroNIDAZOLE (FLAGYL) 500 MG tablet Take 1 tablet (500 mg total) by mouth 2 (two) times a day for 10 days.  Qty: 20 tablet, Refills:  0    Associated Diagnoses: Colitis; Gastrointestinal hemorrhage, unspecified gastrointestinal hemorrhage type         CONTINUE these medications which have NOT CHANGED    Details   ALPRAZolam (XANAX) 1 MG tablet Take 1 mg by mouth 2 (two) times a day as needed for sleep.      buPROPion (WELLBUTRIN XL) 150 MG 24 hr tablet Take 150 mg by mouth daily.      dextroamphetamine-amphetamine (ADDERALL XR) 20 MG 24 hr capsule Take 20 mg by mouth daily. At noon      dextroamphetamine-amphetamine (ADDERALL XR) 30 MG 24 hr capsule Take 30 mg by mouth daily.      doxepin (SINEQUAN) 75 MG capsule Take 75 mg by mouth at bedtime.             famotidine (FOR PEPCID) 10 MG tablet Take 10 mg by mouth 2 (two) times a day.      ferrous sulfate 325 (65 FE) MG tablet Take 1 tablet (325 mg total) by mouth daily with breakfast.  Refills: 0    Associated Diagnoses: Dizziness; Diplopia      fluoride, sodium, (DENTAGEL) 1.1 % Gel dental gel Apply 1 application to teeth daily.      lamoTRIgine (LAMICTAL) 100 MG tablet Take 250 mg by mouth daily.      lansoprazole (PREVACID) 30 MG capsule Take 30 mg by mouth at bedtime.       levocetirizine (XYZAL) 5 MG tablet Take 5 mg by mouth every evening.      meclizine (ANTIVERT) 12.5 mg tablet Take 1 tablet (12.5 mg total) by mouth 3 (three) times a day as needed for dizziness.  Qty: 30 tablet, Refills: 0    Associated Diagnoses: Vertigo      metFORMIN (GLUCOPHAGE-XR) 500 MG 24 hr tablet TAKE 1 TABLET DAILY  Qty: 90 tablet, Refills: 1    Associated Diagnoses: Class 3 severe obesity due to excess calories with serious comorbidity and body mass index (BMI) of 40.0 to 44.9 in adult (H); Metabolic syndrome X; Prediabetes      multivitamin with minerals (THERA-M) 9 mg iron-400 mcg Tab tablet Take 1 tablet by mouth daily.  Qty: 90 tablet, Refills: 0    Associated Diagnoses: Diplopia      pramipexole (MIRAPEX) 0.25 MG tablet Take 0.25 mg by mouth at bedtime.             prochlorperazine (COMPAZINE) 10 MG tablet  Take 1 tablet (10 mg total) by mouth every 6 (six) hours as needed for nausea.  Qty: 30 tablet, Refills: 0    Associated Diagnoses: Vertigo      tiZANidine (ZANAFLEX) 4 MG tablet Take 4 mg by mouth 3 (three) times a day as needed.      zolpidem (AMBIEN CR) 12.5 MG CR tablet Take 12.5 mg by mouth at bedtime.                 =================================================================    Chief Complaint   Patient presents with     Rectal Bleeding       HPI      Socorro Oakes is a 55 y.o. female who presents for sudden onset, constant diffuse abdominal cramping that started shortly after eating eggs and hutchison at 3 AM this morning (18 hours PTA). She denies any palliating or provoking factors. No associated nausea or vomiting. Additionally, the patient endorses bright red diarrhea that started this afternoon. She does not believe she is passing blood clots. Patient is not anticoagulated. No associated dizziness or lightheadedness. Patient did take Alkaseltzer and used a heating pad without relief of symptoms. She denies history of stomach ulcers. Patient does not take any daily medications.      REVIEW OF SYSTEMS   Review of Systems   Gastrointestinal: Positive for abdominal pain, blood in stool and diarrhea. Negative for nausea and vomiting.   Neurological: Negative for dizziness and light-headedness.   All other systems reviewed and are negative.     See HPI.  All other systems reviewed and negative.    PAST MEDICAL HISTORY:  Past Medical History:   Diagnosis Date     Anemia      Anxiety      Appendicitis      Depression      HLD (hyperlipidemia)      Hypertension      Migraine      Obesity      PONV (postoperative nausea and vomiting)      RLS (restless legs syndrome)      Schizophrenia (H)      Seizures (H)      Sleep apnea      TBI (traumatic brain injury) (H)        PAST SURGICAL HISTORY:  Past Surgical History:   Procedure Laterality Date     APPENDECTOMY       KNEE SURGERY       LAPAROSCOPIC APPENDECTOMY        LAPAROSCOPIC CHOLECYSTECTOMY       MANDIBLE SURGERY       AK COLONOSCOPY FLX DX W/COLLJ SPEC WHEN PFRMD N/A 5/11/2021    Procedure: COLONOSCOPY;  Surgeon: Davi Meier MD;  Location: [facility];  Service: General     AK ESOPHAGOGASTRODUODENOSCOPY TRANSORAL DIAGNOSTIC N/A 03/25/2021    Procedure: ESOPHAGOGASTRODUODENOSCOPY (EGD);  Surgeon: Davi Meier MD;  Location: [facility];  Service: General       CURRENT MEDICATIONS:    No current facility-administered medications on file prior to encounter.      Current Outpatient Medications on File Prior to Encounter   Medication Sig     ALPRAZolam (XANAX) 1 MG tablet Take 1 mg by mouth 2 (two) times a day as needed for sleep.     buPROPion (WELLBUTRIN XL) 150 MG 24 hr tablet Take 150 mg by mouth daily.     dextroamphetamine-amphetamine (ADDERALL XR) 20 MG 24 hr capsule Take 20 mg by mouth daily. At noon     dextroamphetamine-amphetamine (ADDERALL XR) 30 MG 24 hr capsule Take 30 mg by mouth daily.     doxepin (SINEQUAN) 75 MG capsule Take 75 mg by mouth at bedtime.            famotidine (FOR PEPCID) 10 MG tablet Take 10 mg by mouth 2 (two) times a day.     ferrous sulfate 325 (65 FE) MG tablet Take 1 tablet (325 mg total) by mouth daily with breakfast.     fluoride, sodium, (DENTAGEL) 1.1 % Gel dental gel Apply 1 application to teeth daily.     lamoTRIgine (LAMICTAL) 100 MG tablet Take 250 mg by mouth daily.     lansoprazole (PREVACID) 30 MG capsule Take 30 mg by mouth at bedtime.      levocetirizine (XYZAL) 5 MG tablet Take 5 mg by mouth every evening.     meclizine (ANTIVERT) 12.5 mg tablet Take 1 tablet (12.5 mg total) by mouth 3 (three) times a day as needed for dizziness.     metFORMIN (GLUCOPHAGE-XR) 500 MG 24 hr tablet TAKE 1 TABLET DAILY     multivitamin with minerals (THERA-M) 9 mg iron-400 mcg Tab tablet Take 1 tablet by mouth daily.     pramipexole (MIRAPEX) 0.25 MG tablet Take 0.25 mg by mouth at bedtime.            prochlorperazine  "(COMPAZINE) 10 MG tablet Take 1 tablet (10 mg total) by mouth every 6 (six) hours as needed for nausea.     tiZANidine (ZANAFLEX) 4 MG tablet Take 4 mg by mouth 3 (three) times a day as needed.     zolpidem (AMBIEN CR) 12.5 MG CR tablet Take 12.5 mg by mouth at bedtime.        ALLERGIES:  Allergies   Allergen Reactions     Black Dye Angioedema and Itching     Had written word in her planner of allergy to \"p-phenylenediamine\" that pt said is black dye     Linezolid Other (See Comments)     Zyvox, was told after surgery that she was allergic to this     Other Environmental Allergy      Black dye from knee brace caused skin breakdown.     Trileptal [Oxcarbazepine]      1,4-Diaminobenzene Itching, Rash and Swelling     Black dye     Latex Rash       FAMILY HISTORY:  Family History   Problem Relation Age of Onset     Rheum arthritis Mother      Depression Mother      Suicidality Father      Depression Father      Depression Sister      Down syndrome Brother      Depression Brother      Depression Sister      Depression Sister      Depression Brother      Depression Brother      Depression Brother      Bipolar disorder Daughter        SOCIAL HISTORY:   Social History     Socioeconomic History     Marital status:      Spouse name: Not on file     Number of children: Not on file     Years of education: Not on file     Highest education level: Not on file   Occupational History     Not on file   Social Needs     Financial resource strain: Not on file     Food insecurity     Worry: Not on file     Inability: Not on file     Transportation needs     Medical: Not on file     Non-medical: Not on file   Tobacco Use     Smoking status: Never Smoker     Smokeless tobacco: Never Used   Substance and Sexual Activity     Alcohol use: Not Currently     Drug use: Not Currently     Sexual activity: Not on file   Lifestyle     Physical activity     Days per week: Not on file     Minutes per session: Not on file     Stress: Not on " "file   Relationships     Social connections     Talks on phone: Not on file     Gets together: Not on file     Attends Oriental orthodox service: Not on file     Active member of club or organization: Not on file     Attends meetings of clubs or organizations: Not on file     Relationship status: Not on file     Intimate partner violence     Fear of current or ex partner: Not on file     Emotionally abused: Not on file     Physically abused: Not on file     Forced sexual activity: Not on file   Other Topics Concern     Not on file   Social History Narrative    Patient does not use any assistive device for ambulation. Patient is not on supplemental O2 at home. Patient would like to be full code.        VITALS:  Patient Vitals for the past 24 hrs:   BP Temp Temp src Pulse Resp SpO2 Height Weight   06/11/21 2111 158/89 98.3  F (36.8  C) Temporal 88 16 97 % 5' 4\" (1.626 m) 190 lb (86.2 kg)       PHYSICAL EXAM    VITAL SIGNS: /89 (Patient Position: Sitting)   Pulse 88   Temp 98.3  F (36.8  C) (Temporal)   Resp 16   Ht 5' 4\" (1.626 m)   Wt 190 lb (86.2 kg)   LMP 09/07/2019   SpO2 97%   BMI 32.61 kg/m     Constitutional:  Well developed, well nourished, no acute distress   EYES: Conjunctivae clear  HENT:  Atraumatic, external ears normal, nose normal, oropharynx moist. Neck- supple   Respiratory:  No respiratory distress, normal breath sounds, no rales, no wheezing   Cardiovascular:  Normal rate, normal rhythm, no murmurs, no gallops, no rubs.  No chest tenderness  GI:  Soft, nondistended, normal bowel sounds, mild diffuse tenderness to palpation across her abdomen without any focality, no organomegaly, no masses, no rebound, no guarding   : No CVA tenderness  Rectal: Multiple external hemorrhoids, no internal hemorrhoids palpated with bright red mucousy blood in rectal vault.  Musculoskeletal:  No edema.  Good range of motion in all major joints. No tenderness to palpation or major deformities noted.  "   Integument: Warm, Dry, No erythema, No rash.   Neurologic:  Alert & oriented x 3, no focal deficits noted, ambulatory  Psych: Affect normal, Judgment normal, Mood normal.     LAB:  All pertinent labs reviewed and interpreted.  Results for orders placed or performed during the hospital encounter of 06/11/21   Comprehensive Metabolic Panel   Result Value Ref Range    Sodium 141 136 - 145 mmol/L    Potassium 3.5 3.5 - 5.0 mmol/L    Chloride 104 98 - 107 mmol/L    CO2 26 22 - 31 mmol/L    Anion Gap, Calculation 11 5 - 18 mmol/L    Glucose 103 70 - 125 mg/dL    BUN 9 8 - 22 mg/dL    Creatinine 0.79 0.60 - 1.10 mg/dL    GFR MDRD Af Amer >60 >60 mL/min/1.73m2    GFR MDRD Non Af Amer >60 >60 mL/min/1.73m2    Bilirubin, Total 0.4 0.0 - 1.0 mg/dL    Calcium 9.3 8.5 - 10.5 mg/dL    Protein, Total 6.7 6.0 - 8.0 g/dL    Albumin 3.9 3.5 - 5.0 g/dL    Alkaline Phosphatase 75 45 - 120 U/L    AST 13 0 - 40 U/L    ALT 13 0 - 45 U/L   Lipase   Result Value Ref Range    Lipase 16 0 - 52 U/L   HM1 (CBC with Diff)   Result Value Ref Range    WBC 15.1 (H) 4.0 - 11.0 thou/uL    RBC 4.85 3.80 - 5.40 mill/uL    Hemoglobin 12.1 12.0 - 16.0 g/dL    Hematocrit 37.9 35.0 - 47.0 %    MCV 78 (L) 80 - 100 fL    MCH 24.9 (L) 27.0 - 34.0 pg    MCHC 31.9 (L) 32.0 - 36.0 g/dL    RDW 19.9 (H) 11.0 - 14.5 %    Platelets 290 140 - 440 thou/uL    MPV 9.0 8.5 - 12.5 fL    Neutrophils % 78 (H) 50 - 70 %    Lymphocytes % 13 (L) 20 - 40 %    Monocytes % 8 2 - 10 %    Eosinophils % 1 0 - 6 %    Basophils % 0 0 - 2 %    Immature Granulocyte % 0 <=0 %    Neutrophils Absolute 11.7 (H) 2.0 - 7.7 thou/uL    Lymphocytes Absolute 2.0 0.8 - 4.4 thou/uL    Monocytes Absolute 1.2 (H) 0.0 - 0.9 thou/uL    Eosinophils Absolute 0.1 0.0 - 0.4 thou/uL    Basophils Absolute 0.0 0.0 - 0.2 thou/uL    Immature Granulocyte Absolute 0.1 (H) <=0.0 thou/uL   POCT occult blood stool   Result Value Ref Range    POC Fecal Occult Bld Negative Negative    Lot Number [id number]     Expiration  Date 11-23     Diluent/Developer Lot Number [id number]     Diluent/Developer Expiration Date 2023-7     Pos Control Valid Control Valid Control    Neg Control Valid Control Valid Control       RADIOLOGY:  Reviewed all pertinent imaging. Please see official radiology report.  Ct Abdomen Pelvis Without Oral With Iv Contrast    Result Date: 6/11/2021  EXAM: CT ABDOMEN PELVIS WO ORAL W IV CONTRAST LOCATION: [facility] DATE/TIME: 6/11/2021 11:46 PM INDICATION: ABD PAIN.  Bloody diarrhea COMPARISON: None. TECHNIQUE: CT scan of the abdomen and pelvis was performed following injection of IV contrast. Multiplanar reformats were obtained. Dose reduction techniques were used. CONTRAST: Iopamidol (Isovue-370) 100mL FINDINGS: LOWER CHEST: Normal. HEPATOBILIARY: Gallbladder is absent. Liver is normal. PANCREAS: Normal. SPLEEN: Normal. ADRENAL GLANDS: Normal. KIDNEYS/BLADDER: Normal kidneys. Distended bladder. BOWEL: There is wall thickening of the descending and proximal sigmoid colon. No bowel obstruction or free air. Appendix not visualized. LYMPH NODES: Normal. VASCULATURE: Scant atherosclerotic calcifications. PELVIC ORGANS: Normal. MUSCULOSKELETAL: L5-S1 degenerative disc height loss.     1.  Findings consistent with a nonspecific colitis primarily affecting the descending colon. 2.  Distended but otherwise unremarkable urinary bladder.      I, Herminia Espinoza, am serving as a scribe to document services personally performed by Dr. Andria Young based on my observation and the provider's statements to me. I, Andria Young MD attest that Herminia Espinoza is acting in a scribe capacity, has observed my performance of the services and has documented them in accordance with my direction.    Andria Young M.D.  Emergency Medicine  [facility] EMERGENCY DEPARTMENT  [address]  [facility] [address]  Dept:  [phone]  Loc: [phone]         Andria Young MD  06/12/21 0116

## 2021-06-30 ENCOUNTER — RECORDS - HEALTHEAST (OUTPATIENT)
Dept: ADMINISTRATIVE | Facility: OTHER | Age: 56
End: 2021-06-30

## 2021-07-01 DIAGNOSIS — J30.9 RHINITIS, ALLERGIC: Primary | ICD-10-CM

## 2021-07-01 PROCEDURE — 36415 COLL VENOUS BLD VENIPUNCTURE: CPT | Performed by: INTERNAL MEDICINE

## 2021-07-01 PROCEDURE — 86003 ALLG SPEC IGE CRUDE XTRC EA: CPT | Performed by: INTERNAL MEDICINE

## 2021-07-02 LAB
CAT DANDER IGG QN: <0.1 KU(A)/L
D FARINAE IGE QN: <0.1 KU(A)/L
D PTERONYSS IGE QN: <0.1 KU(A)/L

## 2021-07-03 NOTE — ADDENDUM NOTE
Addendum Note by Bernadette Chowdhury LPN at 8/29/2019  1:00 PM     Author: Bernadette Chowdhury LPN Service: -- Author Type: Licensed Nurse    Filed: 8/29/2019  1:27 PM Encounter Date: 8/29/2019 Status: Signed    : Bernadette Chowdhury LPN (Licensed Nurse)    Addended by: BERNADETTE CHOWDHURY on: 8/29/2019 01:27 PM        Modules accepted: Orders, SmartSet

## 2021-07-03 NOTE — ADDENDUM NOTE
Addendum Note by René Portillo MD at 8/29/2019  1:00 PM     Author: René Portillo MD Service: -- Author Type: Physician    Filed: 8/29/2019  1:40 PM Encounter Date: 8/29/2019 Status: Signed    : René Portillo MD (Physician)    Addended by: RENÉ PORTILLO on: 8/29/2019 01:40 PM        Modules accepted: Orders

## 2021-07-04 NOTE — ADDENDUM NOTE
Addendum Note by Connie Rico MD at 3/11/2021  4:59 PM     Author: Connie Rico MD Service: -- Author Type: Physician    Filed: 3/17/2021  3:43 PM Encounter Date: 3/11/2021 Status: Signed    : Connie Rico MD (Physician)    Addended by: CONNIE RICO on: 3/17/2021 03:43 PM        Modules accepted: Orders

## 2021-07-05 ENCOUNTER — ANCILLARY PROCEDURE (OUTPATIENT)
Dept: NUCLEAR MEDICINE | Facility: CLINIC | Age: 56
End: 2021-07-05
Attending: INTERNAL MEDICINE
Payer: COMMERCIAL

## 2021-07-05 DIAGNOSIS — K21.9 GASTROESOPHAGEAL REFLUX DISEASE WITHOUT ESOPHAGITIS: ICD-10-CM

## 2021-07-05 DIAGNOSIS — R93.5 ABNORMAL CT OF THE ABDOMEN: ICD-10-CM

## 2021-07-05 DIAGNOSIS — K58.0 IRRITABLE BOWEL SYNDROME WITH DIARRHEA: ICD-10-CM

## 2021-07-05 PROCEDURE — 78264 GASTRIC EMPTYING IMG STUDY: CPT | Mod: GC | Performed by: RADIOLOGY

## 2021-07-06 VITALS — WEIGHT: 190 LBS | HEIGHT: 64 IN | BODY MASS INDEX: 32.44 KG/M2

## 2021-07-07 DIAGNOSIS — K58.0 IRRITABLE BOWEL SYNDROME WITH DIARRHEA: ICD-10-CM

## 2021-07-07 DIAGNOSIS — K30 DELAYED GASTRIC EMPTYING: Primary | ICD-10-CM

## 2021-08-26 ENCOUNTER — MYC MEDICAL ADVICE (OUTPATIENT)
Dept: FAMILY MEDICINE | Facility: CLINIC | Age: 56
End: 2021-08-26

## 2021-08-26 DIAGNOSIS — E66.01 MORBID OBESITY (H): Primary | ICD-10-CM

## 2021-08-26 RX ORDER — METFORMIN HCL 500 MG
500 TABLET, EXTENDED RELEASE 24 HR ORAL DAILY
Qty: 90 TABLET | Refills: 1 | Status: SHIPPED | OUTPATIENT
Start: 2021-08-26 | End: 2021-12-04

## 2021-08-26 NOTE — TELEPHONE ENCOUNTER
Can you please contact this patient and ask her how much Metformin she is taking a day.     I think that it was prescribed by an outside source.  I am happy to fill it but we do not have information about how much she takes    EB

## 2021-08-26 NOTE — TELEPHONE ENCOUNTER
Received a refill request through My chart.  Routing refill request to provider for review/approval because:  Medication is reported/historical  Shruthi Roman RN

## 2021-08-26 NOTE — TELEPHONE ENCOUNTER
Call placed to patient  Patient states she takes:  Metformin 500mg   Sig: Take 1 tablet daily     Will forward to Dr. Villatoro to review    Jae Us RN

## 2021-09-18 ENCOUNTER — HEALTH MAINTENANCE LETTER (OUTPATIENT)
Age: 56
End: 2021-09-18

## 2021-09-24 ENCOUNTER — NURSE TRIAGE (OUTPATIENT)
Dept: FAMILY MEDICINE | Facility: CLINIC | Age: 56
End: 2021-09-24

## 2021-09-24 ENCOUNTER — OFFICE VISIT (OUTPATIENT)
Dept: FAMILY MEDICINE | Facility: CLINIC | Age: 56
End: 2021-09-24
Payer: COMMERCIAL

## 2021-09-24 VITALS
DIASTOLIC BLOOD PRESSURE: 80 MMHG | TEMPERATURE: 97.4 F | RESPIRATION RATE: 16 BRPM | WEIGHT: 185 LBS | SYSTOLIC BLOOD PRESSURE: 144 MMHG | HEIGHT: 64 IN | HEART RATE: 76 BPM | BODY MASS INDEX: 31.58 KG/M2
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
## 2021-09-24 DIAGNOSIS — F31.81 BIPOLAR 2 DISORDER (H): ICD-10-CM

## 2021-09-24 DIAGNOSIS — F20.9 SCHIZOPHRENIA, UNSPECIFIED TYPE (H): ICD-10-CM

## 2021-09-24 DIAGNOSIS — Z23 NEED FOR PROPHYLACTIC VACCINATION AND INOCULATION AGAINST INFLUENZA: ICD-10-CM

## 2021-09-24 PROBLEM — E66.01 MORBID OBESITY (H): Status: RESOLVED | Noted: 2019-05-14 | Resolved: 2021-09-24

## 2021-09-24 PROBLEM — R73.03 PREDIABETES: Status: ACTIVE | Noted: 2019-07-22

## 2021-09-24 PROBLEM — R42 VERTIGO: Status: ACTIVE | Noted: 2021-04-19

## 2021-09-24 PROBLEM — H53.2 DIPLOPIA: Status: ACTIVE | Noted: 2021-09-24

## 2021-09-24 PROCEDURE — 90682 RIV4 VACC RECOMBINANT DNA IM: CPT | Performed by: FAMILY MEDICINE

## 2021-09-24 PROCEDURE — 99214 OFFICE O/P EST MOD 30 MIN: CPT | Mod: 25 | Performed by: FAMILY MEDICINE

## 2021-09-24 PROCEDURE — 90715 TDAP VACCINE 7 YRS/> IM: CPT | Performed by: FAMILY MEDICINE

## 2021-09-24 PROCEDURE — 90472 IMMUNIZATION ADMIN EACH ADD: CPT | Performed by: FAMILY MEDICINE

## 2021-09-24 PROCEDURE — G0008 ADMIN INFLUENZA VIRUS VAC: HCPCS | Performed by: FAMILY MEDICINE

## 2021-09-24 RX ORDER — IPRATROPIUM BROMIDE 42 UG/1
SPRAY, METERED NASAL
COMMUNITY
Start: 2021-06-30 | End: 2022-08-30

## 2021-09-24 ASSESSMENT — ANXIETY QUESTIONNAIRES
5. BEING SO RESTLESS THAT IT IS HARD TO SIT STILL: NEARLY EVERY DAY
1. FEELING NERVOUS, ANXIOUS, OR ON EDGE: NEARLY EVERY DAY
3. WORRYING TOO MUCH ABOUT DIFFERENT THINGS: MORE THAN HALF THE DAYS
7. FEELING AFRAID AS IF SOMETHING AWFUL MIGHT HAPPEN: SEVERAL DAYS
IF YOU CHECKED OFF ANY PROBLEMS ON THIS QUESTIONNAIRE, HOW DIFFICULT HAVE THESE PROBLEMS MADE IT FOR YOU TO DO YOUR WORK, TAKE CARE OF THINGS AT HOME, OR GET ALONG WITH OTHER PEOPLE: SOMEWHAT DIFFICULT
6. BECOMING EASILY ANNOYED OR IRRITABLE: SEVERAL DAYS
GAD7 TOTAL SCORE: 14
2. NOT BEING ABLE TO STOP OR CONTROL WORRYING: SEVERAL DAYS

## 2021-09-24 ASSESSMENT — MIFFLIN-ST. JEOR: SCORE: 1414.15

## 2021-09-24 ASSESSMENT — PATIENT HEALTH QUESTIONNAIRE - PHQ9
SUM OF ALL RESPONSES TO PHQ QUESTIONS 1-9: 21
5. POOR APPETITE OR OVEREATING: NEARLY EVERY DAY

## 2021-09-24 NOTE — TELEPHONE ENCOUNTER
Patient is called and notified, she is scheduled at the 2:40 appointment slot and will come at 2:30.    DANIELLA Castro

## 2021-09-24 NOTE — TELEPHONE ENCOUNTER
Dr. Villatoro - patient had dog bite about a week ago when she was out of town.  Whole upper arm has multiple bites.  She never got medical care.  She reports warmth and redness and she is due for tetanus. Are you able to see her today?  No appointments available in the area.    Stefany Mullen RN  [facility]

## 2021-09-24 NOTE — TELEPHONE ENCOUNTER
"    Reason for Disposition    Bite looks infected (e.g., red area, red streak, pus, or fever)    Additional Information    Negative: Major bleeding (actively dripping or spurting) that can't be stopped    Negative: Sounds like a life-threatening emergency to the triager    Negative: Any break in skin from BITE (e.g., cut, puncture, or scratch) and WILD animal at RISK FOR RABIES (e.g., bat, raccoon, royal, skunk, coyote, other carnivores)    Negative: Any break in skin from BITE (e.g., cut, puncture, or scratch) and PET animal (e.g., dog, cat, or ferret) at risk for RABIES (e.g., sick, stray, unprovoked bite, developing country)    Negative: Any break in skin from BITE (e.g., cut, puncture, or scratch) and monkey    Negative: Cut (length > 1/8 inch or 3 mm) or skin tear and any animal    Negative: Bleeding won't stop after 10 minutes of direct pressure (using correct technique)    Negative: EXPOSURE of non-intact skin (e.g., exposed person has dermatitis, abrasion, wound) with animal BODY FLUID (e.g., saliva such as licking, blood, brain) and animal at high-risk for RABIES (e.g., bat, raccoon, royal, skunk, coyote, other carnivores)    Negative: Sounds like a serious bite injury to the triager    Negative: SEVERE pain (e.g., excruciating)    Negative: Puncture wound (hole through the skin) from cat (teeth or claws)    Negative: Puncture wound or small cut on face (Exception: puncture from small pet, such as a gerbil, mouse, hamster, puppy; or shallow scratches)    Negative: Puncture wound or small cut on hands or genitals (Exception: puncture from small pet, such as a gerbil, mouse, hamster, puppy; or shallow scratches)    Negative: Puncture wound and weak immune system (e.g., HIV positive, cancer chemo, splenectomy, organ transplant, chronic steroids)    Answer Assessment - Initial Assessment Questions  1. ANIMAL: \"What type of animal caused the bite?\" \"Is the injury from a bite or a claw?\" If the animal is a dog or a " "cat, ask: \"Was it a pet or a stray?\" \"Was it acting ill or behaving strangely?\"      dog  2. LOCATION: \"Where is the bite located?\"       Upper arm right side bites and bruising on leg and other arm  3. SIZE: \"How big is the bite?\" \"What does it look like?\"       Size of football - or the whole area above the elbow  4. ONSET: \"When did the bite happen?\" (Minutes or hours ago)       A week ago  5. CIRCUMSTANCES: \"Tell me how this happened.\"       Stranger's dog - was hiking out of town   6. TETANUS: \"When was the last tetanus booster?\"        7. PREGNANCY: \"Is there any chance you are pregnant?\" \"When was your last menstrual period?\"      no    Protocols used: ANIMAL BITE-A-OH      "

## 2021-09-25 ASSESSMENT — ANXIETY QUESTIONNAIRES: GAD7 TOTAL SCORE: 14

## 2021-09-28 NOTE — PROGRESS NOTES
Assessment/Plan:    Socorro Oakes is a 55 year old female presenting for:    Dog bite, initial encounter  Augmentin sent to the pharmacy given slight concern for infection.  Discussed risks and benefits of the medication.  She will contact me if she has any other specific concerns.  If the area does not continue to improve or if her strength worsens we could consider an MRI of her upper arm.  - amoxicillin-clavulanate (AUGMENTIN) 875-125 MG tablet  Dispense: 14 tablet; Refill: 0    Need for prophylactic vaccination and inoculation against influenza  - INFLUENZA QUAD, RECOMBINANT, P-FREE (RIV4) (FLUBLOK)  - TDAP VACCINE (Adacel, Boostrix)  [[id number]]    Bipolar 2 disorder (H)  Continue to follow psychiatry    Schizophrenia, unspecified type (H)  Continue to follow psychiatry.  Stable.      There are no discontinued medications.        Chief Complaint:  Trauma and Imm/Inj        Subjective:   Socorro Oakes is a pleasant 55-year-old female presenting to the clinic today with concerns over a dog bite.    Patient was on a hike about 1 week ago when a dog who was on a leash but was pulling its own her jumped on her and bit her right arm.  She states that it was bleeding a significant amount.  After the bite the owner went to go and get a pen and paper from her car and left before writing down information.    Mercedes states that the owner said that her dog was fully immunized.    She did not seek any medical attention at that time.  She notes that she is due for her tetanus shot.  She states that over the last week to the area that very bruised and now the bruising is healing.  She is worried about a secondary infection.    She is able to move her arm although it does feel sore and weak.  She has not been dropping things.  She is able to move her fingers and wrist without difficulty.    12 point review of systems completed and negative except for what has been described above    History   Smoking Status     Never Smoker    Smokeless Tobacco     Never Used         Current Outpatient Medications:      ALPRAZolam (XANAX) 1 MG tablet, Take 1 mg by mouth 2 times daily as needed for anxiety, Disp: , Rfl:      amoxicillin-clavulanate (AUGMENTIN) 875-125 MG tablet, Take 1 tablet by mouth 2 times daily, Disp: 14 tablet, Rfl: 0     amphetamine-dextroamphetamine (ADDERALL XR) 20 MG 24 hr capsule, Take 20 mg by mouth, Disp: , Rfl:      amphetamine-dextroamphetamine (ADDERALL) 30 MG tablet, Take 30 mg by mouth 2 times daily, Disp: , Rfl:      buPROPion (WELLBUTRIN XL) 150 MG 24 hr tablet, , Disp: , Rfl:      ciprofloxacin (CIPRO) 500 MG tablet, , Disp: , Rfl:      DENTA 5000 PLUS 1.1 % CREA, , Disp: , Rfl:      doxepin (SINEQUAN) 25 MG capsule, , Disp: , Rfl:      DoxePIN (SINEQUAN) 75 MG capsule, Take 75 mg by mouth At Bedtime, Disp: , Rfl:      famotidine (PEPCID) 10 MG tablet, Take 1 tablet (10 mg) by mouth 2 times daily as needed (reflux), Disp: 20 tablet, Rfl: 0     ipratropium (ATROVENT) 0.06 % nasal spray, , Disp: , Rfl:      lamoTRIgine (LAMICTAL) 200 MG tablet, , Disp: , Rfl:      lamoTRIgine (LAMICTAL) 25 MG tablet, , Disp: , Rfl:      LANsoprazole (PREVACID) 30 MG DR capsule, Take 30 mg by mouth, Disp: , Rfl:      levocetirizine (XYZAL) 5 MG tablet, Take 1 tablet (5 mg) by mouth every evening, Disp: 30 tablet, Rfl: 3     metFORMIN (GLUCOPHAGE-XR) 500 MG 24 hr tablet, Take 1 tablet (500 mg) by mouth daily, Disp: 90 tablet, Rfl: 1     metroNIDAZOLE (FLAGYL) 500 MG tablet, , Disp: , Rfl:      pramipexole (MIRAPEX) 0.25 MG tablet, Take 1 tablet (0.25 mg) by mouth At Bedtime, Disp: 90 tablet, Rfl: 1     sodium fluoride dental gel (PREVIDENT) 1.1 % GEL topical gel, Apply 1 Application to affected area, Disp: , Rfl:      tiZANidine (ZANAFLEX) 4 MG capsule, , Disp: , Rfl:      traMADol (ULTRAM) 50 MG tablet, Take 50 mg by mouth, Disp: , Rfl:      zolpidem ER (AMBIEN CR) 12.5 MG CR tablet, Take 12.5 mg by mouth, Disp: , Rfl:  "      Objective:  Vitals:    09/24/21 1432   BP: (!) 144/80   Pulse: 76   Resp: 16   Temp: 97.4  F (36.3  C)   TempSrc: Tympanic   Weight: 83.9 kg (185 lb)   Height: 1.626 m (5' 4\")       Body mass index is 31.76 kg/m .    Vital signs reviewed and stable  General: No acute distress  Psych: Appropriate affect  HEENT: moist mucous membranes, pupils equal, round, reactive to light and accomodation, tympanic membranes are pearly grey bilaterally  Lymph: no cervical or supraclavicular lymphadenopathy  Cardiovascular: regular rate and rhythm with no murmur  Pulmonary: clear to auscultation bilaterally with no wheeze  Abdomen: soft, non tender, non distended with normo-active bowel sounds  Extremities: warm and well perfused with no edema  Skin: warm and dry with no rash, several abrasions that are scabbed and appear to be healing on her right arm with minimal surrounding redness and extensive yellowish bruising on the upper right arm.    Musculoskeletal: Some weakness of flexion extension against resistance but able to do all requested maneuvers.  Normal sensation in hands and forearm.  Slight decrease in sensation in an area that is bruised.         This note has been dictated and transcribed using voice recognition software.   Any errors in transcription are unintentional and inherent to the software.  "

## 2021-10-06 DIAGNOSIS — G47.33 OSA (OBSTRUCTIVE SLEEP APNEA): Primary | ICD-10-CM

## 2021-12-02 DIAGNOSIS — E66.01 MORBID OBESITY (H): ICD-10-CM

## 2021-12-04 RX ORDER — METFORMIN HCL 500 MG
500 TABLET, EXTENDED RELEASE 24 HR ORAL DAILY
Qty: 90 TABLET | Refills: 1 | Status: SHIPPED | OUTPATIENT
Start: 2021-12-04 | End: 2022-07-11

## 2022-01-12 VITALS — HEIGHT: 64 IN | BODY MASS INDEX: 32.44 KG/M2 | WEIGHT: 190 LBS

## 2022-01-18 VITALS — WEIGHT: 195 LBS | HEIGHT: 64 IN | BODY MASS INDEX: 33.29 KG/M2

## 2022-01-18 ASSESSMENT — PATIENT HEALTH QUESTIONNAIRE - PHQ9: SUM OF ALL RESPONSES TO PHQ QUESTIONS 1-9: 7

## 2022-01-20 NOTE — PROGRESS NOTES
"Socorro Oakes is a 56 year old female who is being evaluated via a billable video visit.       The patient has been notified of following:      \"This video visit will be conducted via a call between you and your physician/provider. We have found that certain health care needs can be provided without the need for an in-person physical exam.  This service lets us provide the care you need with a video conversation.  If a prescription is necessary we can send it directly to your pharmacy.  If lab work is needed we can place an order for that and you can then stop by our lab to have the test done at a later time.     Video visits are billed at different rates depending on your insurance coverage.  Please reach out to your insurance provider with any questions.     If during the course of the call the physician/provider feels a video visit is not appropriate, you will not be charged for this service.\"     Patient has given verbal consent for Video visit? Yes  How would you like to obtain your AVS? Mail a copy  If you are dropped from the video visit, the video invite should be resent to: Text to cell phone: -  Will anyone else be joining your video visit? No  If patient encounters technical issues they should call [phone]      Video-Visit Details     Type of service:  Video Visit     Video Start Time: 2pm  Video End Time: 2:20pm    Originating Location (pt. Location): Home     Distant Location (provider location):  [facility]      Platform used for Video Visit: Karit[employer]    Virtual visit for moderate ALEXA managed with CPAP.     Assessment:  - Moderate ALEXA  - Obesity  - Acute on chronic AM headaches (tension, occiput location, feel different than prior migraine HA's)  - Issues with mask fit     Plan:  - Appears well controlled with CPAP 10 cm H2O, though with decreased overall usage  - Continue CPAP on current settings  - Plan for mask fitting  - She will give update in 2-3 weeks via " "mychart      SUBJECTIVE:  Socorro Oakes is a 56 year old female.    PMHx of ADD, schizoaffective disorder, obesity.    11/13/2020 - Overall, she feels the CPAP is working well and no specific concerns with it.  Usage appears adequate, AHI < 2.  She felt the nasal fluticasone had minimal benefit with primarily nocturnal nasal congestion / nasal drainage.  She recently tried changing antihistamine from zyrtec to claritin and omeprazole to lanproprazole without signficant change.  A/P to continue CPAP 10, trial of xyzal 5mg PO at bedtime.    Today -she reports that her CPAP has been going \"not so great\" over the last few months.  She attributes this to what appears to be a change in her mask, and now she is having issues with the separate component staying together and will often fall apart during the night.  Is a little bit unclear when it was last replaced, but potentially last 3-4 months.    Over this roughly same timeframe, she has also been noticing morning headaches.  These are present most mornings, they seem to originate to the left or right of the occiput with unclear radiation.  She has been managing these with ice packs and also taking 3 ibuprofen and 2 Tylenol most mornings.  She does have a history of migraine headaches, she feels that this is different.  In the past migraine headaches were managed with a triptan and also preventative therapy with verapamil.  But, she did have resolution of headaches after starting CPAP at that time.    CPAP download reviewed over past 30 days on 10 cm H2O.  Average daily usage 326 minutes.  AHI 0.3.     Sleep Testing To Date:  11/16/2007 - Split-night PSG with AHI 19.2, polo desat listed as 59% and period of sustained hypoxemia, CPAP 9 optimal. Partial MSLT performed following day with no sleep nor SOREM on 2 naps  4/19/2016 - JOSE on CPAP 9 cm H2O.  Polo desat 75%, time < 88% of 27.4 minutes.  Graph suggestive of one period of sustained hypoxemia and two periods of " repetitive oxygen desaturations consistent with residual ALEXA.  4/26/2016 - Titration PSG, CPAP 10 appearing optimal.        Past medical history:    Patient Active Problem List    Diagnosis Date Noted     Health Care Home 04/20/2011     Priority: High     DX V65.8 REPLACED WITH [facility]ALTH CARE HOME (04/08/2013)         MRSA      Priority: High     SITE - WOUND 7/13/05; 8/12/2009:  Pt stated that she should be discontinued MRSA status as she had screening done. We could not obtain these records in her Medical chart, so we could not validate this.  Pt still MRSA + STATUS.       Diplopia 09/24/2021     Priority: Medium     Vertigo 04/19/2021     Priority: Medium     Bipolar 2 disorder (H) 01/01/2020     Priority: Medium     Prediabetes 07/22/2019     Priority: Medium     Class 3 obesity due to excess calories without serious comorbidity with body mass index (BMI) of 40.0 to 44.9 in adult 03/19/2018     Priority: Medium     Essential hypertension, benign 11/14/2016     Priority: Medium     Anemia 01/16/2015     Priority: Medium     Memory loss 11/27/2012     Priority: Medium     Suicidal ideation 07/18/2012     Priority: Medium     Chronic pain 07/18/2012     Priority: Medium     Schizophrenia (H) 03/06/2012     Priority: Medium     Follow with psychiatry        ADD (attention deficit disorder with hyperactivity) 03/06/2012     Priority: Medium     Problem list name updated by automated process. Provider to review and confirm       Restless legs syndrome (RLS) 03/06/2012     Priority: Medium     Sleep apnea 10/26/2011     Priority: Medium     GERD (gastroesophageal reflux disease) 04/26/2011     Priority: Medium     DJD (degenerative joint disease), lumbar 04/26/2011     Priority: Medium     [facility] PAIN MANAGEMENT   ON VICODIN # 15   FLEXERIL # 60   PAIN CONTRACT SIGNED.        Hyperlipidemia LDL goal <130 12/23/2010     Priority: Medium     Panic disorder with agoraphobia 01/01/2010     Priority: Medium     h/o  multiple concussion  11/05/2008     Priority: Medium     Eczema 09/22/2008     Priority: Medium     Anxiety 09/22/2008     Priority: Medium     Seizure disorder (H) 09/04/2008     Priority: Medium     MRSA (methicillin resistant staph aureus) culture positive 09/04/2008     Priority: Medium     Posttraumatic stress disorder 01/02/2008     Priority: Medium     Snoring 10/29/2007     Priority: Medium     Cellulitis and abscess of toe 07/14/2005     Priority: Medium     Overview:   Epic          10 point ROS of systems including Constitutional, Eyes, Respiratory, Cardiovascular, Gastroenterology, Genitourinary, Integumentary, Muscularskeletal, Psychiatric were all negative except for pertinent positives noted in my HPI.    Current Outpatient Medications   Medication Sig Dispense Refill     ALPRAZolam (XANAX) 1 MG tablet Take 1 mg by mouth 2 times daily as needed for anxiety       amoxicillin-clavulanate (AUGMENTIN) 875-125 MG tablet Take 1 tablet by mouth 2 times daily 14 tablet 0     amphetamine-dextroamphetamine (ADDERALL XR) 20 MG 24 hr capsule Take 20 mg by mouth       amphetamine-dextroamphetamine (ADDERALL) 30 MG tablet Take 30 mg by mouth 2 times daily       buPROPion (WELLBUTRIN XL) 150 MG 24 hr tablet        ciprofloxacin (CIPRO) 500 MG tablet        DENTA 5000 PLUS 1.1 % CREA        doxepin (SINEQUAN) 25 MG capsule        DoxePIN (SINEQUAN) 75 MG capsule Take 75 mg by mouth At Bedtime       famotidine (PEPCID) 10 MG tablet Take 1 tablet (10 mg) by mouth 2 times daily as needed (reflux) 20 tablet 0     ipratropium (ATROVENT) 0.06 % nasal spray        lamoTRIgine (LAMICTAL) 200 MG tablet        lamoTRIgine (LAMICTAL) 25 MG tablet        LANsoprazole (PREVACID) 30 MG DR capsule Take 30 mg by mouth       levocetirizine (XYZAL) 5 MG tablet Take 1 tablet (5 mg) by mouth every evening 30 tablet 3     metFORMIN (GLUCOPHAGE-XR) 500 MG 24 hr tablet Take 1 tablet (500 mg) by mouth daily 90 tablet 1     metroNIDAZOLE  (FLAGYL) 500 MG tablet        pramipexole (MIRAPEX) 0.25 MG tablet Take 1 tablet (0.25 mg) by mouth At Bedtime 90 tablet 1     sodium fluoride dental gel (PREVIDENT) 1.1 % GEL topical gel Apply 1 Application to affected area       tiZANidine (ZANAFLEX) 4 MG capsule        traMADol (ULTRAM) 50 MG tablet Take 50 mg by mouth       zolpidem ER (AMBIEN CR) 12.5 MG CR tablet Take 12.5 mg by mouth         OBJECTIVE:  There were no vitals taken for this visit.    Physical Exam     ---  This note was written with the assistance of the Dragon voice-dictation technology software. The final document, although reviewed, may contain errors. For corrections, please contact the office.    Everton Martínez MD    Sleep Medicine  [facility]  ([phone])  [facility]  ([phone])    Time spent on the date of service:  25 minutes.

## 2022-01-21 ENCOUNTER — VIRTUAL VISIT (OUTPATIENT)
Dept: SLEEP MEDICINE | Facility: CLINIC | Age: 57
End: 2022-01-21
Payer: COMMERCIAL

## 2022-01-21 VITALS — HEIGHT: 64 IN | WEIGHT: 179 LBS | BODY MASS INDEX: 30.56 KG/M2

## 2022-01-21 DIAGNOSIS — G47.33 OSA (OBSTRUCTIVE SLEEP APNEA): Primary | ICD-10-CM

## 2022-01-21 DIAGNOSIS — F90.9 ATTENTION DEFICIT HYPERACTIVITY DISORDER (ADHD), UNSPECIFIED ADHD TYPE: ICD-10-CM

## 2022-01-21 DIAGNOSIS — G44.209 ACUTE NON INTRACTABLE TENSION-TYPE HEADACHE: ICD-10-CM

## 2022-01-21 DIAGNOSIS — F20.9 SCHIZOPHRENIA, UNSPECIFIED TYPE (H): ICD-10-CM

## 2022-01-21 PROCEDURE — 99213 OFFICE O/P EST LOW 20 MIN: CPT | Mod: 95 | Performed by: FAMILY MEDICINE

## 2022-01-21 ASSESSMENT — SLEEP AND FATIGUE QUESTIONNAIRES
HOW LIKELY ARE YOU TO NOD OFF OR FALL ASLEEP WHEN YOU ARE A PASSENGER IN A CAR FOR AN HOUR WITHOUT A BREAK: WOULD NEVER DOZE
HOW LIKELY ARE YOU TO NOD OFF OR FALL ASLEEP WHILE SITTING INACTIVE IN A PUBLIC PLACE: WOULD NEVER DOZE
HOW LIKELY ARE YOU TO NOD OFF OR FALL ASLEEP WHILE SITTING AND READING: MODERATE CHANCE OF DOZING
HOW LIKELY ARE YOU TO NOD OFF OR FALL ASLEEP IN A CAR, WHILE STOPPED FOR A FEW MINUTES IN TRAFFIC: WOULD NEVER DOZE
HOW LIKELY ARE YOU TO NOD OFF OR FALL ASLEEP WHILE LYING DOWN TO REST IN THE AFTERNOON WHEN CIRCUMSTANCES PERMIT: SLIGHT CHANCE OF DOZING
HOW LIKELY ARE YOU TO NOD OFF OR FALL ASLEEP WHILE SITTING AND TALKING TO SOMEONE: WOULD NEVER DOZE
HOW LIKELY ARE YOU TO NOD OFF OR FALL ASLEEP WHILE SITTING QUIETLY AFTER LUNCH WITHOUT ALCOHOL: SLIGHT CHANCE OF DOZING
HOW LIKELY ARE YOU TO NOD OFF OR FALL ASLEEP WHILE WATCHING TV: MODERATE CHANCE OF DOZING

## 2022-01-21 ASSESSMENT — MIFFLIN-ST. JEOR: SCORE: 1381.94

## 2022-01-21 NOTE — PATIENT INSTRUCTIONS
Your BMI is Body mass index is 30.73 kg/m .  Weight management is a personal decision.  If you are interested in exploring weight loss strategies, the following discussion covers the approaches that may be successful. Body mass index (BMI) is one way to tell whether you are at a healthy weight, overweight, or obese. It measures your weight in relation to your height.  A BMI of 18.5 to 24.9 is in the healthy range. A person with a BMI of 25 to 29.9 is considered overweight, and someone with a BMI of 30 or greater is considered obese. More than two-thirds of American adults are considered overweight or obese.  Being overweight or obese increases the risk for further weight gain. Excess weight may lead to heart disease and diabetes.  Creating and following plans for healthy eating and physical activity may help you improve your health.  Weight control is part of healthy lifestyle and includes exercise, emotional health, and healthy eating habits. Careful eating habits lifelong are the mainstay of weight control. Though there are significant health benefits from weight loss, long-term weight loss with diet alone may be very difficult to achieve- studies show long-term success with dietary management in less than 10% of people. Attaining a healthy weight may be especially difficult to achieve in those with severe obesity. In some cases, medications, devices and surgical management might be considered.  What can you do?  If you are overweight or obese and are interested in methods for weight loss, you should discuss this with your provider.     Consider reducing daily calorie intake by 500 calories.     Keep a food journal.     Avoiding skipping meals, consider cutting portions instead.    Diet combined with exercise helps maintain muscle while optimizing fat loss. Strength training is particularly important for building and maintaining muscle mass. Exercise helps reduce stress, increase energy, and improves fitness.  Increasing exercise without diet control, however, may not burn enough calories to loose weight.       Start walking three days a week 10-20 minutes at a time    Work towards walking thirty minutes five days a week     Eventually, increase the speed of your walking for 1-2 minutes at time    And look into health and wellness programs that may be available through your health insurance provider, employer, local community center, or babs club.

## 2022-01-21 NOTE — NURSING NOTE
CPAP compliance instruction letter sent via [employer]. DME order automatically route to [facility].      DENIA Dyer  [facility] [place]

## 2022-01-21 NOTE — PROGRESS NOTES
"Socorro is a 56 year old who is being evaluated via a billable video visit.      How would you like to obtain your AVS? MyChart  If the video visit is dropped, the invitation should be resent by: Send to e-mail at: [email or web address]  Will anyone else be joining your video visit? No  {If patient encounters technical issues they should call [phone] :[id number]}    Video Start Time: {video visit start/end time for provider to select:152948}  Video-Visit Details    Type of service:  Video Visit    Video End Time:{video visit start/end time for provider to select:152948}    Originating Location (pt. Location): {video visit patient location:009288::\"Home\"}    Distant Location (provider location):  [facility]     Platform used for Video Visit: {Virtual Visit Platforms:089825::\"[employer]\"}  "

## 2022-02-27 ENCOUNTER — HEALTH MAINTENANCE LETTER (OUTPATIENT)
Age: 57
End: 2022-02-27

## 2022-05-04 ENCOUNTER — NURSE TRIAGE (OUTPATIENT)
Dept: FAMILY MEDICINE | Facility: CLINIC | Age: 57
End: 2022-05-04
Payer: COMMERCIAL

## 2022-05-04 ENCOUNTER — APPOINTMENT (OUTPATIENT)
Dept: ULTRASOUND IMAGING | Facility: HOSPITAL | Age: 57
End: 2022-05-04
Attending: FAMILY MEDICINE
Payer: COMMERCIAL

## 2022-05-04 ENCOUNTER — HOSPITAL ENCOUNTER (EMERGENCY)
Facility: HOSPITAL | Age: 57
Discharge: HOME | End: 2022-05-04
Attending: EMERGENCY MEDICINE | Admitting: EMERGENCY MEDICINE
Payer: COMMERCIAL

## 2022-05-04 VITALS
WEIGHT: 189 LBS | SYSTOLIC BLOOD PRESSURE: 188 MMHG | DIASTOLIC BLOOD PRESSURE: 91 MMHG | OXYGEN SATURATION: 97 % | TEMPERATURE: 98.1 F | HEART RATE: 71 BPM | BODY MASS INDEX: 32.44 KG/M2 | RESPIRATION RATE: 20 BRPM

## 2022-05-04 DIAGNOSIS — N93.9 VAGINAL BLEEDING: ICD-10-CM

## 2022-05-04 DIAGNOSIS — D25.9 UTERINE LEIOMYOMA, UNSPECIFIED LOCATION: ICD-10-CM

## 2022-05-04 LAB
ANION GAP SERPL CALCULATED.3IONS-SCNC: 9 MMOL/L (ref 5–18)
BUN SERPL-MCNC: 7 MG/DL (ref 8–22)
CALCIUM SERPL-MCNC: 9.4 MG/DL (ref 8.5–10.5)
CHLORIDE BLD-SCNC: 103 MMOL/L (ref 98–107)
CO2 SERPL-SCNC: 25 MMOL/L (ref 22–31)
CREAT SERPL-MCNC: 0.79 MG/DL (ref 0.6–1.1)
ERYTHROCYTE [DISTWIDTH] IN BLOOD BY AUTOMATED COUNT: 13.8 % (ref 10–15)
GFR SERPL CREATININE-BSD FRML MDRD: 87 ML/MIN/1.73M2
GLUCOSE BLD-MCNC: 99 MG/DL (ref 70–125)
HCT VFR BLD AUTO: 41.4 % (ref 35–47)
HGB BLD-MCNC: 14 G/DL (ref 11.7–15.7)
INR PPP: 0.95 (ref 0.9–1.15)
MCH RBC QN AUTO: 29.1 PG (ref 26.5–33)
MCHC RBC AUTO-ENTMCNC: 33.8 G/DL (ref 31.5–36.5)
MCV RBC AUTO: 86 FL (ref 78–100)
PLATELET # BLD AUTO: 258 10E3/UL (ref 150–450)
POTASSIUM BLD-SCNC: 3.6 MMOL/L (ref 3.5–5)
RBC # BLD AUTO: 4.81 10E6/UL (ref 3.8–5.2)
SODIUM SERPL-SCNC: 137 MMOL/L (ref 136–145)
WBC # BLD AUTO: 6.1 10E3/UL (ref 4–11)

## 2022-05-04 PROCEDURE — 36415 COLL VENOUS BLD VENIPUNCTURE: CPT | Performed by: FAMILY MEDICINE

## 2022-05-04 PROCEDURE — 85027 COMPLETE CBC AUTOMATED: CPT | Performed by: FAMILY MEDICINE

## 2022-05-04 PROCEDURE — 99285 EMERGENCY DEPT VISIT HI MDM: CPT | Mod: 25

## 2022-05-04 PROCEDURE — 250N000013 HC RX MED GY IP 250 OP 250 PS 637: Performed by: EMERGENCY MEDICINE

## 2022-05-04 PROCEDURE — 85610 PROTHROMBIN TIME: CPT | Performed by: FAMILY MEDICINE

## 2022-05-04 PROCEDURE — 76830 TRANSVAGINAL US NON-OB: CPT

## 2022-05-04 PROCEDURE — 80048 BASIC METABOLIC PNL TOTAL CA: CPT | Performed by: FAMILY MEDICINE

## 2022-05-04 RX ORDER — IBUPROFEN 200 MG
400 TABLET ORAL ONCE
Status: COMPLETED | OUTPATIENT
Start: 2022-05-04 | End: 2022-05-04

## 2022-05-04 RX ORDER — TRANEXAMIC ACID 650 MG/1
650 TABLET ORAL 3 TIMES DAILY
Qty: 21 TABLET | Refills: 0 | Status: SHIPPED | OUTPATIENT
Start: 2022-05-04 | End: 2022-11-29

## 2022-05-04 RX ORDER — TRANEXAMIC ACID 650 MG/1
650 TABLET ORAL ONCE
Status: COMPLETED | OUTPATIENT
Start: 2022-05-04 | End: 2022-05-04

## 2022-05-04 RX ORDER — ACETAMINOPHEN 325 MG/1
650 TABLET ORAL ONCE
Status: COMPLETED | OUTPATIENT
Start: 2022-05-04 | End: 2022-05-04

## 2022-05-04 RX ADMIN — IBUPROFEN 400 MG: 200 TABLET, FILM COATED ORAL at 22:50

## 2022-05-04 RX ADMIN — ACETAMINOPHEN 650 MG: 325 TABLET ORAL at 22:50

## 2022-05-04 RX ADMIN — TRANEXAMIC ACID 650 MG: 650 TABLET ORAL at 22:59

## 2022-05-04 NOTE — TELEPHONE ENCOUNTER
"  Nurse Triage SBAR    Is this a 2nd Level Triage? YES, LICENSED PRACTITIONER REVIEW IS REQUIRED    Situation:the patient called to report she started bleeding yesterday.    Background: the patient last menstrual period was maybe sept 2021.    Assessment: the patient is bleeding through a pad an hour for all of today. The patient has not had any spotting or menstrual cycle since 9/2021.  The patient does not have any pain. The patient denies any light headedness or weakness.       Protocol Recommended Disposition:   Go to ED Now (Or PCP Triage)    Recommendation:sent to provider to review.  Routed to provider      Reason for Disposition    SEVERE vaginal bleeding (e.g., soaking 2 pads or tampons per hour and present 2 or more hours)    Additional Information    Negative: SEVERE abdominal pain    Negative: SEVERE dizziness (e.g., unable to stand, requires support to walk, feels like passing out now)    Negative: Sexual assault or rape    Negative: Followed a genital area injury    Negative: [1] Vaginal discharge is main symptom AND [2] small amount of blood    Negative: Shock suspected (e.g., cold/pale/clammy skin, too weak to stand, low BP, rapid pulse)    Negative: Difficult to awaken or acting confused (e.g., disoriented, slurred speech)    Negative: Passed out (i.e., lost consciousness, collapsed and was not responding)    Negative: Sounds like a life-threatening emergency to the triager    Answer Assessment - Initial Assessment Questions  1. AMOUNT: \"Describe the bleeding that you are having.\" \"How much bleeding is there?\"     - SPOTTING: spotting, or pinkish / brownish mucous discharge; does not fill panti-liner or pad     - MILD:  less than 1 pad / hour; less than patient's  menstrual bleeding when she still had menstrual periods    - MODERATE: 1-2 pads / hour; small-medium blood clots (e.g., pea, grape, small coin)     - SEVERE: soaking 2 or more pads/hour for 2 or more hours; bleeding not contained by pads " "or continuous red blood from vagina; large blood clots (e.g., golf ball, large coin)       severe  2. ONSET: \"When did the bleeding begin?\" \"Is it continuing now?\"      Started yesterday  3. MENOPAUSE: \"When was your last menstrual period?\"       Sept. 2021  4. ABDOMINAL PAIN: \"Do you have any pain?\" \"How bad is the pain?\"  (e.g., Scale 1-10; mild, moderate, or severe)    - MILD (1-3): doesn't interfere with normal activities, abdomen soft and not tender to touch     - MODERATE (4-7): interferes with normal activities or awakens from sleep, tender to touch     - SEVERE (8-10): excruciating pain, doubled over, unable to do any normal activities       mild  5. BLOOD THINNERS: \"Do you take any blood thinners?\" (e.g., Coumadin/warfarin, Pradaxa/dabigatran, aspirin)      none  6. HORMONES: \"Are you taking any hormone medications, prescription or OTC?\" (e.g., birth control pills, estrogen)      none  7. CAUSE: \"What do you think is causing the bleeding?\" (e.g., recent gyn surgery, recent gyn procedure; known bleeding disorder, uterine cancer)        none  8. HEMODYNAMIC STATUS: \"Are you weak or feeling lightheaded?\" If so, ask: \"Can you stand and walk normally?\"        none  9. OTHER SYMPTOMS: \"What other symptoms are you having with the bleeding?\" (e.g., back pain, burning with urination, fever)      none    Protocols used: VAGINAL BLEEDING - [id number]      "

## 2022-05-04 NOTE — TELEPHONE ENCOUNTER
Provider Response to 2nd Level Triage Request    I have reviewed the RN documentation. My recommendation is:  Refer to Urgent Care -patient is to be evaluated today and I do not believe we have any spots here in the clinic-if she becomes symptomatic with lightheadedness would recommend the emergency department.

## 2022-05-04 NOTE — TELEPHONE ENCOUNTER
Call placed to patient  Relayed Dr. Villatoro message    Patient verbalized understanding  No further questions/concerns    Jae Us RN

## 2022-05-04 NOTE — ED TRIAGE NOTES
C/o vaginal bleeding, onset last night. Most recent cycle was 6 months ago. States she has saturated 20-30 pads since last night. Denies pain, dizziness or shortness of breath. Denies clot passage.

## 2022-05-04 NOTE — ED PROVIDER NOTES
"ED Triage Provider Note  [facility]  Encounter Date: May 4, 2022    History:  Chief Complaint   Patient presents with     Vaginal Bleeding     Socorro Oakes is a 56 year old female who presents to the ED with vaginal bleeding takes \"a moment\" to saturate a pad, \"20-30 pads\" today.  No clots.  No lightheadedness or dizziness.  No chest pain or dyspnea.    Review of Systems:  No rectal bleeding    Exam:  There were no vitals taken for this visit.  General: No acute distress. Appears stated age.   Cardio: Regular rate, extremities well perfused  Resp: Normal work of breathing, grossly normal respiratory rate  Neuro: Alert. CN II-XII grossly intact. Grossly intact strength.       Medical Decision Making:  Patient arriving to the ED with problem as above. A medical screening exam was performed. Lab and US orders initiated from Triage. The patient is appropriate to wait in triage.      Garett Back MD on 5/4/2022 at 5:14 PM      Lab/Imaging Results:       Interventions:  Medications - No data to display    Diagnosis:  1. Vaginal bleeding         Garett Back MD  05/04/22 1718    "

## 2022-05-05 NOTE — ED PROVIDER NOTES
EMERGENCY DEPARTMENT ENCOUNTER      NAME: Socorro Oakes  AGE: 56 year old female  YOB: 1965  MRN: [id number]  EVALUATION DATE & TIME: 2022  9:24 PM    PCP: Neelima Villatoro    ED PROVIDER: James Coello M.D.      Chief Complaint   Patient presents with     Vaginal Bleeding       FINAL IMPRESSION:  1. Vaginal bleeding    2. Uterine leiomyoma, unspecified location        ED COURSE & MEDICAL DECISION MAKIN year old female presents to the Emergency Department for evaluation of vaginal bleeding.  Patient is vitally stable and nontoxic-appearing when she arrives to the emergency department.  Labs notable for hemoglobin of 14 which is reassuring especially in light of her normal vital signs.  I performed a pelvic exam with some moderate blood in the vaginal vault, there was no active bleeding from the cervix during my examination and I could not appreciate any cervical or vaginal lesions.  Ultrasound shows thickened endometrium as well as multiple uterine fibroids which could be contributing to her bleeding.  In her age group, will certainly need to follow-up closely to have further evaluations with OB/GYN and potentially such as endometrial biopsy.  In the meantime she was given a first dose of TXA here and I think given currently mild to moderate bleeding, should be able to continue monitoring things at home and follow-up promptly with women's health provider.  Patient was comfortable with this plan.  She was discharged in stable condition after we reviewed the follow-up and return precautions    10:03 PM Discussed patient with medical student.   10:17 PM I met with the patient, obtained history, performed an initial exam, and discussed options and plan for diagnostics and treatment here in the ED.   10:36 PM Performed pelvic exam with female medical student as chaperone.     At the conclusion of the encounter I discussed the results of all of the tests and the disposition. The  questions were answered. The patient or family acknowledged understanding and was agreeable with the care plan.       MEDICATIONS GIVEN IN THE EMERGENCY:  Medications   tranexamic acid (LYSTEDA) tablet 650 mg (650 mg Oral Given 5/4/22 2259)   acetaminophen (TYLENOL) tablet 650 mg (650 mg Oral Given 5/4/22 2250)   ibuprofen (ADVIL/MOTRIN) tablet 400 mg (400 mg Oral Given 5/4/22 2250)       NEW PRESCRIPTIONS STARTED AT TODAY'S ER VISIT  Discharge Medication List as of 5/4/2022 10:57 PM      START taking these medications    Details   tranexamic acid (LYSTEDA) 650 MG tablet Take 1 tablet (650 mg) by mouth 3 times daily, Disp-21 tablet, R-0, Local Print                =================================================================    HPI    Patient information was obtained from: patient     Use of : N/A       Socorro Oakes is a 56 year old female with a pertinent history of hypertension, hyperlipidemia, seizures, TBI, migraine, and schizophrenia who presents to this ED by walk in for evaluation of vaginal bleeding.     Last night, patient started experiencing heavy vaginal bleeding. Since then, she has gone through an entire package of pads and kept needing to change her pad because she would soak through them. Blood has remained a bright red color the entire time and she has not appreciated any clots. Patient used a towel while on the way to the ED.     Patient LMP was 9/2021. Her periods have been more spaced out for a while. Denies hormone replacement therapy or estrogen use. Denies history of ovarian or uterine cancer. Denies any recent trauma. Last pap smear was in 2019 and normal. Patient denies urinary symptoms, abnormal bowel movements, abdominal pain, pelvic pain, or any other complaints at this time.     REVIEW OF SYSTEMS   All systems reviewed and negative except as noted in HPI.    PAST MEDICAL HISTORY:  Past Medical History:   Diagnosis Date     Anemia      Anxiety      Appendicitis       Arthritis      Cellulitis and abscess of unspecified site      Depression      Depressive disorder 01/01/2001     Disturbance of skin sensation     hand & foot      Dizziness and giddiness      Drug-seeking behavior      Essential hypertension, benign      GERD (gastroesophageal reflux disease)      HLD (hyperlipidemia)      Hypertension      MEDICAL HISTORY OF - 2006,2007,2008,2009    multiple psych. hopitalizations     Migraine      Moderate major depression (H) 3/6/2012     Obesity      Other convulsions     secondary to head injury     PONV (postoperative nausea and vomiting)      Restless leg syndrome      RLS (restless legs syndrome)      Schizophrenia (H) 3/6/2012    Follow with psychiatry       Schizophrenia (H)      Seizures (H)     Had a seizure disorder in past no seizures since 2016     Sleep apnea      Sleep apnea     CPAP use     TBI (traumatic brain injury) (H)      Unspecified hereditary and idiopathic peripheral neuropathy      Variants of migraine, not elsewhere classified, without mention of intractable migraine without mention of status migrainosus        PAST SURGICAL HISTORY:  Past Surgical History:   Procedure Laterality Date     APPENDECTOMY       CHOLECYSTECTOMY, LAPOROSCOPIC  4-30-09     COLONOSCOPY N/A 6/5/2015    Procedure: COLONOSCOPY;  Surgeon: Robe Delgado MD;  Location: [facility]     INJECT EPIDURAL LUMBAR  7/11/2011    Procedure:INJECT EPIDURAL LUMBAR; BRIE with Flouro--; Surgeon:GENERIC ANESTHESIA PROVIDER; Location:[facility]     INJECT EPIDURAL LUMBAR  9/28/2011    Procedure:INJECT EPIDURAL LUMBAR; BRIE with Flouro--; Surgeon:GENERIC ANESTHESIA PROVIDER; Location:[facility]     INJECT EPIDURAL LUMBAR  12/12/2011    Procedure:INJECT EPIDURAL LUMBAR; BRIE with Fluoro - Dr. Landry; Surgeon:GENERIC ANESTHESIA PROVIDER; Location:[facility]     KNEE SURGERY      1- 15 yr ago     KNEE SURGERY       LAPAROSCOPIC APPENDECTOMY       LAPAROSCOPIC APPENDECTOMY       LAPAROSCOPIC  CHOLECYSTECTOMY       MANDIBLE SURGERY      6x surgeries from 1983- 1994     MANDIBLE SURGERY       ORTHOPEDIC SURGERY  01/01/1982    tmj x6 & knee & toe     [place] ESOPHAGOGASTRODUODENOSCOPY TRANSORAL DIAGNOSTIC N/A 03/25/2021    Procedure: ESOPHAGOGASTRODUODENOSCOPY (EGD);  Surgeon: Davi Meier MD;  Location: [facility];  Service: General     SURGICAL HISTORY OF -   10/17/05    left knee surgery      SURGICAL HISTORY OF -   1982,1983,1984x2,1993,    TMJ Surgery x5     SURGICAL HISTORY OF -       ENT Tubes       SURGICAL HISTORY OF -   1996    toe     TONSILLECTOMY & ADENOIDECTOMY       ZZHC COLONOSCOPY W/WO BRUSH/WASH N/A 5/11/2021    Procedure: COLONOSCOPY;  Surgeon: Davi Meier MD;  Location: [facility];  Service: General           CURRENT MEDICATIONS:    No current facility-administered medications for this encounter.     Current Outpatient Medications   Medication     tranexamic acid (LYSTEDA) 650 MG tablet     ALPRAZolam (XANAX) 1 MG tablet     amoxicillin-clavulanate (AUGMENTIN) 875-125 MG tablet     amphetamine-dextroamphetamine (ADDERALL XR) 20 MG 24 hr capsule     amphetamine-dextroamphetamine (ADDERALL) 30 MG tablet     buPROPion (WELLBUTRIN XL) 150 MG 24 hr tablet     ciprofloxacin (CIPRO) 500 MG tablet     DENTA 5000 PLUS 1.1 % CREA     doxepin (SINEQUAN) 25 MG capsule     DoxePIN (SINEQUAN) 75 MG capsule     famotidine (PEPCID) 10 MG tablet     ipratropium (ATROVENT) 0.06 % nasal spray     lamoTRIgine (LAMICTAL) 200 MG tablet     lamoTRIgine (LAMICTAL) 25 MG tablet     LANsoprazole (PREVACID) 30 MG DR capsule     levocetirizine (XYZAL) 5 MG tablet     metFORMIN (GLUCOPHAGE-XR) 500 MG 24 hr tablet     metroNIDAZOLE (FLAGYL) 500 MG tablet     pramipexole (MIRAPEX) 0.25 MG tablet     sodium fluoride dental gel (PREVIDENT) 1.1 % GEL topical gel     tiZANidine (ZANAFLEX) 4 MG capsule     traMADol (ULTRAM) 50 MG tablet     zolpidem ER (AMBIEN CR) 12.5 MG CR tablet          ALLERGIES:  Allergies   Allergen Reactions     Latex Rash     Linezolid Other (See Comments)     Zyvox, was told after surgery that she was allergic to this     Other [Seasonal Allergies]      Black dye from knee brace caused skin breakdown.     Oxcarbazepine Unknown     Trileptal Unknown     Paraphenylenediamine Itching, Swelling and Rash     Black dye       FAMILY HISTORY:  Family History   Problem Relation Age of Onset     Arthritis Mother         rheumatoid/had knee replacement     Bipolar Disorder Mother      Migraines Mother      Other Cancer Mother         lymphoma     Depression Father      Suicide Father      Mental Illness Father      Alcohol/Drug Brother      Anxiety Disorder Brother      Mental Illness Brother      Depression Brother      Allergies Sister      Migraines Sister      Anxiety Disorder Maternal Grandfather      Bipolar Disorder Daughter      Migraines Brother      Substance Abuse Brother      Migraines Sister      Migraines Brother      Migraines Sister      Anesthesia Reaction Other      Cerebrovascular Disease No family hx of      Rheumatoid Arthritis Mother      Depression Mother      Suicidality Father      Depression Sister      Down Syndrome Brother      Depression Brother      Depression Sister      Depression Sister      Depression Brother      Depression Brother      Depression Brother      Bipolar Disorder Daughter        SOCIAL HISTORY:   Social History     Socioeconomic History     Marital status:      Number of children: 2     Years of education: 18   Occupational History     Occupation:      Comment: Disabled 2001   Tobacco Use     Smoking status: Never Smoker     Smokeless tobacco: Never Used   Substance and Sexual Activity     Alcohol use: No     Alcohol/week: 0.0 standard drinks     Drug use: Never     Sexual activity: Not Currently     Partners: Male     Birth control/protection: Surgical     Comment: vasectomy   Other Topics Concern      Parent/sibling w/ CABG, MI or angioplasty before 65F 55M? No       VITALS:  BP (!) 188/91   Pulse 71   Temp 98.1  F (36.7  C) (Oral)   Resp 20   Wt 85.7 kg (189 lb)   SpO2 97%   BMI 32.44 kg/m      PHYSICAL EXAM    Constitutional: Well developed, Well nourished, NAD.  HENT: Normocephalic, Atraumatic. Neck Supple.  Eyes: EOMI, Conjunctiva normal.  Respiratory: Breathing comfortably on room air. Speaks full sentences easily. Lungs clear to ascultation.  Cardiovascular: Normal heart rate, Regular rhythm. No peripheral edema.  Abdomen: Soft, nontender  Pelvic: There is moderate blood in the vaginal vault.  No vaginal or cervical lesions.  No current bleeding from the cervical os.  Musculoskeletal: Good range of motion in all major joints. No major deformities noted.  Integument: Warm, Dry.  Neurologic: Alert & awake, Normal motor function, Normal sensory function, No focal deficits noted.   Psychiatric: Cooperative. Affect appropriate.     LAB:  All pertinent labs reviewed and interpreted.  Labs Ordered and Resulted from Time of ED Arrival to Time of ED Departure   BASIC METABOLIC PANEL - Abnormal       Result Value    Sodium 137      Potassium 3.6      Chloride 103      Carbon Dioxide (CO2) 25      Anion Gap 9      Urea Nitrogen 7 (*)     Creatinine 0.79      Calcium 9.4      Glucose 99      GFR Estimate 87     CBC WITH PLATELETS - Normal    WBC Count 6.1      RBC Count 4.81      Hemoglobin 14.0      Hematocrit 41.4      MCV 86      MCH 29.1      MCHC 33.8      RDW 13.8      Platelet Count 258     INR - Normal    INR 0.95         RADIOLOGY:  Reviewed all pertinent imaging. Please see official radiology report.  US Pelvic Complete with Transvaginal   Final Result   IMPRESSION:   1.  Modest thickening of endometrial lining for a presumed postmenopausal female. No discrete endometrial mass present but endometrial hyperplasia or neoplasia can't be excluded. Thickening may relate to retained blood products. Suggest a  follow-up exam    in 2 months to reevaluate.   2.   2.2 cm cystic structure within left ovary likely of no clinical consequence but suggest a follow-up exam in 6 months to prove stability.                       I, Sarah Navi, am serving as a scribe to document services personally performed by Dr. James Coello, based on my observation and the provider's statements to me. I, James Coello MD attest that Sarah Mcelroy is acting in a scribe capacity, has observed my performance of the services and has documented them in accordance with my direction.    James Coello M.D.  Emergency Medicine  [facility] EMERGENCY DEPARTMENT  [address] [place] [address] [id number] [phone]  Dept: [phone]     James Coello MD  05/05/22 0210

## 2022-05-05 NOTE — DISCHARGE INSTRUCTIONS
You were seen today in the emergency department at [facility] for vaginal bleeding.  Your ultrasound did show a thickened endometrium and multiple uterine fibroids which could be contributing to your bleeding symptoms.  We are going to start you on a medicine called TXA which she can take 3 times a day to help hopefully control your bleeding.  The next step in your evaluation needs to be to follow-up as soon as possible in an OB/GYN clinic to discuss other things which may need to be done like an endometrial biopsy and review your ultrasound results.  We can reevaluate you at any time in the emergency department if he has severe lightheadedness, new severe abdominal pain, or other immediate concern.  Otherwise we are hopeful that your bleeding should slow down over the next few days and you can follow-up with OB/GYN.  Please call them tomorrow to set up next available appointment

## 2022-05-12 ENCOUNTER — TRANSFERRED RECORDS (OUTPATIENT)
Dept: HEALTH INFORMATION MANAGEMENT | Facility: CLINIC | Age: 57
End: 2022-05-12
Payer: COMMERCIAL

## 2022-07-27 ENCOUNTER — HOSPITAL ENCOUNTER (OUTPATIENT)
Dept: MAMMOGRAPHY | Facility: CLINIC | Age: 57
Discharge: HOME | End: 2022-07-27
Attending: FAMILY MEDICINE | Admitting: FAMILY MEDICINE
Payer: COMMERCIAL

## 2022-07-27 DIAGNOSIS — Z12.31 VISIT FOR SCREENING MAMMOGRAM: ICD-10-CM

## 2022-07-27 PROCEDURE — 77067 SCR MAMMO BI INCL CAD: CPT

## 2022-08-30 RX ORDER — MEDROXYPROGESTERONE ACETATE 10 MG
TABLET ORAL
COMMUNITY
Start: 2022-06-02 | End: 2023-04-26

## 2022-08-30 RX ORDER — RISPERIDONE 1 MG/1
TABLET ORAL
COMMUNITY
Start: 2022-07-22 | End: 2022-11-29

## 2022-08-30 RX ORDER — DEXTROAMPHETAMINE SACCHARATE, AMPHETAMINE ASPARTATE MONOHYDRATE, DEXTROAMPHETAMINE SULFATE AND AMPHETAMINE SULFATE 7.5; 7.5; 7.5; 7.5 MG/1; MG/1; MG/1; MG/1
CAPSULE, EXTENDED RELEASE ORAL
COMMUNITY
Start: 2022-07-20

## 2022-08-30 RX ORDER — PROPRANOLOL HYDROCHLORIDE 60 MG/1
TABLET ORAL
COMMUNITY
Start: 2021-11-05 | End: 2022-11-29

## 2022-08-31 ASSESSMENT — PATIENT HEALTH QUESTIONNAIRE - PHQ9
10. IF YOU CHECKED OFF ANY PROBLEMS, HOW DIFFICULT HAVE THESE PROBLEMS MADE IT FOR YOU TO DO YOUR WORK, TAKE CARE OF THINGS AT HOME, OR GET ALONG WITH OTHER PEOPLE: SOMEWHAT DIFFICULT
SUM OF ALL RESPONSES TO PHQ QUESTIONS 1-9: 16
SUM OF ALL RESPONSES TO PHQ QUESTIONS 1-9: 16

## 2022-08-31 ASSESSMENT — ENCOUNTER SYMPTOMS
SORE THROAT: 0
NERVOUS/ANXIOUS: 1
CONSTIPATION: 0
WEAKNESS: 0
PARESTHESIAS: 0
HEMATOCHEZIA: 0
FREQUENCY: 0
HEADACHES: 0
COUGH: 0
FEVER: 0
PALPITATIONS: 0
DIARRHEA: 0
BREAST MASS: 0
CHILLS: 0
DYSURIA: 0
NAUSEA: 0
JOINT SWELLING: 0
ARTHRALGIAS: 1
EYE PAIN: 0
HEARTBURN: 1
ABDOMINAL PAIN: 0
DIZZINESS: 0
MYALGIAS: 0
SHORTNESS OF BREATH: 0
HEMATURIA: 0

## 2022-08-31 ASSESSMENT — ACTIVITIES OF DAILY LIVING (ADL): CURRENT_FUNCTION: NO ASSISTANCE NEEDED

## 2022-09-07 ENCOUNTER — OFFICE VISIT (OUTPATIENT)
Dept: FAMILY MEDICINE | Facility: CLINIC | Age: 57
End: 2022-09-07
Payer: COMMERCIAL

## 2022-09-07 VITALS
TEMPERATURE: 97.1 F | HEART RATE: 69 BPM | RESPIRATION RATE: 16 BRPM | HEIGHT: 64 IN | OXYGEN SATURATION: 99 % | SYSTOLIC BLOOD PRESSURE: 148 MMHG | WEIGHT: 194.5 LBS | DIASTOLIC BLOOD PRESSURE: 83 MMHG | BODY MASS INDEX: 33.2 KG/M2

## 2022-09-07 DIAGNOSIS — Z13.220 LIPID SCREENING: ICD-10-CM

## 2022-09-07 DIAGNOSIS — R73.09 OTHER ABNORMAL GLUCOSE: ICD-10-CM

## 2022-09-07 DIAGNOSIS — F20.9 SCHIZOPHRENIA, UNSPECIFIED TYPE (H): ICD-10-CM

## 2022-09-07 DIAGNOSIS — Z00.00 ENCOUNTER FOR MEDICARE ANNUAL WELLNESS EXAM: ICD-10-CM

## 2022-09-07 DIAGNOSIS — G40.909 SEIZURE DISORDER (H): ICD-10-CM

## 2022-09-07 DIAGNOSIS — E66.01 MORBID OBESITY (H): ICD-10-CM

## 2022-09-07 DIAGNOSIS — F31.81 BIPOLAR 2 DISORDER (H): Primary | ICD-10-CM

## 2022-09-07 DIAGNOSIS — Z79.899 ENCOUNTER FOR LONG-TERM (CURRENT) USE OF MEDICATIONS: ICD-10-CM

## 2022-09-07 DIAGNOSIS — Z11.4 SCREENING FOR HIV (HUMAN IMMUNODEFICIENCY VIRUS): ICD-10-CM

## 2022-09-07 LAB
ALBUMIN SERPL BCG-MCNC: 4.6 G/DL (ref 3.5–5.2)
ALP SERPL-CCNC: 74 U/L (ref 35–104)
ALT SERPL W P-5'-P-CCNC: 20 U/L (ref 10–35)
ANION GAP SERPL CALCULATED.3IONS-SCNC: 8 MMOL/L (ref 7–15)
AST SERPL W P-5'-P-CCNC: 21 U/L (ref 10–35)
BILIRUB SERPL-MCNC: 0.4 MG/DL
BUN SERPL-MCNC: 11.3 MG/DL (ref 6–20)
CALCIUM SERPL-MCNC: 9.3 MG/DL (ref 8.6–10)
CHLORIDE SERPL-SCNC: 104 MMOL/L (ref 98–107)
CHOLEST SERPL-MCNC: 228 MG/DL
CREAT SERPL-MCNC: 1 MG/DL (ref 0.51–0.95)
DEPRECATED HCO3 PLAS-SCNC: 27 MMOL/L (ref 22–29)
ERYTHROCYTE [DISTWIDTH] IN BLOOD BY AUTOMATED COUNT: 14.4 % (ref 10–15)
GFR SERPL CREATININE-BSD FRML MDRD: 65 ML/MIN/1.73M2
GLUCOSE SERPL-MCNC: 118 MG/DL (ref 70–99)
HBA1C MFR BLD: 5.8 % (ref 0–5.6)
HCT VFR BLD AUTO: 40.2 % (ref 35–47)
HDLC SERPL-MCNC: 72 MG/DL
HGB BLD-MCNC: 12.9 G/DL (ref 11.7–15.7)
LDLC SERPL CALC-MCNC: 146 MG/DL
MCH RBC QN AUTO: 26.4 PG (ref 26.5–33)
MCHC RBC AUTO-ENTMCNC: 32.1 G/DL (ref 31.5–36.5)
MCV RBC AUTO: 82 FL (ref 78–100)
NONHDLC SERPL-MCNC: 156 MG/DL
PLATELET # BLD AUTO: 293 10E3/UL (ref 150–450)
POTASSIUM SERPL-SCNC: 4 MMOL/L (ref 3.4–5.3)
PROT SERPL-MCNC: 7.3 G/DL (ref 6.4–8.3)
RBC # BLD AUTO: 4.88 10E6/UL (ref 3.8–5.2)
SODIUM SERPL-SCNC: 139 MMOL/L (ref 136–145)
TRIGL SERPL-MCNC: 51 MG/DL
WBC # BLD AUTO: 6.4 10E3/UL (ref 4–11)

## 2022-09-07 PROCEDURE — 36415 COLL VENOUS BLD VENIPUNCTURE: CPT | Performed by: FAMILY MEDICINE

## 2022-09-07 PROCEDURE — 90682 RIV4 VACC RECOMBINANT DNA IM: CPT | Performed by: FAMILY MEDICINE

## 2022-09-07 PROCEDURE — 80053 COMPREHEN METABOLIC PANEL: CPT | Performed by: FAMILY MEDICINE

## 2022-09-07 PROCEDURE — 80061 LIPID PANEL: CPT | Performed by: FAMILY MEDICINE

## 2022-09-07 PROCEDURE — G0438 PPPS, INITIAL VISIT: HCPCS | Performed by: FAMILY MEDICINE

## 2022-09-07 PROCEDURE — 83036 HEMOGLOBIN GLYCOSYLATED A1C: CPT | Performed by: FAMILY MEDICINE

## 2022-09-07 PROCEDURE — 85027 COMPLETE CBC AUTOMATED: CPT | Performed by: FAMILY MEDICINE

## 2022-09-07 PROCEDURE — G0008 ADMIN INFLUENZA VIRUS VAC: HCPCS | Performed by: FAMILY MEDICINE

## 2022-09-07 RX ORDER — METFORMIN HCL 500 MG
1000 TABLET, EXTENDED RELEASE 24 HR ORAL
Qty: 180 TABLET | Refills: 3 | Status: SHIPPED | OUTPATIENT
Start: 2022-09-07 | End: 2023-11-21

## 2022-09-07 ASSESSMENT — ENCOUNTER SYMPTOMS
SORE THROAT: 0
HEADACHES: 0
PALPITATIONS: 0
ABDOMINAL PAIN: 0
FREQUENCY: 0
DIZZINESS: 0
HEARTBURN: 1
FEVER: 0
DYSURIA: 0
EYE PAIN: 0
JOINT SWELLING: 0
HEMATOCHEZIA: 0
HEMATURIA: 0
NAUSEA: 0
CHILLS: 0
NERVOUS/ANXIOUS: 1
COUGH: 0
BREAST MASS: 0
ARTHRALGIAS: 1
MYALGIAS: 0
CONSTIPATION: 0
SHORTNESS OF BREATH: 0
DIARRHEA: 0
PARESTHESIAS: 0
WEAKNESS: 0

## 2022-09-07 ASSESSMENT — ACTIVITIES OF DAILY LIVING (ADL): CURRENT_FUNCTION: NO ASSISTANCE NEEDED

## 2022-09-07 ASSESSMENT — PAIN SCALES - GENERAL: PAINLEVEL: SEVERE PAIN (6)

## 2022-09-07 NOTE — PATIENT INSTRUCTIONS
"  Patient Education   Personalized Prevention Plan  You are due for the preventive services outlined below.  Your care team is available to assist you in scheduling these services.  If you have already completed any of these items, please share that information with your care team to update in your medical record.  Health Maintenance Due   Topic Date Due     ANNUAL REVIEW OF HM ORDERS  Never done     Discuss Advance Care Planning  Never done     HIV Screening  Never done     URINE DRUG SCREEN  10/01/2009     Zoster (Shingles) Vaccine (1 of 2) Never done     COVID-19 Vaccine (3 - Booster for Pfizer series) 01/24/2022     Flu Vaccine (1) 09/01/2022       Depression and Suicide in Older Adults    Nearly 2 million older Americans have some type of depression. Some of them even take their own lives. Yet depression among older adults is often ignored. Learn the warning signs. You may help spare a loved one needless pain. You may also save a life.   What is depression?  Depression is a common and serious illness that affects the way you think and feel. It is not a normal part of aging, nor is it a sign of weakness, a character flaw, or something you can snap out of. Most people with depression need treatment to get better. The most common symptom is a feeling of deep sadness. People who are depressed also may seem tired and listless. And nothing seems to give them pleasure. It s normal to grieve or be sad sometimes. But sadness lessens or passes with time. Depression rarely goes away or improves on its own. A person with clinical depression can't \"snap out of it.\" Other symptoms of depression are:     Sleeping more or less than normal    Eating more or less than normal    Having headaches, stomachaches, or other pains that don t go away    Feeling nervous,  empty,  or worthless    Crying a great deal    Thinking or talking about suicide or death    Loss of interest in activities previously enjoyed    Social " isolation    Feeling confused or forgetful  What causes it?  The causes of depression aren t fully known. But it is thought to result from a complex blend of these factors:     Biochemistry. Certain chemicals in the brain play a role.    Genes. Depression does run in families.    Life stress. Life stresses can also trigger depression in some people. Older adults often face many stressors, such as death of friends or a spouse, health problems, and financial concerns.    Chronic conditions. This includes conditions such as diabetes, heart disease, or cancer. These can cause symptoms of depression. Medicine side effects can cause changes in thoughts and behaviors.  How you can help  Often, depressed people may not want to ask for help. When they do, they may be ignored. Or, they may receive the wrong treatment. You can help by showing parents and older friends love and support. If they seem depressed, don t lecture the person, ignore the symptoms, or discount the symptoms as a  normal  part of aging -which they are not. Get involved, listen, and show interest and support.   Help them understand that depression is a treatable illness. Tell them you can help them find the right treatment. Offer to go to their healthcare provider's appointment with them for support when the symptoms are discussed. With their approval, contact a local mental health center, social service agency, or hospital about services.   You can be an advocate for him or her at healthcare appointments. Many older adults have chronic illnesses that can cause symptoms of depression. Medicine side effects can change thoughts and behaviors. You can help make sure that the healthcare provider looks at all of these factors. He or she should refer your family member or friend to a mental healthcare provider when needed. in some cases, untreated depression can lead to a misdiagnosis. A person may be diagnosed with a brain disorder such as dementia. If the  healthcare provider does not take the issue of depression seriously, help your family member or friend to find another provider.   Don't be afraid to ask  If you think an older person you care about could be suicidal, ask,  Have you thought about suicide?  Most people will tell you the truth. If they say  yes,  they may already have a plan for how and when they will attempt it. Find out as much as you can. The more detailed the plan, and the easier it is to carry out, the more danger the person is in right now. Tell the person you are there for them and do not want them to harm him or herself. Don't wait to get help for the person. Call the person's healthcare provider, local hospital, or emergency services.   To learn more    National Suicide Prevention Lifeline (crisis hotline) 128-349-ZBRB (564-483-7228)    National [place] of Mental Ttwkwb121-917-4670ort.[facility].nih.gov    National [place] on Mental Iehnrea368-090-4994pzf.robyn.org    Mental Health Tsxcdnp505-760-6624kmt.[facility].org    National Suicide Gjayass468-ZMDYBQG (911-862-6941)    Call 911  Never leave the person alone. A person who is actively suicidal needs psychiatric care right away. They will need constant supervision. Never leave the person out of sight. Call 911 or the national 24-hour suicide crisis hotline at 390-537-NYUY (362-021-0069). You can also take the person to the closest emergency room.   Rising last reviewed this educational content on 5/1/2020 2000-2021 [employer]. All rights reserved. This information is not intended as a substitute for professional medical care. Always follow your healthcare professional's instructions.

## 2022-09-07 NOTE — PROGRESS NOTES
"SUBJECTIVE:   Socorro Oakes is a 56 year old female who presents for Preventive Visit.    Patient has been advised of split billing requirements and indicates understanding: Yes  Are you in the first 12 months of your Medicare coverage?  No    Healthy Habits:     In general, how would you rate your overall health?  Good    Frequency of exercise:  4-5 days/week    Duration of exercise:  45-60 minutes    Do you usually eat at least 4 servings of fruit and vegetables a day, include whole grains    & fiber and avoid regularly eating high fat or \"junk\" foods?  No    Taking medications regularly:  Yes    Medication side effects:  Other    Ability to successfully perform activities of daily living:  No assistance needed    Home Safety:  No safety concerns identified    Hearing Impairment:  No hearing concerns    In the past 6 months, have you been bothered by leaking of urine?  No    In general, how would you rate your overall mental or emotional health?  Fair      PHQ-2 Total Score: 4    Additional concerns today:  No    Answers for HPI/ROS submitted by the patient on 8/31/2022  If you checked off any problems, how difficult have these problems made it for you to do your work, take care of things at home, or get along with other people?: Somewhat difficult  PHQ9 TOTAL SCORE: 16      Do you feel safe in your environment? Yes    Have you ever done Advance Care Planning? (For example, a Health Directive, POLST, or a discussion with a medical provider or your loved ones about your wishes): No, advance care planning information given to patient to review.  Patient declined advance care planning discussion at this time.    Fall risk  Fallen 2 or more times in the past year?: No  Any fall with injury in the past year?: No    Cognitive Screening   1) Repeat 3 items (Leader, Season, Table)    2) Clock draw: NORMAL  3) 3 item recall: Recalls 1 object   Results: NORMAL clock, 1-2 items recalled: COGNITIVE IMPAIRMENT LESS " LIKELY    Mini-CogTM Copyright LAMONTE Eisenberg. Licensed by the author for use in [facility]; reprinted with permission (sorosita@[facility]). All rights reserved.      Do you have sleep apnea, excessive snoring or daytime drowsiness?: yes.sleep apnea    Reviewed and updated as needed this visit by clinical staff   Tobacco  Allergies  Meds                Reviewed and updated as needed this visit by Provider                   Social History     Tobacco Use     Smoking status: Never Smoker     Smokeless tobacco: Never Used   Substance Use Topics     Alcohol use: No     Alcohol/week: 0.0 standard drinks     If you drink alcohol do you typically have >3 drinks per day or >7 drinks per week? No    Alcohol Use 9/7/2022   Prescreen: >3 drinks/day or >7 drinks/week? -   Prescreen: >3 drinks/day or >7 drinks/week? No   No flowsheet data found.            Current providers sharing in care for this patient include:   Patient Care Team:  Neelima Villatoro MD as PCP - Sona Calvo MD as Referring Physician (Neurological Surgery)  Rubina Dobbs PsyD (Psychology)  Kristopher Mchugh as Psychiatrist (Psychiatry)  Dominga Moise, PhD as Psychologist  Tanya, Everton Mayes MD as Assigned Sleep Provider  Neelima Villatoro MD as Assigned PCP  Afsaneh Horowitz DO as MD (Gastroenterology)  Afsaneh Horowitz DO as Assigned Gastroenterology Provider    The following health maintenance items are reviewed in Epic and correct as of today:  Health Maintenance Due   Topic Date Due     HIV SCREENING  Never done     URINE DRUG SCREEN  10/01/2009     ZOSTER IMMUNIZATION (1 of 2) Never done     COVID-19 Vaccine (3 - Booster for Pfizer series) 01/24/2022         Breast CA Risk Assessment (FHS-7) 8/31/2022   Do you have a family history of breast, colon, or ovarian cancer? No / Unknown         Pertinent mammograms are reviewed under the imaging tab.    Review of Systems   Constitutional: Negative for chills and  "fever.   HENT: Negative for congestion, ear pain, hearing loss and sore throat.    Eyes: Negative for pain and visual disturbance.   Respiratory: Negative for cough and shortness of breath.    Cardiovascular: Negative for chest pain, palpitations and peripheral edema.   Gastrointestinal: Positive for heartburn. Negative for abdominal pain, constipation, diarrhea, hematochezia and nausea.   Breasts:  Negative for tenderness, breast mass and discharge.   Genitourinary: Negative for dysuria, frequency, genital sores, hematuria, pelvic pain, urgency, vaginal bleeding and vaginal discharge.   Musculoskeletal: Positive for arthralgias. Negative for joint swelling and myalgias.   Skin: Negative for rash.   Neurological: Negative for dizziness, weakness, headaches and paresthesias.   Psychiatric/Behavioral: Negative for mood changes. The patient is nervous/anxious.          OBJECTIVE:   BP (!) 148/83   Pulse 69   Temp 97.1  F (36.2  C) (Tympanic)   Resp 16   Ht 1.626 m (5' 4\")   Wt 88.2 kg (194 lb 8 oz)   SpO2 99%   BMI 33.39 kg/m   Estimated body mass index is 33.39 kg/m  as calculated from the following:    Height as of this encounter: 1.626 m (5' 4\").    Weight as of this encounter: 88.2 kg (194 lb 8 oz).  Physical Exam    Physical Exam:  General Appearance: Alert, cooperative, no distress, appears stated age   Head: Normocephalic, without obvious abnormality, atraumatic  Eyes: PERRL, conjunctiva/corneas clear, EOM's intact   Ears: Normal TM's and external ear canals, both ears  Neck: Supple, symmetrical, trachea midline, no adenopathy;  thyroid: not enlarged, symmetric, no tenderness/mass/nodules  Back: Symmetric, no curvature, ROM normal,  Lungs: Clear to auscultation bilaterally, respirations unlabored  Breasts: No breast masses, tenderness, asymmetry, or nipple discharge.  Heart: Regular rate and rhythm, S1 and S2 normal, no murmur, rub, or gallop  Abdomen: Soft, non-tender, bowel sounds active all four " "quadrants,  no masses, no organomegaly  Extremities: Extremities normal, atraumatic, no cyanosis or edema  Skin: Skin color, texture, turgor normal, no rashes or lesions  Lymph nodes: Cervical, supraclavicular, and axillary nodes normal and   Neurologic: Normal          ASSESSMENT / PLAN:   1. Encounter for Medicare annual wellness exam    2. Encounter for long-term (current) use of medications  Patient placed for urine drug screen.  She is not currently on any narcotic medications thus I do not think that it is appropriate.    3. Morbid obesity (H)  She will increase metformin to 2 tablets daily if she can tolerate.  Laboratory testing as below  - metFORMIN (GLUCOPHAGE XR) 500 MG 24 hr tablet; Take 2 tablets (1,000 mg) by mouth daily (with dinner)  Dispense: 180 tablet; Refill: 3  - Hemoglobin A1c; Future  - Hemoglobin A1c    4. Bipolar 2 disorder (H)  Following with psychiatry    6. Schizophrenia, unspecified type (H)  Following with psychiatry  - COMPREHENSIVE METABOLIC PANEL; Future  - CBC with Platelets; Future  - COMPREHENSIVE METABOLIC PANEL  - CBC with Platelets    7. Lipid screening  - Lipid panel reflex to direct LDL Fasting; Future  - Lipid panel reflex to direct LDL Fasting    8. Other abnormal glucose   - Hemoglobin A1c; Future  - Hemoglobin A1c    9. Seizure disorder (H)  Stable.  Following with neurology      Patient has been advised of split billing requirements and indicates understanding: Yes    COUNSELING:  Reviewed preventive health counseling, as reflected in patient instructions    Estimated body mass index is 33.39 kg/m  as calculated from the following:    Height as of this encounter: 1.626 m (5' 4\").    Weight as of this encounter: 88.2 kg (194 lb 8 oz).    Weight management plan: Discussed healthy diet and exercise guidelines    She reports that she has never smoked. She has never used smokeless tobacco.      Appropriate preventive services were discussed with this patient, including " applicable screening as appropriate for cardiovascular disease, diabetes, osteopenia/osteoporosis, and glaucoma.  As appropriate for age/gender, discussed screening for colorectal cancer, prostate cancer, breast cancer, and cervical cancer. Checklist reviewing preventive services available has been given to the patient.    Reviewed patients plan of care and provided an AVS. The Basic Care Plan (routine screening as documented in Health Maintenance) for Socorro meets the Care Plan requirement. This Care Plan has been established and reviewed with the Patient.    Counseling Resources:  ATP IV Guidelines  Pooled Cohorts Equation Calculator  Breast Cancer Risk Calculator  Breast Cancer: Medication to Reduce Risk  FRAX Risk Assessment  ICSI Preventive Guidelines  Dietary Guidelines for Americans, 2010  [employer]'s MyPlate  ASA Prophylaxis  Lung CA Screening    Neelima Villatoro MD  [facility]    Identified Health Risks:    The patient s PHQ-9 score is consistent with moderate depression. She was provided with information regarding depression and was advised to schedule a follow up appointment with psychiatry in 4 weeks to further address this issue.

## 2022-09-24 ENCOUNTER — VIRTUAL VISIT (OUTPATIENT)
Dept: URGENT CARE | Facility: CLINIC | Age: 57
End: 2022-09-24
Payer: COMMERCIAL

## 2022-09-24 DIAGNOSIS — U07.1 COVID-19: Primary | ICD-10-CM

## 2022-09-24 PROCEDURE — 99443 PR PHYSICIAN TELEPHONE EVALUATION 21-30 MIN: CPT | Mod: CS | Performed by: NURSE PRACTITIONER

## 2022-09-24 NOTE — PROGRESS NOTES
Socorro is a 56 year old who is being evaluated via a billable telephone visit.      What phone number would you like to be contacted at? [phone]  [facility]    Assessment & Plan     (U07.1) COVID-19  (primary encounter diagnosis)    PLAN:  Qualifies for paxlovid BMI >25   Normal kidney function  Will hold provera and xanax (only takes as needed)    [facility]illes[facility] VIRTUAL URGENT CARE    Subjective   Socorro is a 56 year old presenting for the following health issues:  COVID treatment    HPI   Symptoms started 2 days ago aches fever cough congestion  No sob wheezing  Hydrated  Covid positive yesterday home test.   Taking tylenol ibuprofen      Objective           Vitals:  No vitals were obtained today due to virtual visit.    Physical Exam   healthy, alert and no distress  PSYCH: Alert and oriented times 3; coherent speech, normal   rate and volume, able to articulate logical thoughts, able   to abstract reason, no tangential thoughts, no hallucinations   or delusions  Her affect is normal  RESP: No cough, no audible wheezing, able to talk in full sentences  Remainder of exam unable to be completed due to telephone visits        Phone call duration: 25 minutes

## 2022-10-14 ENCOUNTER — DOCUMENTATION ONLY (OUTPATIENT)
Dept: SLEEP MEDICINE | Facility: CLINIC | Age: 57
End: 2022-10-14

## 2022-10-14 NOTE — PROGRESS NOTES
10/14/2022- PATRICIA MCKEON IS GOING TO BRING IN HER MASK/HEADGEAR TO [place] [facility] AND SHOW ME HER ISSUE SHE IS HAVING. WE WILL SCHEDULE MASK CONSULT FOR TIME AFTER 10/27/222 WHEN SHE GETS HER WITH HER CALENDAR.

## 2022-11-13 DIAGNOSIS — E66.01 MORBID OBESITY (H): ICD-10-CM

## 2022-11-16 RX ORDER — METFORMIN HCL 500 MG
TABLET, EXTENDED RELEASE 24 HR ORAL
Qty: 90 TABLET | Refills: 3 | OUTPATIENT
Start: 2022-11-16

## 2022-11-29 ENCOUNTER — OFFICE VISIT (OUTPATIENT)
Dept: FAMILY MEDICINE | Facility: CLINIC | Age: 57
End: 2022-11-29
Payer: COMMERCIAL

## 2022-11-29 VITALS
WEIGHT: 205 LBS | SYSTOLIC BLOOD PRESSURE: 126 MMHG | HEART RATE: 76 BPM | HEIGHT: 64 IN | TEMPERATURE: 99.2 F | BODY MASS INDEX: 35 KG/M2 | OXYGEN SATURATION: 99 % | DIASTOLIC BLOOD PRESSURE: 66 MMHG | RESPIRATION RATE: 16 BRPM

## 2022-11-29 DIAGNOSIS — M54.50 ACUTE BILATERAL LOW BACK PAIN WITHOUT SCIATICA: Primary | ICD-10-CM

## 2022-11-29 PROCEDURE — 99213 OFFICE O/P EST LOW 20 MIN: CPT | Performed by: NURSE PRACTITIONER

## 2022-11-29 RX ORDER — METHYLPREDNISOLONE 4 MG
TABLET, DOSE PACK ORAL
Qty: 21 TABLET | Refills: 0 | Status: SHIPPED | OUTPATIENT
Start: 2022-11-29 | End: 2022-11-29

## 2022-11-29 RX ORDER — METHYLPREDNISOLONE 4 MG
TABLET, DOSE PACK ORAL
Qty: 21 TABLET | Refills: 0 | Status: SHIPPED | OUTPATIENT
Start: 2022-11-29 | End: 2023-04-26

## 2022-11-29 ASSESSMENT — ENCOUNTER SYMPTOMS: BACK PAIN: 1

## 2022-11-29 NOTE — PROGRESS NOTES
Assessment/Plan:  1. Acute bilateral low back pain without sciatica  DD: Likely myofascial back strain, weakened muscular structure, central obesity. If no improvement can see Spine care for further evaluation.   Trial Tizanidine first if no improvement try prednisone. Discussed side effects of both  - Physical Therapy Referral; Future  - methylPREDNISolone (MEDROL DOSEPAK) 4 MG tablet therapy pack; Follow Package Directions  Dispense: 21 tablet; Refill: 0  - tiZANidine (ZANAFLEX) 4 MG tablet; Take 1 tablet (4 mg) by mouth 3 times daily  Dispense: 30 tablet; Refill: 0    Return in about 1 week (around 12/6/2022), or if symptoms worsen or fail to improve, for Follow up.    Marly Mcmanus, APRN, CNP        Minnie Quintanilla is a 56 year old, presenting for the following health issues:  Back Pain      Back Pain     History of Present Illness       Back Pain:  She presents for follow up of back pain. Patient's back pain is a new problem.    Original cause of back pain: not sure  First noticed back pain: in the last week  Patient feels back pain: constantlyLocation of back pain:  Other  Description of back pain: sharp and stabbing  Back pain spreads: nowhere    Since patient first noticed back pain, pain is: gradually worsening  Does back pain interfere with her job:  Not applicable  On a scale of 1-10 (10 being the worst), patient describes pain as:  7  What makes back pain worse: bending, lying down and twisting  Acupuncture: not tried  Acetaminophen: not helpful  Activity or exercise: not helpful  Chiropractor:  Not tried  Cold: not helpful  Heat: helpful  Massage: not tried  Muscle relaxants: not tried  NSAIDS: not helpful  Opioids: not tried  Physical Therapy: not tried  Rest: not tried  Steroid Injection: not tried  Stretching: not helpful  Surgery: not tried  TENS unit: not tried  Topical pain relievers: not tried  Other healthcare providers patient is seeing for back pain: None    She eats 2-3 servings of  "fruits and vegetables daily.She consumes 1 sweetened beverage(s) daily.She exercises with enough effort to increase her heart rate 30 to 60 minutes per day.  She exercises with enough effort to increase her heart rate 3 or less days per week.   She is taking medications regularly.       Friday started with symptoms in lower back when she woke up from the sleep. No numbness/tinglingling, fevers, history of cancer, no radiating pain or numbness or tingling in legs/feet. No known injury or trauma. Pain is in the sacrum area and into the buttock. Sitting hurts more, difficulty bending down to get to the floor.     Review of Systems   Musculoskeletal: Positive for back pain.          Objective    /66 (BP Location: Right arm, Patient Position: Sitting, Cuff Size: Adult Large)   Pulse 76   Temp 99.2  F (37.3  C) (Tympanic)   Resp 16   Ht 1.626 m (5' 4\")   Wt 93 kg (205 lb)   SpO2 99%   BMI 35.19 kg/m    Body mass index is 35.19 kg/m .  Physical Exam   General: Patient appears to be in no acute distress.  Back: gait normal, heel to toe walking; difficuty with balance. Spine and paraspinal muscles are not tender to palpation. Sensation intact at medial dorsal and lateral aspects of the feet. Strength equal 5/5 bilaterally in lower extremities. Straight leg raising negative on both legs. Range of motion is limited  with flexion, rotation side bending. DTRs + 1 bilateral knees and ankles.   "

## 2022-11-29 NOTE — PATIENT INSTRUCTIONS
Take Ibuprofen 400 mg with Acetaminophen (Tylenol) 1000 mg every 6 hours for acute pain.  Do not take more than 4000 mg of acetaminophen (Tylenol) in a 24 hour period.       Kinesiotape to the lower back

## 2022-11-30 ENCOUNTER — THERAPY VISIT (OUTPATIENT)
Dept: PHYSICAL THERAPY | Facility: CLINIC | Age: 57
End: 2022-11-30
Payer: COMMERCIAL

## 2022-11-30 DIAGNOSIS — M54.50 BILATERAL LOW BACK PAIN WITHOUT SCIATICA: ICD-10-CM

## 2022-11-30 PROCEDURE — 97110 THERAPEUTIC EXERCISES: CPT | Mod: GP | Performed by: PHYSICAL THERAPIST

## 2022-11-30 PROCEDURE — 97162 PT EVAL MOD COMPLEX 30 MIN: CPT | Mod: GP | Performed by: PHYSICAL THERAPIST

## 2022-11-30 PROCEDURE — 97140 MANUAL THERAPY 1/> REGIONS: CPT | Mod: GP | Performed by: PHYSICAL THERAPIST

## 2022-11-30 NOTE — PROGRESS NOTES
Physical Therapy Initial Evaluation  Subjective:  The history is provided by the patient and medical records.   Patient Health History  Socorro Oakes being seen for low back pain.     Problem began: 11/25/2022.   Problem occurred: unsure   Pain is reported as 8/10 on pain scale.  General health as reported by patient is good.  Pertinent medical history includes: anemia, depression, osteoarthritis, overweight, seizures and sleep disorder/apnea.   Red flags:  None as reported by patient.         Current medications:  Anti-inflammatory and anti-depressants.    Current occupation is retired.                     Therapist Generated HPI Evaluation  Problem details: Had sudden onset of sharp pain in sacrum area and lower.  Has had a few days of gradual improvement now.  When she woke up with it last Friday she had not done anything to aggravate it.  Sitting feels worst.  Bending to put on sock, etc also is difficult..         Type of problem:  Sacroiliac and lumbar.    This is a new condition.  Condition occurred with:  Insidious onset.  Where condition occurred: for unknown reasons.  Patient reports pain:  SI joint right and SI joint left.  Pain is described as sharp and aching and is intermittent.  Pain radiates to:  No radiation. Pain is worse during the day.  Since onset symptoms are gradually improving.  Associated symptoms:  Loss of motion. Symptoms are exacerbated by bending and sitting  and relieved by activity/movement, rest, ice and heat.                              Objective:  System         Lumbar/SI Evaluation  ROM:    AROM Lumbar:   Flexion:        Hands to mid thigh - pain on right  Ext:                    Prone on elbows, no increase in pain   Side Bend:        Left:     Right:   Rotation:           Left:     Right:   Side Glide:        Left:     Right:           Lumbar Myotomes:  normal                Lumbar Dermtomes:  normal                  Lumbar Palpation:    Tenderness present at Left:    PSIS and  ASIS  Tenderness present at Right: PSIS  Tenderness not present at Right:  ASIS        SI joint/Sacrum:        Left positive at:    Forward bend standing    Sacral conclusion left:  Anterior inominate                                               General     ROS    Assessment/Plan:    Patient is a 56 year old female with lumbar and sacral complaints.    Patient has the following significant findings with corresponding treatment plan.                Diagnosis 1:  Low back pain  Pain -  manual therapy and education  Decreased ROM/flexibility - manual therapy and therapeutic exercise  Decreased strength - therapeutic exercise and therapeutic activities    Therapy Evaluation Codes:   1) History comprised of:   Personal factors that impact the plan of care:      None.    Comorbidity factors that impact the plan of care are:      Depression, Osteoarthritis, Overweight, Seizures and Sleep disorder/apnea.     Medications impacting care: Anti-depressant and Anti-inflammatory.  2) Examination of Body Systems comprised of:   Body structures and functions that impact the plan of care:      Lumbar spine and Sacral illiac joint.   Activity limitations that impact the plan of care are:      Bending, Lifting and Sitting.  3) Clinical presentation characteristics are:   Evolving/Changing.  4) Decision-Making    Moderate complexity using standardized patient assessment instrument and/or measureable assessment of functional outcome.  Cumulative Therapy Evaluation is: Moderate complexity.    Previous and current functional limitations:  (See Goal Flow Sheet for this information)    Short term and Long term goals: (See Goal Flow Sheet for this information)     Communication ability:  Patient appears to be able to clearly communicate and understand verbal and written communication and follow directions correctly.  Treatment Explanation - The following has been discussed with the patient:   RX ordered/plan of care  Anticipated  outcomes  Possible risks and side effects  This patient would benefit from PT intervention to resume normal activities.   Rehab potential is excellent.    Frequency:  1 X week, once daily  Duration:  for 6 weeks  Discharge Plan:  Achieve all LTG.  Independent in home treatment program.  Reach maximal therapeutic benefit.    Please refer to the daily flowsheet for treatment today, total treatment time and time spent performing 1:1 timed codes.

## 2022-12-02 NOTE — PROGRESS NOTES
[facility]    OUTPATIENT Physical Therapy ORTHOPEDIC EVALUATION  PLAN OF TREATMENT FOR OUTPATIENT REHABILITATION  (COMPLETE FOR INITIAL CLAIMS ONLY)  Patient's Last Name, First Name, M.I.  YOB: 1965  VioletteSocorro  L    Provider s Name:  [facility]   Medical Record No.  [id number]   Start of Care Date:  11/30/22   Onset Date:   11/25/22   Treatment Diagnosis:  LBP Medical Diagnosis:  Bilateral low back pain without sciatica       Goals:     11/30/22 0500   Body Part   Goals listed below are for low back   Goal #1   Goal #1 sitting   Previous Functional Level No restrictions   Current Functional Level Minutes patient can sit   Performance level 45 min, pain 8/10   STG Target Performance Minutes patient will be able to sit   Performance level 45 min, pain < 3/10   Rationale to allow rest from standing;for community transportation   Due date 12/21/22   LTG Target Performance Minutes patient will be able to sit   Performance Level 60 no pain   Rationale to allow rest from standing;for community transportation   Due date 01/11/23       Therapy Frequency:  1 x per week  Predicted Duration of Therapy Intervention:  6 weeks    Jeannine Peraza, PT                 I CERTIFY THE NEED FOR THESE SERVICES FURNISHED UNDER        THIS PLAN OF TREATMENT AND WHILE UNDER MY CARE     (Physician attestation of this document indicates review and certification of the therapy plan).                     Certification Date From:  11/30/22   Certification Date To:  01/11/23    Referring Provider:  Marly Jack    Initial Assessment        See Epic Evaluation SOC Date: 11/30/22

## 2023-01-30 ENCOUNTER — ANCILLARY PROCEDURE (OUTPATIENT)
Dept: GENERAL RADIOLOGY | Facility: CLINIC | Age: 58
End: 2023-01-30
Attending: FAMILY MEDICINE
Payer: COMMERCIAL

## 2023-01-30 ENCOUNTER — OFFICE VISIT (OUTPATIENT)
Dept: FAMILY MEDICINE | Facility: CLINIC | Age: 58
End: 2023-01-30
Payer: COMMERCIAL

## 2023-01-30 DIAGNOSIS — M25.512 CHRONIC LEFT SHOULDER PAIN: ICD-10-CM

## 2023-01-30 DIAGNOSIS — M79.642 PAIN OF LEFT HAND: ICD-10-CM

## 2023-01-30 DIAGNOSIS — F20.9 SCHIZOPHRENIA, UNSPECIFIED TYPE (H): ICD-10-CM

## 2023-01-30 DIAGNOSIS — G89.29 CHRONIC LEFT SHOULDER PAIN: Primary | ICD-10-CM

## 2023-01-30 DIAGNOSIS — K21.00 GASTROESOPHAGEAL REFLUX DISEASE WITH ESOPHAGITIS WITHOUT HEMORRHAGE: ICD-10-CM

## 2023-01-30 DIAGNOSIS — M25.512 CHRONIC LEFT SHOULDER PAIN: Primary | ICD-10-CM

## 2023-01-30 DIAGNOSIS — G89.29 CHRONIC LEFT SHOULDER PAIN: ICD-10-CM

## 2023-01-30 DIAGNOSIS — G40.909 SEIZURE DISORDER (H): ICD-10-CM

## 2023-01-30 PROCEDURE — 73130 X-RAY EXAM OF HAND: CPT | Mod: TC | Performed by: RADIOLOGY

## 2023-01-30 PROCEDURE — 73030 X-RAY EXAM OF SHOULDER: CPT | Mod: TC | Performed by: RADIOLOGY

## 2023-01-30 RX ORDER — LANSOPRAZOLE 30 MG/1
60 CAPSULE, DELAYED RELEASE ORAL
Qty: 180 CAPSULE | Refills: 0 | Status: SHIPPED | OUTPATIENT
Start: 2023-01-30 | End: 2023-06-05

## 2023-01-30 ASSESSMENT — PATIENT HEALTH QUESTIONNAIRE - PHQ9
10. IF YOU CHECKED OFF ANY PROBLEMS, HOW DIFFICULT HAVE THESE PROBLEMS MADE IT FOR YOU TO DO YOUR WORK, TAKE CARE OF THINGS AT HOME, OR GET ALONG WITH OTHER PEOPLE: NOT DIFFICULT AT ALL
SUM OF ALL RESPONSES TO PHQ QUESTIONS 1-9: 4
SUM OF ALL RESPONSES TO PHQ QUESTIONS 1-9: 4

## 2023-01-30 NOTE — PROGRESS NOTES
Answers for HPI/ROS submitted by the patient on 1/30/2023  If you checked off any problems, how difficult have these problems made it for you to do your work, take care of things at home, or get along with other people?: Not difficult at all  PHQ9 TOTAL SCORE: 4  How many servings of fruits and vegetables do you eat daily?: 2-3  On average, how many sweetened beverages do you drink each day (Examples: soda, juice, sweet tea, etc.  Do NOT count diet or artificially sweetened beverages)?: 1  How many minutes a day do you exercise enough to make your heart beat faster?: 20 to 29  How many days a week do you exercise enough to make your heart beat faster?: 3 or less  How many days per week do you miss taking your medication?: 0  What is the reason for your visit today?: shoulder pain, wrist pain, sinus/?  When did your symptoms begin?: More than a month  What are your symptoms?: shoulder pain, wrist pain, sinus  How would you describe these symptoms?: Moderate  Are your symptoms:: Worsening  Have you had these symptoms before?: Yes  Have you tried or received treatment for these symptoms before?: No    Assessment/Plan:    Socorro Oakes is a 57 year old female presenting for:    Chronic left shoulder pain  Most likely rotator cuff tendinitis.  Joint injection as well.  X-ray does not show any specific arthritis.  I personally reviewed the x-ray.  Physical therapy exercises given  - XR Shoulder Left G/E 3 Views  - Large Joint/Bursa injection and/or drainage (Shoulder, Knee)  - triamcinolone (KENALOG-40) injection 40 mg    Pain of left hand  I personally reviewed the x-ray.  Patient has significant arthritis at the base of her thumb.  May benefit from seeing orthopedics.  - XR Hand Left G/E 3 Views    Gastroesophageal reflux disease with esophagitis without hemorrhage  Difficult etiology.  Patient is already seen the blood specialist.  Recommended trying to cut dairy out of her diet for several weeks and increasing  Prevacid to 60 mg daily.  - LANsoprazole (PREVACID) 30 MG DR capsule  Dispense: 180 capsule; Refill: 0    Schizophrenia, unspecified type (H)  Continue following with psychiatry    Seizure disorder (H)  Stable.  Continue following with neurology      Medications Discontinued During This Encounter   Medication Reason     famotidine (PEPCID) 10 MG tablet Therapy completed (No AVS)     LANsoprazole (PREVACID) 30 MG DR capsule Reorder (No AVS / No eCancel)           Chief Complaint:  Shoulder Pain, Sinus Problem, and Musculoskeletal Problem        Subjective:   Socorro Oakes is a pleasant 57-year-old female who presents to the clinic today for several concerns:    1.  Left-sided shoulder pain: Patient notes that she has had pain in her left shoulder over the last 3 to 4 months.  She states that this is worsening.  She denies any trauma.  She states that this pain is in the anterior part of the shoulder but also seems to be in the joint.  She notes difficulty with raising her arm all the way out.  Some days is a slightly better and some days it is worse.  She cannot reach behind her.  Sometimes she is a hard time putting on shirts because raising her arm is uncomfortable    2.  Left hand pain: Patient notes that she had pain at the base of her thumb on her left hand as well as the ulnar side of her wrist.  She notes that the pain varies somewhat in severity but sometimes inhibits being able to pick things up or move her hand.  She has not really noticed much swelling.    3.  Throat drainage: Patient notes that she always feels as though she has drainage in her throat.  She has seen the gastroenterologist for this who recommended Prevacid which she has taken.  She is unsure if this is been helpful.  She has seen ENT for this several times as well who did a CT scan of her sinuses which she states was normal.  She states that then they recommended specific sleep apnea device which she was not interested in getting.  She is  unsure if it is related to food.  Much worse at night when she lays down.    #4.  Schizophrenia: Patient follows with psychiatry.  Stable.    5.  Seizures: Follows with neurology    12 point review of systems completed and negative except for what has been described above    History   Smoking Status     Never   Smokeless Tobacco     Never         Current Outpatient Medications:      ALPRAZolam (XANAX) 1 MG tablet, Take 1 mg by mouth 2 times daily as needed for anxiety, Disp: , Rfl:      amphetamine-dextroamphetamine (ADDERALL XR) 20 MG 24 hr capsule, Take 20 mg by mouth, Disp: , Rfl:      amphetamine-dextroamphetamine (ADDERALL XR) 30 MG 24 hr capsule, , Disp: , Rfl:      buPROPion (WELLBUTRIN XL) 150 MG 24 hr tablet, , Disp: , Rfl:      DENTA 5000 PLUS 1.1 % CREA, , Disp: , Rfl:      DoxePIN (SINEQUAN) 75 MG capsule, Take 75 mg by mouth At Bedtime, Disp: , Rfl:      lamoTRIgine (LAMICTAL) 200 MG tablet, , Disp: , Rfl:      lamoTRIgine (LAMICTAL) 25 MG tablet, , Disp: , Rfl:      LANsoprazole (PREVACID) 30 MG DR capsule, Take 2 capsules (60 mg) by mouth every morning (before breakfast), Disp: 180 capsule, Rfl: 0     metFORMIN (GLUCOPHAGE XR) 500 MG 24 hr tablet, Take 2 tablets (1,000 mg) by mouth daily (with dinner), Disp: 180 tablet, Rfl: 3     pramipexole (MIRAPEX) 0.25 MG tablet, Take 1 tablet (0.25 mg) by mouth At Bedtime, Disp: 90 tablet, Rfl: 1     zolpidem ER (AMBIEN CR) 12.5 MG CR tablet, Take 12.5 mg by mouth, Disp: , Rfl:      medroxyPROGESTERone (PROVERA) 10 MG tablet, , Disp: , Rfl:      methylPREDNISolone (MEDROL DOSEPAK) 4 MG tablet therapy pack, Follow Package Directions, Disp: 21 tablet, Rfl: 0     tiZANidine (ZANAFLEX) 4 MG tablet, Take 1 tablet (4 mg) by mouth 3 times daily (Patient not taking: Reported on 1/30/2023), Disp: 30 tablet, Rfl: 0    Current Facility-Administered Medications:      triamcinolone (KENALOG-40) injection 40 mg, 40 mg, INTRA-ARTICULAR, Once, Neelima Villatoro,  "MD      Objective:  Vitals:    01/30/23 1422 01/31/23 0853   BP: (!) 162/84 (!) 144/80   Pulse: 87    Resp: 16    Temp: 98.6  F (37  C)    TempSrc: Tympanic    SpO2: 98%    Weight: 93 kg (205 lb)    Height: 1.626 m (5' 4\")        Body mass index is 35.19 kg/m .    Vital signs reviewed and stable  General: No acute distress  Psych: Appropriate affect  HEENT: moist mucous membranes, pupils equal, round, reactive to light and accomodation, tympanic membranes are pearly grey bilaterally  Lymph: no cervical or supraclavicular lymphadenopathy  Cardiovascular: regular rate and rhythm with no murmur  Pulmonary: clear to auscultation bilaterally with no wheeze  Abdomen: soft, non tender, non distended with normo-active bowel sounds  Extremities: warm and well perfused with no edema  Skin: warm and dry with no rash  Musculoskeletal: No swelling in left hand.  Tenderness palpation over the first MTP joint.  No tenderness to palpation of the wrist.  Good rotational capabilities as well as flexion extension.  Left shoulder range of motion is limited in abduction to about 110 degrees.  Unable to reach behind her.  Reasonable strength with extension against resistance.      Procedure note for shoulder injection    Verbal consent was obtained for left shoulder steroid injection.  Risks of bleeding and infection were discussed.  Patient was seated with the arm on the lap in the supinated position.  The area of the planned injection in the lateral aspect of the shoulder was cleansed with an alcohol swab and then marked.  The area was then cleansed with 3 iodine swabs.  1 cc of 40 mg/mL Triamcinolone along with 4 cc of 1% lidocaine was injected using a posterior lateral approach.  No complications. EBL was less than 5cc.       This note has been dictated and transcribed using voice recognition software.   Any errors in transcription are unintentional and inherent to the software.  "

## 2023-01-31 VITALS
SYSTOLIC BLOOD PRESSURE: 144 MMHG | RESPIRATION RATE: 16 BRPM | TEMPERATURE: 98.6 F | HEIGHT: 64 IN | HEART RATE: 87 BPM | DIASTOLIC BLOOD PRESSURE: 80 MMHG | WEIGHT: 205 LBS | OXYGEN SATURATION: 98 % | BODY MASS INDEX: 35 KG/M2

## 2023-01-31 PROCEDURE — 20610 DRAIN/INJ JOINT/BURSA W/O US: CPT | Performed by: FAMILY MEDICINE

## 2023-01-31 PROCEDURE — 99214 OFFICE O/P EST MOD 30 MIN: CPT | Mod: 25 | Performed by: FAMILY MEDICINE

## 2023-01-31 RX ORDER — TRIAMCINOLONE ACETONIDE 40 MG/ML
40 INJECTION, SUSPENSION INTRA-ARTICULAR; INTRAMUSCULAR ONCE
Status: COMPLETED | OUTPATIENT
Start: 2023-01-31 | End: 2023-01-31

## 2023-01-31 RX ADMIN — TRIAMCINOLONE ACETONIDE 40 MG: 40 INJECTION, SUSPENSION INTRA-ARTICULAR; INTRAMUSCULAR at 08:57

## 2023-04-24 ENCOUNTER — OFFICE VISIT (OUTPATIENT)
Dept: ORTHOPEDICS | Facility: CLINIC | Age: 58
End: 2023-04-24
Attending: FAMILY MEDICINE
Payer: COMMERCIAL

## 2023-04-24 VITALS
BODY MASS INDEX: 35.19 KG/M2 | SYSTOLIC BLOOD PRESSURE: 182 MMHG | DIASTOLIC BLOOD PRESSURE: 95 MMHG | HEART RATE: 79 BPM | WEIGHT: 205 LBS

## 2023-04-24 DIAGNOSIS — M79.642 PAIN OF LEFT HAND: Primary | ICD-10-CM

## 2023-04-24 DIAGNOSIS — M65.4 RADIAL STYLOID TENOSYNOVITIS: ICD-10-CM

## 2023-04-24 DIAGNOSIS — M19.032 ARTHRITIS OF SCAPHOID-TRAPEZIUM-TRAPEZOID JOINT OF LEFT HAND: ICD-10-CM

## 2023-04-24 PROCEDURE — 99204 OFFICE O/P NEW MOD 45 MIN: CPT | Performed by: PEDIATRICS

## 2023-04-24 NOTE — PROGRESS NOTES
ASSESSMENT & PLAN    Socorro was seen today for pain.    Diagnoses and all orders for this visit:    Pain of left hand  -     Orthopedic  Referral; Future  -     Wrist/Arm Supplies Order Thumb Keeper Brace    Radial styloid tenosynovitis  -     Orthopedic  Referral; Future  -     Wrist/Arm Supplies Order Thumb Keeper Brace    Arthritis of lujtfozv-lorysrsgf-sehibpzhe joint of left hand  -     Orthopedic  Referral; Future  -     Wrist/Arm Supplies Order Thumb Keeper Brace    Other orders  -     Orthopedic  Referral      This issue is chronic and Worsening.    Patient Instructions   We discussed these other possible diagnosis: Left hand pain, more likely radial styloid tenosynovitis with today's exam, does have STT joint arthritis as well.    Plan:  - Today's Plan of Care:  US guided injection - likely radial styloid tenosynovitis, (possible STT joint)  Discussed activity considerations and other supportive care including Ice/Heat, OTC and other topical medications as needed.  Diclofenac/Voltaren Gel    Discussed bracing options    -We also discussed other future treatment options:  Referral to Occupational Therapy  US guided injection (other location)    Follow Up: as needed  - with PCP    Concerning signs and symptoms were reviewed.  The patient expressed understanding of this management plan and all questions were answered at this time.    Callie Hunter MD [facility]  Sports Medicine Physician  [facility]      -----  Chief Complaint   Patient presents with     Left Hand - Pain       SUBJECTIVE  Socorro Oakes is a/an 57 year old female who is seen in consultation at the request of Neelima Villatoro M.D. for evaluation of left hand pain.     The patient is seen by themselves.  The patient is Right handed    Onset: 1 years(s) ago. Reports insidious onset without acute precipitating event.  Location of Pain: base of left thumb into wrist  Worsened by: wrist extension,  turning steering wheel, opening a door, grabbing, pulling   Better with: nothing   Treatments tried: ice, heat, Tylenol, ibuprofen, home exercises, previous imaging (xray 1/30/23) and casting/splinting/bracing  Associated symptoms: weakness of left thumb, locking or catching and feeling of instability    Orthopedic/Surgical history: YES - Date: chronic thumb pain   Social History/Occupation: volunteers at [employer]     No family history pertinent to patient's problem today.    REVIEW OF SYSTEMS:  Review of Systems  Skin: no bruising, no swelling  Musculoskeletal: as above  Neurologic: no numbness, paresthesias  Remainder of review of systems is negative including constitutional, CV, pulmonary, GI, except as noted in HPI or medical history.    OBJECTIVE:  BP (!) 182/95   Pulse 79   Wt 93 kg (205 lb)   BMI 35.19 kg/m     General: healthy, alert and in no distress  HEENT: no scleral icterus or conjunctival erythema  Skin: no suspicious lesions or rash. No jaundice.  CV: distal perfusion intact  Resp: normal respiratory effort without conversational dyspnea   Psych: normal mood and affect  Gait: NORMAL  Neuro: Normal light sensory exam of upper extremity    Bilateral Wrist and Hand exam    Inspection:       No swelling, bruising or deformity bilateral    Tender:       Radial styloid tendons on the right    Non Tender:       Remainder of the Wrist and Hand bilateral    ROM:       Full and symmetric active and passive range of motion of the forearm, wrist and digits bilateral    Strength:       5/5 strength in the muscles of the hand, wrist and forearm bilateral    Special Tests:        positive (+) Finkelstein's maneuver right    Neurovascular:       2+ radial pulses bilaterally with brisk capillary refill and      normal sensation to light touch in the radial, median and ulnar nerve distributions    RADIOLOGY:  I independently visualized and reviewed these images with the patient  Reviewed left hand x-rays from  1/30/2023 -STT joint arthritis, no acute fracture       Review of the result(s) of each unique test - XR

## 2023-04-24 NOTE — PATIENT INSTRUCTIONS
We discussed these other possible diagnosis: Left hand pain, more likely radial styloid tenosynovitis with today's exam, does have STT joint arthritis as well.    Plan:  - Today's Plan of Care:  US guided injection - likely radial styloid tenosynovitis, (possible STT joint)  Discussed activity considerations and other supportive care including Ice/Heat, OTC and other topical medications as needed.  Diclofenac/Voltaren Gel    Discussed bracing options    -We also discussed other future treatment options:  Referral to Occupational Therapy  US guided injection (other location)    Follow Up: as needed  - with PCP    If you have any further questions for your physician or physician s care team you can call [phone] and use option 3 to leave a voice message.

## 2023-04-24 NOTE — LETTER
4/24/2023         RE: Socorro Oakes  [address] [place] [id number]        Dear Colleague,    Thank you for referring your patient, Socorro Oakes, to the [facility]. Please see a copy of my visit note below.    ASSESSMENT & PLAN    Socorro was seen today for pain.    Diagnoses and all orders for this visit:    Pain of left hand  -     Orthopedic  Referral; Future  -     Wrist/Arm Supplies Order Thumb Keeper Brace    Radial styloid tenosynovitis  -     Orthopedic  Referral; Future  -     Wrist/Arm Supplies Order Thumb Keeper Brace    Arthritis of ummwvbzi-hklerybpp-icrionpku joint of left hand  -     Orthopedic  Referral; Future  -     Wrist/Arm Supplies Order Thumb Keeper Brace    Other orders  -     Orthopedic  Referral      This issue is chronic and Worsening.    Patient Instructions   We discussed these other possible diagnosis: Left hand pain, more likely radial styloid tenosynovitis with today's exam, does have STT joint arthritis as well.    Plan:  - Today's Plan of Care:  US guided injection - likely radial styloid tenosynovitis, (possible STT joint)  Discussed activity considerations and other supportive care including Ice/Heat, OTC and other topical medications as needed.  Diclofenac/Voltaren Gel    Discussed bracing options    -We also discussed other future treatment options:  Referral to Occupational Therapy  US guided injection (other location)    Follow Up: as needed  - with PCP    Concerning signs and symptoms were reviewed.  The patient expressed understanding of this management plan and all questions were answered at this time.    Callie Hunter MD [facility]  Sports Medicine Physician  [facility]      -----  Chief Complaint   Patient presents with     Left Hand - Pain       SUBJECTIVE  Socorro Oakes is a/an 57 year old female who is seen in consultation at the request of Neelima Villatoro M.D. for  evaluation of left hand pain.     The patient is seen by themselves.  The patient is Right handed    Onset: 1 years(s) ago. Reports insidious onset without acute precipitating event.  Location of Pain: base of left thumb into wrist  Worsened by: wrist extension, turning steering wheel, opening a door, grabbing, pulling   Better with: nothing   Treatments tried: ice, heat, Tylenol, ibuprofen, home exercises, previous imaging (xray 1/30/23) and casting/splinting/bracing  Associated symptoms: weakness of left thumb, locking or catching and feeling of instability    Orthopedic/Surgical history: YES - Date: chronic thumb pain   Social History/Occupation: volunteers at [employer]     No family history pertinent to patient's problem today.    REVIEW OF SYSTEMS:  Review of Systems  Skin: no bruising, no swelling  Musculoskeletal: as above  Neurologic: no numbness, paresthesias  Remainder of review of systems is negative including constitutional, CV, pulmonary, GI, except as noted in HPI or medical history.    OBJECTIVE:  BP (!) 182/95   Pulse 79   Wt 93 kg (205 lb)   BMI 35.19 kg/m     General: healthy, alert and in no distress  HEENT: no scleral icterus or conjunctival erythema  Skin: no suspicious lesions or rash. No jaundice.  CV: distal perfusion intact  Resp: normal respiratory effort without conversational dyspnea   Psych: normal mood and affect  Gait: NORMAL  Neuro: Normal light sensory exam of upper extremity    Bilateral Wrist and Hand exam    Inspection:       No swelling, bruising or deformity bilateral    Tender:       Radial styloid tendons on the right    Non Tender:       Remainder of the Wrist and Hand bilateral    ROM:       Full and symmetric active and passive range of motion of the forearm, wrist and digits bilateral    Strength:       5/5 strength in the muscles of the hand, wrist and forearm bilateral    Special Tests:        positive (+) Finkelstein's maneuver right    Neurovascular:       2+  radial pulses bilaterally with brisk capillary refill and      normal sensation to light touch in the radial, median and ulnar nerve distributions    RADIOLOGY:  I independently visualized and reviewed these images with the patient  Reviewed left hand x-rays from 1/30/2023 -STT joint arthritis, no acute fracture       Review of the result(s) of each unique test - XR             Again, thank you for allowing me to participate in the care of your patient.        Sincerely,        Callie Hunter MD

## 2023-04-26 ENCOUNTER — NURSE TRIAGE (OUTPATIENT)
Dept: FAMILY MEDICINE | Facility: CLINIC | Age: 58
End: 2023-04-26
Payer: COMMERCIAL

## 2023-04-26 RX ORDER — TRANEXAMIC ACID 650 MG/1
TABLET ORAL
COMMUNITY
End: 2023-04-28

## 2023-04-26 RX ORDER — RISPERIDONE 1 MG/1
TABLET ORAL
COMMUNITY
End: 2023-04-28

## 2023-04-26 NOTE — TELEPHONE ENCOUNTER
Patient called the triage line. BP was high while at her doctors appointment earlier this week 182/95. They did not recheck it before she left. Last night BP was high 170/110 but patient attributes this to the wild game. It came down after the game, did not give those values.  This morning BP was 145/90  Denied chest pain, denies SOB, denies numbness or tingling. Denies blurred vision.  Patient stated she has had headaches but that they are from tension in her neck and shoulder.  Disposition to be seen in 2 weeks. Scheduled patient for Friday.   Red flag symptoms reviewed.     Reason for Disposition    Systolic BP >= 130 OR Diastolic >= 80, and is not taking BP medications    Protocols used: BLOOD PRESSURE - HIGH-A-OH

## 2023-04-28 ENCOUNTER — OFFICE VISIT (OUTPATIENT)
Dept: FAMILY MEDICINE | Facility: CLINIC | Age: 58
End: 2023-04-28
Payer: COMMERCIAL

## 2023-04-28 VITALS
HEIGHT: 64 IN | OXYGEN SATURATION: 98 % | DIASTOLIC BLOOD PRESSURE: 82 MMHG | BODY MASS INDEX: 34.4 KG/M2 | RESPIRATION RATE: 16 BRPM | HEART RATE: 79 BPM | WEIGHT: 201.5 LBS | SYSTOLIC BLOOD PRESSURE: 150 MMHG | TEMPERATURE: 96.6 F

## 2023-04-28 DIAGNOSIS — I10 BENIGN ESSENTIAL HYPERTENSION: Primary | ICD-10-CM

## 2023-04-28 LAB
ANION GAP SERPL CALCULATED.3IONS-SCNC: 9 MMOL/L (ref 7–15)
BUN SERPL-MCNC: 11.3 MG/DL (ref 6–20)
CALCIUM SERPL-MCNC: 9.7 MG/DL (ref 8.6–10)
CHLORIDE SERPL-SCNC: 105 MMOL/L (ref 98–107)
CREAT SERPL-MCNC: 0.9 MG/DL (ref 0.51–0.95)
DEPRECATED HCO3 PLAS-SCNC: 27 MMOL/L (ref 22–29)
GFR SERPL CREATININE-BSD FRML MDRD: 74 ML/MIN/1.73M2
GLUCOSE SERPL-MCNC: 123 MG/DL (ref 70–99)
POTASSIUM SERPL-SCNC: 4.3 MMOL/L (ref 3.4–5.3)
SODIUM SERPL-SCNC: 141 MMOL/L (ref 136–145)

## 2023-04-28 PROCEDURE — 80048 BASIC METABOLIC PNL TOTAL CA: CPT | Performed by: FAMILY MEDICINE

## 2023-04-28 PROCEDURE — 36415 COLL VENOUS BLD VENIPUNCTURE: CPT | Performed by: FAMILY MEDICINE

## 2023-04-28 PROCEDURE — 99214 OFFICE O/P EST MOD 30 MIN: CPT | Performed by: FAMILY MEDICINE

## 2023-04-28 RX ORDER — LISINOPRIL 10 MG/1
10 TABLET ORAL DAILY
Qty: 90 TABLET | Refills: 3 | Status: SHIPPED | OUTPATIENT
Start: 2023-04-28 | End: 2023-04-28

## 2023-04-28 RX ORDER — LISINOPRIL 10 MG/1
10 TABLET ORAL DAILY
Qty: 90 TABLET | Refills: 3 | Status: SHIPPED | OUTPATIENT
Start: 2023-04-28 | End: 2023-11-01

## 2023-04-28 NOTE — PROGRESS NOTES
History of Present Illness       Reason for visit:  Blood pressure  Symptom onset:  More than a month  Symptoms include:  None  Symptom intensity:  Mild  Symptom progression:  Staying the same  Had these symptoms before:  Yes  Has tried/received treatment for these symptoms:  No  What makes it worse:  No  What makes it better:  No    She eats 4 or more servings of fruits and vegetables daily.She consumes 1 sweetened beverage(s) daily.She exercises with enough effort to increase her heart rate 20 to 29 minutes per day.  She exercises with enough effort to increase her heart rate 4 days per week.   She is taking medications regularly.     Assessment/Plan:    Socorro Oakes is a 57 year old female presenting for:    Benign essential hypertension  Patient blood pressures slowly been increasing over the last few years when I do chart review.  Occasionally she will have a normal blood pressure but generally there is a trend of increasing.  She has been checking her blood pressures at home and notes in the morning it is in the fairly normal range but by the evening it is generally in the 150s to 160s with occasional 170s.  Given this I think it is reasonable to start a daily medication.  Lisinopril sent to the pharmacy and based discussed risk and benefits of medication.  Also will check a BMP today and have her come back for a lab only visit in a few weeks for repeat BMP.    She will continue to monitor at home and let me know what her blood pressures are looking like a few weeks.    Discussed that if she is able to become a bit more active that potentially she will not need this medication anymore.  Again, she will monitor at home and let me know what numbers she is recording.    - Basic metabolic panel  (Ca, Cl, CO2, Creat, Gluc, K, Na, BUN)  - lisinopril (ZESTRIL) 10 MG tablet  Dispense: 90 tablet; Refill: 3  - Basic metabolic panel  (Ca, Cl, CO2, Creat, Gluc, K, Na, BUN)  - Basic metabolic panel  (Ca, Cl, CO2, Creat,  Gluc, K, Na, BUN)      Medications Discontinued During This Encounter   Medication Reason     methylPREDNISolone (MEDROL DOSEPAK) 4 MG tablet therapy pack      medroxyPROGESTERone (PROVERA) 10 MG tablet      tiZANidine (ZANAFLEX) 4 MG tablet      risperiDONE (RISPERDAL) 1 MG tablet Therapy completed (No AVS)     tranexamic acid (LYSTEDA) 650 MG tablet Therapy completed (No AVS)     amphetamine-dextroamphetamine (ADDERALL XR) 20 MG 24 hr capsule Dose adjustment     lisinopril (ZESTRIL) 10 MG tablet            Chief Complaint:  Blood Pressure Check        Subjective:   Socorro Oakes is a very pleasant 57-year-old female who presents to the clinic today for concerns over high blood pressure.    At patient's last several visits her blood pressures have been somewhat borderline.  She had a visit for hand pain with a different provider last week and blood pressure was in the 180s systolic.  Since that time she has been checking her blood pressures at home.  She notes in the morning her blood pressures are generally in the 120s to mid 130s.  In the evening they are in the 160s to 170s systolic.    She denies any chest pain or shortness of breath.  She does admit that she has not been as active over the winter.    12 point review of systems completed and negative except for what has been described above    History   Smoking Status     Never   Smokeless Tobacco     Never         Current Outpatient Medications:      ALPRAZolam (XANAX) 1 MG tablet, Take 1 mg by mouth 2 times daily as needed for anxiety, Disp: , Rfl:      amphetamine-dextroamphetamine (ADDERALL XR) 30 MG 24 hr capsule, , Disp: , Rfl:      buPROPion (WELLBUTRIN XL) 150 MG 24 hr tablet, , Disp: , Rfl:      DENTA 5000 PLUS 1.1 % CREA, , Disp: , Rfl:      DoxePIN (SINEQUAN) 75 MG capsule, Take 75 mg by mouth At Bedtime, Disp: , Rfl:      lamoTRIgine (LAMICTAL) 200 MG tablet, , Disp: , Rfl:      lamoTRIgine (LAMICTAL) 25 MG tablet, , Disp: , Rfl:      LANsoprazole  "(PREVACID) 30 MG DR capsule, Take 2 capsules (60 mg) by mouth every morning (before breakfast), Disp: 180 capsule, Rfl: 0     lisinopril (ZESTRIL) 10 MG tablet, Take 1 tablet (10 mg) by mouth daily, Disp: 90 tablet, Rfl: 3     metFORMIN (GLUCOPHAGE XR) 500 MG 24 hr tablet, Take 2 tablets (1,000 mg) by mouth daily (with dinner), Disp: 180 tablet, Rfl: 3     pramipexole (MIRAPEX) 0.25 MG tablet, Take 1 tablet (0.25 mg) by mouth At Bedtime, Disp: 90 tablet, Rfl: 1     zolpidem ER (AMBIEN CR) 12.5 MG CR tablet, Take 12.5 mg by mouth, Disp: , Rfl:       Objective:  Vitals:    04/28/23 1116 04/28/23 1136   BP: (!) 158/80 (!) 150/82   Pulse: 79    Resp: 16    Temp: (!) 96.6  F (35.9  C)    TempSrc: Tympanic    SpO2: 98%    Weight: 91.4 kg (201 lb 8 oz)    Height: 1.626 m (5' 4\")        Body mass index is 34.59 kg/m .    Vital signs reviewed and stable  General: No acute distress  Psych: Appropriate affect  HEENT: moist mucous membranes, pupils equal, round, reactive to light and accomodation, tympanic membranes are pearly grey bilaterally  Lymph: no cervical or supraclavicular lymphadenopathy  Cardiovascular: regular rate and rhythm with no murmur  Pulmonary: clear to auscultation bilaterally with no wheeze  Abdomen: soft, non tender, non distended with normo-active bowel sounds  Extremities: warm and well perfused with no edema  Skin: warm and dry with no rash         This note has been dictated and transcribed using voice recognition software.   Any errors in transcription are unintentional and inherent to the software.  "

## 2023-05-08 NOTE — PROGRESS NOTES
Socorro Oakes  :  1965  DOS: May 10, 2023  MRN: [id number]    Sports Medicine Clinic Procedure    Ultrasound Guided Left Hand, STT Joint Steroid Injection    Clinical History: Patient presents with left hand pain; per Dr. Hunter, pain is more likely radial styloid tenosynovitis, patient does have STT joint arthritis as well.  Pain is worse with wrist extension, turning steering wheel, opening a door, grabbing, pulling. He has tried ice, heat, Tylenol, ibuprofen, home exercises, previous imaging (xray 23) and casting/splinting/bracing    Diagnosis:   1. Pain of left hand    2. Arthritis of whjkbsne-nfchloerv-nvvvfmjus joint of left hand      Referring Physician: Callie Hunter MD  Hand / Upper Extremity Injection/Arthrocentesis    Date/Time: 5/10/2023 1:40 PM    Performed by: Guillaume Paige DO  Authorized by: Guillaume Paige DO    Indications:  Pain, therapeutic and diagnostic  Needle Size:  25 G  Guidance: ultrasound    Approach:  Radial  Condition: osteoarthritis    Location:  Thumb   Location comment:  Left Thumb/Wrist - STT Joint    Medications:  40 mg triamcinolone 40 MG/ML; 1 mL ropivacaine 5 MG/ML  Outcome:  Tolerated well, no immediate complications  Procedure discussed: discussed risks, benefits, and alternatives    Consent Given by:  Patient  Timeout: timeout called immediately prior to procedure    Prep: patient was prepped and draped in usual sterile fashion     Ultrasound images of procedure were permanently stored.         Impression:  Successful left wrist US guided CSI, STT joint    Plan:  Follow up as directed by Dr Hunter  Focal pain over volar STT joint today  Less pain in 1st dorsal compartment today, mostly thenar area pain, some in 1st CMC joint  We will schedule a tentative f/u in 3 weeks to consider alternative injection locations if needed, given her somewhat nonspecific regional pain  Expectations and goals of CSI reviewed  Often 2-3 days for steroid  effect, and can take up to two weeks for maximum effect  We discussed modified progressive pain-free activity as tolerated  Do not overuse in first two weeks if feeling better due to concern for vulnerability while steroid is working  Supportive care reviewed  All questions were answered today  Contact us with additional questions or concerns  Signs and sx of concern reviewed        Guillaume Paige DO, CAQ  Primary Care Sports Medicine  [facility]

## 2023-05-10 ENCOUNTER — OFFICE VISIT (OUTPATIENT)
Dept: ORTHOPEDICS | Facility: CLINIC | Age: 58
End: 2023-05-10
Attending: PEDIATRICS
Payer: COMMERCIAL

## 2023-05-10 VITALS — WEIGHT: 201 LBS | HEIGHT: 64 IN | BODY MASS INDEX: 34.31 KG/M2

## 2023-05-10 DIAGNOSIS — M19.032 ARTHRITIS OF SCAPHOID-TRAPEZIUM-TRAPEZOID JOINT OF LEFT HAND: ICD-10-CM

## 2023-05-10 DIAGNOSIS — M79.642 PAIN OF LEFT HAND: ICD-10-CM

## 2023-05-10 PROCEDURE — 20604 DRAIN/INJ JOINT/BURSA W/US: CPT | Mod: LT | Performed by: FAMILY MEDICINE

## 2023-05-10 RX ORDER — ROPIVACAINE HYDROCHLORIDE 5 MG/ML
1 INJECTION, SOLUTION EPIDURAL; INFILTRATION; PERINEURAL
Status: DISCONTINUED | OUTPATIENT
Start: 2023-05-10 | End: 2024-02-02

## 2023-05-10 RX ORDER — TRIAMCINOLONE ACETONIDE 40 MG/ML
40 INJECTION, SUSPENSION INTRA-ARTICULAR; INTRAMUSCULAR
Status: DISCONTINUED | OUTPATIENT
Start: 2023-05-10 | End: 2024-02-02

## 2023-05-10 RX ADMIN — TRIAMCINOLONE ACETONIDE 40 MG: 40 INJECTION, SUSPENSION INTRA-ARTICULAR; INTRAMUSCULAR at 13:40

## 2023-05-10 RX ADMIN — ROPIVACAINE HYDROCHLORIDE 1 ML: 5 INJECTION, SOLUTION EPIDURAL; INFILTRATION; PERINEURAL at 13:40

## 2023-05-10 NOTE — LETTER
5/10/2023         RE: Socorro Oakes  [address] [place] [address]        Dear Colleague,    Thank you for referring your patient, Socorro Oakes, to the [facility]. Please see a copy of my visit note below.    Socorro Oakes  :  1965  DOS: May 10, 2023  MRN: [id number]    Sports Medicine Clinic Procedure    Ultrasound Guided Left Hand, STT Joint Steroid Injection    Clinical History: Patient presents with left hand pain; per Dr. Hunter, pain is more likely radial styloid tenosynovitis, patient does have STT joint arthritis as well.  Pain is worse with wrist extension, turning steering wheel, opening a door, grabbing, pulling. He has tried ice, heat, Tylenol, ibuprofen, home exercises, previous imaging (xray 23) and casting/splinting/bracing    Diagnosis:   1. Pain of left hand    2. Arthritis of yxjtaudo-cmdnnszxm-dfxwlogbt joint of left hand      Referring Physician: Callie Hunter MD  Hand / Upper Extremity Injection/Arthrocentesis    Date/Time: 5/10/2023 1:40 PM    Performed by: Guillaume Paige DO  Authorized by: Guillaume Paige DO    Indications:  Pain, therapeutic and diagnostic  Needle Size:  25 G  Guidance: ultrasound    Approach:  Radial  Condition: osteoarthritis    Location:  Thumb   Location comment:  Left Thumb/Wrist - STT Joint    Medications:  40 mg triamcinolone 40 MG/ML; 1 mL ropivacaine 5 MG/ML  Outcome:  Tolerated well, no immediate complications  Procedure discussed: discussed risks, benefits, and alternatives    Consent Given by:  Patient  Timeout: timeout called immediately prior to procedure    Prep: patient was prepped and draped in usual sterile fashion     Ultrasound images of procedure were permanently stored.         Impression:  Successful left wrist US guided CSI, STT joint    Plan:  Follow up as directed by Dr Hunter  Focal pain over volar STT joint today  Less pain in 1st dorsal compartment today, mostly  thenar area pain, some in 1st CMC joint  We will schedule a tentative f/u in 3 weeks to consider alternative injection locations if needed, given her somewhat nonspecific regional pain  Expectations and goals of CSI reviewed  Often 2-3 days for steroid effect, and can take up to two weeks for maximum effect  We discussed modified progressive pain-free activity as tolerated  Do not overuse in first two weeks if feeling better due to concern for vulnerability while steroid is working  Supportive care reviewed  All questions were answered today  Contact us with additional questions or concerns  Signs and sx of concern reviewed        Guillaume Paige DO, CAGRISEL  Primary Care Sports Medicine  [facility]             Again, thank you for allowing me to participate in the care of your patient.        Sincerely,        Guillaume Paige DO

## 2023-05-19 ENCOUNTER — LAB (OUTPATIENT)
Dept: LAB | Facility: CLINIC | Age: 58
End: 2023-05-19
Payer: COMMERCIAL

## 2023-05-19 DIAGNOSIS — I10 BENIGN ESSENTIAL HYPERTENSION: ICD-10-CM

## 2023-05-19 DIAGNOSIS — Z11.4 SCREENING FOR HIV (HUMAN IMMUNODEFICIENCY VIRUS): Primary | ICD-10-CM

## 2023-05-19 LAB
ANION GAP SERPL CALCULATED.3IONS-SCNC: 14 MMOL/L (ref 7–15)
BUN SERPL-MCNC: 7.2 MG/DL (ref 6–20)
CALCIUM SERPL-MCNC: 9.8 MG/DL (ref 8.6–10)
CHLORIDE SERPL-SCNC: 102 MMOL/L (ref 98–107)
CREAT SERPL-MCNC: 0.86 MG/DL (ref 0.51–0.95)
DEPRECATED HCO3 PLAS-SCNC: 24 MMOL/L (ref 22–29)
GFR SERPL CREATININE-BSD FRML MDRD: 78 ML/MIN/1.73M2
GLUCOSE SERPL-MCNC: 121 MG/DL (ref 70–99)
POTASSIUM SERPL-SCNC: 4 MMOL/L (ref 3.4–5.3)
SODIUM SERPL-SCNC: 140 MMOL/L (ref 136–145)

## 2023-05-19 PROCEDURE — 80048 BASIC METABOLIC PNL TOTAL CA: CPT

## 2023-05-19 PROCEDURE — 36415 COLL VENOUS BLD VENIPUNCTURE: CPT

## 2023-05-30 NOTE — PROGRESS NOTES
Socorro Oakes  :  1965  DOS: May 30, 2023  MRN: [id number]    Sports Medicine Clinic Procedure    Ultrasound Guided Left 1st CMC joint corticosteroid injection    Clinical History: Left wrist pain for the past 1 year, reports insidious onset without acute precipitating event.Location of Pain: base of left thumb into wrist. Worsened by: wrist extension, turning steering wheel, opening a door, grabbing, pulling     Diagnosis:   1. Primary osteoarthritis of first carpometacarpal joint of left hand      Referring Physician: Dr Callie Hunter MD    Small Joint Injection/Arthrocentesis: L thumb CMC    Date/Time: 2023 10:34 AM    Performed by: Guillaume Paige DO  Authorized by: Guillaume Paige DO  Indications:  Pain and diagnostic evaluation  Needle Size:  25 G  Guidance: ultrasound     Approach:  Radial  Location:  Thumb    Site:  L thumb CMC                    Medications:  40 mg triamcinolone 40 MG/ML; 1 mL ropivacaine 5 MG/ML        Outcome:  Tolerated well, no immediate complications  Procedure discussed: discussed risks, benefits, and alternatives    Consent Given by:  Patient  Timeout: timeout called immediately prior to procedure    Prep: patient was prepped and draped in usual sterile fashion       Ultrasound images of procedure were permanently stored.              Impression:  Successful US guided left 1st CMC joint CSI    Plan:  Follow up as directed by Dr Hunter  Improved pain over STT joint today after CSI last visit, overall pain improved  No pain in 1st dorsal compartment today  Patient has a line drawn with pen over the 1st cmc joint today, site of most focal pain  Bracing options reviewed  Trial of US guided 1st CMC joint CSI today  Expectations and goals of CSI reviewed  Often 2-3 days for steroid effect, and can take up to two weeks for maximum effect  We discussed modified progressive pain-free activity as tolerated  Do not overuse in first two weeks if feeling better  due to concern for vulnerability while steroid is working  Supportive care reviewed  All questions were answered today  Contact us with additional questions or concerns  Signs and sx of concern reviewed      Guillaume Paige DO, CAGRISEL  Primary Care Sports Medicine  [facility]

## 2023-05-31 ENCOUNTER — OFFICE VISIT (OUTPATIENT)
Dept: ORTHOPEDICS | Facility: CLINIC | Age: 58
End: 2023-05-31
Payer: COMMERCIAL

## 2023-05-31 DIAGNOSIS — M18.12 PRIMARY OSTEOARTHRITIS OF FIRST CARPOMETACARPAL JOINT OF LEFT HAND: Primary | ICD-10-CM

## 2023-05-31 PROCEDURE — 20604 DRAIN/INJ JOINT/BURSA W/US: CPT | Mod: LT | Performed by: FAMILY MEDICINE

## 2023-05-31 RX ORDER — TRIAMCINOLONE ACETONIDE 40 MG/ML
40 INJECTION, SUSPENSION INTRA-ARTICULAR; INTRAMUSCULAR
Status: DISCONTINUED | OUTPATIENT
Start: 2023-05-31 | End: 2024-02-02

## 2023-05-31 RX ORDER — ROPIVACAINE HYDROCHLORIDE 5 MG/ML
1 INJECTION, SOLUTION EPIDURAL; INFILTRATION; PERINEURAL
Status: DISCONTINUED | OUTPATIENT
Start: 2023-05-31 | End: 2024-02-02

## 2023-05-31 RX ADMIN — TRIAMCINOLONE ACETONIDE 40 MG: 40 INJECTION, SUSPENSION INTRA-ARTICULAR; INTRAMUSCULAR at 10:34

## 2023-05-31 RX ADMIN — ROPIVACAINE HYDROCHLORIDE 1 ML: 5 INJECTION, SOLUTION EPIDURAL; INFILTRATION; PERINEURAL at 10:34

## 2023-05-31 NOTE — LETTER
2023         RE: Socorro Oakes  [address] [place] [address]        Dear Colleague,    Thank you for referring your patient, Socorro Oakes, to the [facility]. Please see a copy of my visit note below.    Socorro Oakes  :  1965  DOS: May 30, 2023  MRN: [id number]    Sports Medicine Clinic Procedure    Ultrasound Guided Left 1st CMC joint corticosteroid injection    Clinical History: Left wrist pain for the past 1 year, reports insidious onset without acute precipitating event.Location of Pain: base of left thumb into wrist. Worsened by: wrist extension, turning steering wheel, opening a door, grabbing, pulling     Diagnosis:   1. Primary osteoarthritis of first carpometacarpal joint of left hand      Referring Physician: Dr Callie Hunter MD    Small Joint Injection/Arthrocentesis: L thumb CMC    Date/Time: 2023 10:34 AM    Performed by: Guillaume Paige DO  Authorized by: Guillaume Paige DO  Indications:  Pain and diagnostic evaluation  Needle Size:  25 G  Guidance: ultrasound     Approach:  Radial  Location:  Thumb    Site:  L thumb CMC                    Medications:  40 mg triamcinolone 40 MG/ML; 1 mL ropivacaine 5 MG/ML        Outcome:  Tolerated well, no immediate complications  Procedure discussed: discussed risks, benefits, and alternatives    Consent Given by:  Patient  Timeout: timeout called immediately prior to procedure    Prep: patient was prepped and draped in usual sterile fashion       Ultrasound images of procedure were permanently stored.              Impression:  Successful US guided left 1st CMC joint CSI    Plan:  Follow up as directed by Dr Hunter  Improved pain over STT joint today after CSI last visit, overall pain improved  No pain in 1st dorsal compartment today  Patient has a line drawn with pen over the 1st cmc joint today, site of most focal pain  Bracing options reviewed  Trial of US guided 1st CMC joint  CSI today  Expectations and goals of CSI reviewed  Often 2-3 days for steroid effect, and can take up to two weeks for maximum effect  We discussed modified progressive pain-free activity as tolerated  Do not overuse in first two weeks if feeling better due to concern for vulnerability while steroid is working  Supportive care reviewed  All questions were answered today  Contact us with additional questions or concerns  Signs and sx of concern reviewed      Guillaume Paige DO, CAQ  Primary Care Sports Medicine  [facility]             Again, thank you for allowing me to participate in the care of your patient.        Sincerely,        Guillaume Paige DO

## 2023-06-04 DIAGNOSIS — K21.00 GASTROESOPHAGEAL REFLUX DISEASE WITH ESOPHAGITIS WITHOUT HEMORRHAGE: ICD-10-CM

## 2023-06-05 RX ORDER — LANSOPRAZOLE 30 MG/1
CAPSULE, DELAYED RELEASE ORAL
Qty: 180 CAPSULE | Refills: 0 | Status: SHIPPED | OUTPATIENT
Start: 2023-06-05 | End: 2023-12-11

## 2023-06-05 NOTE — TELEPHONE ENCOUNTER
"Routing refill request to provider for review/approval because:  PCP to determine.          Requested Prescriptions   Pending Prescriptions Disp Refills     LANsoprazole (PREVACID) 30 MG DR capsule [Pharmacy Med Name: Lansoprazole Oral Capsule Delayed Release 30 MG] 180 capsule 0     Sig: TAKE TWO CAPSULES BY MOUTH IN THE MORNING BEFORE BREAKFAST       PPI Protocol Passed - 6/4/2023  6:51 PM        Passed - Not on Clopidogrel (unless Pantoprazole ordered)        Passed - No diagnosis of osteoporosis on record        Passed - Recent (12 mo) or future (30 days) visit within the authorizing provider's specialty     Patient has had an office visit with the authorizing provider or a provider within the authorizing providers department within the previous 12 mos or has a future within next 30 days. See \"Patient Info\" tab in inbasket, or \"Choose Columns\" in Meds & Orders section of the refill encounter.              Passed - Medication is active on med list        Passed - Patient is age 18 or older        Passed - No active pregnacy on record        Passed - No positive pregnancy test in past 12 months                 Shantal Palumbo RN 06/05/23 4:30 PM    "

## 2023-06-27 ENCOUNTER — PATIENT OUTREACH (OUTPATIENT)
Dept: CARE COORDINATION | Facility: CLINIC | Age: 58
End: 2023-06-27
Payer: COMMERCIAL

## 2023-07-24 ENCOUNTER — NURSE TRIAGE (OUTPATIENT)
Dept: FAMILY MEDICINE | Facility: CLINIC | Age: 58
End: 2023-07-24
Payer: COMMERCIAL

## 2023-07-24 NOTE — TELEPHONE ENCOUNTER
Noted. Called and gave the message to patient. RN recommended [facility]. She stated she has an appointment for tomorrow in [place]. Has been dealing with this pain for a long time and is okay to wait until tomorrow.     Shantal Palumbo RN on 7/24/2023 at 10:33 AM  '

## 2023-07-24 NOTE — TELEPHONE ENCOUNTER
Are there any same days spots on Marly's schedule?   If nothing is open at our clinic, will need to go to [facility] Prakash MD

## 2023-07-24 NOTE — TELEPHONE ENCOUNTER
"Patient reporting increased lower back and neck pain and stiffness. Left side of worse   7/10 pain right now, but states it loosens up during day  Hx of lumbar pain  Has full ROM of neck  No heaving lifting or exercising, no neurologic sx, no fever, difficulty breathing or SOB or recent illness  Slept on a new firm mattress and pillow in past few weeks, back and/or side position - on side experiences numbness and tingling in right hand    No relief from taking 800MG Advil and 2 extra strength arthritis tylenol at 8am  Pt has appt tomorrow but would like to be seen today at primary clinic - [place] is too far.  Routed to PCP to review and check same -day availability.    Provider home care advise, pain medication, heat/cold, stretches, s/s of worsening condition       Reason for Disposition   MODERATE back pain (e.g., interferes with normal activities) and present > 3 days    Answer Assessment - Initial Assessment Questions  1. ONSET: \"When did the pain begin?\"       A few days ago, but worst pain waking this morning   2. LOCATION: \"Where does it hurt?\" (upper, mid or lower back)      Lower back and now stiff/painful neck this morning  3. SEVERITY: \"How bad is the pain?\"  (e.g., Scale 1-10; mild, moderate, or severe)    - MILD (1-3): doesn't interfere with normal activities     - MODERATE (4-7): interferes with normal activities or awakens from sleep     - SEVERE (8-10): excruciating pain, unable to do any normal activities       Moderate pain   4. PATTERN: \"Is the pain constant?\" (e.g., yes, no; constant, intermittent)       Constant   5. RADIATION: \"Does the pain shoot into your legs or elsewhere?\"      In morning feels like radiating to sides but now just mid/lower back  6. CAUSE:  \"What do you think is causing the back pain?\"       Hx of back (degenerative joint - lumbar)   7. BACK OVERUSE:  \"Any recent lifting of heavy objects, strenuous work or exercise?\"      none  8. MEDICATIONS: \"What have you taken so far " "for the pain?\" (e.g., nothing, acetaminophen, NSAIDS)      800mg advil and 2 extra strength arthritis tylenol at 8am   9. NEUROLOGIC SYMPTOMS: \"Do you have any weakness, numbness, or problems with bowel/bladder control?\"      Numbness and tingling in right hand when sleeps on her side  10. OTHER SYMPTOMS: \"Do you have any other symptoms?\" (e.g., fever, abdominal pain, burning with urination, blood in urine)        no  11. PREGNANCY: \"Is there any chance you are pregnant?\" (e.g., yes, no; LMP)        no    Protocols used: Back Pain-A-[place]    Shahana PARISI RN  [facility]    "

## 2023-07-25 ENCOUNTER — OFFICE VISIT (OUTPATIENT)
Dept: FAMILY MEDICINE | Facility: CLINIC | Age: 58
End: 2023-07-25
Payer: COMMERCIAL

## 2023-07-25 ENCOUNTER — PATIENT OUTREACH (OUTPATIENT)
Dept: CARE COORDINATION | Facility: CLINIC | Age: 58
End: 2023-07-25

## 2023-07-25 VITALS
OXYGEN SATURATION: 99 % | BODY MASS INDEX: 33.64 KG/M2 | SYSTOLIC BLOOD PRESSURE: 148 MMHG | HEART RATE: 74 BPM | WEIGHT: 196 LBS | DIASTOLIC BLOOD PRESSURE: 85 MMHG

## 2023-07-25 DIAGNOSIS — Z12.31 VISIT FOR SCREENING MAMMOGRAM: Primary | ICD-10-CM

## 2023-07-25 DIAGNOSIS — Z23 NEED FOR SHINGLES VACCINE: ICD-10-CM

## 2023-07-25 DIAGNOSIS — M54.2 NECK PAIN: ICD-10-CM

## 2023-07-25 DIAGNOSIS — Z23 NEED FOR PROPHYLACTIC VACCINATION AGAINST HEPATITIS B VIRUS: ICD-10-CM

## 2023-07-25 PROCEDURE — 99213 OFFICE O/P EST LOW 20 MIN: CPT | Performed by: FAMILY MEDICINE

## 2023-07-25 ASSESSMENT — ANXIETY QUESTIONNAIRES
7. FEELING AFRAID AS IF SOMETHING AWFUL MIGHT HAPPEN: NOT AT ALL
IF YOU CHECKED OFF ANY PROBLEMS ON THIS QUESTIONNAIRE, HOW DIFFICULT HAVE THESE PROBLEMS MADE IT FOR YOU TO DO YOUR WORK, TAKE CARE OF THINGS AT HOME, OR GET ALONG WITH OTHER PEOPLE: SOMEWHAT DIFFICULT
1. FEELING NERVOUS, ANXIOUS, OR ON EDGE: MORE THAN HALF THE DAYS
GAD7 TOTAL SCORE: 7
6. BECOMING EASILY ANNOYED OR IRRITABLE: SEVERAL DAYS
GAD7 TOTAL SCORE: 7
2. NOT BEING ABLE TO STOP OR CONTROL WORRYING: SEVERAL DAYS
3. WORRYING TOO MUCH ABOUT DIFFERENT THINGS: SEVERAL DAYS
5. BEING SO RESTLESS THAT IT IS HARD TO SIT STILL: SEVERAL DAYS
4. TROUBLE RELAXING: SEVERAL DAYS

## 2023-07-25 ASSESSMENT — PATIENT HEALTH QUESTIONNAIRE - PHQ9
SUM OF ALL RESPONSES TO PHQ QUESTIONS 1-9: 3
SUM OF ALL RESPONSES TO PHQ QUESTIONS 1-9: 3
10. IF YOU CHECKED OFF ANY PROBLEMS, HOW DIFFICULT HAVE THESE PROBLEMS MADE IT FOR YOU TO DO YOUR WORK, TAKE CARE OF THINGS AT HOME, OR GET ALONG WITH OTHER PEOPLE: SOMEWHAT DIFFICULT

## 2023-07-25 NOTE — PROGRESS NOTES
"  Assessment & Plan   Problem List Items Addressed This Visit          Nervous and Auditory    Neck pain     Neck pain and tension. Physical therapy assessment and treatment as appropriate. Follow up prn.          Relevant Orders    Physical Therapy Referral     Other Visit Diagnoses       Visit for screening mammogram    -  Primary    Need for shingles vaccine        Need for prophylactic vaccination against hepatitis B virus                          BMI:   Estimated body mass index is 33.64 kg/m  as calculated from the following:    Height as of 5/10/23: 1.626 m (5' 4\").    Weight as of this encounter: 88.9 kg (196 lb).         Mariah Ayala MD  [facility] JHONNY Quintanilla is a 57 year old, presenting for the following health issues:  back and neck pain    Patient is describing chronic neck, shoulder and back tension.  She says has been going on for 6 to 8 months and is constant.  She does not recall any inciting event.  It seems to start in the neck and go to the shoulders and lower back.  She describes the sensation as a tightness.  She notices putting her chin down makes it worse.  It is worse on the left than it is on the right.  She rates the pain 5/10 in intensity.  Over time it does not seem to be getting better.  She is tried multiple different pillows but they do not help.  She is tried 3 mattresses and those are not helpful either.  She notes that is worse when she is in bed and in the early morning after having been in bed all night.  It seems to get better during the day with activity.  She notes ice and heat might help but not a lot.  Tylenol and ibuprofen help but not enough.        7/25/2023     3:04 PM   Additional Questions   Roomed by as   Accompanied by self         7/25/2023     3:04 PM   Patient Reported Additional Medications   Patient reports taking the following new medications no     History of Present Illness       Reason for visit:  Neck/back  Symptom " onset:  More than a month  Symptoms include:  Neck/back pain  Symptom intensity:  Severe  Symptom progression:  Worsening  Had these symptoms before:  No  What makes it worse:  No  What makes it better:  No    She eats 4 or more servings of fruits and vegetables daily.She consumes 1 sweetened beverage(s) daily.She exercises with enough effort to increase her heart rate 20 to 29 minutes per day.  She exercises with enough effort to increase her heart rate 4 days per week.   She is taking medications regularly.           Objective    BP (!) 148/85 (BP Location: Left arm, Patient Position: Left side, Cuff Size: Adult Large)   Pulse 74   Wt 88.9 kg (196 lb)   SpO2 99%   BMI 33.64 kg/m    Body mass index is 33.64 kg/m .  Physical Exam  Constitutional:       Appearance: Normal appearance.   HENT:      Head: Normocephalic and atraumatic.   Cardiovascular:      Rate and Rhythm: Normal rate and regular rhythm.   Pulmonary:      Effort: Pulmonary effort is normal.   Musculoskeletal:         General: Normal range of motion.      Cervical back: Normal range of motion and neck supple.      Comments: Posture: chin forward  Hypertrophied upper trapezius muscles.   Negative Spurling test   Neurological:      General: No focal deficit present.      Mental Status: She is alert and oriented to person, place, and time.

## 2023-08-04 ENCOUNTER — THERAPY VISIT (OUTPATIENT)
Dept: PHYSICAL THERAPY | Facility: CLINIC | Age: 58
End: 2023-08-04
Attending: FAMILY MEDICINE
Payer: COMMERCIAL

## 2023-08-04 DIAGNOSIS — M54.2 NECK PAIN: ICD-10-CM

## 2023-08-04 PROCEDURE — 97140 MANUAL THERAPY 1/> REGIONS: CPT | Mod: GP

## 2023-08-04 PROCEDURE — 97162 PT EVAL MOD COMPLEX 30 MIN: CPT | Mod: GP

## 2023-08-04 NOTE — PROGRESS NOTES
"PHYSICAL THERAPY EVALUATION  Type of Visit: Evaluation    See electronic medical record for Abuse and Falls Screening details.    Subjective       Presenting condition or subjective complaint:  neck pain that started roughly 2-3 months ago. She is limited with all neck motions. Concurrently she has low back pain that she feels is related to her bed and sleeping. She does have numbness in RUE off/on. Her neck pain is always present but her back pain fluctuates. She did have fall from a crate that she was standing on and she did smack her head  Date of onset: 05/04/23    Relevant medical history:     Dates & types of surgery:      Pain assessment: See objective evaluation for additional pain details     Objective   CERVICAL SPINE EVALUATION  PAIN: Pain Level at Rest: 2/10  Pain Level with Use: 8/10  Pain is Exacerbated By: Driving, dressing, leisure activities, personal cares, having to modify all activities to accommodate neck restrictions.    Pain is Relieved By: nothing specific has helped longer term. Trialed ice, heat, self stretching and OTC pain meds.   Pain Progression: Worsened    POSTURE:  moderate forward head and rounded shoulder posture in sitting and standing. Appeared slightly guarded and uncomfortable in all positions.     ROM:   (Degrees) Left AROM Right AROM    Cervical Flexion 20 pain able to go to 45 total    Cervical Extension 22 pain able to go to 40    Cervical Side bend 15 (pain) 15 (pain on L scap)    Cervical Rotation 40 45    Cervical Protrusion     Cervical Retraction     Thoracic Flexion     Thoracic Extension     Thoracic Rotation       Left AROM Left MMT Right AROM Right MMT   Shoulder Flexion WNL  WNL    Shoulder Extension       Shoulder Abduction Roughly 90  WNL    Shoulder Adduction       Shoulder IR       Shoulder ER       Shoulder Horiz Abduction       Shoulder Horiz Adduction       Pain: pain in L shoulder that pt reports \"dont'tthink is related to the neck\"    SPECIAL TESTS:    " Left Right   Alar Ligament Negative         Cervical Rot/Lateral Flex Positive Positive   Compression Positive Positive   Distraction Positive Positive   Spurling s Positive Negative         Transverse Ligament Negative  Negative    Vertebral Artery Negative  Negative                          PALPATION:  Increased tension and tenderness to palpation at B (L>R) SO, cervical PS, Scalene, SCM, UT, LS, Rhomboid and temporalis.   SPINAL SEGMENTAL CONCLUSIONS:  Restricted motion for  L facet joints during L or R SB and Rot.       Assessment & Plan   CLINICAL IMPRESSIONS  Medical Diagnosis: Neck Pain    Treatment Diagnosis: Neck Pain, muscle tension, limited ROM, cervicogenic dizziness   Impression/Assessment: Patient is a 57 year old female with neck pain complaints.  The following significant findings have been identified: Pain, Decreased ROM/flexibility, Decreased joint mobility, Decreased strength, Decreased proprioception, Impaired muscle performance, Decreased activity tolerance, Impaired posture, and Dizziness. Patient's dizziness is likely related to cervicogenic origins and will likely improve via addressing cervical muscle tension. If it does not she may benefit from vestibular rehab. These impairments interfere with their ability to perform self care tasks, recreational activities, household chores, driving , household mobility, and community mobility as compared to previous level of function.     Clinical Decision Making (Complexity):  Clinical Presentation: Evolving/Changing  Clinical Presentation Rationale: based on medical and personal factors listed in PT evaluation  Clinical Decision Making (Complexity): Moderate complexity    PLAN OF CARE  Treatment Interventions:  Modalities: Cryotherapy, E-stim, Hot Pack, Mechanical Traction  Interventions: Manual Therapy, Neuromuscular Re-education, Therapeutic Activity, Therapeutic Exercise, Self-Care/Home Management    Long Term Goals     PT Goal 1  Goal Identifier:  LTG - ROM  Goal Description: Pt will demonstrate cerviacl ROM WNL with minimal pain to improve driving.  Rationale: to maximize safety and independence with performance of ADLs and functional tasks;to maximize safety and independence with transportation;to maximize safety and independence within the community;to maximize safety and independence within the home;to maximize safety and independence with self cares  Target Date: 10/27/23      Frequency of Treatment: 1x/week  Duration of Treatment: 12 weeks    Education Assessment:   Learner/Method: Patient;Demonstration;Pictures/Video    Risks and benefits of evaluation/treatment have been explained.   Patient/Family/caregiver agrees with Plan of Care.     Evaluation Time:          Signing Clinician: Emerald Stacy, PT      [facility]                                                                                   OUTPATIENT PHYSICAL THERAPY      PLAN OF TREATMENT FOR OUTPATIENT REHABILITATION   Patient's Last Name, First Name, Socorro Brooks YOB: 1965   Provider's Name   [facility]   Medical Record No.  [id number]     Onset Date: 05/04/23  Start of Care Date: 08/04/23     Medical Diagnosis:  Neck Pain      PT Treatment Diagnosis:  Neck Pain, muscle tension, limited ROM, cervicogenic dizziness Plan of Treatment  Frequency/Duration: 1x/week/ 12 weeks    Certification date from 08/04/23 to 10/27/23         See note for plan of treatment details and functional goals     Emerald Stacy, PT                         I CERTIFY THE NEED FOR THESE SERVICES FURNISHED UNDER        THIS PLAN OF TREATMENT AND WHILE UNDER MY CARE     (Physician attestation of this document indicates review and certification of the therapy plan).                Referring Provider:  Mariah Ayala      Initial Assessment  See Epic Evaluation- Start of Care Date: 08/04/23

## 2023-08-08 ENCOUNTER — PATIENT OUTREACH (OUTPATIENT)
Dept: CARE COORDINATION | Facility: CLINIC | Age: 58
End: 2023-08-08
Payer: COMMERCIAL

## 2023-08-14 RX ORDER — DEXTROAMPHETAMINE SACCHARATE, AMPHETAMINE ASPARTATE MONOHYDRATE, DEXTROAMPHETAMINE SULFATE AND AMPHETAMINE SULFATE 2.5; 2.5; 2.5; 2.5 MG/1; MG/1; MG/1; MG/1
CAPSULE, EXTENDED RELEASE ORAL
COMMUNITY
Start: 2023-07-27

## 2023-08-16 ENCOUNTER — OFFICE VISIT (OUTPATIENT)
Dept: FAMILY MEDICINE | Facility: CLINIC | Age: 58
End: 2023-08-16
Payer: COMMERCIAL

## 2023-08-16 VITALS
OXYGEN SATURATION: 98 % | HEIGHT: 64 IN | TEMPERATURE: 97.4 F | SYSTOLIC BLOOD PRESSURE: 122 MMHG | DIASTOLIC BLOOD PRESSURE: 60 MMHG | RESPIRATION RATE: 16 BRPM | WEIGHT: 197.13 LBS | BODY MASS INDEX: 33.66 KG/M2 | HEART RATE: 84 BPM

## 2023-08-16 DIAGNOSIS — Z12.31 VISIT FOR SCREENING MAMMOGRAM: ICD-10-CM

## 2023-08-16 DIAGNOSIS — R04.0 EPISTAXIS: ICD-10-CM

## 2023-08-16 DIAGNOSIS — R05.3 CHRONIC COUGH: Primary | ICD-10-CM

## 2023-08-16 PROCEDURE — 99213 OFFICE O/P EST LOW 20 MIN: CPT | Performed by: FAMILY MEDICINE

## 2023-08-16 NOTE — PROGRESS NOTES
Answers submitted by the patient for this visit:  General Questionnaire (Submitted on 8/16/2023)  Chief Complaint: Chronic problems general questions HPI Form  How many servings of fruits and vegetables do you eat daily?: 4 or more  On average, how many sweetened beverages do you drink each day (Examples: soda, juice, sweet tea, etc.  Do NOT count diet or artificially sweetened beverages)?: 1  How many minutes a day do you exercise enough to make your heart beat faster?: 30 to 60  How many days a week do you exercise enough to make your heart beat faster?: 4  How many days per week do you miss taking your medication?: 0  General Concern (Submitted on 8/16/2023)  Chief Complaint: Chronic problems general questions HPI Form  What is the reason for your visit today?: cough  When did your symptoms begin?: More than a month    Assessment/Plan:    Socorro Oakes is a 57 year old female presenting for:    Chronic cough  I suspect patient's cough is due to her lisinopril.  This was started about 4 months ago and the cough started about a month and a half after the lisinopril was initiated.  Would recommend that she stop the lisinopril at this point.  Her blood pressures were just a bit borderline so she will stop the lisinopril for a few weeks and continue to check blood pressures and let me know how she is doing and how her blood pressure is looking.    Epistaxis  No large vessels noted on examination today.  Discussed starting saline nasal spray twice daily.    Visit for screening mammogram  - MA SCREENING DIGITAL BILAT - Future  (s+30)      There are no discontinued medications.        Chief Complaint:  Cough and Epistaxis        Subjective:   Socorro Oakes is a very pleasant 49-year-old female who presents to the clinic today with concerns over a cough.    Patient notes that she began to have a mild cough back in June (about 2 and half months ago).  She was not sick prior to this cough starting.  She does not think that  she had allergies.  She did start taking allergy medications for a few weeks but they did not seem to help.  The cough is not positional.  It feels dry and scratchy in her throat.    She does not think that there is anything that she ate in particular that got caught in her throat that caused the coughing.    She notes that cough is either just a dry throat clearing or sometimes she will have coughing spasms.    She will also note that sometimes when she is coughing she will have a bloody nose.    12 point review of systems completed and negative except for what has been described above    History   Smoking Status    Never   Smokeless Tobacco    Never         Current Outpatient Medications:     ALPRAZolam (XANAX) 1 MG tablet, Take 1 mg by mouth 2 times daily as needed for anxiety, Disp: , Rfl:     amphetamine-dextroamphetamine (ADDERALL XR) 10 MG 24 hr capsule, , Disp: , Rfl:     amphetamine-dextroamphetamine (ADDERALL XR) 30 MG 24 hr capsule, , Disp: , Rfl:     buPROPion (WELLBUTRIN XL) 150 MG 24 hr tablet, , Disp: , Rfl:     DENTA 5000 PLUS 1.1 % CREA, , Disp: , Rfl:     DoxePIN (SINEQUAN) 75 MG capsule, Take 75 mg by mouth At Bedtime, Disp: , Rfl:     lamoTRIgine (LAMICTAL) 200 MG tablet, , Disp: , Rfl:     lamoTRIgine (LAMICTAL) 25 MG tablet, , Disp: , Rfl:     LANsoprazole (PREVACID) 30 MG DR capsule, TAKE TWO CAPSULES BY MOUTH IN THE MORNING BEFORE BREAKFAST, Disp: 180 capsule, Rfl: 0    lisinopril (ZESTRIL) 10 MG tablet, Take 1 tablet (10 mg) by mouth daily, Disp: 90 tablet, Rfl: 3    metFORMIN (GLUCOPHAGE XR) 500 MG 24 hr tablet, Take 2 tablets (1,000 mg) by mouth daily (with dinner), Disp: 180 tablet, Rfl: 3    pramipexole (MIRAPEX) 0.25 MG tablet, Take 1 tablet (0.25 mg) by mouth At Bedtime, Disp: 90 tablet, Rfl: 1    zolpidem ER (AMBIEN CR) 12.5 MG CR tablet, Take 12.5 mg by mouth, Disp: , Rfl:     Current Facility-Administered Medications:     1 mL ropivacaine (NAROPIN) injection 5 mg/mL, 1 mL, , , Repa,  "Guillaume Doherty DO, 1 mL at 05/31/23 1034    1 mL ropivacaine (NAROPIN) injection 5 mg/mL, 1 mL, , , Guillaume Paige DO, 1 mL at 05/10/23 1340    triamcinolone (KENALOG-40) injection 40 mg, 40 mg, , , Guillaume Paige DO, 40 mg at 05/31/23 1034    triamcinolone (KENALOG-40) injection 40 mg, 40 mg, , , Guillaume Paige DO, 40 mg at 05/10/23 1340      Objective:  Vitals:    08/16/23 1014   BP: 122/60   Pulse: 84   Resp: 16   Temp: 97.4  F (36.3  C)   TempSrc: Tympanic   SpO2: 98%   Weight: 89.4 kg (197 lb 2 oz)   Height: 1.626 m (5' 4\")       Body mass index is 33.84 kg/m .    Vital signs reviewed and stable  General: No acute distress  Psych: Appropriate affect  HEENT: moist mucous membranes, pupils equal, round, reactive to light and accomodation, tympanic membranes are pearly grey bilaterally  Lymph: no cervical or supraclavicular lymphadenopathy  Cardiovascular: regular rate and rhythm with no murmur  Pulmonary: clear to auscultation bilaterally with no wheeze  Abdomen: soft, non tender, non distended with normo-active bowel sounds  Extremities: warm and well perfused with no edema  Skin: warm and dry with no rash         This note has been dictated and transcribed using voice recognition software.   Any errors in transcription are unintentional and inherent to the software.  "

## 2023-08-22 ENCOUNTER — PATIENT OUTREACH (OUTPATIENT)
Dept: CARE COORDINATION | Facility: CLINIC | Age: 58
End: 2023-08-22
Payer: COMMERCIAL

## 2023-09-13 ENCOUNTER — OFFICE VISIT (OUTPATIENT)
Dept: SLEEP MEDICINE | Facility: CLINIC | Age: 58
End: 2023-09-13
Payer: COMMERCIAL

## 2023-09-13 ENCOUNTER — TELEPHONE (OUTPATIENT)
Dept: SLEEP MEDICINE | Facility: CLINIC | Age: 58
End: 2023-09-13

## 2023-09-13 VITALS
SYSTOLIC BLOOD PRESSURE: 146 MMHG | WEIGHT: 194.13 LBS | BODY MASS INDEX: 33.32 KG/M2 | DIASTOLIC BLOOD PRESSURE: 83 MMHG | OXYGEN SATURATION: 98 % | HEART RATE: 89 BPM

## 2023-09-13 DIAGNOSIS — G47.33 OSA (OBSTRUCTIVE SLEEP APNEA): Primary | ICD-10-CM

## 2023-09-13 PROCEDURE — 99214 OFFICE O/P EST MOD 30 MIN: CPT | Performed by: NURSE PRACTITIONER

## 2023-09-13 ASSESSMENT — SLEEP AND FATIGUE QUESTIONNAIRES
HOW LIKELY ARE YOU TO NOD OFF OR FALL ASLEEP WHILE SITTING AND READING: MODERATE CHANCE OF DOZING
HOW LIKELY ARE YOU TO NOD OFF OR FALL ASLEEP WHILE SITTING INACTIVE IN A PUBLIC PLACE: WOULD NEVER DOZE
HOW LIKELY ARE YOU TO NOD OFF OR FALL ASLEEP WHILE WATCHING TV: SLIGHT CHANCE OF DOZING
HOW LIKELY ARE YOU TO NOD OFF OR FALL ASLEEP WHILE LYING DOWN TO REST IN THE AFTERNOON WHEN CIRCUMSTANCES PERMIT: SLIGHT CHANCE OF DOZING
HOW LIKELY ARE YOU TO NOD OFF OR FALL ASLEEP WHILE SITTING AND TALKING TO SOMEONE: WOULD NEVER DOZE
HOW LIKELY ARE YOU TO NOD OFF OR FALL ASLEEP IN A CAR, WHILE STOPPED FOR A FEW MINUTES IN TRAFFIC: WOULD NEVER DOZE
HOW LIKELY ARE YOU TO NOD OFF OR FALL ASLEEP WHEN YOU ARE A PASSENGER IN A CAR FOR AN HOUR WITHOUT A BREAK: SLIGHT CHANCE OF DOZING
HOW LIKELY ARE YOU TO NOD OFF OR FALL ASLEEP WHILE SITTING QUIETLY AFTER LUNCH WITHOUT ALCOHOL: WOULD NEVER DOZE

## 2023-09-13 NOTE — NURSING NOTE
"Chief Complaint   Patient presents with    CPAP Follow Up       Initial BP (!) 146/83   Pulse 89   Wt 88.1 kg (194 lb 2 oz)   LMP  (LMP Unknown)   SpO2 98%   BMI 33.32 kg/m   Estimated body mass index is 33.32 kg/m  as calculated from the following:    Height as of 8/16/23: 1.626 m (5' 4\").    Weight as of this encounter: 88.1 kg (194 lb 2 oz).    Medication Reconciliation: complete    Neck circumference:   DME: DENIA Stevenson  [facility]      "

## 2023-09-13 NOTE — PATIENT INSTRUCTIONS
Drive Safe... Drive Alive     Sleep health profoundly affects your health, mood, and your safety. 33% of the population (one in three of us) is not getting enough sleep and many have a sleep disorder. Not getting enough sleep or having an untreated / undertreated sleep condition may make us sleepy without even knowing it. In fact, our driving could be dramatically impaired due to our sleep health. As your provider, here are some things I would like you to know about driving:     Here are some warning signs for impairment and dangerous drowsy driving:              -Having been awake more than 16 hours               -Looking tired               -Eyelid drooping              -Head nodding (it could be too late at this point)              -Driving for more than 30 minutes     Some things you could do to make the driving safer if you are experiencing some drowsiness:              -Stop driving and rest              -Call for transportation              -Make sure your sleep disorder is adequately treated     Some things that have been shown NOT to work when experiencing drowsiness while driving:              -Turning on the radio              -Opening windows              -Eating any  distracting  /  entertaining  foods (e.g., sunflower seeds, candy, or any other)              -Talking on the phone      Your decision may not only impact your life, but also the life of others. Please, remember to drive safe for yourself and all of us.   Your Body mass index is 33.32 kg/m .  Weight management is a personal decision.  If you are interested in exploring weight loss strategies, the following discussion covers the approaches that may be successful. Body mass index (BMI) is one way to tell whether you are at a healthy weight, overweight, or obese. It measures your weight in relation to your height.  A BMI of 18.5 to 24.9 is in the healthy range. A person with a BMI of 25 to 29.9 is considered overweight, and someone with a BMI  of 30 or greater is considered obese. More than two-thirds of American adults are considered overweight or obese.  Being overweight or obese increases the risk for further weight gain. Excess weight may lead to heart disease and diabetes.  Creating and following plans for healthy eating and physical activity may help you improve your health.  Weight control is part of healthy lifestyle and includes exercise, emotional health, and healthy eating habits. Careful eating habits lifelong are the mainstay of weight control. Though there are significant health benefits from weight loss, long-term weight loss with diet alone may be very difficult to achieve- studies show long-term success with dietary management in less than 10% of people. Attaining a healthy weight may be especially difficult to achieve in those with severe obesity. In some cases, medications, devices and surgical management might be considered.  What can you do?  If you are overweight or obese and are interested in methods for weight loss, you should discuss this with your provider.   Consider reducing daily calorie intake by 500 calories.   Keep a food journal.   Avoiding skipping meals, consider cutting portions instead.    Diet combined with exercise helps maintain muscle while optimizing fat loss. Strength training is particularly important for building and maintaining muscle mass. Exercise helps reduce stress, increase energy, and improves fitness. Increasing exercise without diet control, however, may not burn enough calories to loose weight.     Start walking three days a week 10-20 minutes at a time  Work towards walking thirty minutes five days a week   Eventually, increase the speed of your walking for 1-2 minutes at time    In addition, we recommend that you review healthy lifestyles and methods for weight loss available through the National Institutes of Health patient information sites:  http://win.niddk.nih.gov/publications/index.htm    And  look into health and wellness programs that may be available through your health insurance provider, employer, local community center, or babs club.

## 2023-09-13 NOTE — PROGRESS NOTES
Sleep Apnea - Follow-up Visit:  Socorro Oakes is a very pleasant postmenopausal female who presents to the sleep medicine clinic in follow-up for evaluation of efficacy and compliance of the treatment of her obstructive sleep apnea with auto titrate CPAP therapy.    Her initial intake assessment into the [employer] system took place on 3/5/2015.  She [place] [facility] [place] with an AHI of 19.2.  Her SaO2 desat listed as 59%.    Socorro has a medical history pertinent for hypertension, moderate obstructive sleep apnea, chronic pain disorder, seizure disorder, GERD, hyperlipidemia, prediabetes, multiple concussions, degenerative joint disease, restless leg syndrome, anemia, schizophrenia, ADD, posttraumatic stress disorder, panic disorder with agoraphobia, bipolar 2 disorder, anxiety, memory loss, class I obesity.      Impression/Plan:    Moderate Sleep apnea.  She is tolerating PAP well. Daytime symptoms are much improved.     Will keep CPAP pressures unchanged.  Patient continues to benefit from CPAP therapy.  She denies any fatigue, daytime sleepiness, breakthrough snoring.  Discussed with patient must use her CPAP therapy minimum of 4 hours each day, 70% of the time.  Discussed symptoms of untreated sleep apnea.   Recommend patient optimize her's sleep schedule as well as her sleep hygiene practices to mitigate any sleep disturbances   Recommend patient employ safe driving practices such as not driving a motor vehicle should she become drowsy   Weight management to BMI of 30.0   Patient to return to clinic in 1 year, sooner if needed      30 minutes spent with patient, all of which were spent face-to-face counseling, consulting, coordinating plan of care.      CC:  Neelima Villatoro,         History of Present Illness:  Chief Complaint   Patient presents with    CPAP Follow Up       Socorro Oakes presents for follow-up of their moderate sleep apnea, managed with  CPAP.     DME- [facility]    Do you use a CPAP Machine at home: Yes  Overall, on a scale of 0-10 how would you rate your CPAP (0 poor, 10 great): 3    What type of mask do you use: Nasal Pillow  Is your mask comfortable: No  If not, why:      Is your mask leaking: No  If yes, where do you feel it:    How many night per week does the mask leak (0-7):      Do you notice snoring with mask on: No  Do you notice gasping arousals with mask on: No  Are you having significant oral or nasal dryness: Yes  Is the pressure setting comfortable: Yes  If not, why:      What is your typical bedtime: 11  How long does it take you to go to sleep on PAP therapy: 2  What time do you typically get out of bed for the day: 630  How many hours on average per night are you using PAP therapy: 6  How many hours are you sleeping per night: 4  Do you feel well rested in the morning: No      Previous Sleep Studies  Diagnosed with moderate ALEXA on PSG at [facility] on 11/16/2007 with AHI 19.2, cristopher desat listed as 59% and period of sustained hypoxemia, CPAP 9 optimal. Partial MSLT performed following day with no sleep nor SOREM on 2 naps.     [employer]   CPAP 10.0 cmH2O 30 day usage data:  63% of days with > 4 hours of use. 1/30 days with no use.   Average use 283 minutes per day.   95%ile Leak 22.96 L/min.   AHI 2.1 events per hour.     Today we reviewed her initial sleep study revealing an AHI of 19.2 events/hour and compared with today's downloaded compliance data of a residual AHI of 2.1 events/hour.  We discussed her compliance usage of 63% of days with more than 4 hours of use discussing methods of improvement.  Although an order will be placed today for comprehensive DME equipment, patient will be careful during the next month in particular to use her CPAP therapy minimum of 4 hours each day.  At times, she has found she inadvertently takes her mask off during the night.  Patient does appreciate the use of her CPAP therapy as  demonstrated by her daily use of the device.  She also denies any excessive daytime sleepiness symptoms, breakthrough snoring.     EPWORTH SLEEPINESS SCALE         9/13/2023    10:03 AM    Hancock Sleepiness Scale ( SURY Marshall  [id number]<br>ESS - [place]/English - Final version - 21 Nov 07 - [facility] [place].)   Sitting and reading Moderate chance of dozing   Watching TV Slight chance of dozing   Sitting, inactive in a public place (e.g. a theatre or a meeting) Would never doze   As a passenger in a car for an hour without a break Slight chance of dozing   Lying down to rest in the afternoon when circumstances permit Slight chance of dozing   Sitting and talking to someone Would never doze   Sitting quietly after a lunch without alcohol Would never doze   In a car, while stopped for a few minutes in traffic Would never doze   Hancock Score (MC) 5   Hancock Score (Sleep) 5       INSOMNIA SEVERITY INDEX (VANCE)          9/13/2023    10:00 AM   Insomnia Severity Index (VANCE)   Difficulty falling asleep 1   Difficulty staying asleep 3   Problems waking up too early 1   How SATISFIED/DISSATISFIED are you with your CURRENT sleep pattern? 3   How NOTICEABLE to others do you think your sleep problem is in terms of impairing the quality of your life? 1   How WORRIED/DISTRESSED are you about your current sleep problem? 2   To what extent do you consider your sleep problem to INTERFERE with your daily functioning (e.g. daytime fatigue, mood, ability to function at work/daily chores, concentration, memory, mood, etc.) CURRENTLY? 2   VANCE Total Score 13       Guidelines for Scoring/Interpretation:  Total score categories:  0-7 = No clinically significant insomnia   8-14 = Subthreshold insomnia   15-21 = Clinical insomnia (moderate severity)  22-28 = Clinical insomnia (severe)  Used via courtesy of www.neoSaejealth.va.gov with permission from Ruslan Sainz PhD., [facility]      Past medical/surgical history, family history,  social history, medications and allergies were reviewed.        Problem List:  Patient Active Problem List    Diagnosis Date Noted    MRSA      Priority: High     SITE - WOUND 7/13/05; 8/12/2009:  Pt stated that she should be discontinued MRSA status as she had screening done. We could not obtain these records in her Medical chart, so we could not validate this.  Pt still MRSA + STATUS.      Neck pain 07/25/2023     Priority: Medium    Pain of left hand 04/24/2023     Priority: Medium    Bilateral low back pain without sciatica 11/30/2022     Priority: Medium    Diplopia 09/24/2021     Priority: Medium    Vertigo 04/19/2021     Priority: Medium    Bipolar 2 disorder (H) 01/01/2020     Priority: Medium    Prediabetes 07/22/2019     Priority: Medium    Essential hypertension, benign 11/14/2016     Priority: Medium    Anemia 01/16/2015     Priority: Medium    Memory loss 11/27/2012     Priority: Medium    Suicidal ideation 07/18/2012     Priority: Medium    Chronic pain 07/18/2012     Priority: Medium    Schizophrenia (H) 03/06/2012     Priority: Medium     Follow with psychiatry       ADD (attention deficit disorder with hyperactivity) 03/06/2012     Priority: Medium     Problem list name updated by automated process. Provider to review and confirm      Restless legs syndrome (RLS) 03/06/2012     Priority: Medium    Sleep apnea 10/26/2011     Priority: Medium    GERD (gastroesophageal reflux disease) 04/26/2011     Priority: Medium    DJD (degenerative joint disease), lumbar 04/26/2011     Priority: Medium     [facility] PAIN MANAGEMENT   ON VICODIN # 15   FLEXERIL # 60   PAIN CONTRACT SIGNED.       Hyperlipidemia LDL goal <130 12/23/2010     Priority: Medium    Panic disorder with agoraphobia 01/01/2010     Priority: Medium    h/o multiple concussion  11/05/2008     Priority: Medium    Eczema 09/22/2008     Priority: Medium    Anxiety 09/22/2008     Priority: Medium    Seizure disorder (H) 09/04/2008     Priority:  Medium    MRSA (methicillin resistant staph aureus) culture positive 09/04/2008     Priority: Medium    Posttraumatic stress disorder 01/02/2008     Priority: Medium    Snoring 10/29/2007     Priority: Medium    Cellulitis and abscess of toe 07/14/2005     Priority: Medium     Overview:   Epic           LMP  (LMP Unknown)     RAJ Hendrickson, CNP  Sleep Medicine    This note was written with the assistance of the Dragon voice-dictation technology software. The final document, although reviewed, may contain errors. For corrections, please contact the office.

## 2023-09-13 NOTE — TELEPHONE ENCOUNTER
Called to inform patient she is not eligible for a replacement cpap at this time. Patient stated she wasn't sure when she received her current CPAP and thought it was time to replace it. Patient wants to try a different style mask and I offered to schedule a mask consult, however she will call [facility] showroom when she has time.

## 2023-09-26 ENCOUNTER — HOSPITAL ENCOUNTER (OUTPATIENT)
Dept: MAMMOGRAPHY | Facility: CLINIC | Age: 58
Discharge: HOME | End: 2023-09-26
Attending: FAMILY MEDICINE | Admitting: FAMILY MEDICINE
Payer: COMMERCIAL

## 2023-09-26 DIAGNOSIS — Z12.31 VISIT FOR SCREENING MAMMOGRAM: ICD-10-CM

## 2023-09-26 PROCEDURE — 77067 SCR MAMMO BI INCL CAD: CPT

## 2023-11-01 ENCOUNTER — OFFICE VISIT (OUTPATIENT)
Dept: FAMILY MEDICINE | Facility: CLINIC | Age: 58
End: 2023-11-01
Payer: COMMERCIAL

## 2023-11-01 VITALS
WEIGHT: 193.13 LBS | HEIGHT: 64 IN | BODY MASS INDEX: 32.97 KG/M2 | SYSTOLIC BLOOD PRESSURE: 134 MMHG | TEMPERATURE: 97.4 F | RESPIRATION RATE: 16 BRPM | OXYGEN SATURATION: 98 % | HEART RATE: 75 BPM | DIASTOLIC BLOOD PRESSURE: 80 MMHG

## 2023-11-01 DIAGNOSIS — Z00.00 ENCOUNTER FOR MEDICARE ANNUAL WELLNESS EXAM: Primary | ICD-10-CM

## 2023-11-01 DIAGNOSIS — R20.2 NUMBNESS AND TINGLING OF UPPER EXTREMITY: ICD-10-CM

## 2023-11-01 DIAGNOSIS — I10 BENIGN ESSENTIAL HYPERTENSION: ICD-10-CM

## 2023-11-01 DIAGNOSIS — R20.0 NUMBNESS AND TINGLING OF UPPER EXTREMITY: ICD-10-CM

## 2023-11-01 DIAGNOSIS — Z13.220 LIPID SCREENING: ICD-10-CM

## 2023-11-01 LAB
ALBUMIN SERPL BCG-MCNC: 4.4 G/DL (ref 3.5–5.2)
ALP SERPL-CCNC: 85 U/L (ref 35–104)
ALT SERPL W P-5'-P-CCNC: 17 U/L (ref 0–50)
ANION GAP SERPL CALCULATED.3IONS-SCNC: 11 MMOL/L (ref 7–15)
AST SERPL W P-5'-P-CCNC: 18 U/L (ref 0–45)
BILIRUB SERPL-MCNC: 0.3 MG/DL
BUN SERPL-MCNC: 11.5 MG/DL (ref 6–20)
CALCIUM SERPL-MCNC: 9.4 MG/DL (ref 8.6–10)
CHLORIDE SERPL-SCNC: 102 MMOL/L (ref 98–107)
CHOLEST SERPL-MCNC: 224 MG/DL
CREAT SERPL-MCNC: 0.8 MG/DL (ref 0.51–0.95)
DEPRECATED HCO3 PLAS-SCNC: 24 MMOL/L (ref 22–29)
EGFRCR SERPLBLD CKD-EPI 2021: 85 ML/MIN/1.73M2
ERYTHROCYTE [DISTWIDTH] IN BLOOD BY AUTOMATED COUNT: 15.1 % (ref 10–15)
GLUCOSE SERPL-MCNC: 107 MG/DL (ref 70–99)
HCT VFR BLD AUTO: 36.4 % (ref 35–47)
HDLC SERPL-MCNC: 80 MG/DL
HGB BLD-MCNC: 11.4 G/DL (ref 11.7–15.7)
LDLC SERPL CALC-MCNC: 128 MG/DL
MCH RBC QN AUTO: 24.8 PG (ref 26.5–33)
MCHC RBC AUTO-ENTMCNC: 31.3 G/DL (ref 31.5–36.5)
MCV RBC AUTO: 79 FL (ref 78–100)
NONHDLC SERPL-MCNC: 144 MG/DL
PLATELET # BLD AUTO: 263 10E3/UL (ref 150–450)
POTASSIUM SERPL-SCNC: 4.1 MMOL/L (ref 3.4–5.3)
PROT SERPL-MCNC: 6.6 G/DL (ref 6.4–8.3)
RBC # BLD AUTO: 4.59 10E6/UL (ref 3.8–5.2)
SODIUM SERPL-SCNC: 137 MMOL/L (ref 135–145)
TRIGL SERPL-MCNC: 81 MG/DL
TSH SERPL DL<=0.005 MIU/L-ACNC: 1.79 UIU/ML (ref 0.3–4.2)
WBC # BLD AUTO: 6.3 10E3/UL (ref 4–11)

## 2023-11-01 PROCEDURE — 36415 COLL VENOUS BLD VENIPUNCTURE: CPT | Performed by: FAMILY MEDICINE

## 2023-11-01 PROCEDURE — G0439 PPPS, SUBSEQ VISIT: HCPCS | Performed by: FAMILY MEDICINE

## 2023-11-01 PROCEDURE — 80061 LIPID PANEL: CPT | Performed by: FAMILY MEDICINE

## 2023-11-01 PROCEDURE — 85027 COMPLETE CBC AUTOMATED: CPT | Performed by: FAMILY MEDICINE

## 2023-11-01 PROCEDURE — 80053 COMPREHEN METABOLIC PANEL: CPT | Performed by: FAMILY MEDICINE

## 2023-11-01 PROCEDURE — 99214 OFFICE O/P EST MOD 30 MIN: CPT | Mod: 25 | Performed by: FAMILY MEDICINE

## 2023-11-01 PROCEDURE — 90682 RIV4 VACC RECOMBINANT DNA IM: CPT | Performed by: FAMILY MEDICINE

## 2023-11-01 PROCEDURE — G0008 ADMIN INFLUENZA VIRUS VAC: HCPCS | Performed by: FAMILY MEDICINE

## 2023-11-01 PROCEDURE — 84443 ASSAY THYROID STIM HORMONE: CPT | Performed by: FAMILY MEDICINE

## 2023-11-01 ASSESSMENT — ENCOUNTER SYMPTOMS
DYSURIA: 0
ARTHRALGIAS: 1
JOINT SWELLING: 0
FREQUENCY: 0
HEMATOCHEZIA: 0
CONSTIPATION: 0
EYE PAIN: 0
HEMATURIA: 0
ABDOMINAL PAIN: 0
DIZZINESS: 0
NERVOUS/ANXIOUS: 0
COUGH: 0
HEARTBURN: 1
HEADACHES: 0
BREAST MASS: 0
MYALGIAS: 1
CHILLS: 0
PARESTHESIAS: 1
NAUSEA: 0
PALPITATIONS: 0
SHORTNESS OF BREATH: 0
WEAKNESS: 0
FEVER: 0
DIARRHEA: 0
SORE THROAT: 0

## 2023-11-01 ASSESSMENT — ACTIVITIES OF DAILY LIVING (ADL): CURRENT_FUNCTION: NO ASSISTANCE NEEDED

## 2023-11-01 NOTE — PATIENT INSTRUCTIONS
Patient Education   Personalized Prevention Plan  You are due for the preventive services outlined below.  Your care team is available to assist you in scheduling these services.  If you have already completed any of these items, please share that information with your care team to update in your medical record.  Health Maintenance Due   Topic Date Due     Hepatitis B Vaccine (1 of 3 - 3-dose series) Never done     HIV Screening  Never done     URINE DRUG SCREEN  10/01/2009     Zoster (Shingles) Vaccine (1 of 2) Never done     Flu Vaccine (1) 09/01/2023     COVID-19 Vaccine (3 - 2023-24 season) 09/01/2023     ANNUAL REVIEW OF HM ORDERS  09/07/2023

## 2023-11-01 NOTE — PROGRESS NOTES
"SUBJECTIVE:   Socorro is a 57 year old who presents for Preventive Visit.    Are you in the first 12 months of your Medicare coverage?  No    Healthy Habits:     In general, how would you rate your overall health?  Good    Frequency of exercise:  2-3 days/week    Duration of exercise:  15-30 minutes    Do you usually eat at least 4 servings of fruit and vegetables a day, include whole grains    & fiber and avoid regularly eating high fat or \"junk\" foods?  Yes    Taking medications regularly:  Yes    Medication side effects:  None    Ability to successfully perform activities of daily living:  No assistance needed    Home Safety:  No safety concerns identified    Hearing Impairment:  No hearing concerns    In the past 6 months, have you been bothered by leaking of urine?  No    In general, how would you rate your overall mental or emotional health?  Good    Additional concerns today:  No    Have you ever done Advance Care Planning? (For example, a Health Directive, POLST, or a discussion with a medical provider or your loved ones about your wishes): No, advance care planning information given to patient to review.  Patient plans to discuss their wishes with loved ones or provider.      Fall risk- No falls in the past 12 months     Cognitive Screening   1) Repeat 3 items (Leader, Season, Table)    2) Clock draw: NORMAL  3) 3 item recall: Recalls 2 objects   Results: NORMAL clock, 1-2 items recalled: COGNITIVE IMPAIRMENT LESS LIKELY    Mini-CogTM Copyright [employer] [place]. Licensed by the author for use in [facility]; reprinted with permission (natanael@.[facility]). All rights reserved.      Do you have sleep apnea, excessive snoring or daytime drowsiness? : yes    Reviewed and updated as needed this visit by clinical staff    Allergies  Meds              Reviewed and updated as needed this visit by Provider                 Social History     Tobacco Use    Smoking status: Never    Smokeless tobacco: Never   Substance " Use Topics    Alcohol use: No     Alcohol/week: 0.0 standard drinks of alcohol             11/1/2023     9:26 AM   Alcohol Use   Prescreen: >3 drinks/day or >7 drinks/week? No     Do you have a current opioid prescription? No  Do you use any other controlled substances or medications that are not prescribed by a provider? None      She states that she is doing well overall.  We recently stopped her lisinopril due to a cough and this seemed to help take her cough away.    Her main concern is that she is having numbness and tingling in her right arm.  She states that this is in her upper arm as well as in her hand.  All fingers are affected.  She states that sometimes when she turns her head certain ways this can cause a tingling.  Other times it seems more positional when she is lying in bed.  It is very disruptive in her life per her report.  She has been seeing physical therapy which she states is sometimes a little bit helpful but not taking care of the problem.  She has not had further evaluation on this.    Current providers sharing in care for this patient include:   Patient Care Team:  Neelima Villatoro MD as PCP - General  [facility], Sona Wolfe MD as Referring Physician (Neurological Surgery)  Rubina Dobbs PsyD (Psychology)  Kristopher Mchugh as Psychiatrist (Psychiatry)  Dominga Moise, PhD as Psychologist  Neelima Villatoro MD as Assigned PCP  Afsaneh Horowitz DO as MD (Gastroenterology)  Guillaume Paige DO as Assigned Musculoskeletal Provider    The following health maintenance items are reviewed in Epic and correct as of today:  Health Maintenance   Topic Date Due    HEPATITIS B IMMUNIZATION (1 of 3 - 3-dose series) Never done    HIV SCREENING  Never done    URINE DRUG SCREEN  10/01/2009    ZOSTER IMMUNIZATION (1 of 2) Never done    INFLUENZA VACCINE (1) 09/01/2023    COVID-19 Vaccine (3 - 2023-24 season) 09/01/2023    ANNUAL REVIEW OF HM ORDERS  09/07/2023    MAMMO SCREENING   09/26/2024    MEDICARE ANNUAL WELLNESS VISIT  11/01/2024    HPV TEST  12/02/2024    PAP  12/02/2024    LIPID  09/07/2027    ADVANCE CARE PLANNING  09/11/2027    COLORECTAL CANCER SCREENING  05/11/2031    DTAP/TDAP/TD IMMUNIZATION (3 - Td or Tdap) 09/24/2031    HEPATITIS C SCREENING  Completed    MIGRAINE ACTION PLAN  Completed    Pneumococcal Vaccine: Pediatrics (0 to 5 Years) and At-Risk Patients (6 to 64 Years)  Aged Out    IPV IMMUNIZATION  Aged Out    HPV IMMUNIZATION  Aged Out    MENINGITIS IMMUNIZATION  Aged Out    RSV MONOCLONAL ANTIBODY  Aged Out     Lab work is in process  Mammogram Screening: Mammogram Screening: Recommended mammography every 1-2 years with patient discussion and risk factor consideration        8/31/2022     5:47 PM   Breast CA Risk Assessment (FHS-7)   Do you have a family history of breast, colon, or ovarian cancer? No / Unknown         Pertinent mammograms are reviewed under the imaging tab.    Review of Systems   Constitutional:  Negative for chills and fever.   HENT:  Negative for congestion, ear pain, hearing loss and sore throat.    Eyes:  Negative for pain and visual disturbance.   Respiratory:  Negative for cough and shortness of breath.    Cardiovascular:  Negative for chest pain, palpitations and peripheral edema.   Gastrointestinal:  Positive for heartburn. Negative for abdominal pain, constipation, diarrhea, hematochezia and nausea.   Breasts:  Negative for tenderness, breast mass and discharge.   Genitourinary:  Negative for dysuria, frequency, genital sores, hematuria, pelvic pain, urgency, vaginal bleeding and vaginal discharge.   Musculoskeletal:  Positive for arthralgias and myalgias. Negative for joint swelling.   Skin:  Negative for rash.   Neurological:  Positive for paresthesias. Negative for dizziness, weakness and headaches.   Psychiatric/Behavioral:  Negative for mood changes. The patient is not nervous/anxious.          OBJECTIVE:   /80   Pulse 75   Temp  "97.4  F (36.3  C) (Tympanic)   Resp 16   Ht 1.614 m (5' 3.55\")   Wt 87.6 kg (193 lb 2 oz)   LMP  (LMP Unknown)   SpO2 98%   BMI 33.62 kg/m   Estimated body mass index is 33.62 kg/m  as calculated from the following:    Height as of this encounter: 1.614 m (5' 3.55\").    Weight as of this encounter: 87.6 kg (193 lb 2 oz).  Physical Exam    Physical Exam:  General Appearance: Alert, cooperative, no distress, appears stated age   Head: Normocephalic, without obvious abnormality, atraumatic  Eyes: PERRL, conjunctiva/corneas clear, EOM's intact   Ears: Normal TM's and external ear canals, both ears  Nose:Nares normal, septum midline,mucosa normal, no drainage    Throat:Lips, mucosa, and tongue normal; teeth and gums normal  Neck: Supple, symmetrical, trachea midline, no adenopathy;  thyroid: not enlarged, symmetric, no tenderness/mass/nodules  Back: Symmetric, no curvature, ROM normal,  Lungs: Clear to auscultation bilaterally, respirations unlabored  Breasts: No breast masses, tenderness, asymmetry, or nipple discharge.  Heart: Regular rate and rhythm, S1 and S2 normal, no murmur, rub, or gallop  Abdomen: Soft, non-tender, bowel sounds active all four quadrants,  no masses, no organomegaly  Extremities: Extremities normal, atraumatic, no cyanosis or edema  Skin: Skin color, texture, turgor normal, no rashes or lesions  Lymph nodes: Cervical, supraclavicular, and axillary nodes normal and   Neurologic: Normal  Musculoskeletal: Normal range of motion of right and left arms at shoulders.  Patient has slightly decreased  strength on the right as compared to the left.  Normal sensation to light touch          ASSESSMENT / PLAN:   1. Encounter for Medicare annual wellness exam  - COMPREHENSIVE METABOLIC PANEL; Future  - CBC with Platelets; Future  - COMPREHENSIVE METABOLIC PANEL  - CBC with Platelets    2. Numbness and tingling of upper extremity  Given patient's numbness and perceived weakness in her arms and hands " "bilaterally I think it would be reasonable to more pinpoint where her nerve compression lies.  An EMG has been ordered for her bilateral upper extremities.  Hopefully this will help us determine who she should see as a specialist.    We discussed risks and benefits today.  Patient agrees to the plan.  - EMG; Future  - TSH with free T4 reflex; Future  - TSH with free T4 reflex    3. Benign essential hypertension  - COMPREHENSIVE METABOLIC PANEL; Future  - COMPREHENSIVE METABOLIC PANEL    4. Lipid screening  - Lipid panel reflex to direct LDL Fasting; Future  - Lipid panel reflex to direct LDL Fasting      Patient has been advised of split billing requirements and indicates understanding: Yes      COUNSELING:  Reviewed preventive health counseling, as reflected in patient instructions      BMI:   Estimated body mass index is 33.62 kg/m  as calculated from the following:    Height as of this encounter: 1.614 m (5' 3.55\").    Weight as of this encounter: 87.6 kg (193 lb 2 oz).   Weight management plan: Discussed healthy diet and exercise guidelines      She reports that she has never smoked. She has never used smokeless tobacco.      Appropriate preventive services were discussed with this patient, including applicable screening as appropriate for fall prevention, nutrition, physical activity, Tobacco-use cessation, weight loss and cognition.  Checklist reviewing preventive services available has been given to the patient.    Reviewed patients plan of care and provided an AVS. The Basic Care Plan (routine screening as documented in Health Maintenance) for Socorro meets the Care Plan requirement. This Care Plan has been established and reviewed with the Patient.          Neelima Villatoro MD  [facility]    Identified Health Risks:  I have reviewed Opioid Use Disorder and Substance Use Disorder risk factors and made any needed referrals.   "

## 2023-11-21 DIAGNOSIS — E66.01 MORBID OBESITY (H): ICD-10-CM

## 2023-11-21 RX ORDER — METFORMIN HCL 500 MG
1000 TABLET, EXTENDED RELEASE 24 HR ORAL
Qty: 180 TABLET | Refills: 0 | Status: SHIPPED | OUTPATIENT
Start: 2023-11-21 | End: 2024-03-04

## 2023-12-09 DIAGNOSIS — K21.00 GASTROESOPHAGEAL REFLUX DISEASE WITH ESOPHAGITIS WITHOUT HEMORRHAGE: ICD-10-CM

## 2023-12-11 RX ORDER — LANSOPRAZOLE 30 MG/1
CAPSULE, DELAYED RELEASE ORAL
Qty: 180 CAPSULE | Refills: 0 | Status: SHIPPED | OUTPATIENT
Start: 2023-12-11 | End: 2024-06-17

## 2024-01-15 ENCOUNTER — OFFICE VISIT (OUTPATIENT)
Dept: NEUROLOGY | Facility: CLINIC | Age: 59
End: 2024-01-15
Attending: FAMILY MEDICINE
Payer: COMMERCIAL

## 2024-01-15 DIAGNOSIS — R20.2 NUMBNESS AND TINGLING OF UPPER EXTREMITY: ICD-10-CM

## 2024-01-15 DIAGNOSIS — R20.0 NUMBNESS AND TINGLING OF UPPER EXTREMITY: ICD-10-CM

## 2024-01-15 PROCEDURE — 95910 NRV CNDJ TEST 7-8 STUDIES: CPT | Performed by: PSYCHIATRY & NEUROLOGY

## 2024-01-15 PROCEDURE — 95886 MUSC TEST DONE W/N TEST COMP: CPT | Performed by: PSYCHIATRY & NEUROLOGY

## 2024-01-15 NOTE — PROGRESS NOTES
[facility]  Electrodiagnostic Laboratory                 Department of Neurology                                                                                                         Test Date:  1/15/2024    Patient: Socorro Oakes : 1965 Physician: Michael Miller MD   Sex: Female AGE: 58 year Ref Phys:    ID#: [id number]   Technician: Carlos Abel     History and Examination:  Socorro Oakes is a 58 year old woman with pain in the right shoulder, right arm, and left wrist. Examination demonstrates normal bulk and tone and weakness of thumb abduction. She is referred for evaluation of neuromuscular etiologies of her symptoms.     Techniques:  Motor conduction studies were done with surface recording electrodes. Sensory conduction studies were performed with surface electrodes, unless indicated otherwise by (n), designating the use of subdermal recording electrodes. Temperature was monitored and recorded throughout the study. Upper extremities were maintained at a temperature of 32 degrees Centigrade or higher.  EMG was done with a concentric needle electrode.     Results:  Right median sensory and mixed nerve conduction studies demonstrated moderately severe conduction slowing, accentuated in the transcarpal segment, and relative attenuation of amplitude. Left median sensory and mixed nerve conduction studies demonstrated mild conduction slowing. Bilateral ulnar sensory and mixed nerve conduction studies were normal. Right median motor conduction studies demonstrated moderately severe prolongation of distal latency, mild to moderate attenuation of amplitude, and mild forearm conduction slowing. Left median motor conduction studies demonstrated marginal absolute prolongation of latency to the abductor pollicis brevis and relative prolongation of latency to the second lumbrical muscle. Bilateral ulnar motor conduction studies were normal. Electromyography of the right upper  limb demonstrated abnormal spontaneous activity in the abductor pollicis brevis muscle and was otherwise normal.     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  No evidence of right cervical radiculopathy.    ___________________________  Michael Miller MD        Nerve Conduction Studies  Motor Sites      Latency Amplitude Neg. Amp Diff Segment Distance Velocity Neg. Dur Neg Area Diff Temperature Comment   Site (ms) Norm (mV) Norm (%)  cm m/s Norm (ms) (%) ( C)    Left Median (APB) Motor   Wrist 4.4  < 4.4 5.3  > 5.0  Wrist-APB 8   5.2  33.1    Elbow 8.9 - 4.7  > 5.0 -11 Elbow-Wrist 21.5 48  > 48 5.3 -13 33    Right Median (APB) Motor   Wrist 7.5  < 4.4 3.9  > 5.0  Wrist-APB 8   4.7  32.2    Elbow 12.1 - 3.7  > 5.0 -5 Elbow-Wrist 21 46  > 48 5.2 -7 32.2    Left Median/Ulnar (Lumb-Dors Int II) Motor        Median (Lumb I)   Wrist 4.4 - 1.46 -  Wrist-Lumb I 10   6.0  33         Ulnar (Dorsal Int (manus))   Wrist 3.2 - 5.0 -  Wrist-Dorsal Int (manus) 10   4.2  33.1    Right Median/Ulnar (Lumb-Dors Int II) Motor        Median (Lumb I)   Wrist 6.0 - 0.62 -  Wrist-Lumb I 10   7.2  32         Ulnar (Dorsal Int (manus))   Wrist 3.2 - 4.9 -  Wrist-Dorsal Int (manus) 10   4.8  32    Left Ulnar (ADM) Motor   Wrist 3.1  < 3.5 10.2  > 5.0  Wrist-ADM 8   5.0  32.9    Bel Elbow 6.3 - 9.5 - -7 Bel Elbow-Wrist 19 59  > 48 5.3 -1 32.9    Abv Elbow 8.0 - 9.3 - -2 Abv Elbow-Bel Elbow 10 59  > 48 5.4 0 32.9    Right Ulnar (ADM) Motor   Wrist 3.0  < 3.5 9.9  > 5.0  Wrist-ADM 8   4.7  32.2    Bel Elbow 6.5 - 9.6 - -3 Bel Elbow-Wrist 18.7 53  > 48 4.7 -6 32.1    Abv Elbow 8.1 - 9.5 - -1 Abv Elbow-Bel Elbow 10 63  > 48 4.8 -1 32.1      Sensory Sites      Onset Lat Peak Lat Amp (O-P) Amp (P-P) Segment Distance Velocity Temperature Comment   Site ms (ms)  V Norm ( V)  cm m/s Norm ( C)     Left Median Sensory   Wrist-Dig II 3.5 4.6 28  > 10 48 Wrist-Dig II 14 40  > 48 32.9    Right Median Sensory   Wrist 3.9 5.5 10  > 10 15 Wrist-Dig II 14 36  > 48 31.9    Left Median-Ulnar Palmar Sensory        Median   Palm-Wrist 2.0 2.7 35 - 38 Palm-Wrist 8 40 - 33         Ulnar   Palm-Wrist 1.33 1.98 9 - 15 Palm-Wrist 8 60 - 32.9    Right Median-Ulnar Palmar Sensory        Median   Palm 3.6 4.1 2 - 2 Palm-Wrist 8 22 - 32.1         Ulnar   Palm-Wrist 1.25 1.95 13 - 16 Palm-Wrist 8 64 - 32.1    Left Ulnar Sensory   Wrist-Dig V 2.6 3.5 32  > 8 54 Wrist-Dig V 12.5 48  > 48 32.9    Right Ulnar Sensory   Wrist-Dig V 2.5 3.4 31  > 8 40 Wrist-Dig V 12.5 50  > 48 32.1      Inter-Nerve Comparisons     Nerve 1 Value 1 Nerve 2 Value 2 Parameter Result Normal   Sensory Sites   R Median Palm-Wrist 4.1 ms R Ulnar Palm-Wrist 1.95 ms Peak Lat Diff 2.2 ms <0.40   L Median Palm-Wrist 2.7 ms L Ulnar Palm-Wrist 1.98 ms Peak Lat Diff 0.72 ms <0.40     F Wave Studies     Min-F Max-F Dispersion Persistence Mean-F F-Norm L-R Mean-F L-R Mean-F Norm F/M Ratio F-M Lat (ms)   Left Ulnar (Abd Dig Min)  33.2  C   25.63 31.17 5.54 100.00 29.74 <36  <2.5 2.94 22.50       Electromyography     Side Muscle Ins Act Fibs/PSW Fasc HF Amp Dur Poly Recrt Int Pat   Right Pronator Teres Nml None Nml 0 Nml Nml 0 Nml Nml   Right Biceps Nml None Nml 0 Nml Nml 0 Nml Nml   Right EDC Nml None Nml 0 Nml Nml 0 Nml Nml   Right FDI Nml None Nml 0 Nml Nml 0 Nml Nml   Right APB Nml 1+ Nml 0 Nml Nml 0 Nml Nml   Right Triceps Nml None Nml 0 Nml Nml 0 Nml Nml   Right C6 PSP Nml None Nml 0              NCS Waveforms:    Motor                    Sensory

## 2024-01-15 NOTE — LETTER
1/15/2024         RE: Socorro Oakes  [address] [place] [address]        Dear Colleague,    Thank you for referring your patient, Socorro Oakes, to the [facility] [place]. Please see a copy of my visit note below.                                [facility]  Electrodiagnostic Laboratory                 Department of Neurology                                                                                                         Test Date:  1/15/2024    Patient: Socorro Oakes : 1965 Physician: Michael Miller MD   Sex: Female AGE: 58 year Ref Phys:    ID#: [id number]   Technician: Carlos Abel     History and Examination:  Socorro Oakes is a 58 year old woman with pain in the right shoulder, right arm, and left wrist. Examination demonstrates normal bulk and tone and weakness of thumb abduction. She is referred for evaluation of neuromuscular etiologies of her symptoms.     Techniques:  Motor conduction studies were done with surface recording electrodes. Sensory conduction studies were performed with surface electrodes, unless indicated otherwise by (n), designating the use of subdermal recording electrodes. Temperature was monitored and recorded throughout the study. Upper extremities were maintained at a temperature of 32 degrees Centigrade or higher.  EMG was done with a concentric needle electrode.     Results:  Right median sensory and mixed nerve conduction studies demonstrated moderately severe conduction slowing, accentuated in the transcarpal segment, and relative attenuation of amplitude. Left median sensory and mixed nerve conduction studies demonstrated mild conduction slowing. Bilateral ulnar sensory and mixed nerve conduction studies were normal. Right median motor conduction studies demonstrated moderately severe prolongation of distal latency, mild to moderate attenuation of amplitude, and mild forearm conduction slowing. Left median motor conduction  studies demonstrated marginal absolute prolongation of latency to the abductor pollicis brevis and relative prolongation of latency to the second lumbrical muscle. Bilateral ulnar motor conduction studies were normal. Electromyography of the right upper limb demonstrated abnormal spontaneous activity in the abductor pollicis brevis muscle and was otherwise normal.     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  No evidence of right cervical radiculopathy.    ___________________________  Michael Miller MD        Nerve Conduction Studies  Motor Sites      Latency Amplitude Neg. Amp Diff Segment Distance Velocity Neg. Dur Neg Area Diff Temperature Comment   Site (ms) Norm (mV) Norm (%)  cm m/s Norm (ms) (%) ( C)    Left Median (APB) Motor   Wrist 4.4  < 4.4 5.3  > 5.0  Wrist-APB 8   5.2  33.1    Elbow 8.9 - 4.7  > 5.0 -11 Elbow-Wrist 21.5 48  > 48 5.3 -13 33    Right Median (APB) Motor   Wrist 7.5  < 4.4 3.9  > 5.0  Wrist-APB 8   4.7  32.2    Elbow 12.1 - 3.7  > 5.0 -5 Elbow-Wrist 21 46  > 48 5.2 -7 32.2    Left Median/Ulnar (Lumb-Dors Int II) Motor        Median (Lumb I)   Wrist 4.4 - 1.46 -  Wrist-Lumb I 10   6.0  33         Ulnar (Dorsal Int (manus))   Wrist 3.2 - 5.0 -  Wrist-Dorsal Int (manus) 10   4.2  33.1    Right Median/Ulnar (Lumb-Dors Int II) Motor        Median (Lumb I)   Wrist 6.0 - 0.62 -  Wrist-Lumb I 10   7.2  32         Ulnar (Dorsal Int (manus))   Wrist 3.2 - 4.9 -  Wrist-Dorsal Int (manus) 10   4.8  32    Left Ulnar (ADM) Motor   Wrist 3.1  < 3.5 10.2  > 5.0  Wrist-ADM 8   5.0  32.9    Bel Elbow 6.3 - 9.5 - -7 Bel Elbow-Wrist 19 59  > 48 5.3 -1 32.9    Abv Elbow 8.0 - 9.3 - -2 Abv Elbow-Bel Elbow 10 59  > 48 5.4 0 32.9    Right Ulnar (ADM) Motor   Wrist 3.0  < 3.5 9.9  > 5.0  Wrist-ADM 8   4.7  32.2    Bel Elbow 6.5 - 9.6 - -3 Bel Elbow-Wrist  18.7 53  > 48 4.7 -6 32.1    Abv Elbow 8.1 - 9.5 - -1 Abv Elbow-Bel Elbow 10 63  > 48 4.8 -1 32.1      Sensory Sites      Onset Lat Peak Lat Amp (O-P) Amp (P-P) Segment Distance Velocity Temperature Comment   Site ms (ms)  V Norm ( V)  cm m/s Norm ( C)    Left Median Sensory   Wrist-Dig II 3.5 4.6 28  > 10 48 Wrist-Dig II 14 40  > 48 32.9    Right Median Sensory   Wrist 3.9 5.5 10  > 10 15 Wrist-Dig II 14 36  > 48 31.9    Left Median-Ulnar Palmar Sensory        Median   Palm-Wrist 2.0 2.7 35 - 38 Palm-Wrist 8 40 - 33         Ulnar   Palm-Wrist 1.33 1.98 9 - 15 Palm-Wrist 8 60 - 32.9    Right Median-Ulnar Palmar Sensory        Median   Palm 3.6 4.1 2 - 2 Palm-Wrist 8 22 - 32.1         Ulnar   Palm-Wrist 1.25 1.95 13 - 16 Palm-Wrist 8 64 - 32.1    Left Ulnar Sensory   Wrist-Dig V 2.6 3.5 32  > 8 54 Wrist-Dig V 12.5 48  > 48 32.9    Right Ulnar Sensory   Wrist-Dig V 2.5 3.4 31  > 8 40 Wrist-Dig V 12.5 50  > 48 32.1      Inter-Nerve Comparisons     Nerve 1 Value 1 Nerve 2 Value 2 Parameter Result Normal   Sensory Sites   R Median Palm-Wrist 4.1 ms R Ulnar Palm-Wrist 1.95 ms Peak Lat Diff 2.2 ms <0.40   L Median Palm-Wrist 2.7 ms L Ulnar Palm-Wrist 1.98 ms Peak Lat Diff 0.72 ms <0.40     F Wave Studies     Min-F Max-F Dispersion Persistence Mean-F F-Norm L-R Mean-F L-R Mean-F Norm F/M Ratio F-M Lat (ms)   Left Ulnar (Abd Dig Min)  33.2  C   25.63 31.17 5.54 100.00 29.74 <36  <2.5 2.94 22.50       Electromyography     Side Muscle Ins Act Fibs/PSW Fasc HF Amp Dur Poly Recrt Int Pat   Right Pronator Teres Nml None Nml 0 Nml Nml 0 Nml Nml   Right Biceps Nml None Nml 0 Nml Nml 0 Nml Nml   Right EDC Nml None Nml 0 Nml Nml 0 Nml Nml   Right FDI Nml None Nml 0 Nml Nml 0 Nml Nml   Right APB Nml 1+ Nml 0 Nml Nml 0 Nml Nml   Right Triceps Nml None Nml 0 Nml Nml 0 Nml Nml   Right C6 PSP Nml None Nml 0              NCS Waveforms:    Motor                    Sensory                                             Again, thank you for  allowing me to participate in the care of your patient.        Sincerely,        Michael Miller MD

## 2024-01-22 ENCOUNTER — OFFICE VISIT (OUTPATIENT)
Dept: ORTHOPEDICS | Facility: CLINIC | Age: 59
End: 2024-01-22
Attending: FAMILY MEDICINE
Payer: COMMERCIAL

## 2024-01-22 VITALS — WEIGHT: 193 LBS | BODY MASS INDEX: 34.2 KG/M2 | HEIGHT: 63 IN

## 2024-01-22 DIAGNOSIS — M25.531 BILATERAL WRIST PAIN: ICD-10-CM

## 2024-01-22 DIAGNOSIS — G56.03 BILATERAL CARPAL TUNNEL SYNDROME: Primary | ICD-10-CM

## 2024-01-22 DIAGNOSIS — M19.032 ARTHRITIS OF SCAPHOID-TRAPEZIUM-TRAPEZOID JOINT OF LEFT HAND: ICD-10-CM

## 2024-01-22 DIAGNOSIS — M25.532 BILATERAL WRIST PAIN: ICD-10-CM

## 2024-01-22 PROCEDURE — 99214 OFFICE O/P EST MOD 30 MIN: CPT | Performed by: PEDIATRICS

## 2024-01-22 NOTE — PROGRESS NOTES
ASSESSMENT & PLAN    Socorro was seen today for pain and pain.    Diagnoses and all orders for this visit:    Bilateral carpal tunnel syndrome  -     Orthopedic  Referral  -     Hand Therapy Referral; Future  -     Wrist/Arm/Hand Bracking Supplies Order Wrist Brace; Bilateral; with thumb spica    Arthritis of emrwxcrw-khcixkjbf-xqcokbvnd joint of left hand  -     Hand Therapy Referral; Future  -     Wrist/Arm/Hand Bracking Supplies Order Wrist Brace; Bilateral; with thumb spica    Bilateral wrist pain  -     Hand Therapy Referral; Future  -     Wrist/Arm/Hand Bracking Supplies Order Wrist Brace; Bilateral; with thumb spica          See Patient Instructions  Patient Instructions   Reviewed nature of the symptoms and bilateral wrist and hands.  Most recently, EMG demonstrated carpal tunnel syndrome bilaterally, greater on the right.  Interestingly, symptoms overall are more prominent in the left hand.  Also, there is history of some arthritic change in the left hand carpals, primarily STT articulation.  There is possible that there is more than 1 component to the left hand and wrist pain.  We discussed considerations around additional imaging (x-ray of the right hand, possibly MRI of the left wrist given more diffuse symptoms and previous), also referral to hand therapy, bracing for support (particularly nighttime wrist bracing in neutral position for carpal tunnel syndrome, also more support for the left wrist and hand).  Additionally, we discussed potential use of medications for symptoms, also steroid injection.  Following discussion, we then placed hand therapy referral.  Bracing options reviewed today, nighttime wrist bracing and neutral position.  Also more broad support for the left hand and wrist, such as with thumb spica wrist brace.  Defer any medications for now; briefly discussed consideration oral steroid versus gabapentin versus both.  Finally, regarding injection, plan to pursue bilateral carpal  tunnel steroid injection, and plan this with one of my colleagues.  Past use of ultrasound guidance for injection.  This will also provide diagnostic benefit.  If not getting desired relief particularly in the left hand and wrist, then may need to return to focusing more on the known arthritic change.  Plan to monitor course with the injections up to 2 weeks to begin.    If you have any further questions for your physician or physician s care team you can contact them thru MyChart or by calling [phone].      Guillaume Thornton [facility]      CC: Neelima Villatoro      -----  Chief Complaint   Patient presents with    Left Wrist - Pain    Right Wrist - Pain       SUBJECTIVE  Socorro Oakes is a/an 58 year old female who is seen in consultation at the request of  Neelima Villatoro M.D. for evaluation of bilateral wrist.     The patient is seen by themselves.  The patient is Right handed    Onset: 6+ month(s) ago. Reports insidious onset without acute precipitating event.  Location of Pain: bilateral wrist, left worse than right  Worsened by: gripping things, fine motor skills, worse as the day goes on  Better with:  Treatments tried: various topicals, 2 cortisone injections done on the left wrist done with Dr. 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  Additional history:  Notes dropping objects with right hand. Has had some numbness bilateral hands.  Previously had pain in left hand, radial side, more thenar emincene area.  Now having pain, burning in volar wrist on left, also some medial and lateral left wrist pain.  No numbness in left hand. However,  "does have burning sensation in hands.  Limited relief from past steroid injections.   Has done some previous bracing, perhaps somewhat intermittent.      REVIEW OF SYSTEMS:  Review of Systems    OBJECTIVE:  Ht 1.6 m (5' 3\")   Wt 87.5 kg (193 lb)   BMI 34.19 kg/m             Hand/wrist (bilateral):    Inspection:  No deformity noted.  No clear swelling. No ecchymosis.    Motion:  Wrist motion with some discomfort at level of wrist, more on left  Digit motion with pain with thumb motion on left    Radial pulses normal, +2/4, capillary refill brisk.    Palpation:  Tender left radial wrist, snuffbox; bilateral volar wrist, more on left    Tinel pos bilat for wrist pain, some radiating symptoms to thenar eminence. Phalen mild improvement in left hand/wrist. Reverse Phalen with some increased wrist pain L > R.    Finkelstein with some wrist area pain on left.    Thumb grind with some pain on left, pain more in wrist.        RADIOLOGY:  Final results and radiologist's interpretation, available in the [facility] health record.  Images were reviewed with the patient in the office today.  My personal interpretation of the performed imaging: STT arthrosis.          XR HAND LEFT G/E 3 VIEWS 1/30/2023 3:09 PM      HISTORY: Pain of left hand     COMPARISON: None.                                                                          IMPRESSION: Advanced degenerative change of the STT joint. Left hand  negative for fracture. No dislocation.     BETSY WILSON MD           EMG:                                                                                                                                                                                                                                                [facility]  Electrodiagnostic Laboratory                                                                                                                               Department of Neurology                      "                                                                                      Test Date:  1/15/2024     Patient: Socorro Oakes : 1965 Physician: Michael Miller MD   Sex: Female AGE: 58 year Ref Phys:     ID#: [id number]     Technician: Carlos Abel      History and Examination:  Socorro Oakes is a 58 year old woman with pain in the right shoulder, right arm, and left wrist. Examination demonstrates normal bulk and tone and weakness of thumb abduction. She is referred for evaluation of neuromuscular etiologies of her symptoms.      Techniques:  Motor conduction studies were done with surface recording electrodes. Sensory conduction studies were performed with surface electrodes, unless indicated otherwise by (n), designating the use of subdermal recording electrodes. Temperature was monitored and recorded throughout the study. Upper extremities were maintained at a temperature of 32 degrees Centigrade or higher.  EMG was done with a concentric needle electrode.      Results:  Right median sensory and mixed nerve conduction studies demonstrated moderately severe conduction slowing, accentuated in the transcarpal segment, and relative attenuation of amplitude. Left median sensory and mixed nerve conduction studies demonstrated mild conduction slowing. Bilateral ulnar sensory and mixed nerve conduction studies were normal. Right median motor conduction studies demonstrated moderately severe prolongation of distal latency, mild to moderate attenuation of amplitude, and mild forearm conduction slowing. Left median motor conduction studies demonstrated marginal absolute prolongation of latency to the abductor pollicis brevis and relative prolongation of latency to the second lumbrical muscle. Bilateral ulnar motor conduction studies were normal. Electromyography of the right upper limb demonstrated abnormal spontaneous activity in the abductor pollicis brevis muscle and was otherwise normal.       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  No evidence of right cervical radiculopathy.     ___________________________  Michael Miller MD           Nerve Conduction Studies  Motor Sites                       Latency Amplitude Neg. Amp Diff Segment Distance Velocity Neg. Dur Neg Area Diff Temperature Comment   Site (ms) Norm (mV) Norm (%)   cm m/s Norm (ms) (%) ( C)     Left Median (APB) Motor   Wrist 4.4  < 4.4 5.3  > 5.0   Wrist-APB 8     5.2   33.1     Elbow 8.9 - 4.7  > 5.0 -11 Elbow-Wrist 21.5 48  > 48 5.3 -13 33     Right Median (APB) Motor   Wrist 7.5  < 4.4 3.9  > 5.0   Wrist-APB 8     4.7   32.2     Elbow 12.1 - 3.7  > 5.0 -5 Elbow-Wrist 21 46  > 48 5.2 -7 32.2     Left Median/Ulnar (Lumb-Dors Int II) Motor        Median (Lumb I)   Wrist 4.4 - 1.46 -   Wrist-Lumb I 10     6.0   33          Ulnar (Dorsal Int (manus))   Wrist 3.2 - 5.0 -   Wrist-Dorsal Int (manus) 10     4.2   33.1     Right Median/Ulnar (Lumb-Dors Int II) Motor        Median (Lumb I)   Wrist 6.0 - 0.62 -   Wrist-Lumb I 10     7.2   32          Ulnar (Dorsal Int (manus))   Wrist 3.2 - 4.9 -   Wrist-Dorsal Int (manus) 10     4.8   32     Left Ulnar (ADM) Motor   Wrist 3.1  < 3.5 10.2  > 5.0   Wrist-ADM 8     5.0   32.9     Bel Elbow 6.3 - 9.5 - -7 Bel Elbow-Wrist 19 59  > 48 5.3 -1 32.9     Abv Elbow 8.0 - 9.3 - -2 Abv Elbow-Bel Elbow 10 59  > 48 5.4 0 32.9     Right Ulnar (ADM) Motor   Wrist 3.0  < 3.5 9.9  > 5.0   Wrist-ADM 8     4.7   32.2     Bel Elbow 6.5 - 9.6 - -3 Bel Elbow-Wrist 18.7 53  > 48 4.7 -6 32.1     Abv Elbow 8.1 - 9.5 - -1 Abv Elbow-Bel Elbow 10 63  > 48 4.8 -1 32.1        Sensory Sites                     Onset Lat Peak Lat Amp (O-P) Amp (P-P) Segment Distance Velocity Temperature Comment   Site ms (ms)  V Norm ( V)   cm m/s Norm ( C)     Left Median Sensory    Wrist-Dig II 3.5 4.6 28  > 10 48 Wrist-Dig II 14 40  > 48 32.9     Right Median Sensory   Wrist 3.9 5.5 10  > 10 15 Wrist-Dig II 14 36  > 48 31.9     Left Median-Ulnar Palmar Sensory        Median   Palm-Wrist 2.0 2.7 35 - 38 Palm-Wrist 8 40 - 33          Ulnar   Palm-Wrist 1.33 1.98 9 - 15 Palm-Wrist 8 60 - 32.9     Right Median-Ulnar Palmar Sensory        Median   Palm 3.6 4.1 2 - 2 Palm-Wrist 8 22 - 32.1          Ulnar   Palm-Wrist 1.25 1.95 13 - 16 Palm-Wrist 8 64 - 32.1     Left Ulnar Sensory   Wrist-Dig V 2.6 3.5 32  > 8 54 Wrist-Dig V 12.5 48  > 48 32.9     Right Ulnar Sensory   Wrist-Dig V 2.5 3.4 31  > 8 40 Wrist-Dig V 12.5 50  > 48 32.1        Inter-Nerve Comparisons      Nerve 1 Value 1 Nerve 2 Value 2 Parameter Result Normal   Sensory Sites   R Median Palm-Wrist 4.1 ms R Ulnar Palm-Wrist 1.95 ms Peak Lat Diff 2.2 ms <0.40   L Median Palm-Wrist 2.7 ms L Ulnar Palm-Wrist 1.98 ms Peak Lat Diff 0.72 ms <0.40      F Wave Studies      Min-F Max-F Dispersion Persistence Mean-F F-Norm L-R Mean-F L-R Mean-F Norm F/M Ratio F-M Lat (ms)   Left Ulnar (Abd Dig Min)  33.2  C   25.63 31.17 5.54 100.00 29.74 <36   <2.5 2.94 22.50         Electromyography      Side Muscle Ins Act Fibs/PSW Fasc HF Amp Dur Poly Recrt Int Pat   Right Pronator Teres Nml None Nml 0 Nml Nml 0 Nml Nml   Right Biceps Nml None Nml 0 Nml Nml 0 Nml Nml   Right EDC Nml None Nml 0 Nml Nml 0 Nml Nml   Right FDI Nml None Nml 0 Nml Nml 0 Nml Nml   Right APB Nml 1+ Nml 0 Nml Nml 0 Nml Nml   Right Triceps Nml None Nml 0 Nml Nml 0 Nml Nml   Right C6 PSP Nml None Nml 0                     Review of prior external note(s) from - referring  Review of the result(s) of each unique test - EMG  Independent interpretation of a test performed by another physician/other qualified health care professional (not separately reported) - imaging  Over 30 minutes spent by me on the date of the encounter doing chart review, history and exam, documentation and further  activities per the note

## 2024-01-22 NOTE — LETTER
1/22/2024         RE: Socorro Oakes  [address] [place] [address]        Dear Colleague,    Thank you for referring your patient, Socorro Oakes, to the [facility] [place]. Please see a copy of my visit note below.    ASSESSMENT & PLAN    Socorro was seen today for pain and pain.    Diagnoses and all orders for this visit:    Bilateral carpal tunnel syndrome  -     Orthopedic  Referral  -     Hand Therapy Referral; Future  -     Wrist/Arm/Hand Bracking Supplies Order Wrist Brace; Bilateral; with thumb spica    Arthritis of ybuhexcs-zdjxwfkva-mzkgfmvkz joint of left hand  -     Hand Therapy Referral; Future  -     Wrist/Arm/Hand Bracking Supplies Order Wrist Brace; Bilateral; with thumb spica    Bilateral wrist pain  -     Hand Therapy Referral; Future  -     Wrist/Arm/Hand Bracking Supplies Order Wrist Brace; Bilateral; with thumb spica          See Patient Instructions  Patient Instructions   Reviewed nature of the symptoms and bilateral wrist and hands.  Most recently, EMG demonstrated carpal tunnel syndrome bilaterally, greater on the right.  Interestingly, symptoms overall are more prominent in the left hand.  Also, there is history of some arthritic change in the left hand carpals, primarily STT articulation.  There is possible that there is more than 1 component to the left hand and wrist pain.  We discussed considerations around additional imaging (x-ray of the right hand, possibly MRI of the left wrist given more diffuse symptoms and previous), also referral to hand therapy, bracing for support (particularly nighttime wrist bracing in neutral position for carpal tunnel syndrome, also more support for the left wrist and hand).  Additionally, we discussed potential use of medications for symptoms, also steroid injection.  Following discussion, we then placed hand therapy referral.  Bracing options reviewed today, nighttime wrist bracing and neutral position.  Also more  broad support for the left hand and wrist, such as with thumb spica wrist brace.  Defer any medications for now; briefly discussed consideration oral steroid versus gabapentin versus both.  Finally, regarding injection, plan to pursue bilateral carpal tunnel steroid injection, and plan this with one of my colleagues.  Past use of ultrasound guidance for injection.  This will also provide diagnostic benefit.  If not getting desired relief particularly in the left hand and wrist, then may need to return to focusing more on the known arthritic change.  Plan to monitor course with the injections up to 2 weeks to begin.    If you have any further questions for your physician or physician s care team you can contact them thru MyChart or by calling [phone].      [facility]      CC: Neelima Villatoro      -----  Chief Complaint   Patient presents with     Left Wrist - Pain     Right Wrist - Pain       SUBJECTIVE  Socorro Oakes is a/an 58 year old female who is seen in consultation at the request of  Neelima Villatoro M.D. for evaluation of bilateral wrist.     The patient is seen by themselves.  The patient is Right handed    Onset: 6+ month(s) ago. Reports insidious onset without acute precipitating event.  Location of Pain: bilateral wrist, left worse than right  Worsened by: gripping things, fine motor skills, worse as the day goes on  Better with:  Treatments tried: various topicals, 2 cortisone injections done on the left wrist done with Dr. 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  Additional history:  Notes dropping objects with  "right hand. Has had some numbness bilateral hands.  Previously had pain in left hand, radial side, more thenar emincene area.  Now having pain, burning in volar wrist on left, also some medial and lateral left wrist pain.  No numbness in left hand. However, does have burning sensation in hands.  Limited relief from past steroid injections.   Has done some previous bracing, perhaps somewhat intermittent.      REVIEW OF SYSTEMS:  Review of Systems    OBJECTIVE:  Ht 1.6 m (5' 3\")   Wt 87.5 kg (193 lb)   BMI 34.19 kg/m             Hand/wrist (bilateral):    Inspection:  No deformity noted.  No clear swelling. No ecchymosis.    Motion:  Wrist motion with some discomfort at level of wrist, more on left  Digit motion with pain with thumb motion on left    Radial pulses normal, +2/4, capillary refill brisk.    Palpation:  Tender left radial wrist, snuffbox; bilateral volar wrist, more on left    Tinel pos bilat for wrist pain, some radiating symptoms to thenar eminence. Phalen mild improvement in left hand/wrist. Reverse Phalen with some increased wrist pain L > R.    Finkelstein with some wrist area pain on left.    Thumb grind with some pain on left, pain more in wrist.        RADIOLOGY:  Final results and radiologist's interpretation, available in the [facility] health record.  Images were reviewed with the patient in the office today.  My personal interpretation of the performed imaging: STT arthrosis.          XR HAND LEFT G/E 3 VIEWS 1/30/2023 3:09 PM      HISTORY: Pain of left hand     COMPARISON: None.                                                                          IMPRESSION: Advanced degenerative change of the STT joint. Left hand  negative for fracture. No dislocation.     BETSY WILSON MD           EMG:                                                                                                                                                                                                               "                                  [facility]  Electrodiagnostic Laboratory                                                                                                                               Department of Neurology                                                                                                           Test Date:  1/15/2024     Patient: Socorro Oakes : 1965 Physician: Michael Miller MD   Sex: Female AGE: 58 year Ref Phys:     ID#: [id number]     Technician: Carlos Abel      History and Examination:  Socorro Oakes is a 58 year old woman with pain in the right shoulder, right arm, and left wrist. Examination demonstrates normal bulk and tone and weakness of thumb abduction. She is referred for evaluation of neuromuscular etiologies of her symptoms.      Techniques:  Motor conduction studies were done with surface recording electrodes. Sensory conduction studies were performed with surface electrodes, unless indicated otherwise by (n), designating the use of subdermal recording electrodes. Temperature was monitored and recorded throughout the study. Upper extremities were maintained at a temperature of 32 degrees Centigrade or higher.  EMG was done with a concentric needle electrode.      Results:  Right median sensory and mixed nerve conduction studies demonstrated moderately severe conduction slowing, accentuated in the transcarpal segment, and relative attenuation of amplitude. Left median sensory and mixed nerve conduction studies demonstrated mild conduction slowing. Bilateral ulnar sensory and mixed nerve conduction studies were normal. Right median motor conduction studies demonstrated moderately severe prolongation of distal latency, mild to moderate attenuation of amplitude, and mild forearm conduction slowing. Left median motor conduction studies demonstrated marginal absolute prolongation of latency to the abductor pollicis brevis and relative prolongation of  latency to the second lumbrical muscle. Bilateral ulnar motor conduction studies were normal. Electromyography of the right upper limb demonstrated abnormal spontaneous activity in the abductor pollicis brevis muscle and was otherwise normal.      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  No evidence of right cervical radiculopathy.     ___________________________  Michael Miller MD           Nerve Conduction Studies  Motor Sites                       Latency Amplitude Neg. Amp Diff Segment Distance Velocity Neg. Dur Neg Area Diff Temperature Comment   Site (ms) Norm (mV) Norm (%)   cm m/s Norm (ms) (%) ( C)     Left Median (APB) Motor   Wrist 4.4  < 4.4 5.3  > 5.0   Wrist-APB 8     5.2   33.1     Elbow 8.9 - 4.7  > 5.0 -11 Elbow-Wrist 21.5 48  > 48 5.3 -13 33     Right Median (APB) Motor   Wrist 7.5  < 4.4 3.9  > 5.0   Wrist-APB 8     4.7   32.2     Elbow 12.1 - 3.7  > 5.0 -5 Elbow-Wrist 21 46  > 48 5.2 -7 32.2     Left Median/Ulnar (Lumb-Dors Int II) Motor        Median (Lumb I)   Wrist 4.4 - 1.46 -   Wrist-Lumb I 10     6.0   33          Ulnar (Dorsal Int (manus))   Wrist 3.2 - 5.0 -   Wrist-Dorsal Int (manus) 10     4.2   33.1     Right Median/Ulnar (Lumb-Dors Int II) Motor        Median (Lumb I)   Wrist 6.0 - 0.62 -   Wrist-Lumb I 10     7.2   32          Ulnar (Dorsal Int (manus))   Wrist 3.2 - 4.9 -   Wrist-Dorsal Int (manus) 10     4.8   32     Left Ulnar (ADM) Motor   Wrist 3.1  < 3.5 10.2  > 5.0   Wrist-ADM 8     5.0   32.9     Bel Elbow 6.3 - 9.5 - -7 Bel Elbow-Wrist 19 59  > 48 5.3 -1 32.9     Abv Elbow 8.0 - 9.3 - -2 Abv Elbow-Bel Elbow 10 59  > 48 5.4 0 32.9     Right Ulnar (ADM) Motor   Wrist 3.0  < 3.5 9.9  > 5.0   Wrist-ADM 8     4.7   32.2     Bel Elbow 6.5 - 9.6 - -3 Bel Elbow-Wrist 18.7 53  > 48 4.7 -6 32.1     Abv Elbow 8.1 - 9.5 - -1 Abv  Elbow-Bel Elbow 10 63  > 48 4.8 -1 32.1        Sensory Sites                     Onset Lat Peak Lat Amp (O-P) Amp (P-P) Segment Distance Velocity Temperature Comment   Site ms (ms)  V Norm ( V)   cm m/s Norm ( C)     Left Median Sensory   Wrist-Dig II 3.5 4.6 28  > 10 48 Wrist-Dig II 14 40  > 48 32.9     Right Median Sensory   Wrist 3.9 5.5 10  > 10 15 Wrist-Dig II 14 36  > 48 31.9     Left Median-Ulnar Palmar Sensory        Median   Palm-Wrist 2.0 2.7 35 - 38 Palm-Wrist 8 40 - 33          Ulnar   Palm-Wrist 1.33 1.98 9 - 15 Palm-Wrist 8 60 - 32.9     Right Median-Ulnar Palmar Sensory        Median   Palm 3.6 4.1 2 - 2 Palm-Wrist 8 22 - 32.1          Ulnar   Palm-Wrist 1.25 1.95 13 - 16 Palm-Wrist 8 64 - 32.1     Left Ulnar Sensory   Wrist-Dig V 2.6 3.5 32  > 8 54 Wrist-Dig V 12.5 48  > 48 32.9     Right Ulnar Sensory   Wrist-Dig V 2.5 3.4 31  > 8 40 Wrist-Dig V 12.5 50  > 48 32.1        Inter-Nerve Comparisons      Nerve 1 Value 1 Nerve 2 Value 2 Parameter Result Normal   Sensory Sites   R Median Palm-Wrist 4.1 ms R Ulnar Palm-Wrist 1.95 ms Peak Lat Diff 2.2 ms <0.40   L Median Palm-Wrist 2.7 ms L Ulnar Palm-Wrist 1.98 ms Peak Lat Diff 0.72 ms <0.40      F Wave Studies      Min-F Max-F Dispersion Persistence Mean-F F-Norm L-R Mean-F L-R Mean-F Norm F/M Ratio F-M Lat (ms)   Left Ulnar (Abd Dig Min)  33.2  C   25.63 31.17 5.54 100.00 29.74 <36   <2.5 2.94 22.50         Electromyography      Side Muscle Ins Act Fibs/PSW Fasc HF Amp Dur Poly Recrt Int Pat   Right Pronator Teres Nml None Nml 0 Nml Nml 0 Nml Nml   Right Biceps Nml None Nml 0 Nml Nml 0 Nml Nml   Right EDC Nml None Nml 0 Nml Nml 0 Nml Nml   Right FDI Nml None Nml 0 Nml Nml 0 Nml Nml   Right APB Nml 1+ Nml 0 Nml Nml 0 Nml Nml   Right Triceps Nml None Nml 0 Nml Nml 0 Nml Nml   Right C6 PSP Nml None Nml 0                     Review of prior external note(s) from - referring  Review of the result(s) of each unique test - EMG  Independent interpretation of a  test performed by another physician/other qualified health care professional (not separately reported) - imaging  Over 30 minutes spent by me on the date of the encounter doing chart review, history and exam, documentation and further activities per the note           Again, thank you for allowing me to participate in the care of your patient.        Sincerely,        Guillaume Thornton, DO

## 2024-01-22 NOTE — PATIENT INSTRUCTIONS
Reviewed nature of the symptoms and bilateral wrist and hands.  Most recently, EMG demonstrated carpal tunnel syndrome bilaterally, greater on the right.  Interestingly, symptoms overall are more prominent in the left hand.  Also, there is history of some arthritic change in the left hand carpals, primarily STT articulation.  There is possible that there is more than 1 component to the left hand and wrist pain.  We discussed considerations around additional imaging (x-ray of the right hand, possibly MRI of the left wrist given more diffuse symptoms and previous), also referral to hand therapy, bracing for support (particularly nighttime wrist bracing in neutral position for carpal tunnel syndrome, also more support for the left wrist and hand).  Additionally, we discussed potential use of medications for symptoms, also steroid injection.  Following discussion, we then placed hand therapy referral.  Bracing options reviewed today, nighttime wrist bracing and neutral position.  Also more broad support for the left hand and wrist, such as with thumb spica wrist brace.  Defer any medications for now; briefly discussed consideration oral steroid versus gabapentin versus both.  Finally, regarding injection, plan to pursue bilateral carpal tunnel steroid injection, and plan this with one of my colleagues.  Past use of ultrasound guidance for injection.  This will also provide diagnostic benefit.  If not getting desired relief particularly in the left hand and wrist, then may need to return to focusing more on the known arthritic change.  Plan to monitor course with the injections up to 2 weeks to begin.    If you have any further questions for your physician or physician s care team you can contact them thru [facility] or by calling [phone].

## 2024-01-25 ENCOUNTER — THERAPY VISIT (OUTPATIENT)
Dept: OCCUPATIONAL THERAPY | Facility: CLINIC | Age: 59
End: 2024-01-25
Attending: PEDIATRICS
Payer: COMMERCIAL

## 2024-01-25 DIAGNOSIS — M19.032 ARTHRITIS OF SCAPHOID-TRAPEZIUM-TRAPEZOID JOINT OF LEFT HAND: ICD-10-CM

## 2024-01-25 DIAGNOSIS — M25.531 BILATERAL WRIST PAIN: ICD-10-CM

## 2024-01-25 DIAGNOSIS — M25.532 BILATERAL WRIST PAIN: ICD-10-CM

## 2024-01-25 DIAGNOSIS — G56.03 BILATERAL CARPAL TUNNEL SYNDROME: ICD-10-CM

## 2024-01-25 PROCEDURE — 97535 SELF CARE MNGMENT TRAINING: CPT | Mod: GO | Performed by: OCCUPATIONAL THERAPIST

## 2024-01-25 PROCEDURE — 97760 ORTHOTIC MGMT&TRAING 1ST ENC: CPT | Mod: GO | Performed by: OCCUPATIONAL THERAPIST

## 2024-01-25 PROCEDURE — 97165 OT EVAL LOW COMPLEX 30 MIN: CPT | Mod: GO | Performed by: OCCUPATIONAL THERAPIST

## 2024-01-25 NOTE — PROGRESS NOTES
OCCUPATIONAL THERAPY EVALUATION  Type of Visit: Evaluation    See electronic medical record for Abuse and Falls Screening details.    Subjective      Presenting condition or subjective complaint:    Date of onset: 01/22/24 (therapy referral)    Relevant medical history: Arthritis; Concussions; Depression; Dizziness; Implanted device; Mental Illness; Migraines or headaches; Osteoarthritis; Severe dizziness; Seizures; Sleep disorder like apnea   Dates & types of surgery: appendix, gall bladder, L knee, jaw x 5, toe    Prior diagnostic imaging/testing results: EMG   Moderate to severe right, mild left; x-ray of left hand advanced STT arthritis   Prior therapy history for the same diagnosis, illness or injury: No      Prior Level of Function  ADL: Independent  Living Environment  Social support: Alone   Type of home: [facility]   Help at home: None    Employment: No    Hobbies/Interests: travel, sewing, plants, volunteer     Objective   ADDITIONAL HISTORY:  Right hand dominant  Patient reports symptoms of pain, weakness/loss of strength, and numbness of bilateral hands (left > right)  Transportation: drives    Functional Outcome Measure:   Pt did not complete    Pain Level (Scale 0-10)   1/25/2024   At Rest 5 left  1-2 right   With Use 9 left  4 right     Pain Description  Date 1/25/2024   Location Bilateral wrists and thumbs   Pain Quality Burning right hand, aching and sharp on left hand   Frequency intermittent right, constant left   Pain is worst  End of day   Exacerbated by  Use of hands   Relieved by NSAIDs and night splints   Progression Gradually worsening      Edema  None    Sensation  Decreased Median and Ulnar Nerve distribution per pt report all fingers, primarily in right hand    ROM   WNL fingers. No thenar or intrinsic atrophy noted on exam.    Thumb 1/25/2024 1/25/2024   AROM  (PROM) Right Left   RABD 40- 35+   PABD 40- 30+   Retropulsion 3 cm+ 2 cm++   Kapandji Opposition Scale (0-10/10) 9/10 9/10      Wrist 1/25/2024 1/25/2024   AROM (PROM) Right Left   Extension 65 65   Flexion 55 45     Special Tests   - none    + mild    ++ moderate    +++ severe         1/25/2024   Elbow Flexion Test R:+ med n  L:-   Tinels Cubital Tunnel R:-  L:-   Tinels Guyons Canal R:-  L:-   Median Nerve Compression at Pronator R:-  L:-   Carpal-Compression Test R:-  L:-   Dietrich test for lumbrical incursion  (fist x 30 secs) R:-  L:-   Tinels at Carpal Tunnel R:-  L:-   Phalens R:+  L:+ slight     Strength   (Measured in pounds)  Pain Report: - none  + mild    ++ moderate    +++ severe    1/25/2024 1/25/2024   Trials Right Left   1 23+ slight 18+     Lat Pinch 1/25/2024 1/25/2024   Trials Right Left   1 9- 5+     3 Pt Pinch 1/25/2024 1/25/2024   Trials Right Left   1 4+ 3++     Palpation   Pain Report:  - none    + mild    ++ moderate    +++ severe    1/25/2023   CMC Joint Line R: +  L: ++   Thenar Eminence R: -  L: +   Web Space R: + tightness  L: ++   1st DC R: -  L: +   Radial Styloid R: -  L: +   FCR R: +  L: +      Assessment & Plan   CLINICAL IMPRESSIONS  Medical Diagnosis: B CTS, B wrist pain, L STT arthritis    Treatment Diagnosis: B wrist pain and hand paraesthesias    Impression/Assessment: Pt is a 58 year old female presenting to Occupational Therapy due to bilateral wrist pain and hand paraesthesias.  Patient's limitations or Problem List includes: Pain, Decreased ROM/motion, Weakness, Sensory disturbance, Decreased , Decreased pinch, and Tightness in musculature of the left wrist and thumb and bilateral hands which interferes with the patient's ability to perform Self Care Tasks (dressing, eating, bathing), Work Tasks, Sleep Patterns, Recreational Activities, Household Chores, and Driving  as compared to previous level of function.    Clinical Decision Making (Complexity):  Assessment of Occupational Performance: 5 or more Performance Deficits  Occupational Performance Limitations: bathing/showering, dressing,  hygiene and grooming, driving and community mobility, home establishment and management, meal preparation and cleanup, sleep, and work  Clinical Decision Making (Complexity): Low complexity    PLAN OF CARE  Treatment Interventions:  Modalities:  US and Paraffin  Therapeutic Exercise:  AROM, PROM, Tendon Gliding, Isotonics, Isometrics, and Stabilization  Neuromuscular re-education:  Nerve Gliding, Posture, and Kinesiotaping  Manual Techniques:  Joint mobilization and Myofascial release  Orthotic Fabrication:  Static, Hand based, and Forearm based  Self Care:  Self Care Tasks and Ergonomic Considerations    Long Term Goals   OT Goal 1  Goal Identifier: sleeping  Goal Description: Pt will report decreased pain at end of day to be able to fall asleeping with mild to no difficulty, pain 3/10 or less  Rationale: In order to maximize safety and independence with performance of self-care activities  Goal Progress: pain 9/10  Target Date: 04/23/24      Frequency of Treatment: 1 x per week  Duration of Treatment: for 1 month, taping to 2 x per month for 2 months     Recommended Referrals to Other Professionals:  NA  Education Assessment: Learner/Method: Patient;Demonstration;Pictures/Video  Education Comments: printed PTRx     Risks and benefits of evaluation/treatment have been explained.   Patient/Family/caregiver agrees with Plan of Care.     Evaluation Time:    OT Eval, Low Complexity Minutes (57432): 20   Present: Not applicable     Signing Clinician: Brie Solis OT      [facility]                                                                                   OUTPATIENT OCCUPATIONAL THERAPY      PLAN OF TREATMENT FOR OUTPATIENT REHABILITATION   Patient's Last Name, First Name, RODOLFOCONSUELO OakesSocorro  L YOB: 1965   Provider's Name   [facility]   Medical Record No.  [id number]     Onset Date: 01/22/24 (therapy referral) Start of Care  Date: 01/25/24     Medical Diagnosis:  B CTS, B wrist pain, L STT arthritis      OT Treatment Diagnosis:  B wrist pain and hand paraesthesias Plan of Treatment  Frequency/Duration:1 x per week/for 1 month, taping to 2 x per month for 2 months    Certification date from 01/25/24   To 04/23/24        See note for plan of treatment details and functional goals     Brie Solis OT                         I CERTIFY THE NEED FOR THESE SERVICES FURNISHED UNDER        THIS PLAN OF TREATMENT AND WHILE UNDER MY CARE     (Physician attestation of this document indicates review and certification of the therapy plan).              Referring Provider:  Guillaume Thornton    Initial Assessment  See Epic Evaluation- 01/25/24

## 2024-02-01 ENCOUNTER — THERAPY VISIT (OUTPATIENT)
Dept: OCCUPATIONAL THERAPY | Facility: CLINIC | Age: 59
End: 2024-02-01
Attending: PEDIATRICS
Payer: COMMERCIAL

## 2024-02-01 DIAGNOSIS — M19.032 ARTHRITIS OF SCAPHOID-TRAPEZIUM-TRAPEZOID JOINT OF LEFT HAND: ICD-10-CM

## 2024-02-01 DIAGNOSIS — M25.531 BILATERAL WRIST PAIN: ICD-10-CM

## 2024-02-01 DIAGNOSIS — M25.532 BILATERAL WRIST PAIN: ICD-10-CM

## 2024-02-01 DIAGNOSIS — G56.03 BILATERAL CARPAL TUNNEL SYNDROME: Primary | ICD-10-CM

## 2024-02-01 PROCEDURE — 97110 THERAPEUTIC EXERCISES: CPT | Mod: 59 | Performed by: OCCUPATIONAL THERAPIST

## 2024-02-01 PROCEDURE — 97763 ORTHC/PROSTC MGMT SBSQ ENC: CPT | Mod: GO | Performed by: OCCUPATIONAL THERAPIST

## 2024-02-01 PROCEDURE — 97140 MANUAL THERAPY 1/> REGIONS: CPT | Mod: 59 | Performed by: OCCUPATIONAL THERAPIST

## 2024-02-02 ENCOUNTER — OFFICE VISIT (OUTPATIENT)
Dept: ORTHOPEDICS | Facility: CLINIC | Age: 59
End: 2024-02-02
Payer: COMMERCIAL

## 2024-02-02 VITALS — HEIGHT: 63 IN | WEIGHT: 193 LBS | BODY MASS INDEX: 34.2 KG/M2

## 2024-02-02 DIAGNOSIS — G56.03 BILATERAL CARPAL TUNNEL SYNDROME: Primary | ICD-10-CM

## 2024-02-02 PROCEDURE — 20526 THER INJECTION CARP TUNNEL: CPT | Mod: 50 | Performed by: FAMILY MEDICINE

## 2024-02-02 RX ORDER — LIDOCAINE HYDROCHLORIDE 10 MG/ML
2 INJECTION, SOLUTION INFILTRATION; PERINEURAL
Status: DISCONTINUED | OUTPATIENT
Start: 2024-02-02 | End: 2024-04-17

## 2024-02-02 RX ORDER — TRIAMCINOLONE ACETONIDE 40 MG/ML
40 INJECTION, SUSPENSION INTRA-ARTICULAR; INTRAMUSCULAR
Status: DISCONTINUED | OUTPATIENT
Start: 2024-02-02 | End: 2024-04-17

## 2024-02-02 RX ADMIN — TRIAMCINOLONE ACETONIDE 40 MG: 40 INJECTION, SUSPENSION INTRA-ARTICULAR; INTRAMUSCULAR at 15:16

## 2024-02-02 RX ADMIN — LIDOCAINE HYDROCHLORIDE 2 ML: 10 INJECTION, SOLUTION INFILTRATION; PERINEURAL at 15:16

## 2024-02-02 NOTE — LETTER
2024         RE: Socorro Oakes  [address] [place] [id number]        Dear Colleague,    Thank you for referring your patient, Socorro Oakes, to the [facility]. Please see a copy of my visit note below.    Socorro Oakes  :  1965  DOS: 2024  MRN: [id number]    Sports Medicine Clinic Procedure    Ultrasound Guided bilateral Carpal Tunnel Injection    Clinical History: Gradual onset of bilateral hand numbness/tingling with severe pain and weakness in left hand over the past 6+ months.  EMG completed 1/15/24 noted moderate-severe bilateral wrist carpal tunnel syndrome, right>>>left.     Diagnosis:   1. Bilateral carpal tunnel syndrome       Referring Physician: Guillaume Thornton DO      Hand / Upper Extremity Injection/Arthrocentesis: bilateral carpal tunnel    Date/Time: 2024 3:16 PM    Performed by: Guillaume Paige DO  Authorized by: Guillaume Paige DO    Indications:  Therapeutic and diagnostic  Needle Size:  25 G  Guidance: ultrasound    Condition: carpal tunnel    Laterality:  Bilateral    Site:  Bilateral carpal tunnel  Medications (Right):  40 mg triamcinolone 40 MG/ML; 2 mL lidocaine 1 %  Medications (Left):  40 mg triamcinolone 40 MG/ML; 2 mL lidocaine 1 %  Outcome:  Tolerated well, no immediate complications  Procedure discussed: discussed risks, benefits, and alternatives    Timeout: timeout called immediately prior to procedure    Prep: patient was prepped and draped in usual sterile fashion     Hydrodissection of median nerve in carpal tunnel performed, with fenestration of transverse carpal ligament      Ultrasound images of procedure were permanently stored.         Impression:  Successful bilateral carpal tunnel injection    Plan:  Follow up with Dr. Thornton as previously discussed.  Expectations and goals of CSI reviewed  Often 2-3 days for steroid effect, and can take up to two weeks for maximum effect  We  discussed modified progressive pain-free activity as tolerated  Do not overuse in first two weeks if feeling better due to concern for vulnerability while steroid is working  Supportive care reviewed  All questions were answered today  Contact us with additional questions or concerns  Signs and sx of concern reviewed      Guillaume Paige DO, SHAGGY  Primary Care Sports Medicine  [facility]       Again, thank you for allowing me to participate in the care of your patient.        Sincerely,        Guillaume Paige DO

## 2024-02-02 NOTE — PROGRESS NOTES
Socorro Oakes  :  1965  DOS: 2024  MRN: [id number]    Sports Medicine Clinic Procedure    Ultrasound Guided bilateral Carpal Tunnel Injection    Clinical History: Gradual onset of bilateral hand numbness/tingling with severe pain and weakness in left hand over the past 6+ months.  EMG completed 1/15/24 noted moderate-severe bilateral wrist carpal tunnel syndrome, right>>>left.     Diagnosis:   1. Bilateral carpal tunnel syndrome       Referring Physician: Guillaume Thornton DO      Hand / Upper Extremity Injection/Arthrocentesis: bilateral carpal tunnel    Date/Time: 2024 3:16 PM    Performed by: Guillaume Paige DO  Authorized by: Guillaume Paige DO    Indications:  Therapeutic and diagnostic  Needle Size:  25 G  Guidance: ultrasound    Condition: carpal tunnel    Laterality:  Bilateral    Site:  Bilateral carpal tunnel  Medications (Right):  40 mg triamcinolone 40 MG/ML; 2 mL lidocaine 1 %  Medications (Left):  40 mg triamcinolone 40 MG/ML; 2 mL lidocaine 1 %  Outcome:  Tolerated well, no immediate complications  Procedure discussed: discussed risks, benefits, and alternatives    Timeout: timeout called immediately prior to procedure    Prep: patient was prepped and draped in usual sterile fashion     Hydrodissection of median nerve in carpal tunnel performed, with fenestration of transverse carpal ligament      Ultrasound images of procedure were permanently stored.         Impression:  Successful bilateral carpal tunnel injection    Plan:  Follow up with Dr. Thornton as previously discussed.  Expectations and goals of CSI reviewed  Often 2-3 days for steroid effect, and can take up to two weeks for maximum effect  We discussed modified progressive pain-free activity as tolerated  Do not overuse in first two weeks if feeling better due to concern for vulnerability while steroid is working  Supportive care reviewed  All questions were answered today  Contact us with  additional questions or concerns  Signs and sx of concern reviewed      Guillaume Paige DO, SHAGGY  Primary Care Sports Medicine  [facility]

## 2024-02-08 ENCOUNTER — THERAPY VISIT (OUTPATIENT)
Dept: OCCUPATIONAL THERAPY | Facility: CLINIC | Age: 59
End: 2024-02-08
Attending: PEDIATRICS
Payer: COMMERCIAL

## 2024-02-08 DIAGNOSIS — M19.032 ARTHRITIS OF SCAPHOID-TRAPEZIUM-TRAPEZOID JOINT OF LEFT HAND: ICD-10-CM

## 2024-02-08 DIAGNOSIS — M25.532 BILATERAL WRIST PAIN: ICD-10-CM

## 2024-02-08 DIAGNOSIS — G56.03 BILATERAL CARPAL TUNNEL SYNDROME: Primary | ICD-10-CM

## 2024-02-08 DIAGNOSIS — M25.531 BILATERAL WRIST PAIN: ICD-10-CM

## 2024-02-08 PROCEDURE — 97110 THERAPEUTIC EXERCISES: CPT | Mod: GO | Performed by: OCCUPATIONAL THERAPIST

## 2024-02-08 PROCEDURE — 97140 MANUAL THERAPY 1/> REGIONS: CPT | Mod: GO | Performed by: OCCUPATIONAL THERAPIST

## 2024-02-29 ENCOUNTER — THERAPY VISIT (OUTPATIENT)
Dept: OCCUPATIONAL THERAPY | Facility: CLINIC | Age: 59
End: 2024-02-29
Payer: COMMERCIAL

## 2024-02-29 DIAGNOSIS — M19.032 ARTHRITIS OF SCAPHOID-TRAPEZIUM-TRAPEZOID JOINT OF LEFT HAND: ICD-10-CM

## 2024-02-29 DIAGNOSIS — M25.532 BILATERAL WRIST PAIN: ICD-10-CM

## 2024-02-29 DIAGNOSIS — G56.03 BILATERAL CARPAL TUNNEL SYNDROME: Primary | ICD-10-CM

## 2024-02-29 DIAGNOSIS — M25.531 BILATERAL WRIST PAIN: ICD-10-CM

## 2024-02-29 PROCEDURE — 97763 ORTHC/PROSTC MGMT SBSQ ENC: CPT | Mod: GO | Performed by: OCCUPATIONAL THERAPIST

## 2024-02-29 PROCEDURE — 97110 THERAPEUTIC EXERCISES: CPT | Mod: GO | Performed by: OCCUPATIONAL THERAPIST

## 2024-03-02 DIAGNOSIS — E66.01 MORBID OBESITY (H): ICD-10-CM

## 2024-03-03 ENCOUNTER — MYC REFILL (OUTPATIENT)
Dept: FAMILY MEDICINE | Facility: CLINIC | Age: 59
End: 2024-03-03
Payer: COMMERCIAL

## 2024-03-03 DIAGNOSIS — E66.01 MORBID OBESITY (H): ICD-10-CM

## 2024-03-04 RX ORDER — METFORMIN HCL 500 MG
1000 TABLET, EXTENDED RELEASE 24 HR ORAL
Qty: 180 TABLET | Refills: 0 | Status: SHIPPED | OUTPATIENT
Start: 2024-03-04 | End: 2024-05-29

## 2024-03-04 RX ORDER — METFORMIN HCL 500 MG
1000 TABLET, EXTENDED RELEASE 24 HR ORAL
Qty: 180 TABLET | Refills: 0 | Status: SHIPPED | OUTPATIENT
Start: 2024-03-04

## 2024-03-04 NOTE — TELEPHONE ENCOUNTER
Requested Prescriptions   Pending Prescriptions Disp Refills    metFORMIN (GLUCOPHAGE XR) 500 MG 24 hr tablet 180 tablet 0     Sig: Take 2 tablets (1,000 mg) by mouth daily (with dinner)       Biguanide Agents Failed - 3/3/2024  3:34 PM        Failed - Patient has documented A1c within the specified period of time.     If HgbA1C is 8 or greater, it needs to be on file within the past 3 months.  If less than 8, must be on file within the past 6 months.     Recent Labs   Lab Test 09/07/22  1106   A1C 5.8*             Failed - Medication indicated for associated diagnosis     Medication is associated with one or more of the following diagnoses:     Gestational diabetes mellitus     Hyperinsulinar obesity     Hypersecretion of ovarian androgens    Non-alcoholic fatty liver    Polycystic ovarian syndrome               Pre-diabetes (DM 2 prevention)    Type 2 diabetes mellitus     Weight gain, antipsychotic therapy-induced             Passed - Patient is age 10 or older        Passed - Patient does NOT have a diagnosis of CHF.        Passed - Medication is active on med list        Passed - Has GFR on file in past 12 months and most recent value is normal        Passed - Recent (6 mo) or future (90 days) visit within the authorizing provider's specialty     The patient must have completed an in-person or virtual visit within the past 6 months or has a future visit scheduled within the next 90 days with the authorizing provider s specialty.  Urgent care and e-visits do not quality as an office visit for this protocol.          Passed - Patient is not pregnant        Passed - Patient has not had a positive pregnancy test within the past 12 mos.

## 2024-03-21 ENCOUNTER — THERAPY VISIT (OUTPATIENT)
Dept: OCCUPATIONAL THERAPY | Facility: CLINIC | Age: 59
End: 2024-03-21
Payer: COMMERCIAL

## 2024-03-21 DIAGNOSIS — M19.032 ARTHRITIS OF SCAPHOID-TRAPEZIUM-TRAPEZOID JOINT OF LEFT HAND: ICD-10-CM

## 2024-03-21 DIAGNOSIS — G56.03 BILATERAL CARPAL TUNNEL SYNDROME: Primary | ICD-10-CM

## 2024-03-21 DIAGNOSIS — M25.532 BILATERAL WRIST PAIN: ICD-10-CM

## 2024-03-21 DIAGNOSIS — M25.531 BILATERAL WRIST PAIN: ICD-10-CM

## 2024-03-21 PROCEDURE — 97535 SELF CARE MNGMENT TRAINING: CPT | Mod: GO | Performed by: OCCUPATIONAL THERAPIST

## 2024-03-21 PROCEDURE — 97763 ORTHC/PROSTC MGMT SBSQ ENC: CPT | Mod: GO | Performed by: OCCUPATIONAL THERAPIST

## 2024-03-21 PROCEDURE — 97110 THERAPEUTIC EXERCISES: CPT | Mod: GO | Performed by: OCCUPATIONAL THERAPIST

## 2024-03-21 NOTE — PROGRESS NOTES
03/21/24 0500   Appointment Info   Treating Provider Brie Solis, OTR/L, CHT   Total/Authorized Visits 8   Visits Used 5   Medical Diagnosis B CTS, B wrist pain, L STT arthritis   OT Tx Diagnosis B wrist pain and hand paraesthesias   Quick Add  Certification   Progress Note/Certification   Start Of Care Date 01/25/24   Onset of Illness/Injury or Date of Surgery 01/22/24  (therapy referral)   Therapy Frequency 1 x per week   Predicted Duration for 1 month, taping to 2 x per month for 2 months   Certification date from 01/25/24   Certification date to 04/23/24   Progress Note Due Date    Progress Note Completed Date 03/21/24   OT Goal 1   Goal Identifier sleeping   Goal Description Pt will report decreased pain at end of day to be able to fall asleeping with mild to no difficulty, pain 3/10 or less   Rationale In order to maximize safety and independence with performance of self-care activities   Goal Progress able to sleep without pain in hands   Target Date 04/23/24   Date Met 03/21/24   Subjective Report   Subjective Report The right is pretty good. The left I still have some arthritis pain. I have better ROM and less tingling in both hands.   Objective Measures   Objective Measures Objective Measure 2;Hand Obj Measures   Hand Objective Measures ROM;Strength   ROM Thumb ROM   Strength ;Lateral Pinch;3 Point Pinch   Objective Measure 1   Objective Measure pain at rest 0-10   Details R:0, L:2   Objective Measure 2   Objective Measure pain with use   Details R:0, L:7-8   Thumb ROM    Radial Abduction R:45, L:40   Palmar Abduction R:40, L:40    (measured in pounds)    Average Strength R:67, L:52   Lateral Pinch (measured in pounds)   Lateral Pinch Average Strength R:10, L:10   3 Point Pinch (measured in pounds)   3 Point Pinch Average Strength R:10, L:9   Treatment Interventions (OT)   Interventions Self Care/Home Management;Neuromuscular Re-education;Therapeutic Procedure/Exercise;Orthotics   Self  "Care/Home Management   Self-Care/Home Mgmt/ADL, Compensatory, Meal Prep Minutes (17093) 10 Minutes   Self Care Self Care 3   Self Care 1 HEP   Self Care 1 - Details review HEP and I Sx mmt   Self Care 2 Warmth for stiffness   Self Care 2 - Details Ice for pain after activity   PTRx Self Care 1 [place] Massage to Thumb   PTRx Self Care 1 - Details massage muscle between thumb and index finger for 1-3 mins using marble or small firm ball   PTRx Self Care 2 Thumb Stabilization Repositioning Method 1(on chest)   PTRx Self Care 2 - Details hold gentle pain free stretch for 1-3 minutes   Skilled Intervention to decrease pain and increase mobility   Patient Response/Progress verbalized understanding   Self Care 3 Adaptive Equipment   Self Care 3 - Details review and issue AE resources   Neuromuscular Re-education   Neuromuscular Re-ed Minutes (85511) 2  (brief/no charge)   PTRx Neuro Re-ed 1 Median Nerve Mobility   PTRx Neuro Re-ed 1 - Details 10 reps gentle nerve \"flossing\"   Skilled Intervention to increase mobility   Patient Response/Progress demonstrated understanding   Therapeutic Procedure/Exercise   Therapeutic Procedure: strength, endurance, ROM, flexibillity minutes (94382) 15   PTRx Ther Proc 1 Finger Active Range of Motion Tendon Glides Fist Series   PTRx Ther Proc 1 - Details 10 reps   PTRx Ther Proc 2 Thumb Stabilization Strengthening Isometric C   PTRx Ther Proc 2 - Details 10 reps   PTRx Ther Proc 3 Thumb Stabilization 1st Dorsal Interosseous   PTRx Ther Proc 3 - Details 10 reps   PTRx Ther Proc 4 Wrist Strengthening Isometric Extension   PTRx Ther Proc 4 - Details 10 reps avoid pain   PTRx Ther Proc 5 Wrist Strengthening Isometric Flexion   PTRx Ther Proc 5 - Details 10 reps avoid pain   Skilled Intervention to increase tendon gliding and thumb and wrist strength/stability   Patient Response/Progress demonstrated understanding   Manual Therapy   Manual Therapy 1 defer MFR and jt mobs due to orthosis desean "   Orthotics   Orthotic Management, Subsequent session minutes (52721) 15 Minutes   HAND Splinting Finger/Thumb   Finger/Thumb Splint Desciption Thumb spica   Wear Schedule Day;Use/ADL's;Per comfort   Off for: Hygiene;Exercise   Comments L orthoplast HBTS   Skilled Intervention Advanced knowledge of anatomy, orthosis fabrication techniques and orthosis materials was required. The orthosis design was chosen based on assessment of the patient's individualized  needs.   Patient Response/Progress reported orthosis was comfortable   Education   Learner/Method Patient;Demonstration;Pictures/Video   Education Comments printed PTRx   Plan   Home program fit with HBTS for L thumb, review and issued AE resources   Total Session Time   Timed Code Treatment Minutes 42   Total Treatment Time (sum of timed and untimed services) 42         DISCHARGE  Reason for Discharge: Patient has met all goals.    Equipment Issued: NA    Discharge Plan: Patient to continue home program.    Referring Provider:  Guillaume Thornton

## 2024-04-17 ENCOUNTER — OFFICE VISIT (OUTPATIENT)
Dept: ORTHOPEDICS | Facility: CLINIC | Age: 59
End: 2024-04-17
Payer: COMMERCIAL

## 2024-04-17 VITALS
BODY MASS INDEX: 33.84 KG/M2 | DIASTOLIC BLOOD PRESSURE: 93 MMHG | SYSTOLIC BLOOD PRESSURE: 184 MMHG | HEIGHT: 63 IN | WEIGHT: 191 LBS

## 2024-04-17 DIAGNOSIS — G56.03 BILATERAL CARPAL TUNNEL SYNDROME: ICD-10-CM

## 2024-04-17 DIAGNOSIS — M19.032 ARTHRITIS OF SCAPHOID-TRAPEZIUM-TRAPEZOID JOINT OF LEFT HAND: ICD-10-CM

## 2024-04-17 DIAGNOSIS — M18.12 PRIMARY OSTEOARTHRITIS OF FIRST CARPOMETACARPAL JOINT OF LEFT HAND: Primary | ICD-10-CM

## 2024-04-17 PROCEDURE — 99213 OFFICE O/P EST LOW 20 MIN: CPT | Mod: 25 | Performed by: FAMILY MEDICINE

## 2024-04-17 PROCEDURE — 20604 DRAIN/INJ JOINT/BURSA W/US: CPT | Mod: LT | Performed by: FAMILY MEDICINE

## 2024-04-17 RX ORDER — ROPIVACAINE HYDROCHLORIDE 5 MG/ML
0.5 INJECTION, SOLUTION EPIDURAL; INFILTRATION; PERINEURAL
Status: SHIPPED | OUTPATIENT
Start: 2024-04-17

## 2024-04-17 RX ORDER — TRIAMCINOLONE ACETONIDE 40 MG/ML
20 INJECTION, SUSPENSION INTRA-ARTICULAR; INTRAMUSCULAR
Status: SHIPPED | OUTPATIENT
Start: 2024-04-17

## 2024-04-17 RX ADMIN — ROPIVACAINE HYDROCHLORIDE 0.5 ML: 5 INJECTION, SOLUTION EPIDURAL; INFILTRATION; PERINEURAL at 17:40

## 2024-04-17 RX ADMIN — TRIAMCINOLONE ACETONIDE 20 MG: 40 INJECTION, SUSPENSION INTRA-ARTICULAR; INTRAMUSCULAR at 17:40

## 2024-04-17 ASSESSMENT — ENCOUNTER SYMPTOMS
RESPIRATORY NEGATIVE: 1
CARDIOVASCULAR NEGATIVE: 1
GASTROINTESTINAL NEGATIVE: 1
ENDOCRINE NEGATIVE: 1
EYES NEGATIVE: 1
CONSTITUTIONAL NEGATIVE: 1
HEMATOLOGIC/LYMPHATIC NEGATIVE: 1

## 2024-04-17 NOTE — PROGRESS NOTES
Socorro Oakes  :  1965  DOS: 2024  MRN: [id number]    Chief Complaint   Patient presents with    Left Hand - Follow Up, Pain, Cts       SUBJECTIVE  Socorro Oakes is a/an 58 year old female who is seen in consultation at the request of  Neelima Villatoro M.D. for evaluation of bilateral wrist.     The patient is seen by themselves.  The patient is Right handed    Onset: 6+ month(s) ago. Reports insidious onset without acute precipitating event.  Location of Pain: bilateral wrist, left worse than right  Worsened by: gripping things, fine motor skills, worse as the day goes on  Better with:  Treatments tried: various topicals, 2 cortisone injections done on the left wrist done with Dr. 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  Additional history:  Notes dropping objects with right hand. Has had some numbness bilateral hands.  Previously had pain in left hand, radial side, more thenar emincene area.  Now having pain, burning in volar wrist on left, also some medial and lateral left wrist pain.  No numbness in left hand. However, does have burning sensation in hands.  Limited relief from past steroid injections.   Has done some previous bracing, perhaps somewhat intermittent.    Interim History - 2024  Since last visit on 2024 patient has moderate-severe left thumb STT/CMC joint pain.  Bilateral wrist carpal tunnel syndrome  injection completed on 24 provided relief of numbness/tingling pain into her fingers, right hand is doing well at this time.  Patient has completed hand therapy & is doing HEP.  No new injury in the interim.    REVIEW OF SYSTEMS:  Review of Systems  "  Constitutional: Negative.    HENT: Negative.     Eyes: Negative.    Respiratory: Negative.     Cardiovascular: Negative.    Gastrointestinal: Negative.    Endocrine: Negative.    Genitourinary: Negative.    Skin: Negative.    Hematological: Negative.    All other systems reviewed and are negative.    OBJECTIVE:  Ht 1.6 m (5' 3\")   Wt 86.6 kg (191 lb)   BMI 33.83 kg/m       Hand/wrist left    Inspection:  No deformity noted.  No clear swelling. No ecchymosis.    Motion:  Wrist motion with some discomfort at level of wrist, more on left  Digit motion with pain with thumb motion on left, localizing to 1st CMC and STT joints    Radial pulses normal, +2/4, capillary refill brisk.    Palpation:  Tender left radial wrist, 1st CMC joint > STT joint    Finkelstein with some wrist area pain on left, mild    Thumb grind with some pain on left, pain more in wrist.    RADIOLOGY:  Final results and radiologist's interpretation, available in the [facility] health record.  Images were reviewed with the patient in the office today.  XR HAND LEFT G/E 3 VIEWS 1/30/2023 3:09 PM      HISTORY: Pain of left hand     COMPARISON: None.                                                                   IMPRESSION: Advanced degenerative change of the STT joint. Left hand  negative for fracture. No dislocation.      Hand / Upper Extremity Injection/Arthrocentesis: L thumb CMC    Date/Time: 4/17/2024 5:40 PM    Performed by: Guillaume Paige DO  Authorized by: Guillaume Paige DO    Indications:  Pain and therapeutic  Indications comment:  Osteoarthritis  Needle Size:  25 G  Guidance: ultrasound    Approach:  Radial  Condition: osteoarthritis    Location:  Thumb  Site:  L thumb CMC    Medications:  20 mg triamcinolone 40 MG/ML; 0.5 mL ROPivacaine 5 MG/ML  Outcome:  Tolerated well, no immediate complications  Procedure discussed: discussed risks, benefits, and alternatives    Consent Given by:  Patient  Timeout: timeout called " immediately prior to procedure    Prep: patient was prepped and draped in usual sterile fashion     Ultrasound images of procedure were permanently stored.       ASSESSMENT & PLAN    (M18.12) Primary osteoarthritis of first carpometacarpal joint of left hand  (primary encounter diagnosis)  (M19.032) Arthritis of diztklaz-djajqzgtw-ddgqrrlqx joint of left hand  (G56.03) Bilateral carpal tunnel syndrome    Follow up as needed with me, reviewed option to return in 2-3 weeks or anytime after if no relief form injection or only mild/partial relief  Had better relief from 1st CMC joint injection last year, but STT joint still painful as well and can consider that location in the future if needed/desired  Reviewed relative risks and goals of CSI  Prior CTS sx improved s/p CSI  She could like to discuss these issues with hand surgeon, referral placed today for further discussion, reviewed timing of injections relative to safety and possible surgery  XR images independently visualized and reviewed with patient today in clinic  Oral Tylenol and topical Voltaren gel reviewed as safe OTC options, reviewed safe dosing strategies  Bracing and activity modification strategies reviewed  Expectations and goals of CSI reviewed  Often 2-3 days for steroid effect, and can take up to two weeks for maximum effect  We discussed modified progressive pain-free activity as tolerated  Do not overuse in first two weeks if feeling better due to concern for vulnerability while steroid is working  Supportive care reviewed  All questions were answered today  Contact us with additional questions or concerns  Signs and sx of concern reviewed      Guillaume Paige DO, SHAGGY  Sports Medicine Physician  [facility]

## 2024-04-17 NOTE — LETTER
2024         RE: Socorro Oakes  [address] [place] [id number]        Dear Colleague,    Thank you for referring your patient, Socorro Oakes, to the [facility] [place]. Please see a copy of my visit note below.    Socorro Oakes  :  1965  DOS: 2024  MRN: [id number]    Chief Complaint   Patient presents with     Left Hand - Follow Up, Pain, Cts       SUBJECTIVE  Socorro Oakes is a/an 58 year old female who is seen in consultation at the request of  Neelima Villatoro M.D. for evaluation of bilateral wrist.     The patient is seen by themselves.  The patient is Right handed    Onset: 6+ month(s) ago. Reports insidious onset without acute precipitating event.  Location of Pain: bilateral wrist, left worse than right  Worsened by: gripping things, fine motor skills, worse as the day goes on  Better with:  Treatments tried: various topicals, 2 cortisone injections done on the left wrist done with Dr. 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  Additional history:  Notes dropping objects with right hand. Has had some numbness bilateral hands.  Previously had pain in left hand, radial side, more thenar emincene area.  Now having pain, burning in volar wrist on left, also some medial and lateral left wrist pain.  No numbness in left hand. However, does have burning sensation in hands.  Limited relief from past steroid injections.   Has done some previous bracing, perhaps somewhat intermittent.    Interim History - 2024  Since last visit on 2024 patient has moderate-severe left thumb STT/CMC joint pain.  Bilateral wrist carpal tunnel syndrome   "injection completed on 2/2/24 provided relief of numbness/tingling pain into her fingers, right hand is doing well at this time.  Patient has completed hand therapy & is doing HEP.  No new injury in the interim.    REVIEW OF SYSTEMS:  Review of Systems   Constitutional: Negative.    HENT: Negative.     Eyes: Negative.    Respiratory: Negative.     Cardiovascular: Negative.    Gastrointestinal: Negative.    Endocrine: Negative.    Genitourinary: Negative.    Skin: Negative.    Hematological: Negative.    All other systems reviewed and are negative.    OBJECTIVE:  Ht 1.6 m (5' 3\")   Wt 86.6 kg (191 lb)   BMI 33.83 kg/m       Hand/wrist left    Inspection:  No deformity noted.  No clear swelling. No ecchymosis.    Motion:  Wrist motion with some discomfort at level of wrist, more on left  Digit motion with pain with thumb motion on left, localizing to 1st CMC and STT joints    Radial pulses normal, +2/4, capillary refill brisk.    Palpation:  Tender left radial wrist, 1st CMC joint > STT joint    Finkelstein with some wrist area pain on left, mild    Thumb grind with some pain on left, pain more in wrist.    RADIOLOGY:  Final results and radiologist's interpretation, [facility].  Images were reviewed with the patient in the office today.  XR HAND LEFT G/E 3 VIEWS 1/30/2023 3:09 PM      HISTORY: Pain of left hand     COMPARISON: None.                                                                   IMPRESSION: Advanced degenerative change of the STT joint. Left hand  negative for fracture. No dislocation.      Hand / Upper Extremity Injection/Arthrocentesis: L thumb CMC    Date/Time: 4/17/2024 5:40 PM    Performed by: Guillaume Paige DO  Authorized by: Guillaume Paige DO    Indications:  Pain and therapeutic  Indications comment:  Osteoarthritis  Needle Size:  25 G  Guidance: ultrasound    Approach:  Radial  Condition: osteoarthritis    Location:  Thumb  Site:  L thumb " CMC    Medications:  20 mg triamcinolone 40 MG/ML; 0.5 mL ROPivacaine 5 MG/ML  Outcome:  Tolerated well, no immediate complications  Procedure discussed: discussed risks, benefits, and alternatives    Consent Given by:  Patient  Timeout: timeout called immediately prior to procedure    Prep: patient was prepped and draped in usual sterile fashion     Ultrasound images of procedure were permanently stored.       ASSESSMENT & PLAN    (M18.12) Primary osteoarthritis of first carpometacarpal joint of left hand  (primary encounter diagnosis)  (M19.032) Arthritis of tauxatki-tihknszby-kolfkpfzu joint of left hand  (G56.03) Bilateral carpal tunnel syndrome    Follow up as needed with me, reviewed option to return in 2-3 weeks or anytime after if no relief form injection or only mild/partial relief  Had better relief from 1st CMC joint injection last year, but STT joint still painful as well and can consider that location in the future if needed/desired  Reviewed relative risks and goals of CSI  Prior CTS sx improved s/p CSI  She could like to discuss these issues with hand surgeon, referral placed today for further discussion, reviewed timing of injections relative to safety and possible surgery  XR images independently visualized and reviewed with patient today in clinic  Oral Tylenol and topical Voltaren gel reviewed as safe OTC options, reviewed safe dosing strategies  Bracing and activity modification strategies reviewed  Expectations and goals of CSI reviewed  Often 2-3 days for steroid effect, and can take up to two weeks for maximum effect  We discussed modified progressive pain-free activity as tolerated  Do not overuse in first two weeks if feeling better due to concern for vulnerability while steroid is working  Supportive care reviewed  All questions were answered today  Contact us with additional questions or concerns  Signs and sx of concern reviewed      Guillaume Paige DO, SHAGGY  Sports Medicine Physician  [facility]  [facility]             Again, thank you for allowing me to participate in the care of your patient.        Sincerely,        Guillaume Paige, DO

## 2024-05-28 DIAGNOSIS — E66.01 MORBID OBESITY (H): ICD-10-CM

## 2024-05-29 ENCOUNTER — NURSE TRIAGE (OUTPATIENT)
Dept: FAMILY MEDICINE | Facility: CLINIC | Age: 59
End: 2024-05-29
Payer: COMMERCIAL

## 2024-05-29 ENCOUNTER — TRANSFERRED RECORDS (OUTPATIENT)
Dept: HEALTH INFORMATION MANAGEMENT | Facility: CLINIC | Age: 59
End: 2024-05-29
Payer: COMMERCIAL

## 2024-05-29 RX ORDER — METFORMIN HCL 500 MG
1000 TABLET, EXTENDED RELEASE 24 HR ORAL
Qty: 180 TABLET | Refills: 0 | Status: SHIPPED | OUTPATIENT
Start: 2024-05-29 | End: 2024-08-28

## 2024-05-29 NOTE — TELEPHONE ENCOUNTER
Please have her check blood pressures 2-3 times daily over the next few days and had her on at 11 AM for a video visit on Friday    Thanks    THERESE

## 2024-05-29 NOTE — TELEPHONE ENCOUNTER
Nurse Triage SBAR    Is this a 2nd Level Triage? NO    Situation: patient calling with elevated BP for the past few weeks    Background: patient states she was started on lisinopril about a year ago. At a visit with her PCP in November it was stopped due to a cough. Patient has been checking her BP over the last 2-3 months randomly. She notes she has had some higher number 150-170's. The last two weeks it has been creeping up. This morning it was 170/91.   No other symptoms with this. Denies vision changes, chest pain , shortness of breath and gait changes.     Assessment: blood pressure 2 hours ago was 170/91. No other symptoms to report.     Protocol Recommended Disposition:   See Within 3 Days In Office     Recommendation: patient should be seen. Patient wondering if she should start on BP medication. Will route to PCP and RN team to advise and assist with scheduling.      Routed to provider    Does the patient meet one of the following criteria for ADS visit consideration? No    Reason for Disposition   Systolic BP >= 160 OR Diastolic >= 100    Additional Information   Negative: Sounds like a life-threatening emergency to the triager   Negative: Pregnant > 20 weeks or postpartum (< 6 weeks after delivery) and new hand or face swelling   Negative: Pregnant > 20 weeks and BP > 140/90   Negative: Systolic BP >= 160 OR Diastolic >= 100, and any cardiac or neurologic symptoms (e.g., chest pain, difficulty breathing, unsteady gait, blurred vision)   Negative: Patient sounds very sick or weak to the triager   Negative: BP Systolic BP >= 140 OR Diastolic >= 90 and postpartum (from 0 to 6 weeks after delivery)   Negative: Systolic BP >= 180 OR Diastolic >= 110, and missed most recent dose of blood pressure medication   Negative: Systolic BP >= 180 OR Diastolic >= 110   Negative: Patient wants to be seen   Negative: Ran out of BP medications   Negative: Taking BP medications and feels is having side effects (e.g.,  impotence, cough, dizziness)    Protocols used: High Blood Pressure-A-OH

## 2024-05-29 NOTE — TELEPHONE ENCOUNTER
Call placed to patient   Relayed Dr. Villatoro message    Video visit scheduled for 5/31/2024 at 1100 with Dr. Villatoro  [place] availability, but will do her best to do the video visit as scheduled    Patient verbalized understanding  No further questions/concerns    Jae Us, Clinic RN  [facility]

## 2024-05-31 ENCOUNTER — VIRTUAL VISIT (OUTPATIENT)
Dept: FAMILY MEDICINE | Facility: CLINIC | Age: 59
End: 2024-05-31
Payer: COMMERCIAL

## 2024-05-31 DIAGNOSIS — F20.9 SCHIZOPHRENIA, UNSPECIFIED TYPE (H): ICD-10-CM

## 2024-05-31 DIAGNOSIS — I10 BENIGN ESSENTIAL HYPERTENSION: Primary | ICD-10-CM

## 2024-05-31 DIAGNOSIS — G40.909 SEIZURE DISORDER (H): ICD-10-CM

## 2024-05-31 PROCEDURE — 99214 OFFICE O/P EST MOD 30 MIN: CPT | Mod: 95 | Performed by: FAMILY MEDICINE

## 2024-05-31 PROCEDURE — G2211 COMPLEX E/M VISIT ADD ON: HCPCS | Mod: 95 | Performed by: FAMILY MEDICINE

## 2024-05-31 RX ORDER — HYDROCHLOROTHIAZIDE 25 MG/1
25 TABLET ORAL DAILY
Qty: 30 TABLET | Refills: 0 | Status: SHIPPED | OUTPATIENT
Start: 2024-05-31 | End: 2024-06-24

## 2024-05-31 NOTE — PROGRESS NOTES
Socorro is a 58 year old who is being evaluated via a billable video visit.    How would you like to obtain your AVS? MyChart  If the video visit is dropped, the invitation should be resent by: Text to cell phone: [phone]  Will anyone else be joining your video visit? No      -------------------------    Assessment/Plan:    Socorro Oakes is a 58 year old female presenting for:    Benign essential hypertension  Hydrochlorothiazide sent to the pharmacy.  Discussed risk and benefits of the medication.  Discussed most common side effects.  BMP ordered for her to get done in 2 weeks.  She will continue to check her blood pressures at home and contact me in 1 to 2 weeks to let me know how they are looking.  Discussed adding losartan to the hydrochlorothiazide if needed.  - hydrochlorothiazide (HYDRODIURIL) 25 MG tablet  Dispense: 30 tablet; Refill: 0  - Basic metabolic panel  (Ca, Cl, CO2, Creat, Gluc, K, Na, BUN)    Schizophrenia, unspecified type (H)  Follow with psych - stable    Seizure disorder (H)  Follows with neurology.  Stable        There are no discontinued medications.    The longitudinal plan of care for the diagnosis(es)/condition(s) as documented were addressed during this visit. Due to the added complexity in care, I will continue to support this patient in the subsequent management and with ongoing continuity of care.      Chief Complaint:  Blood Pressure Check          Subjective:   Socorro Oakes is a pleasant 58 year old female being evaluated via video visit today for the following concern/s:    Hypertension: Patient has a past medical history significant for hypertension.  About a year ago she was started on lisinopril low-dose.  This was helpful for her but about a month and a half after she began taking this that she developed a cough that lasted for several months.  She was seen in the clinic and her lisinopril was discontinued.  She had made some lifestyle modifications and so we did not start  anything in its place.    She notes that she checked her blood pressure for a while and it was in a good place and then she stopped checking.  Over the last few months she has had a few visits at various clinics and blood pressures have been in the 140s and 150s.  She started checking at home and finds that her blood pressures are generally in the 150 is with an occasional 170.  She is feeling well but thinks that she needs to get back on a medication.  She prefers to avoid lisinopril due to the previous cough.      12 point review of systems completed and negative except for what has been described above    History   Smoking Status    Never   Smokeless Tobacco    Never         Current Outpatient Medications:     ALPRAZolam (XANAX) 1 MG tablet, Take 1 mg by mouth 2 times daily as needed for anxiety, Disp: , Rfl:     amphetamine-dextroamphetamine (ADDERALL XR) 10 MG 24 hr capsule, , Disp: , Rfl:     amphetamine-dextroamphetamine (ADDERALL XR) 30 MG 24 hr capsule, , Disp: , Rfl:     buPROPion (WELLBUTRIN XL) 150 MG 24 hr tablet, , Disp: , Rfl:     DENTA 5000 PLUS 1.1 % CREA, , Disp: , Rfl:     DoxePIN (SINEQUAN) 75 MG capsule, Take 75 mg by mouth At Bedtime, Disp: , Rfl:     hydrochlorothiazide (HYDRODIURIL) 25 MG tablet, Take 1 tablet (25 mg) by mouth daily, Disp: 30 tablet, Rfl: 0    lamoTRIgine (LAMICTAL) 200 MG tablet, , Disp: , Rfl:     lamoTRIgine (LAMICTAL) 25 MG tablet, , Disp: , Rfl:     LANsoprazole (PREVACID) 30 MG DR capsule, TAKE TWO CAPSULES BY MOUTH IN THE MORNING BEFORE BREAKFAST., Disp: 180 capsule, Rfl: 0    metFORMIN (GLUCOPHAGE XR) 500 MG 24 hr tablet, Take 2 tablets (1,000 mg) by mouth daily (with dinner), Disp: 180 tablet, Rfl: 0    pramipexole (MIRAPEX) 0.25 MG tablet, Take 1 tablet (0.25 mg) by mouth At Bedtime, Disp: 90 tablet, Rfl: 1    zolpidem ER (AMBIEN CR) 12.5 MG CR tablet, Take 12.5 mg by mouth, Disp: , Rfl:     metFORMIN (GLUCOPHAGE XR) 500 MG 24 hr tablet, TAKE TWO TABLETS BY MOUTH  "DAILY WITH DINNER (Patient not taking: Reported on 5/31/2024), Disp: 180 tablet, Rfl: 0    Current Facility-Administered Medications:     0.5 mL ropivacaine (NAROPIN) injection 5 mg/mL, 0.5 mL, , , , 0.5 mL at 04/17/24 1740    triamcinolone (KENALOG-40) injection 20 mg, 20 mg, , , , 20 mg at 04/17/24 1740        Objective:  No vitals were done due to the nature of this visit      11/12/2020    10:00 AM   Vitals - Patient Reported   Height (Patient Reported) 5' 4\"   Weight (Patient Reported) 195 lb   BMI (Based on Pt Reported Ht/Wt) 33.47 kg/m2               General: No acute distress  Psych: Appropriate affect  HEENT: moist mucous membranes  Pulmonary: Breathing comfortably, speaking in complete sentences  Extremities: warm and well perfused with no edema  Skin: warm and dry with no rash         This note has been dictated and transcribed using voice recognition software.   Any errors in transcription are unintentional and inherent to the software.        Video-Visit Details    Type of service:  Video Visit   Video End Time:start 1055/1115  Originating Location (pt. Location): Home    Distant Location (provider location):  On-site  Platform used for Video Visit: Lukas  Signed Electronically by: Neelima Villatoro MD    "

## 2024-06-17 DIAGNOSIS — K21.00 GASTROESOPHAGEAL REFLUX DISEASE WITH ESOPHAGITIS WITHOUT HEMORRHAGE: ICD-10-CM

## 2024-06-17 RX ORDER — LANSOPRAZOLE 30 MG/1
CAPSULE, DELAYED RELEASE ORAL
Qty: 180 CAPSULE | Refills: 0 | Status: SHIPPED | OUTPATIENT
Start: 2024-06-17

## 2024-06-19 ENCOUNTER — TELEPHONE (OUTPATIENT)
Dept: FAMILY MEDICINE | Facility: CLINIC | Age: 59
End: 2024-06-19

## 2024-06-19 ENCOUNTER — LAB (OUTPATIENT)
Dept: LAB | Facility: CLINIC | Age: 59
End: 2024-06-19
Payer: COMMERCIAL

## 2024-06-19 DIAGNOSIS — I10 BENIGN ESSENTIAL HYPERTENSION: ICD-10-CM

## 2024-06-19 DIAGNOSIS — Z11.4 SCREENING FOR HIV (HUMAN IMMUNODEFICIENCY VIRUS): Primary | ICD-10-CM

## 2024-06-19 LAB
ANION GAP SERPL CALCULATED.3IONS-SCNC: 13 MMOL/L (ref 7–15)
BUN SERPL-MCNC: 8.8 MG/DL (ref 8–23)
CALCIUM SERPL-MCNC: 9.5 MG/DL (ref 8.6–10)
CHLORIDE SERPL-SCNC: 97 MMOL/L (ref 98–107)
CREAT SERPL-MCNC: 0.77 MG/DL (ref 0.51–0.95)
DEPRECATED HCO3 PLAS-SCNC: 27 MMOL/L (ref 22–29)
EGFRCR SERPLBLD CKD-EPI 2021: 88 ML/MIN/1.73M2
GLUCOSE SERPL-MCNC: 109 MG/DL (ref 70–99)
POTASSIUM SERPL-SCNC: 4 MMOL/L (ref 3.4–5.3)
SODIUM SERPL-SCNC: 137 MMOL/L (ref 135–145)

## 2024-06-19 PROCEDURE — 80048 BASIC METABOLIC PNL TOTAL CA: CPT

## 2024-06-19 PROCEDURE — 36415 COLL VENOUS BLD VENIPUNCTURE: CPT

## 2024-06-19 NOTE — TELEPHONE ENCOUNTER
She can get diclofenac gel which is sold over-the-counter.    This is an NSAID gel that can help with pain relief to the specific area.  She could do a trial of a wrist brace to rest the area as well.  She could consider [facility] if she would like as well.    EB

## 2024-06-19 NOTE — TELEPHONE ENCOUNTER
Call placed to patient   Relayed Dr. Villatoro message    Patient states she has been doing all that was recommended already  Patient plans to reach out to Ortho for an appointment and next steps  No further questions/concerns     Jae Us, Clinic RN  [facility]

## 2024-06-19 NOTE — TELEPHONE ENCOUNTER
Received call from patient.  States that she has been in pain for the last 7-10 days with wrist pain.  States that it is a stabbing burning pain.  Has done cortisone injections in the past.  Saw ortho last week regarding arthritis in her thumb as her wrists weren't bothering her that much  Patient taking tylenol/ibuprofen and using Voltaran.  Last injection in wrists was in February.  Dr that did last injection not available until this September.  Patient calling wanting to know if there are any other topicals or pain medication that she can use.  Should she go to another ortho MD.  Rajan Correa, DANIELLA

## 2024-06-21 ENCOUNTER — TRANSFERRED RECORDS (OUTPATIENT)
Dept: HEALTH INFORMATION MANAGEMENT | Facility: CLINIC | Age: 59
End: 2024-06-21

## 2024-06-24 DIAGNOSIS — I10 BENIGN ESSENTIAL HYPERTENSION: ICD-10-CM

## 2024-06-24 RX ORDER — HYDROCHLOROTHIAZIDE 25 MG/1
25 TABLET ORAL DAILY
Qty: 90 TABLET | Refills: 1 | Status: SHIPPED | OUTPATIENT
Start: 2024-06-24

## 2024-06-25 ENCOUNTER — TRANSFERRED RECORDS (OUTPATIENT)
Dept: HEALTH INFORMATION MANAGEMENT | Facility: CLINIC | Age: 59
End: 2024-06-25
Payer: COMMERCIAL

## 2024-08-27 ENCOUNTER — PATIENT OUTREACH (OUTPATIENT)
Dept: CARE COORDINATION | Facility: CLINIC | Age: 59
End: 2024-08-27
Payer: COMMERCIAL

## 2024-08-27 DIAGNOSIS — E66.01 MORBID OBESITY (H): ICD-10-CM

## 2024-08-28 RX ORDER — METFORMIN HCL 500 MG
1000 TABLET, EXTENDED RELEASE 24 HR ORAL
Qty: 180 TABLET | Refills: 0 | Status: SHIPPED | OUTPATIENT
Start: 2024-08-28 | End: 2024-10-02

## 2024-09-05 ENCOUNTER — TRANSFERRED RECORDS (OUTPATIENT)
Dept: HEALTH INFORMATION MANAGEMENT | Facility: CLINIC | Age: 59
End: 2024-09-05
Payer: COMMERCIAL

## 2024-09-11 NOTE — PROGRESS NOTES
"Socorro Oakes is a 54 year old female who is being evaluated via a billable telephone visit.      The patient has been notified of following:     \"This telephone visit will be conducted via a call between you and your physician/provider. We have found that certain health care needs can be provided without the need for a physical exam.  This service lets us provide the care you need with a short phone conversation.  If a prescription is necessary we can send it directly to your pharmacy.  If lab work is needed we can place an order for that and you can then stop by our lab to have the test done at a later time.    Telephone visits are billed at different rates depending on your insurance coverage. During this emergency period, for some insurers they may be billed the same as an in-person visit.  Please reach out to your insurance provider with any questions.    If during the course of the call the physician/provider feels a telephone visit is not appropriate, you will not be charged for this service.\"    Patient has given verbal consent for Telephone visit?  Yes    How would you like to obtain your AVS? Mail a copy    Subjective     Socorro Oakes is a 54 year old female who presents to clinic today for the following health issues:         Follow up from 1/2020, was seen for a cough/ choking on phlegm when she wakes up in the morning. Patient was diagnosed with allergies and given Zyrtec when she was last seen 1/20. She does not feel this helped at all. She does not choke on food or medications.   Denies history of smoking. Denies history of allergies. + history of GERD- patient stopped taking Omeprazole. Nose feels stuffy. No sinus pressure or pain.     Patient Active Problem List   Diagnosis     MRSA     h/o multiple concussion      Hyperlipidemia LDL goal <130     Health Care Home     GERD (gastroesophageal reflux disease)     DJD (degenerative joint disease), lumbar     Sleep apnea     Migraine     Moderate major " depression (H)     Schizophrenia (H)     ADD (attention deficit disorder with hyperactivity)     Restless legs syndrome (RLS)     Memory loss     Essential hypertension, benign     Class 3 obesity due to excess calories without serious comorbidity with body mass index (BMI) of 40.0 to 44.9 in adult     Morbid obesity (H)     Past Surgical History:   Procedure Laterality Date     CHOLECYSTECTOMY, LAPOROSCOPIC  4-30-09     COLONOSCOPY N/A 6/5/2015    Procedure: COLONOSCOPY;  Surgeon: Robe Delgado MD;  Location: [facility]     INJECT EPIDURAL LUMBAR  7/11/2011    Procedure:INJECT EPIDURAL LUMBAR; BRIE with Flouro--; Surgeon:GENERIC ANESTHESIA PROVIDER; Location:[facility]     INJECT EPIDURAL LUMBAR  9/28/2011    Procedure:INJECT EPIDURAL LUMBAR; BRIE with Flouro--; Surgeon:GENERIC ANESTHESIA PROVIDER; Location:[facility]     INJECT EPIDURAL LUMBAR  12/12/2011    Procedure:INJECT EPIDURAL LUMBAR; BRIE with Fluoro - Dr. Landry; Surgeon:GENERIC ANESTHESIA PROVIDER; Location:[facility]     KNEE SURGERY      1- 15 yr ago     LAPAROSCOPIC APPENDECTOMY       MANDIBLE SURGERY      6x surgeries from 1983- 1994     SURGICAL HISTORY OF -   10/17/05    left knee surgery      SURGICAL HISTORY OF -   1982,1983,1984x2,1993,    TMJ Surgery x5     SURGICAL HISTORY OF -       ENT Tubes       SURGICAL HISTORY OF -   1996    toe     TONSILLECTOMY & ADENOIDECTOMY         Social History     Tobacco Use     Smoking status: Never Smoker     Smokeless tobacco: Never Used   Substance Use Topics     Alcohol use: No     Alcohol/week: 0.0 standard drinks     Family History   Problem Relation Age of Onset     Arthritis Mother         rheumatoid/had knee replacement     Bipolar Disorder Mother      Migraines Mother      Depression Father         comitted suicide age 48     Suicide Father      Mental Illness Father      Alcohol/Drug Brother      Anxiety Disorder Brother      Mental Illness Brother      Allergies Sister      Migraines  Sister      Anxiety Disorder Maternal Grandfather      Bipolar Disorder Daughter      Migraines Brother      Substance Abuse Brother      Migraines Sister      Migraines Brother      Migraines Sister      Anesthesia Reaction Other      Cerebrovascular Disease No family hx of            Reviewed and updated as needed this visit by Provider         Review of Systems   ROS COMP: Constitutional, HEENT, cardiovascular, pulmonary, GI, , musculoskeletal, neuro, skin, endocrine and psych systems are negative, except as otherwise noted.         Diagnostic Test Results:  Labs reviewed in Epic        Assessment/Plan:  1. Gastroesophageal reflux disease without esophagitis  Restart Taking Omeprazole daily    2. Seasonal allergic rhinitis, unspecified trigger  Start Allegra- did not feel Zyrtec helped    3. Phlegm in throat  - ? GERD vs Allergies  Consider referral to ENT however due to Covid- appointment will be rescheduled after Covid       No follow-ups on file.      Phone call duration:  10 minutes    RAJ Macias CNP

## 2024-09-24 ENCOUNTER — PATIENT OUTREACH (OUTPATIENT)
Dept: CARE COORDINATION | Facility: CLINIC | Age: 59
End: 2024-09-24
Payer: COMMERCIAL

## 2024-09-26 ENCOUNTER — DOCUMENTATION ONLY (OUTPATIENT)
Dept: SLEEP MEDICINE | Facility: CLINIC | Age: 59
End: 2024-09-26
Payer: COMMERCIAL

## 2024-09-26 DIAGNOSIS — G47.33 OBSTRUCTIVE SLEEP APNEA (ADULT) (PEDIATRIC): Primary | ICD-10-CM

## 2024-10-02 ENCOUNTER — PATIENT OUTREACH (OUTPATIENT)
Dept: CARE COORDINATION | Facility: CLINIC | Age: 59
End: 2024-10-02

## 2024-10-02 ENCOUNTER — OFFICE VISIT (OUTPATIENT)
Dept: FAMILY MEDICINE | Facility: CLINIC | Age: 59
End: 2024-10-02
Payer: COMMERCIAL

## 2024-10-02 VITALS
OXYGEN SATURATION: 98 % | BODY MASS INDEX: 32.82 KG/M2 | SYSTOLIC BLOOD PRESSURE: 132 MMHG | DIASTOLIC BLOOD PRESSURE: 78 MMHG | TEMPERATURE: 98.1 F | HEIGHT: 63 IN | HEART RATE: 75 BPM | WEIGHT: 185.25 LBS | RESPIRATION RATE: 16 BRPM

## 2024-10-02 DIAGNOSIS — G56.03 BILATERAL CARPAL TUNNEL SYNDROME: ICD-10-CM

## 2024-10-02 DIAGNOSIS — Z01.818 PREOP GENERAL PHYSICAL EXAM: Primary | ICD-10-CM

## 2024-10-02 DIAGNOSIS — Z12.31 VISIT FOR SCREENING MAMMOGRAM: ICD-10-CM

## 2024-10-02 DIAGNOSIS — L30.9 DERMATITIS: ICD-10-CM

## 2024-10-02 LAB
ANION GAP SERPL CALCULATED.3IONS-SCNC: 12 MMOL/L (ref 7–15)
BUN SERPL-MCNC: 8.5 MG/DL (ref 8–23)
CALCIUM SERPL-MCNC: 9.7 MG/DL (ref 8.8–10.4)
CHLORIDE SERPL-SCNC: 95 MMOL/L (ref 98–107)
CREAT SERPL-MCNC: 0.71 MG/DL (ref 0.51–0.95)
EGFRCR SERPLBLD CKD-EPI 2021: >90 ML/MIN/1.73M2
ERYTHROCYTE [DISTWIDTH] IN BLOOD BY AUTOMATED COUNT: 15 % (ref 10–15)
GLUCOSE SERPL-MCNC: 97 MG/DL (ref 70–99)
HCO3 SERPL-SCNC: 28 MMOL/L (ref 22–29)
HCT VFR BLD AUTO: 35.5 % (ref 35–47)
HGB BLD-MCNC: 11.6 G/DL (ref 11.7–15.7)
MCH RBC QN AUTO: 26.3 PG (ref 26.5–33)
MCHC RBC AUTO-ENTMCNC: 32.7 G/DL (ref 31.5–36.5)
MCV RBC AUTO: 81 FL (ref 78–100)
PLATELET # BLD AUTO: 309 10E3/UL (ref 150–450)
POTASSIUM SERPL-SCNC: 3.6 MMOL/L (ref 3.4–5.3)
RBC # BLD AUTO: 4.41 10E6/UL (ref 3.8–5.2)
SODIUM SERPL-SCNC: 135 MMOL/L (ref 135–145)
WBC # BLD AUTO: 5.8 10E3/UL (ref 4–11)

## 2024-10-02 PROCEDURE — 87389 HIV-1 AG W/HIV-1&-2 AB AG IA: CPT | Performed by: FAMILY MEDICINE

## 2024-10-02 PROCEDURE — 85027 COMPLETE CBC AUTOMATED: CPT | Performed by: FAMILY MEDICINE

## 2024-10-02 PROCEDURE — 99214 OFFICE O/P EST MOD 30 MIN: CPT | Performed by: FAMILY MEDICINE

## 2024-10-02 PROCEDURE — 93000 ELECTROCARDIOGRAM COMPLETE: CPT | Performed by: FAMILY MEDICINE

## 2024-10-02 PROCEDURE — 80048 BASIC METABOLIC PNL TOTAL CA: CPT | Performed by: FAMILY MEDICINE

## 2024-10-02 PROCEDURE — G2211 COMPLEX E/M VISIT ADD ON: HCPCS | Performed by: FAMILY MEDICINE

## 2024-10-02 PROCEDURE — 36415 COLL VENOUS BLD VENIPUNCTURE: CPT | Performed by: FAMILY MEDICINE

## 2024-10-02 RX ORDER — TRIAMCINOLONE ACETONIDE 1 MG/G
CREAM TOPICAL 2 TIMES DAILY
Qty: 45 G | Refills: 0 | Status: SHIPPED | OUTPATIENT
Start: 2024-10-02

## 2024-10-02 NOTE — PATIENT INSTRUCTIONS
How to Take Your Medication Before Surgery  Preoperative Medication Instructions   Antiplatelet or Anticoagulation Medication Instructions   - Patient is on no antiplatelet or anticoagulation medications.    Additional Medication Instructions  Take wellbutrin the morning of surgery and hold other meds       Patient Education   Preparing for Your Surgery  For Adults  Getting started  In most cases, a nurse will call to review your health history and instructions. They will give you an arrival time based on your scheduled surgery time. Please be ready to share:  Your doctor's clinic name and phone number  Your medical, surgical, and anesthesia history  A list of allergies and sensitivities  A list of medicines, including herbal treatments and over-the-counter drugs  Whether the patient has a legal guardian (ask how to send us the papers in advance)  Note: You may not receive a call if you were seen at our PAC (Preoperative Assessment Center).  Please tell us if you're pregnant--or if there's any chance you might be pregnant. Some surgeries may injure a fetus (unborn baby), so they require a pregnancy test. Surgeries that are safe for a fetus don't always need a test, and you can choose whether to have one.   Preparing for surgery  Within 10 to 30 days of surgery: Have a pre-op exam (sometimes called an H&P, or History and Physical). This can be done at a clinic or pre-operative center.  If you're having a , you may not need this exam. Talk to your care team.  At your pre-op exam, talk to your care team about all medicines you take. (This includes CBD oil and any drugs, such as THC, marijuana, and other forms of cannabis.) If you need to stop any medicine before surgery, ask when to start taking it again.  This is for your safety. Many medicines and drugs can make you bleed too much during surgery. Some change how well surgery (anesthesia) drugs work.  Call your insurance company to let them know you're having  surgery. (If you don't have insurance, call [phone].)  Call your clinic if there's any change in your health. This includes a scrape or scratch near the surgery site, or any signs of a cold (sore throat, runny nose, cough, rash, fever).  Eating and drinking guidelines  For your safety: Unless your surgeon tells you otherwise, follow the guidelines below.  Eat and drink as normal until 8 hours before you arrive for surgery. After that, no food or milk. You can spit out gum when you arrive.  Drink clear liquids until 2 hours before you arrive. These are liquids you can see through, like water, Gatorade, and Propel Water. They also include plain black coffee and tea (no cream or milk).  No alcohol for 24 hours before you arrive. The night before surgery, stop any drinks that contain THC.  If your care team tells you to take medicine on the morning of surgery, it's okay to take it with a sip of water. No other medicines or drugs are allowed (including CBD oil)--follow your care team's instructions.  If you have questions the day of surgery, call your hospital or surgery center.   Preventing infection  Shower or bathe the night before and the morning of surgery. Follow the instructions your clinic gave you. (If no instructions, use regular soap.)  Don't shave or clip hair near your surgery site. We'll remove the hair if needed.  Don't smoke or vape the morning of surgery. No chewing tobacco for 6 hours before you arrive. A nicotine patch is okay. You may spit out nicotine gum when you arrive.  For some surgeries, the surgeon will tell you to fully quit smoking and nicotine.  We will make every effort to keep you safe from infection. We will:  Clean our hands often with soap and water (or an alcohol-based hand rub).  Clean the skin at your surgery site with a special soap that kills germs.  Give you a special gown to keep you warm. (Cold raises the risk of infection.)  Wear hair covers, masks, gowns, and gloves  during surgery.  Give antibiotic medicine, if prescribed. Not all surgeries need this medicine.  What to bring on the day of surgery  Photo ID and insurance card  Copy of your health care directive, if you have one  Glasses and hearing aids (bring cases)  You can't wear contacts during surgery  Inhaler and eye drops, if you use them (tell us about these when you arrive)  CPAP machine or breathing device, if you use them  A few personal items, if spending the night  If you have . . .  A pacemaker, ICD (cardiac defibrillator), or other implant: Bring the ID card.  An implanted stimulator: Bring the remote control.  A legal guardian: Bring a copy of the certified (court-stamped) guardianship papers.  Please remove any jewelry, including body piercings. Leave jewelry and other valuables at home.  If you're going home the day of surgery  You must have a responsible adult drive you home. They should stay with you overnight as well.  If you don't have someone to stay with you, and you aren't safe to go home alone, we may keep you overnight. Insurance often won't pay for this.  After surgery  If it's hard to control your pain or you need more pain medicine, please call your surgeon's office.  Questions?   If you have any questions for your care team, list them here:   ____________________________________________________________________________________________________________________________________________________________________________________________________________________________________________________________  For informational purposes only. Not to replace the advice of your health care provider. Copyright   2003, 2019 [facility]. All rights reserved. Clinically reviewed by Darrell Schafer MD. [employer] ([employer]) [id number] - REV 08/24.

## 2024-10-02 NOTE — PROGRESS NOTES
Preoperative Evaluation  [facility]  [address]  [facility] [address]  Phone: [phone]  Primary Provider: Neelima Villatoro MD  Pre-op Performing Provider: Neelima Villatoro MD  Oct 2, 2024             9/29/2024   Surgical Information   What procedure is being done? right wrist carpal tunnel release   Facility or Hospital where procedure/surgery will be performed: [facility]   Who is doing the procedure / surgery? Biju Delgadillo MD   Date of surgery / procedure: 10/4/24   Time of surgery / procedure: Approx 1pm   Where do you plan to recover after surgery? at home alone        Fax number for surgical facility: [phone]    Assessment & Plan     The proposed surgical procedure is considered LOW risk.    Preop general physical exam  - EKG 12-lead complete w/read - Clinics  - Basic metabolic panel  (Ca, Cl, CO2, Creat, Gluc, K, Na, BUN); Future  - CBC with platelets; Future  - Basic metabolic panel  (Ca, Cl, CO2, Creat, Gluc, K, Na, BUN)  - CBC with platelets    Bilateral carpal tunnel syndrome    Dermatitis  Mild rash from wrist braces  - triamcinolone (KENALOG) 0.1 % external cream; Apply topically 2 times daily. Pea sized amount to rash on wrists    Visit for screening mammogram  - MA Screening Bilateral w/ [place]; Future       - No identified additional risk factors other than previously addressed    Preoperative Medication Instructions  Antiplatelet or Anticoagulation Medication Instructions   - Patient is on no antiplatelet or anticoagulation medications.    Additional Medication Instructions  Take wellbutrin the morning of surgery and hold other meds    Recommendation  Approval given to proceed with proposed procedure, without further diagnostic evaluation.    The longitudinal plan of care for the diagnosis(es)/condition(s) as documented were addressed during this visit. Due to the added complexity in care, I will continue to support Socorro in the subsequent  management and with ongoing continuity of care.    Minnie Quintanilla is a 58 year old, presenting for the following:  Pre-Op Exam (10/4/24    1:15pm/R Wrist- Carpal Tunnel Surgery/[facility]/Dr. Biju Delgadillo)    HPI related to upcoming procedure: Patient has unfortunately been struggling with bilateral carpal tunnel syndrome.  She will be getting a right-sided carpal tunnel surgery on Friday and hopefully left-sided surgery sometime thereafter.    Mild rash on her bilateral wrists noted from her wrist braces.  Has been putting some hydrocortisone on the areas.        9/29/2024   Pre-Op Questionnaire   Have you ever had a heart attack or stroke? No   Have you ever had surgery on your heart or blood vessels, such as a stent placement, a coronary artery bypass, or surgery on an artery in your head, neck, heart, or legs? No   Do you have chest pain with activity? No   Do you have a history of heart failure? No   Do you currently have a cold, bronchitis or symptoms of other infection? No   Do you have a cough, shortness of breath, or wheezing? No   Do you or anyone in your family have previous history of blood clots? No   Do you or does anyone in your family have a serious bleeding problem such as prolonged bleeding following surgeries or cuts? No   Have you ever had problems with anemia or been told to take iron pills? No   Have you had any abnormal blood loss such as black, tarry or bloody stools, or abnormal vaginal bleeding? No   Have you ever had a blood transfusion? No   Are you willing to have a blood transfusion if it is medically needed before, during, or after your surgery? Yes   Have you or any of your relatives ever had problems with anesthesia? (!) YES  - anesthesia has caused nausea in the past - most recently been OK   Do you have sleep apnea, excessive snoring or daytime drowsiness? (!) YES - sleep apnea    Do you have a CPAP machine? Yes   Do you have any artifical heart  valves or other implanted medical devices like a pacemaker, defibrillator, or continuous glucose monitor? No   Do you have artificial joints? No   Are you allergic to latex? (!) YES        Health Care Directive  Patient does not have a Health Care Directive or Living Will: Discussed advance care planning with patient; however, patient declined at this time.    Preoperative Review of    reviewed - no record of controlled substances prescribed.      Status of Chronic Conditions:  See problem list for active medical problems.  Problems all longstanding and stable, except as noted/documented.  See ROS for pertinent symptoms related to these conditions.    Patient Active Problem List    Diagnosis Date Noted    MRSA      Priority: High     SITE - WOUND 7/13/05; 8/12/2009:  Pt stated that she should be discontinued MRSA status as she had screening done. We could not obtain these records in her Medical chart, so we could not validate this.  Pt still MRSA + STATUS.      Neck pain 07/25/2023     Priority: Medium    Pain of left hand 04/24/2023     Priority: Medium    Bilateral low back pain without sciatica 11/30/2022     Priority: Medium    Diplopia 09/24/2021     Priority: Medium    Vertigo 04/19/2021     Priority: Medium    Bipolar 2 disorder (H) 01/01/2020     Priority: Medium    Prediabetes 07/22/2019     Priority: Medium    Essential hypertension, benign 11/14/2016     Priority: Medium    Anemia 01/16/2015     Priority: Medium    Memory loss 11/27/2012     Priority: Medium    Suicidal ideation 07/18/2012     Priority: Medium    Chronic pain 07/18/2012     Priority: Medium    Schizophrenia (H) 03/06/2012     Priority: Medium     Follow with psychiatry       ADD (attention deficit disorder with hyperactivity) 03/06/2012     Priority: Medium     Problem list name updated by automated process. Provider to review and confirm      Restless legs syndrome (RLS) 03/06/2012     Priority: Medium    Sleep apnea 10/26/2011      Priority: Medium    GERD (gastroesophageal reflux disease) 04/26/2011     Priority: Medium    DJD (degenerative joint disease), lumbar 04/26/2011     Priority: Medium     [facility] PAIN MANAGEMENT   ON VICODIN # 15   FLEXERIL # 60   PAIN CONTRACT SIGNED.       Hyperlipidemia LDL goal <130 12/23/2010     Priority: Medium    Panic disorder with agoraphobia 01/01/2010     Priority: Medium    h/o multiple concussion  11/05/2008     Priority: Medium    Eczema 09/22/2008     Priority: Medium    Anxiety 09/22/2008     Priority: Medium    Seizure disorder (H) 09/04/2008     Priority: Medium    MRSA (methicillin resistant staph aureus) culture positive 09/04/2008     Priority: Medium    Posttraumatic stress disorder 01/02/2008     Priority: Medium    Snoring 10/29/2007     Priority: Medium    Cellulitis and abscess of toe 07/14/2005     Priority: Medium     Overview:   Epic         Past Medical History:   Diagnosis Date    Anemia     Anxiety     Appendicitis     Arthritis     Cellulitis and abscess of unspecified site     Depression     Depressive disorder 01/01/2001    Disturbance of skin sensation     hand & foot     Dizziness and giddiness     Drug-seeking behavior     Essential hypertension, benign     GERD (gastroesophageal reflux disease)     HLD (hyperlipidemia)     Hypertension     MEDICAL HISTORY OF - 2006,2007,2008,2009    multiple psych. hopitalizations    Migraine     Moderate major depression (H) 03/06/2012    Obesity     Other convulsions     secondary to head injury    PONV (postoperative nausea and vomiting)     Restless leg syndrome     RLS (restless legs syndrome)     Schizophrenia (H) 03/06/2012    Follow with psychiatry      Schizophrenia (H)     Seizures (H)     Had a seizure disorder in past no seizures since 2016    Sleep apnea     Sleep apnea     CPAP use    TBI (traumatic brain injury) (H)     Unspecified hereditary and idiopathic peripheral neuropathy     Variants of migraine, not elsewhere  classified, without mention of intractable migraine without mention of status migrainosus      Past Surgical History:   Procedure Laterality Date    APPENDECTOMY      CHOLECYSTECTOMY, LAPOROSCOPIC  04/30/2009    COLONOSCOPY N/A 06/05/2015    Procedure: COLONOSCOPY;  Surgeon: Robe Delgado MD;  Location: [facility]    INJECT EPIDURAL LUMBAR  07/11/2011    Procedure:INJECT EPIDURAL LUMBAR; BRIE with Flouro--; Surgeon:GENERIC ANESTHESIA PROVIDER; Location:[facility]    INJECT EPIDURAL LUMBAR  09/28/2011    Procedure:INJECT EPIDURAL LUMBAR; BRIE with Flouro--; Surgeon:GENERIC ANESTHESIA PROVIDER; Location:[facility]    INJECT EPIDURAL LUMBAR  12/12/2011    Procedure:INJECT EPIDURAL LUMBAR; BRIE with Fluoro - Dr. Landry; Surgeon:GENERIC ANESTHESIA PROVIDER; Location:[facility]    KNEE SURGERY      1- 15 yr ago    KNEE SURGERY      LAPAROSCOPIC APPENDECTOMY      LAPAROSCOPIC APPENDECTOMY      LAPAROSCOPIC CHOLECYSTECTOMY      MANDIBLE SURGERY      6x surgeries from 1983- 1994    MANDIBLE SURGERY      ORTHOPEDIC SURGERY  01/01/1982    tmj x6 & knee & toe    TN ESOPHAGOGASTRODUODENOSCOPY TRANSORAL DIAGNOSTIC N/A 03/25/2021    Procedure: ESOPHAGOGASTRODUODENOSCOPY (EGD);  Surgeon: Davi Meier MD;  Location: [facility];  Service: General    SURGICAL HISTORY OF -   10/17/2005    left knee surgery     SURGICAL HISTORY OF -   1982,1983,1984x2,1993,    TMJ Surgery x5    SURGICAL HISTORY OF -       ENT Tubes      SURGICAL HISTORY OF -   1996    toe    TONSILLECTOMY & ADENOIDECTOMY      ZZHC COLONOSCOPY W/WO BRUSH/WASH N/A 05/11/2021    Procedure: COLONOSCOPY;  Surgeon: Davi Meier MD;  Location: [facility];  Service: General     Current Outpatient Medications   Medication Sig Dispense Refill    amphetamine-dextroamphetamine (ADDERALL XR) 10 MG 24 hr capsule       amphetamine-dextroamphetamine (ADDERALL XR) 30 MG 24 hr capsule       buPROPion (WELLBUTRIN XL) 150 MG 24 hr tablet       DENTA 5000  PLUS 1.1 % CREA       DoxePIN (SINEQUAN) 75 MG capsule Take 75 mg by mouth At Bedtime      hydrochlorothiazide (HYDRODIURIL) 25 MG tablet TAKE ONE TABLET BY MOUTH ONE TIME DAILY 90 tablet 1    lamoTRIgine (LAMICTAL) 200 MG tablet       lamoTRIgine (LAMICTAL) 25 MG tablet       LANsoprazole (PREVACID) 30 MG DR capsule TAKE TWO CAPSULES BY MOUTH IN THE MORNING BEFORE BREAKFAST 180 capsule 0    metFORMIN (GLUCOPHAGE XR) 500 MG 24 hr tablet Take 2 tablets (1,000 mg) by mouth daily (with dinner) 180 tablet 0    pramipexole (MIRAPEX) 0.25 MG tablet Take 1 tablet (0.25 mg) by mouth At Bedtime 90 tablet 1    triamcinolone (KENALOG) 0.1 % external cream Apply topically 2 times daily. Pea sized amount to rash on wrists 45 g 0    zolpidem ER (AMBIEN CR) 12.5 MG CR tablet Take 12.5 mg by mouth         Allergies   Allergen Reactions    Latex Rash    Linezolid Other (See Comments)     Zyvox, was told after surgery that she was allergic to this    Other [Seasonal Allergies]      Black dye from knee brace caused skin breakdown.    Oxcarbazepine Unknown    Oxcarbazepine Unknown    Paraphenylenediamine Itching, Swelling and Rash     Black dye        Social History     Tobacco Use    Smoking status: Never    Smokeless tobacco: Never   Substance Use Topics    Alcohol use: No     Alcohol/week: 0.0 standard drinks of alcohol     Family History   Problem Relation Age of Onset    Arthritis Mother         rheumatoid/had knee replacement    Bipolar Disorder Mother     Migraines Mother     Other Cancer Mother         lymphoma    Depression Father     Suicide Father     Mental Illness Father     Alcohol/Drug Brother     Anxiety Disorder Brother     Mental Illness Brother     Depression Brother     Allergies Sister     Migraines Sister     Anxiety Disorder Maternal Grandfather     Bipolar Disorder Daughter     Migraines Brother     Substance Abuse Brother     Migraines Sister     Migraines Brother     Migraines Sister     Anesthesia Reaction  "Other     Cerebrovascular Disease No family hx of     Rheumatoid Arthritis Mother     Depression Mother     Suicidality Father     Depression Sister     Down Syndrome Brother     Depression Brother     Depression Sister     Depression Sister     Depression Brother     Depression Brother     Depression Brother     Bipolar Disorder Daughter      History   Drug Use Unknown             Review of Systems  Constitutional, HEENT, cardiovascular, pulmonary, GI, , musculoskeletal, neuro, skin, endocrine and psych systems are negative, except as otherwise noted.    Objective    /78   Pulse 75   Temp 98.1  F (36.7  C) (Tympanic)   Resp 16   Ht 1.6 m (5' 3\")   Wt 84 kg (185 lb 4 oz)   LMP  (LMP Unknown)   SpO2 98%   BMI 32.82 kg/m     Estimated body mass index is 32.82 kg/m  as calculated from the following:    Height as of this encounter: 1.6 m (5' 3\").    Weight as of this encounter: 84 kg (185 lb 4 oz).  Physical Exam  Objective:  Vital signs reviewed and stable  General: No acute distress  Psych: Appropriate affect  HEENT: moist mucous membranes, pupils equal, round, reactive to light and accomodation, tympanic membranes are pearly grey bilaterally  Lymph: no cervical or supraclavicular lymphadenopathy  Cardiovascular: regular rate and rhythm with no murmur  Pulmonary: clear to auscultation bilaterally with no wheeze  Abdomen: soft, non tender, non distended with normo-active bowel sounds  Extremities: warm and well perfused with no edema  Skin: warm and dry with no rash      Recent Labs   Lab Test 06/19/24  1331 11/01/23  0958   HGB  --  11.4*   PLT  --  263    137   POTASSIUM 4.0 4.1   CR 0.77 0.80        Diagnostics  Recent Results (from the past 24 hour(s))   CBC with platelets    Collection Time: 10/02/24  3:35 PM   Result Value Ref Range    WBC Count 5.8 4.0 - 11.0 10e3/uL    RBC Count 4.41 3.80 - 5.20 10e6/uL    Hemoglobin 11.6 (L) 11.7 - 15.7 g/dL    Hematocrit 35.5 35.0 - 47.0 %    MCV 81 78 - " 100 fL    MCH 26.3 (L) 26.5 - 33.0 pg    MCHC 32.7 31.5 - 36.5 g/dL    RDW 15.0 10.0 - 15.0 %    Platelet Count 309 150 - 450 10e3/uL      EKG: appears normal, NSR, normal axis, normal intervals, no acute ST/T changes c/w ischemia, no LVH by voltage criteria, unchanged from previous tracings    Revised Cardiac Risk Index (RCRI)  The patient has the following serious cardiovascular risks for perioperative complications:   - No serious cardiac risks = 0 points     RCRI Interpretation: 0 points: Class I (very low risk - 0.4% complication rate)         Signed Electronically by: Neelima Villatoro MD  A copy of this evaluation report is provided to the requesting physician.

## 2024-10-03 LAB — HIV 1+2 AB+HIV1 P24 AG SERPL QL IA: NONREACTIVE

## 2024-10-15 ENCOUNTER — TRANSFERRED RECORDS (OUTPATIENT)
Dept: HEALTH INFORMATION MANAGEMENT | Facility: CLINIC | Age: 59
End: 2024-10-15
Payer: COMMERCIAL

## 2024-10-30 ENCOUNTER — HOSPITAL ENCOUNTER (OUTPATIENT)
Dept: MAMMOGRAPHY | Facility: CLINIC | Age: 59
Discharge: HOME | End: 2024-10-30
Attending: FAMILY MEDICINE | Admitting: FAMILY MEDICINE
Payer: COMMERCIAL

## 2024-10-30 DIAGNOSIS — Z12.31 VISIT FOR SCREENING MAMMOGRAM: ICD-10-CM

## 2024-10-30 PROCEDURE — 77063 BREAST TOMOSYNTHESIS BI: CPT

## 2024-11-06 ASSESSMENT — PATIENT HEALTH QUESTIONNAIRE - PHQ9: SUM OF ALL RESPONSES TO PHQ QUESTIONS 1-9: 16

## 2024-11-11 ENCOUNTER — TELEPHONE (OUTPATIENT)
Dept: FAMILY MEDICINE | Facility: CLINIC | Age: 59
End: 2024-11-11
Payer: COMMERCIAL

## 2024-11-11 NOTE — TELEPHONE ENCOUNTER
"We do not have a diagnosis of diabetes on her chart.    There have been a few A1c test that have showed \"prediabetes\" but no diabetic diagnosis.  I believe she is on metformin for weight management?    Please contact the patient to ensure that we did not miss the diabetic diagnosis.    Thanks    EB  "

## 2024-11-11 NOTE — TELEPHONE ENCOUNTER
"[employer]eri (pharmacy insurance) calling for this patient who is diabetic and is not currently on a statin. They recommend a moderate intensity statin in her case if she has  no allergies to them.    She states they sent a fax on this but never heard back.    11/1/24 Lipid note: \"Based on your age, race, smoking status,blood-pressure, and cholesterol levels your calculated 10 year risk for a vascular event (heart attack/stroke) is 2.4%.   Generally we will recommend starting a cholesterol medication once that risk is about 7.5%.     The 10-year ASCVD risk score (Manohar BAUER, et al., 2019) is: 2.4%    Values used to calculate the score:      Age: 57 years      Sex: Female      Is Non- : No      Diabetic: No      Tobacco smoker: No      Systolic Blood Pressure: 134 mmHg      Is BP treated: No      HDL Cholesterol: 80 mg/dL      Total Cholesterol: 224 mg/dL\"    Please advise.  Radha Sainz RN on 11/11/2024 at 10:47 AM    "

## 2024-11-11 NOTE — TELEPHONE ENCOUNTER
Call placed to patient   Relayed Dr. Villatoro message    Patient states she is not diabetic and is on Metformin for her metabolism  Patient states she has been trying to decline the insurance care coordination help, but still has been getting calls     Jae Us, Clinic RN  .[employer]

## 2024-11-14 ENCOUNTER — TRANSFERRED RECORDS (OUTPATIENT)
Dept: HEALTH INFORMATION MANAGEMENT | Facility: CLINIC | Age: 59
End: 2024-11-14
Payer: COMMERCIAL

## 2024-11-20 ENCOUNTER — TRANSFERRED RECORDS (OUTPATIENT)
Dept: HEALTH INFORMATION MANAGEMENT | Facility: CLINIC | Age: 59
End: 2024-11-20
Payer: COMMERCIAL

## 2024-11-20 RX ORDER — ALPRAZOLAM 1 MG/1
TABLET ORAL
COMMUNITY
Start: 2024-11-04

## 2024-11-21 NOTE — TELEPHONE ENCOUNTER
2nd attempt to call back.  Advised patient is NOT diabetic and will not be started on statin by provider at this time.    Radha Sainz RN on 11/21/2024 at 3:44 PM

## 2024-12-02 ENCOUNTER — OFFICE VISIT (OUTPATIENT)
Dept: FAMILY MEDICINE | Facility: CLINIC | Age: 59
End: 2024-12-02
Payer: COMMERCIAL

## 2024-12-02 VITALS
BODY MASS INDEX: 32.95 KG/M2 | HEART RATE: 80 BPM | TEMPERATURE: 96.5 F | HEIGHT: 64 IN | RESPIRATION RATE: 16 BRPM | WEIGHT: 193 LBS | SYSTOLIC BLOOD PRESSURE: 138 MMHG | OXYGEN SATURATION: 99 % | DIASTOLIC BLOOD PRESSURE: 82 MMHG

## 2024-12-02 DIAGNOSIS — Z12.4 CERVICAL CANCER SCREENING: ICD-10-CM

## 2024-12-02 DIAGNOSIS — E66.01 MORBID OBESITY (H): ICD-10-CM

## 2024-12-02 DIAGNOSIS — Z00.00 ENCOUNTER FOR MEDICARE ANNUAL WELLNESS EXAM: Primary | ICD-10-CM

## 2024-12-02 DIAGNOSIS — E78.5 HYPERLIPIDEMIA LDL GOAL <130: ICD-10-CM

## 2024-12-02 DIAGNOSIS — Z23 ENCOUNTER FOR IMMUNIZATION: ICD-10-CM

## 2024-12-02 DIAGNOSIS — M25.531 RIGHT WRIST PAIN: ICD-10-CM

## 2024-12-02 DIAGNOSIS — I10 BENIGN ESSENTIAL HYPERTENSION: ICD-10-CM

## 2024-12-02 LAB
ALBUMIN SERPL BCG-MCNC: 4.2 G/DL (ref 3.5–5.2)
ALP SERPL-CCNC: 89 U/L (ref 40–150)
ALT SERPL W P-5'-P-CCNC: 18 U/L (ref 0–50)
ANION GAP SERPL CALCULATED.3IONS-SCNC: 14 MMOL/L (ref 7–15)
AST SERPL W P-5'-P-CCNC: 15 U/L (ref 0–45)
BILIRUB SERPL-MCNC: 0.3 MG/DL
BUN SERPL-MCNC: 10.2 MG/DL (ref 8–23)
CALCIUM SERPL-MCNC: 9.4 MG/DL (ref 8.8–10.4)
CHLORIDE SERPL-SCNC: 99 MMOL/L (ref 98–107)
CHOLEST SERPL-MCNC: 240 MG/DL
CREAT SERPL-MCNC: 0.89 MG/DL (ref 0.51–0.95)
EGFRCR SERPLBLD CKD-EPI 2021: 74 ML/MIN/1.73M2
ERYTHROCYTE [DISTWIDTH] IN BLOOD BY AUTOMATED COUNT: 14.4 % (ref 10–15)
FASTING STATUS PATIENT QL REPORTED: YES
FASTING STATUS PATIENT QL REPORTED: YES
GLUCOSE SERPL-MCNC: 111 MG/DL (ref 70–99)
HCO3 SERPL-SCNC: 27 MMOL/L (ref 22–29)
HCT VFR BLD AUTO: 34.2 % (ref 35–47)
HDLC SERPL-MCNC: 77 MG/DL
HGB BLD-MCNC: 11.2 G/DL (ref 11.7–15.7)
LDLC SERPL CALC-MCNC: 138 MG/DL
MCH RBC QN AUTO: 26.8 PG (ref 26.5–33)
MCHC RBC AUTO-ENTMCNC: 32.7 G/DL (ref 31.5–36.5)
MCV RBC AUTO: 82 FL (ref 78–100)
NONHDLC SERPL-MCNC: 163 MG/DL
PLATELET # BLD AUTO: 254 10E3/UL (ref 150–450)
POTASSIUM SERPL-SCNC: 3.4 MMOL/L (ref 3.4–5.3)
PROT SERPL-MCNC: 6.4 G/DL (ref 6.4–8.3)
RBC # BLD AUTO: 4.18 10E6/UL (ref 3.8–5.2)
SODIUM SERPL-SCNC: 140 MMOL/L (ref 135–145)
TRIGL SERPL-MCNC: 123 MG/DL
WBC # BLD AUTO: 6.5 10E3/UL (ref 4–11)

## 2024-12-02 PROCEDURE — 91320 SARSCV2 VAC 30MCG TRS-SUC IM: CPT | Performed by: FAMILY MEDICINE

## 2024-12-02 PROCEDURE — 90480 ADMN SARSCOV2 VAC 1/ONLY CMP: CPT | Performed by: FAMILY MEDICINE

## 2024-12-02 PROCEDURE — 85027 COMPLETE CBC AUTOMATED: CPT | Performed by: FAMILY MEDICINE

## 2024-12-02 PROCEDURE — 99214 OFFICE O/P EST MOD 30 MIN: CPT | Mod: 25 | Performed by: FAMILY MEDICINE

## 2024-12-02 PROCEDURE — G0008 ADMIN INFLUENZA VIRUS VAC: HCPCS | Performed by: FAMILY MEDICINE

## 2024-12-02 PROCEDURE — 36415 COLL VENOUS BLD VENIPUNCTURE: CPT | Performed by: FAMILY MEDICINE

## 2024-12-02 PROCEDURE — G0439 PPPS, SUBSEQ VISIT: HCPCS | Performed by: FAMILY MEDICINE

## 2024-12-02 PROCEDURE — 90673 RIV3 VACCINE NO PRESERV IM: CPT | Performed by: FAMILY MEDICINE

## 2024-12-02 PROCEDURE — 80061 LIPID PANEL: CPT | Performed by: FAMILY MEDICINE

## 2024-12-02 PROCEDURE — 80053 COMPREHEN METABOLIC PANEL: CPT | Performed by: FAMILY MEDICINE

## 2024-12-02 RX ORDER — OXYCODONE HYDROCHLORIDE 5 MG/1
5 TABLET ORAL DAILY PRN
Qty: 12 TABLET | Refills: 0 | Status: SHIPPED | OUTPATIENT
Start: 2024-12-02 | End: 2024-12-05

## 2024-12-02 RX ORDER — METFORMIN HYDROCHLORIDE 500 MG/1
1000 TABLET, EXTENDED RELEASE ORAL
Qty: 180 TABLET | Refills: 3 | Status: SHIPPED | OUTPATIENT
Start: 2024-12-02

## 2024-12-02 SDOH — HEALTH STABILITY: PHYSICAL HEALTH: ON AVERAGE, HOW MANY DAYS PER WEEK DO YOU ENGAGE IN MODERATE TO STRENUOUS EXERCISE (LIKE A BRISK WALK)?: 3 DAYS

## 2024-12-02 SDOH — HEALTH STABILITY: PHYSICAL HEALTH: ON AVERAGE, HOW MANY MINUTES DO YOU ENGAGE IN EXERCISE AT THIS LEVEL?: 40 MIN

## 2024-12-02 ASSESSMENT — SOCIAL DETERMINANTS OF HEALTH (SDOH): HOW OFTEN DO YOU GET TOGETHER WITH FRIENDS OR RELATIVES?: ONCE A WEEK

## 2024-12-02 NOTE — PATIENT INSTRUCTIONS
Patient Education   Preventive Care Advice   This is general advice given by our system to help you stay healthy. However, your care team may have specific advice just for you. Please talk to your care team about your preventive care needs.  Nutrition  Eat 5 or more servings of fruits and vegetables each day.  Try wheat bread, brown rice and whole grain pasta (instead of white bread, rice, and pasta).  Get enough calcium and vitamin D. Check the label on foods and aim for 100% of the RDA (recommended daily allowance).  Lifestyle  Exercise at least 150 minutes each week  (30 minutes a day, 5 days a week).  Do muscle strengthening activities 2 days a week. These help control your weight and prevent disease.  No smoking.  Wear sunscreen to prevent skin cancer.  Have a dental exam and cleaning every 6 months.  Yearly exams  See your health care team every year to talk about:  Any changes in your health.  Any medicines your care team has prescribed.  Preventive care, family planning, and ways to prevent chronic diseases.  Shots (vaccines)   HPV shots (up to age 26), if you've never had them before.  Hepatitis B shots (up to age 59), if you've never had them before.  COVID-19 shot: Get this shot when it's due.  Flu shot: Get a flu shot every year.  Tetanus shot: Get a tetanus shot every 10 years.  Pneumococcal, hepatitis A, and RSV shots: Ask your care team if you need these based on your risk.  Shingles shot (for age 50 and up)  General health tests  Diabetes screening:  Starting at age 35, Get screened for diabetes at least every 3 years.  If you are younger than age 35, ask your care team if you should be screened for diabetes.  Cholesterol test: At age 39, start having a cholesterol test every 5 years, or more often if advised.  Bone density scan (DEXA): At age 50, ask your care team if you should have this scan for osteoporosis (brittle bones).  Hepatitis C: Get tested at least once in your life.  STIs (sexually  transmitted infections)  Before age 24: Ask your care team if you should be screened for STIs.  After age 24: Get screened for STIs if you're at risk. You are at risk for STIs (including HIV) if:  You are sexually active with more than one person.  You don't use condoms every time.  You or a partner was diagnosed with a sexually transmitted infection.  If you are at risk for HIV, ask about PrEP medicine to prevent HIV.  Get tested for HIV at least once in your life, whether you are at risk for HIV or not.  Cancer screening tests  Cervical cancer screening: If you have a cervix, begin getting regular cervical cancer screening tests starting at age 21.  Breast cancer scan (mammogram): If you've ever had breasts, begin having regular mammograms starting at age 40. This is a scan to check for breast cancer.  Colon cancer screening: It is important to start screening for colon cancer at age 45.  Have a colonoscopy test every 10 years (or more often if you're at risk) Or, ask your provider about stool tests like a FIT test every year or Cologuard test every 3 years.  To learn more about your testing options, visit:   .  For help making a decision, visit:   https://bit.ly/zv09882.  Prostate cancer screening test: If you have a prostate, ask your care team if a prostate cancer screening test (PSA) at age 55 is right for you.  Lung cancer screening: If you are a current or former smoker ages 50 to 80, ask your care team if ongoing lung cancer screenings are right for you.  For informational purposes only. Not to replace the advice of your health care provider. Copyright   2023 [facility]. All rights reserved. Clinically reviewed by the [facility]itions [facility]. [employer] [id number] - REV 01/24.  Learning About Stress  What is stress?     Stress is your body's response to a hard situation. Your body can have a physical, emotional, or mental response. Stress is a fact of life for most people, and it  affects everyone differently. What causes stress for you may not be stressful for someone else.  A lot of things can cause stress. You may feel stress when you go on a job interview, take a test, or run a race. This kind of short-term stress is normal and even useful. It can help you if you need to work hard or react quickly. For example, stress can help you finish an important job on time.  Long-term stress is caused by ongoing stressful situations or events. Examples of long-term stress include long-term health problems, ongoing problems at work, or conflicts in your family. Long-term stress can harm your health.  How does stress affect your health?  When you are stressed, your body responds as though you are in danger. It makes hormones that speed up your heart, make you breathe faster, and give you a burst of energy. This is called the fight-or-flight stress response. If the stress is over quickly, your body goes back to normal and no harm is done.  But if stress happens too often or lasts too long, it can have bad effects. Long-term stress can make you more likely to get sick, and it can make symptoms of some diseases worse. If you tense up when you are stressed, you may develop neck, shoulder, or low back pain. Stress is linked to high blood pressure and heart disease.  Stress also harms your emotional health. It can make you escalera, tense, or depressed. Your relationships may suffer, and you may not do well at work or school.  What can you do to manage stress?  You can try these things to help manage stress:   Do something active. Exercise or activity can help reduce stress. Walking is a great way to get started. Even everyday activities such as housecleaning or yard work can help.  Try yoga or jennifer chi. These techniques combine exercise and meditation. You may need some training at first to learn them.  Do something you enjoy. For example, listen to music or go to a movie. Practice your hobby or do volunteer  "work.  Meditate. This can help you relax, because you are not worrying about what happened before or what may happen in the future.  Do guided imagery. Imagine yourself in any setting that helps you feel calm. You can use online videos, books, or a teacher to guide you.  Do breathing exercises. For example:  From a standing position, bend forward from the waist with your knees slightly bent. Let your arms dangle close to the floor.  Breathe in slowly and deeply as you return to a standing position. Roll up slowly and lift your head last.  Hold your breath for just a few seconds in the standing position.  Breathe out slowly and bend forward from the waist.  Let your feelings out. Talk, laugh, cry, and express anger when you need to. Talking with supportive friends or family, a counselor, or a karen leader about your feelings is a healthy way to relieve stress. Avoid discussing your feelings with people who make you feel worse.  Write. It may help to write about things that are bothering you. This helps you find out how much stress you feel and what is causing it. When you know this, you can find better ways to cope.  What can you do to prevent stress?  You might try some of these things to help prevent stress:  Manage your time. This helps you find time to do the things you want and need to do.  Get enough sleep. Your body recovers from the stresses of the day while you are sleeping.  Get support. Your family, friends, and community can make a difference in how you experience stress.  Limit your news feed. Avoid or limit time on social media or news that may make you feel stressed.  Do something active. Exercise or activity can help reduce stress. Walking is a great way to get started.  Where can you learn more?  Go to https://www.[employer]ybag.net/patiented  Enter N032 in the search box to learn more about \"Learning About Stress.\"  Current as of: October 24, 2023  Content Version: 14.2 2024 [employer] "   Care instructions adapted under license by your healthcare professional. If you have questions about a medical condition or this instruction, always ask your healthcare professional. [employer], [facility] disclaims any warranty or liability for your use of this information.    Learning About Sleeping Well  What does sleeping well mean?     Sleeping well means getting enough sleep to feel good and stay healthy. How much sleep is enough varies among people.  The number of hours you sleep and how you feel when you wake up are both important. If you do not feel refreshed, you probably need more sleep. Another sign of not getting enough sleep is feeling tired during the day.  Experts recommend that adults get at least 7 or more hours of sleep per day. Children and older adults need more sleep.  Why is getting enough sleep important?  Getting enough quality sleep is a basic part of good health. When your sleep suffers, your physical health, mood, and your thoughts can suffer too. You may find yourself feeling more grumpy or stressed. Not getting enough sleep also can lead to serious problems, including injury, accidents, anxiety, and depression.  What might cause poor sleeping?  Many things can cause sleep problems, including:  Changes to your sleep schedule.  Stress. Stress can be caused by fear about a single event, such as giving a speech. Or you may have ongoing stress, such as worry about work or school.  Depression, anxiety, and other mental or emotional conditions.  Changes in your sleep habits or surroundings. This includes changes that happen where you sleep, such as noise, light, or sleeping in a different bed. It also includes changes in your sleep pattern, such as having jet lag or working a late shift.  Health problems, such as pain, breathing problems, and restless legs syndrome.  Lack of regular exercise.  Using alcohol, nicotine, or caffeine before bed.  How can you help yourself?  Here are some tips  "that may help you sleep more soundly and wake up feeling more refreshed.  Your sleeping area   Use your bedroom only for sleeping and sex. A bit of light reading may help you fall asleep. But if it doesn't, do your reading elsewhere in the house. Try not to use your TV, computer, smartphone, or tablet while you are in bed.  Be sure your bed is big enough to stretch out comfortably, especially if you have a sleep partner.  Keep your bedroom quiet, dark, and cool. Use curtains, blinds, or a sleep mask to block out light. To block out noise, use earplugs, soothing music, or a \"white noise\" machine.  Your evening and bedtime routine   Create a relaxing bedtime routine. You might want to take a warm shower or bath, or listen to soothing music.  Go to bed at the same time every night. And get up at the same time every morning, even if you feel tired.  What to avoid   Limit caffeine (coffee, tea, caffeinated sodas) during the day, and don't have any for at least 6 hours before bedtime.  Avoid drinking alcohol before bedtime. Alcohol can cause you to wake up more often during the night.  Try not to smoke or use tobacco, especially in the evening. Nicotine can keep you awake.  Limit naps during the day, especially close to bedtime.  Avoid lying in bed awake for too long. If you can't fall asleep or if you wake up in the middle of the night and can't get back to sleep within about 20 minutes, get out of bed and go to another room until you feel sleepy.  Avoid taking medicine right before bed that may keep you awake or make you feel hyper or energized. Your doctor can tell you if your medicine may do this and if you can take it earlier in the day.  If you can't sleep   Imagine yourself in a peaceful, pleasant scene. Focus on the details and feelings of being in a place that is relaxing.  Get up and do a quiet or boring activity until you feel sleepy.  Avoid drinking any liquids before going to bed to help prevent waking up " "often to use the bathroom.  Where can you learn more?  Go to https://www.[employer].net/patiented  Enter J942 in the search box to learn more about \"Learning About Sleeping Well.\"  Current as of: July 10, 2023  Content Version: 14.2 2024 [facility] [employer] [facility].   Care instructions adapted under license by your healthcare professional. If you have questions about a medical condition or this instruction, always ask your healthcare professional. [employer] disclaims any warranty or liability for your use of this information.       "

## 2024-12-02 NOTE — PROGRESS NOTES
"Preventive Care Visit  [facility] DONOVAN Villatoro MD, Family Medicine  Dec 2, 2024      Assessment & Plan     Encounter for Medicare annual wellness exam  - INFLUENZA VACCINE TRIVALENT(FLUBLOK)  - COVID-19 12+ (PFIZER)  - PRIMARY CARE FOLLOW-UP SCHEDULING; Future    Right wrist pain  Following with ortho - limited Oxy for at night - discussed that this would not be a long-term solution but might be helpful with her sleeping for the next week or 2 until she can see orthopedics again.  - oxyCODONE (ROXICODONE) 5 MG tablet; Take 1 tablet (5 mg) by mouth daily as needed for pain.    Morbid obesity (H)  Refill metformin sent to the pharmacy.  Discussed risk and benefits of the medication.  - metFORMIN (GLUCOPHAGE XR) 500 MG 24 hr tablet; Take 2 tablets (1,000 mg) by mouth daily (with dinner).    Hyperlipidemia LDL goal <130  Recheck cholesterol levels today  - Lipid panel reflex to direct LDL Fasting; Future  - CBC with platelets; Future  - Lipid panel reflex to direct LDL Fasting  - CBC with platelets    Cervical cancer screening  Had this done at [facility].    Encounter for immunization  - INFLUENZA VACCINE TRIVALENT(FLUBLOK)    Benign essential hypertension  T goal - will check at home  - CBC with platelets; Future  - Comprehensive metabolic panel (BMP + Alb, Alk Phos, ALT, AST, Total. Bili, TP); Future  - CBC with platelets  - Comprehensive metabolic panel (BMP + Alb, Alk Phos, ALT, AST, Total. Bili, TP)            BMI  Estimated body mass index is 33.65 kg/m  as calculated from the following:    Height as of this encounter: 1.613 m (5' 3.5\").    Weight as of this encounter: 87.5 kg (193 lb).       Counseling  Appropriate preventive services were addressed with this patient via screening, questionnaire, or discussion as appropriate for fall prevention, nutrition, physical activity, Tobacco-use cessation, social engagement, weight loss and cognition.  Checklist reviewing " preventive services available has been given to the patient.  Reviewed patient's diet, addressing concerns and/or questions.   She is at risk for lack of exercise and has been provided with information to increase physical activity for the benefit of her well-being.   She is at risk for psychosocial distress and has been provided with information to reduce risk.   Discussed possible causes of fatigue.         Minnie Quintanilla is a 58 year old, presenting for the following:  Physical (Fasting for labs today- pap smear done at OB/GYN- ANDREW signed)            LENCHO Quintanilla is a pleasant 58-year-old female who presents to the clinic today for a annual wellness visit.    Unfortunately she has been having some right sided wrist and hand pain.  She had carpal tunnel surgery a few months ago which helped for a few weeks but is now very painful again.  She had another injection.  She is continue to follow with Ortho.  This is keeping her up at night.  She finds it hard to focus due to the pain.  It is very severe per her report.  She is taking ibuprofen but it does not seem to help much.    Otherwise, no other questions or concerns.        Health Care Directive  Patient does not have a Health Care Directive: Discussed advance care planning with patient; however, patient declined at this time.      12/2/2024   General Health   How would you rate your overall physical health? Good   Feel stress (tense, anxious, or unable to sleep) To some extent      (!) STRESS CONCERN      12/2/2024   Nutrition   Diet: Regular (no restrictions)            12/2/2024   Exercise   Days per week of moderate/strenous exercise 3 days   Average minutes spent exercising at this level 40 min            12/2/2024   Social Factors   Frequency of gathering with friends or relatives Once a week   Worry food won't last until get money to buy more No   Food not last or not have enough money for food? No   Do you have housing? (Housing is defined as stable  permanent housing and does not include staying ouside in a car, in a tent, in an abandoned building, in an overnight shelter, or couch-surfing.) Yes   Are you worried about losing your housing? No   Lack of transportation? No   Unable to get utilities (heat,electricity)? No            12/2/2024   Fall Risk   Fallen 2 or more times in the past year? No    Trouble with walking or balance? No        Patient-reported          12/2/2024   Activities of Daily Living- Home Safety   Needs help with the following daily activites None of the above   Safety concerns in the home None of the above            12/2/2024   Dental   Dentist two times every year? Yes            12/2/2024   Hearing Screening   Hearing concerns? None of the above            12/2/2024   Driving Risk Screening   Patient/family members have concerns about driving No            12/2/2024   General Alertness/Fatigue Screening   Have you been more tired than usual lately? (!) YES            12/2/2024   Urinary Incontinence Screening   Bothered by leaking urine in past 6 months No            12/2/2024   TB Screening   Were you born outside of the [place]? No              Today's PHQ-2 Score:       4/17/2024     4:57 PM   PHQ-2 ( 1999 Pfizer)   Q1: Little interest or pleasure in doing things 0   Q2: Feeling down, depressed or hopeless 0   PHQ-2 Score 0         12/2/2024   Substance Use   Alcohol more than 3/day or more than 7/wk Not Applicable   Do you have a current opioid prescription? No   How severe/bad is pain from 1 to 10? 8/10   Do you use any other substances recreationally? No        Social History     Tobacco Use    Smoking status: Never    Smokeless tobacco: Never   Substance Use Topics    Alcohol use: No     Alcohol/week: 0.0 standard drinks of alcohol    Drug use: Never           9/26/2023   LAST FHS-7 RESULTS   1st degree relative breast or ovarian cancer No   Any relative bilateral breast cancer No   Any male have breast cancer No   Any ONE woman  have BOTH breast AND ovarian cancer No   Any woman with breast cancer before 50yrs No   2 or more relatives with breast AND/OR ovarian cancer No   2 or more relatives with breast AND/OR bowel cancer No           Mammogram Screening - Mammogram every 1-2 years updated in Health Maintenance based on mutual decision making      History of abnormal Pap smear: No - age 30- 64 PAP with HPV every 5 years recommended        Latest Ref Rng & Units 12/2/2019    10:05 AM 12/2/2019    10:00 AM 12/2/2019    12:00 AM   PAP / HPV   PAP (Historical)  NIL      HPV 16 DNA NEG^Negative  Negative     HPV 18 DNA NEG^Negative  Negative     HPV_EXT - HISTORICAL    See Scanned Report    Other HR HPV NEG^Negative  Negative       ASCVD Risk   The 10-year ASCVD risk score (Manohar BAUER, et al., 2019) is: 3.9%    Values used to calculate the score:      Age: 58 years      Sex: Female      Is Non- : No      Diabetic: No      Tobacco smoker: No      Systolic Blood Pressure: 138 mmHg      Is BP treated: Yes      HDL Cholesterol: 80 mg/dL      Total Cholesterol: 224 mg/dL            Reviewed and updated as needed this visit by Provider                    Past Medical History:   Diagnosis Date    Anemia     Anxiety     Appendicitis     Arthritis     Cellulitis and abscess of unspecified site     Depression     Depressive disorder 01/01/2001    Disturbance of skin sensation     hand & foot     Dizziness and giddiness     Drug-seeking behavior     Essential hypertension, benign     GERD (gastroesophageal reflux disease)     HLD (hyperlipidemia)     Hypertension     MEDICAL HISTORY OF - 2006,2007,2008,2009    multiple psych. hopitalizations    Migraine     Moderate major depression (H) 03/06/2012    Obesity     Other convulsions     secondary to head injury    PONV (postoperative nausea and vomiting)     Restless leg syndrome     RLS (restless legs syndrome)     Schizophrenia (H) 03/06/2012    Follow with psychiatry       Schizophrenia (H)     Seizures (H)     Had a seizure disorder in past no seizures since 2016    Sleep apnea     Sleep apnea     CPAP use    TBI (traumatic brain injury) (H)     Unspecified hereditary and idiopathic peripheral neuropathy     Variants of migraine, not elsewhere classified, without mention of intractable migraine without mention of status migrainosus      Past Surgical History:   Procedure Laterality Date    APPENDECTOMY      CHOLECYSTECTOMY, LAPOROSCOPIC  04/30/2009    COLONOSCOPY N/A 06/05/2015    Procedure: COLONOSCOPY;  Surgeon: Robe Delgado MD;  Location: [facility]    INJECT EPIDURAL LUMBAR  07/11/2011    Procedure:INJECT EPIDURAL LUMBAR; BRIE with Flouro--; Surgeon:GENERIC ANESTHESIA PROVIDER; Location:[facility]    INJECT EPIDURAL LUMBAR  09/28/2011    Procedure:INJECT EPIDURAL LUMBAR; BRIE with Flouro--; Surgeon:GENERIC ANESTHESIA PROVIDER; Location:[facility]    INJECT EPIDURAL LUMBAR  12/12/2011    Procedure:INJECT EPIDURAL LUMBAR; BRIE with Fluoro - Dr. Landry; Surgeon:GENERIC ANESTHESIA PROVIDER; Location:[facility]    KNEE SURGERY      1- 15 yr ago    KNEE SURGERY      LAPAROSCOPIC APPENDECTOMY      LAPAROSCOPIC APPENDECTOMY      LAPAROSCOPIC CHOLECYSTECTOMY      MANDIBLE SURGERY      6x surgeries from 1983- 1994    MANDIBLE SURGERY      ORTHOPEDIC SURGERY  01/01/1982    tmj x6 & knee & toe    LA ESOPHAGOGASTRODUODENOSCOPY TRANSORAL DIAGNOSTIC N/A 03/25/2021    Procedure: ESOPHAGOGASTRODUODENOSCOPY (EGD);  Surgeon: Davi Meier MD;  Location: [facility];  Service: General    SURGICAL HISTORY OF -   10/17/2005    left knee surgery     SURGICAL HISTORY OF -   1982,1983,1984x2,1993,    TMJ Surgery x5    SURGICAL HISTORY OF -       ENT Tubes      SURGICAL HISTORY OF -   1996    toe    TONSILLECTOMY & ADENOIDECTOMY      ZZHC COLONOSCOPY W/WO BRUSH/WASH N/A 05/11/2021    Procedure: COLONOSCOPY;  Surgeon: Davi Meier MD;  Location: [facility];  Service:  General     Current providers sharing in care for this patient include:  Patient Care Team:  Neelima Villatoro MD as PCP - General  Sona Trent MD as Referring Physician (Neurological Surgery)  Rubina Dobbs PsyD (Psychology)  Kristopher Mchugh as Psychiatrist (Psychiatry)  Dominga Moise, PhD as Psychologist  Neelima Villatoro MD as Assigned PCP  Afsaneh Horowitz DO as MD (Gastroenterology)  Guillaume Paige DO as Assigned Musculoskeletal Provider  Aranza Hughes APRN CNP as Assigned Pulmonology Provider  Michael Miller MD as Assigned Neuroscience Provider    The following health maintenance items are reviewed in Epic and correct as of today:  Health Maintenance   Topic Date Due    HEPATITIS B IMMUNIZATION (1 of 3 - 19+ 3-dose series) Never done    URINE DRUG SCREEN  10/01/2009    ZOSTER IMMUNIZATION (1 of 2) Never done    LIPID  11/01/2024    HPV TEST  12/02/2024    PAP  12/02/2024    BMP  10/02/2025    ANNUAL REVIEW OF HM ORDERS  10/02/2025    MAMMO SCREENING  10/30/2025    MEDICARE ANNUAL WELLNESS VISIT  12/02/2025    GLUCOSE  10/02/2027    ADVANCE CARE PLANNING  10/02/2029    COLORECTAL CANCER SCREENING  05/11/2031    DTAP/TDAP/TD IMMUNIZATION (3 - Td or Tdap) 09/24/2031    RSV VACCINE (1 - 1-dose 75+ series) 12/08/2039    HEPATITIS C SCREENING  Completed    HIV SCREENING  Completed    MIGRAINE ACTION PLAN  Completed    PHQ-2 (once per calendar year)  Completed    INFLUENZA VACCINE  Completed    COVID-19 Vaccine  Completed    Pneumococcal Vaccine: Pediatrics (0 to 5 Years) and At-Risk Patients (6 to 64 Years)  Aged Out    HPV IMMUNIZATION  Aged Out    MENINGITIS IMMUNIZATION  Aged Out    RSV MONOCLONAL ANTIBODY  Aged Out         Review of Systems  Constitutional, HEENT, cardiovascular, pulmonary, GI, , musculoskeletal, neuro, skin, endocrine and psych systems are negative, except as otherwise noted.     Objective    Exam  /82   Pulse 80   Temp (!) 96.5  F  "(35.8  C) (Tympanic)   Resp 16   Ht 1.613 m (5' 3.5\")   Wt 87.5 kg (193 lb)   LMP  (LMP Unknown)   SpO2 99%   BMI 33.65 kg/m     Estimated body mass index is 33.65 kg/m  as calculated from the following:    Height as of this encounter: 1.613 m (5' 3.5\").    Weight as of this encounter: 87.5 kg (193 lb).    Physical Exam    Physical Exam:  General Appearance: Alert, cooperative, no distress, appears stated age   Head: Normocephalic, without obvious abnormality, atraumatic  Eyes: PERRL, conjunctiva/corneas clear, EOM's intact   Ears: Normal TM's and external ear canals, both ears  Nose:Nares normal, septum midline,mucosa normal, no drainage    Throat:Lips, mucosa, and tongue normal; teeth and gums normal  Neck: Supple, symmetrical, trachea midline, no adenopathy;  thyroid: not enlarged, symmetric, no tenderness/mass/nodules  Back: Symmetric, no curvature, ROM normal,  Lungs: Clear to auscultation bilaterally, respirations unlabored  Breasts: No breast masses, tenderness, asymmetry, or nipple discharge.  Heart: Regular rate and rhythm, S1 and S2 normal, no murmur, rub, or gallop  Abdomen: Soft, non-tender, bowel sounds active all four quadrants,  no masses, no organomegaly  Extremities: Extremities normal, atraumatic, no cyanosis or edema  Skin: Skin color, texture, turgor normal, no rashes or lesions  Lymph nodes: Cervical, supraclavicular, and axillary nodes normal and   Neurologic: Normal          12/2/2024   Mini Cog   Clock Draw Score 2 Normal   3 Item Recall 2 objects recalled   Mini Cog Total Score 4                 Signed Electronically by: Neelima Villatoro MD    "

## 2024-12-04 ENCOUNTER — TRANSFERRED RECORDS (OUTPATIENT)
Dept: HEALTH INFORMATION MANAGEMENT | Facility: CLINIC | Age: 59
End: 2024-12-04
Payer: COMMERCIAL

## 2024-12-16 DIAGNOSIS — K21.00 GASTROESOPHAGEAL REFLUX DISEASE WITH ESOPHAGITIS WITHOUT HEMORRHAGE: ICD-10-CM

## 2024-12-16 DIAGNOSIS — I10 BENIGN ESSENTIAL HYPERTENSION: ICD-10-CM

## 2024-12-17 RX ORDER — LANSOPRAZOLE 30 MG/1
CAPSULE, DELAYED RELEASE ORAL
Qty: 180 CAPSULE | Refills: 1 | Status: SHIPPED | OUTPATIENT
Start: 2024-12-17

## 2024-12-17 RX ORDER — HYDROCHLOROTHIAZIDE 25 MG/1
25 TABLET ORAL DAILY
Qty: 90 TABLET | Refills: 1 | Status: SHIPPED | OUTPATIENT
Start: 2024-12-17

## 2024-12-19 ENCOUNTER — TRANSFERRED RECORDS (OUTPATIENT)
Dept: HEALTH INFORMATION MANAGEMENT | Facility: CLINIC | Age: 59
End: 2024-12-19

## 2024-12-19 DIAGNOSIS — M25.539 WRIST PAIN: Primary | ICD-10-CM

## 2024-12-19 LAB
BASOPHILS # BLD AUTO: 0 10E3/UL (ref 0–0.2)
BASOPHILS NFR BLD AUTO: 0 %
CRP SERPL-MCNC: 8.72 MG/L
EOSINOPHIL # BLD AUTO: 0.1 10E3/UL (ref 0–0.7)
EOSINOPHIL NFR BLD AUTO: 1 %
ERYTHROCYTE [DISTWIDTH] IN BLOOD BY AUTOMATED COUNT: 14.3 % (ref 10–15)
ERYTHROCYTE [SEDIMENTATION RATE] IN BLOOD BY WESTERGREN METHOD: 15 MM/HR (ref 0–30)
HCT VFR BLD AUTO: 37.7 % (ref 35–47)
HGB BLD-MCNC: 11.7 G/DL (ref 11.7–15.7)
IMM GRANULOCYTES # BLD: 0 10E3/UL
IMM GRANULOCYTES NFR BLD: 0 %
LYMPHOCYTES # BLD AUTO: 1.6 10E3/UL (ref 0.8–5.3)
LYMPHOCYTES NFR BLD AUTO: 23 %
MCH RBC QN AUTO: 26.3 PG (ref 26.5–33)
MCHC RBC AUTO-ENTMCNC: 31 G/DL (ref 31.5–36.5)
MCV RBC AUTO: 85 FL (ref 78–100)
MONOCYTES # BLD AUTO: 0.7 10E3/UL (ref 0–1.3)
MONOCYTES NFR BLD AUTO: 10 %
NEUTROPHILS # BLD AUTO: 4.7 10E3/UL (ref 1.6–8.3)
NEUTROPHILS NFR BLD AUTO: 65 %
PLATELET # BLD AUTO: 355 10E3/UL (ref 150–450)
RBC # BLD AUTO: 4.45 10E6/UL (ref 3.8–5.2)
WBC # BLD AUTO: 7.2 10E3/UL (ref 4–11)

## 2024-12-19 PROCEDURE — 85025 COMPLETE CBC W/AUTO DIFF WBC: CPT | Performed by: ORTHOPAEDIC SURGERY

## 2024-12-19 PROCEDURE — 86038 ANTINUCLEAR ANTIBODIES: CPT | Performed by: ORTHOPAEDIC SURGERY

## 2024-12-19 PROCEDURE — 86431 RHEUMATOID FACTOR QUANT: CPT | Performed by: ORTHOPAEDIC SURGERY

## 2024-12-19 PROCEDURE — 86200 CCP ANTIBODY: CPT | Performed by: ORTHOPAEDIC SURGERY

## 2024-12-19 PROCEDURE — 36415 COLL VENOUS BLD VENIPUNCTURE: CPT | Performed by: ORTHOPAEDIC SURGERY

## 2024-12-19 PROCEDURE — 85652 RBC SED RATE AUTOMATED: CPT | Performed by: ORTHOPAEDIC SURGERY

## 2024-12-19 PROCEDURE — 86140 C-REACTIVE PROTEIN: CPT | Performed by: ORTHOPAEDIC SURGERY

## 2024-12-20 LAB
ANA SER QL IF: NEGATIVE
CCP AB SER IA-ACNC: 1.1 U/ML
RHEUMATOID FACT SERPL-ACNC: <10 IU/ML

## 2025-01-17 ENCOUNTER — TRANSFERRED RECORDS (OUTPATIENT)
Dept: HEALTH INFORMATION MANAGEMENT | Facility: CLINIC | Age: 60
End: 2025-01-17
Payer: COMMERCIAL

## 2025-01-22 ENCOUNTER — TRANSFERRED RECORDS (OUTPATIENT)
Dept: HEALTH INFORMATION MANAGEMENT | Facility: CLINIC | Age: 60
End: 2025-01-22
Payer: COMMERCIAL

## 2025-02-03 ENCOUNTER — MYC REFILL (OUTPATIENT)
Dept: FAMILY MEDICINE | Facility: CLINIC | Age: 60
End: 2025-02-03
Payer: COMMERCIAL

## 2025-02-03 DIAGNOSIS — L30.9 DERMATITIS: ICD-10-CM

## 2025-02-03 RX ORDER — TRIAMCINOLONE ACETONIDE 1 MG/G
CREAM TOPICAL 2 TIMES DAILY
Qty: 45 G | Refills: 1 | Status: SHIPPED | OUTPATIENT
Start: 2025-02-03

## 2025-02-21 ENCOUNTER — MYC MEDICAL ADVICE (OUTPATIENT)
Dept: FAMILY MEDICINE | Facility: CLINIC | Age: 60
End: 2025-02-21
Payer: COMMERCIAL

## 2025-02-24 NOTE — TELEPHONE ENCOUNTER
Can you avril help her schedule a video visit to discuss results from GI (have you seen the results she dropped off??)    Looks like I have some openings for virtual next week?    EB

## 2025-03-05 ENCOUNTER — VIRTUAL VISIT (OUTPATIENT)
Dept: FAMILY MEDICINE | Facility: CLINIC | Age: 60
End: 2025-03-05
Payer: COMMERCIAL

## 2025-03-05 DIAGNOSIS — M06.00 SERONEGATIVE RHEUMATOID ARTHRITIS (H): ICD-10-CM

## 2025-03-05 DIAGNOSIS — D50.9 IRON DEFICIENCY ANEMIA, UNSPECIFIED IRON DEFICIENCY ANEMIA TYPE: Primary | ICD-10-CM

## 2025-03-05 PROCEDURE — 98006 SYNCH AUDIO-VIDEO EST MOD 30: CPT | Performed by: FAMILY MEDICINE

## 2025-03-05 RX ORDER — METHYLPREDNISOLONE 8 MG/1
TABLET ORAL
COMMUNITY
Start: 2025-02-05

## 2025-03-05 RX ORDER — FERROUS GLUCONATE 324(38)MG
324 TABLET ORAL
Qty: 90 TABLET | Refills: 0 | Status: SHIPPED | OUTPATIENT
Start: 2025-03-05

## 2025-03-05 RX ORDER — FOLIC ACID 1 MG/1
TABLET ORAL
COMMUNITY
Start: 2025-02-05

## 2025-03-05 RX ORDER — METHOTREXATE 2.5 MG/1
TABLET ORAL
COMMUNITY
Start: 2025-02-05

## 2025-03-05 NOTE — PATIENT INSTRUCTIONS
Patient Education   Iron-Rich Diet: Care Instructions  Overview     Your body needs iron to make a protein called hemoglobin. This protein is found in red blood cells. It carries oxygen from the lungs to the rest of the cells in your body. If you don't get enough iron, your body makes fewer and smaller red blood cells. As a result, your body's cells may not get enough oxygen.  Most people can get the iron their bodies need by eating enough iron-rich foods. Your doctor may advise you to take an iron supplement along with eating an iron-rich diet.  Follow-up care is a key part of your treatment and safety. Be sure to make and go to all appointments, and call your doctor if you are having problems. It's also a good idea to know your test results and keep a list of the medicines you take.  How can you care for yourself at home?  Make iron-rich foods a part of your daily diet. Iron-rich foods include:  All meats, such as chicken, beef, lamb, pork, fish, and shellfish. Liver is very high in iron.  Leafy green vegetables. Examples are spinach, danielle greens, and Swiss chard.  Raisins, peas, beans, lentils, barley, and eggs.  Iron-fortified grain products. These include breakfast cereals, breads, pastas, and other grain products.  Have foods and drinks that contain vitamin C when you eat iron-rich foods. Vitamin C helps you absorb more iron from food. Foods with vitamin C include tomatoes, bell peppers, broccoli, and citrus fruit or juice.  How much iron do you need?  The recommended daily amount of iron varies. Most people need the following amount of iron each day.  Recommended daily amount of iron from food  Group Age Amount of daily iron   Adults Ages 19 and older  Ages 19 to 50 (who menstruate) 8 mg.  18 mg.   Pregnant Ages 14 to 50 27 mg.   Lactating Ages 14 to 18  Ages 19 to 50 10 mg.  9 mg.   Adolescents Ages 9 to 13  Ages 14 to 18  Ages 14 to 18 (who menstruate) 8 mg.  11 mg.  15 mg.   Children Ages 1 to 3  Ages 4  "to 8 7 mg.  10 mg.   Infants Birth to 6 months  7 to 12 months 0.27 mg.  11 mg.   Where can you learn more?  Go to https://www.[email or web address].net/patiented  Enter Z290 in the search box to learn more about \"Iron-Rich Diet: Care Instructions.\"  Current as of: September 20, 2023  Content Version: 14.3    2024 [employer].   Care instructions adapted under license by your healthcare professional. If you have questions about a medical condition or this instruction, always ask your healthcare professional. [employer] disclaims any warranty or liability for your use of this information.         https://rheumatology.[email or web address]/patients/rheumatoid-arthritis    Patient Education   Rheumatoid Arthritis (RA): Care Instructions  Your Care Instructions     Arthritis is a common health problem in which the joints are inflamed. There are many types of arthritis. In rheumatoid arthritis, the body's own immune system attacks the joints. This causes pain, stiffness, and swelling in the joints, especially in the hands and feet. It can become hard to open jars, write, and do other daily tasks. Sometimes rheumatoid arthritis can also cause bumps to form under the skin.  Over time, rheumatoid arthritis can damage and deform joints. Early treatment with medicines may reduce your chances of having a lasting disability.  Follow-up care is a key part of your treatment and safety. Be sure to make and go to all appointments, and call your doctor if you are having problems. It's also a good idea to know your test results and keep a list of the medicines you take.  How can you care for yourself at home?  If your doctor recommends it, get more exercise. Walking is a good choice. If your knees or ankles hurt, try riding a stationary bike or swimming.  Move each joint gently through its full range of motion once or twice a day.  Rest joints when they are sore or overworked. Short rest breaks may help more than staying in bed.  Reach and " stay at a healthy weight. Regular exercise and a healthy diet will help you do this. Extra weight can strain the joints, especially the knees and hips, and make the pain worse. Losing even a few pounds may help.  Get enough calcium and vitamin D to help prevent osteoporosis, which causes thin bones. Talk to your doctor about how much you should take.  Protect your joints from injury. Do not overuse them. Try to limit or avoid activities that cause joint pain or swelling. Use special kitchen tools and other self-help devices as well as walkers, splints, or canes if needed.  Use heat to ease pain. Take warm showers or baths. Use hot packs or a heating pad set on low. Sleep under a warm electric blanket.  Put ice or a cold pack on the area for 10 to 20 minutes at a time. Put a thin cloth between the ice and your skin.  Take pain medicines exactly as directed.  If the doctor gave you a prescription medicine for pain, take it as prescribed.  If you are not taking a prescription pain medicine, ask your doctor if you can take an over-the-counter medicine.  Take an active role in managing your condition. Set up a treatment plan with your doctor, and learn as much as you can about rheumatoid arthritis. This will help you control pain and stay active.  When should you call for help?   Call your doctor now or seek immediate medical care if:    You have a fever or a rash along with joint pain.     You have joint pain that is so severe that you cannot use the joint at all.     You have sudden swelling, redness, or pain in one or more joints, and you do not know why.     You have back or neck pain along with weakness in your arms or legs.     You have a loss of bowel or bladder control.   Watch closely for changes in your health, and be sure to contact your doctor if:    You have joint pain that lasts for more than 6 weeks.     You have side effects from your arthritis medicines, such as stomach pain, nausea, heartburn, or dark  "and tarlike stools.   Where can you learn more?  Go to https://www.[employer].net/patiented  Enter K205 in the search box to learn more about \"Rheumatoid Arthritis (RA): Care Instructions.\"  Current as of: July 31, 2024  Content Version: 14.3    2024 [employer].   Care instructions adapted under license by your healthcare professional. If you have questions about a medical condition or this instruction, always ask your healthcare professional. [employer] disclaims any warranty or liability for your use of this information.       "

## 2025-03-05 NOTE — PROGRESS NOTES
"Socorro is a 59 year old who is being evaluated via a billable video visit.    How would you like to obtain your AVS? [employer]pping [employer]  If the video visit is dropped, the invitation should be resent by: Text to cell phone: [phone]  Will anyone else be joining your video visit? No      Assessment & Plan     Iron deficiency anemia, unspecified iron deficiency anemia type  Patient's hemoglobin is slightly low at 11.0.  I do suspect that this is iron deficiency based on other parameters and have recommended starting iron supplementation.  She did not tolerate ferrous sulfate well.  Will try ferrous gluconate.  If this does not work encouraged her to continue iron rich foods and try eating with some citric acid containing foods to help absorption.  Possibly some of her anemia could be due to anemia of chronic disease/inflammation - (Although iron levels are specifically low and I think she will benefit from iron supplementation)  - ferrous gluconate (FERGON) 324 (38 Fe) MG tablet; Take 1 tablet (324 mg) by mouth daily (with breakfast).    Seronegative rheumatoid arthritis (H)  Continue to follow with rheumatology.  Information given on rheumatoid arthritis.  Also discussed reaching out to rheumatologist to determine when she will be able to wean the steroid -she follows up with them next month    The longitudinal plan of care for the diagnosis(es)/condition(s) as documented were addressed during this visit. Due to the added complexity in care, I will continue to support Socorro in the subsequent management and with ongoing continuity of care.    BMI  Estimated body mass index is 33.65 kg/m  as calculated from the following:    Height as of 12/2/24: 1.613 m (5' 3.5\").    Weight as of 12/2/24: 87.5 kg (193 lb).   Weight management plan: Discussed healthy diet and exercise guidelines          Subjective   Socorro is a 59 year old, presenting for the following health issues:  Follow Up (citiservi message 2/21/25/Discuss outside results " from specialist )      Video Start Time:  1255pm    LENCHO Long is a very pleasant 59-year-old female who presents to the clinic today via video visit to discuss joint pain.    Unfortunately about 2 months ago patient began to get very significant bilateral hand joint pain.  This traveled up her arms into her shoulders.  She saw orthopedics for this who did some blood work and did not find any abnormalities.  She was then sent to rheumatology.  She has been diagnosed with seronegative rheumatoid arthritis.  She was started on steroids which did help significantly.  She was also started on methotrexate.  She meets with them again in a month.  She is hopeful to wean off the steroids at that time and wonders if she can do so sooner.    She was diagnosed with iron deficiency anemia with a slightly low-normal iron level and low ferritin.  Hemoglobin was 11 (parameters of normal is 11.2-15.4)    She states that she does eat iron rich food.  She does not take an iron supplementation          Review of Systems  Constitutional, HEENT, cardiovascular, pulmonary, GI, , musculoskeletal, neuro, skin, endocrine and psych systems are negative, except as otherwise noted.      Objective           Vitals:  No vitals were obtained today due to virtual visit.    Physical Exam   GENERAL: alert and no distress  EYES: Eyes grossly normal to inspection.  No discharge or erythema, or obvious scleral/conjunctival abnormalities.  RESP: No audible wheeze, cough, or visible cyanosis.    SKIN: Visible skin clear. No significant rash, abnormal pigmentation or lesions.  NEURO: Cranial nerves grossly intact.  Mentation and speech appropriate for age.  PSYCH: Appropriate affect, tone, and pace of words          Video-Visit Details    Type of service:  Video Visit   Video End Time:128pm  Originating Location (pt. Location): Home    Distant Location (provider location):  On-site  Platform used for Video Visit: Lukas  Signed Electronically by:  Neelima Villatoro MD

## 2025-04-23 DIAGNOSIS — Z79.899 HIGH RISK MEDICATION USE: Primary | ICD-10-CM

## 2025-04-30 ENCOUNTER — LAB (OUTPATIENT)
Dept: LAB | Facility: CLINIC | Age: 60
End: 2025-04-30
Payer: COMMERCIAL

## 2025-04-30 DIAGNOSIS — Z79.899 HIGH RISK MEDICATION USE: ICD-10-CM

## 2025-04-30 LAB
ALT SERPL W P-5'-P-CCNC: 16 U/L (ref 0–50)
AST SERPL W P-5'-P-CCNC: 11 U/L (ref 0–45)
BASOPHILS # BLD AUTO: 0 10E3/UL (ref 0–0.2)
BASOPHILS NFR BLD AUTO: 0 %
CREAT SERPL-MCNC: 0.78 MG/DL (ref 0.51–0.95)
CRP SERPL-MCNC: <3 MG/L
EGFRCR SERPLBLD CKD-EPI 2021: 86 ML/MIN/1.73M2
EOSINOPHIL # BLD AUTO: 0.1 10E3/UL (ref 0–0.7)
EOSINOPHIL NFR BLD AUTO: 1 %
ERYTHROCYTE [DISTWIDTH] IN BLOOD BY AUTOMATED COUNT: 19.7 % (ref 10–15)
HCT VFR BLD AUTO: 38.3 % (ref 35–47)
HGB BLD-MCNC: 12 G/DL (ref 11.7–15.7)
IMM GRANULOCYTES # BLD: 0.1 10E3/UL
IMM GRANULOCYTES NFR BLD: 1 %
LYMPHOCYTES # BLD AUTO: 2.4 10E3/UL (ref 0.8–5.3)
LYMPHOCYTES NFR BLD AUTO: 24 %
MCH RBC QN AUTO: 26.5 PG (ref 26.5–33)
MCHC RBC AUTO-ENTMCNC: 31.3 G/DL (ref 31.5–36.5)
MCV RBC AUTO: 85 FL (ref 78–100)
MONOCYTES # BLD AUTO: 0.9 10E3/UL (ref 0–1.3)
MONOCYTES NFR BLD AUTO: 9 %
NEUTROPHILS # BLD AUTO: 6.4 10E3/UL (ref 1.6–8.3)
NEUTROPHILS NFR BLD AUTO: 64 %
PLATELET # BLD AUTO: 286 10E3/UL (ref 150–450)
RBC # BLD AUTO: 4.53 10E6/UL (ref 3.8–5.2)
WBC # BLD AUTO: 10 10E3/UL (ref 4–11)

## 2025-04-30 PROCEDURE — 84460 ALANINE AMINO (ALT) (SGPT): CPT

## 2025-04-30 PROCEDURE — 82565 ASSAY OF CREATININE: CPT

## 2025-04-30 PROCEDURE — 85025 COMPLETE CBC W/AUTO DIFF WBC: CPT

## 2025-04-30 PROCEDURE — 84450 TRANSFERASE (AST) (SGOT): CPT

## 2025-04-30 PROCEDURE — 86140 C-REACTIVE PROTEIN: CPT

## 2025-04-30 PROCEDURE — 36415 COLL VENOUS BLD VENIPUNCTURE: CPT

## 2025-05-01 ENCOUNTER — TELEPHONE (OUTPATIENT)
Dept: PHARMACY | Facility: OTHER | Age: 60
End: 2025-05-01
Payer: COMMERCIAL

## 2025-05-01 NOTE — TELEPHONE ENCOUNTER
[facility] Recruitment: [facility] insurance     Referral outreach attempt #1 on May 1, 2025      Outcome: left voicemail- Call back number [phone] and MyChart message sent    Velma Parry [facility]  -[facility]  [phone]

## 2025-05-07 ENCOUNTER — TRANSFERRED RECORDS (OUTPATIENT)
Dept: HEALTH INFORMATION MANAGEMENT | Facility: CLINIC | Age: 60
End: 2025-05-07
Payer: COMMERCIAL

## 2025-05-29 ENCOUNTER — LAB (OUTPATIENT)
Dept: LAB | Facility: CLINIC | Age: 60
End: 2025-05-29
Payer: COMMERCIAL

## 2025-05-29 DIAGNOSIS — Z79.899 HIGH RISK MEDICATION USE: ICD-10-CM

## 2025-05-29 LAB
ALT SERPL W P-5'-P-CCNC: 17 U/L (ref 0–50)
AST SERPL W P-5'-P-CCNC: 22 U/L (ref 0–45)
BASOPHILS # BLD AUTO: 0.1 10E3/UL (ref 0–0.2)
BASOPHILS NFR BLD AUTO: 1 %
CREAT SERPL-MCNC: 0.82 MG/DL (ref 0.51–0.95)
CRP SERPL-MCNC: <3 MG/L
EGFRCR SERPLBLD CKD-EPI 2021: 81 ML/MIN/1.73M2
EOSINOPHIL # BLD AUTO: 0.1 10E3/UL (ref 0–0.7)
EOSINOPHIL NFR BLD AUTO: 1 %
ERYTHROCYTE [DISTWIDTH] IN BLOOD BY AUTOMATED COUNT: 17.8 % (ref 10–15)
HCT VFR BLD AUTO: 37.2 % (ref 35–47)
HGB BLD-MCNC: 12.5 G/DL (ref 11.7–15.7)
IMM GRANULOCYTES # BLD: 0 10E3/UL
IMM GRANULOCYTES NFR BLD: 0 %
LYMPHOCYTES # BLD AUTO: 2.2 10E3/UL (ref 0.8–5.3)
LYMPHOCYTES NFR BLD AUTO: 23 %
MCH RBC QN AUTO: 27.9 PG (ref 26.5–33)
MCHC RBC AUTO-ENTMCNC: 33.6 G/DL (ref 31.5–36.5)
MCV RBC AUTO: 83 FL (ref 78–100)
MONOCYTES # BLD AUTO: 0.9 10E3/UL (ref 0–1.3)
MONOCYTES NFR BLD AUTO: 10 %
NEUTROPHILS # BLD AUTO: 6.3 10E3/UL (ref 1.6–8.3)
NEUTROPHILS NFR BLD AUTO: 66 %
PLATELET # BLD AUTO: 301 10E3/UL (ref 150–450)
RBC # BLD AUTO: 4.48 10E6/UL (ref 3.8–5.2)
WBC # BLD AUTO: 9.5 10E3/UL (ref 4–11)

## 2025-05-31 ENCOUNTER — HEALTH MAINTENANCE LETTER (OUTPATIENT)
Age: 60
End: 2025-05-31

## 2025-06-10 DIAGNOSIS — I10 BENIGN ESSENTIAL HYPERTENSION: ICD-10-CM

## 2025-06-10 RX ORDER — HYDROCHLOROTHIAZIDE 25 MG/1
25 TABLET ORAL DAILY
Qty: 90 TABLET | Refills: 1 | Status: SHIPPED | OUTPATIENT
Start: 2025-06-10

## 2025-06-19 ENCOUNTER — TELEPHONE (OUTPATIENT)
Dept: PHARMACY | Facility: OTHER | Age: 60
End: 2025-06-19
Payer: COMMERCIAL

## 2025-06-19 NOTE — TELEPHONE ENCOUNTER
[facility] Recruitment: [facility] insurance     Referral outreach attempt #3 on June 19, 2025      Outcome: left voicemail- Call back number [phone]    Velma Parry [facility]  -[facility]  [phone]

## 2025-09-03 ENCOUNTER — LAB (OUTPATIENT)
Dept: LAB | Facility: CLINIC | Age: 60
End: 2025-09-03
Payer: COMMERCIAL

## 2025-09-03 DIAGNOSIS — Z79.899 HIGH RISK MEDICATION USE: ICD-10-CM

## 2025-09-03 LAB
ALT SERPL W P-5'-P-CCNC: 10 U/L (ref 0–50)
AST SERPL W P-5'-P-CCNC: 14 U/L (ref 0–45)
BASOPHILS # BLD AUTO: <0.04 10E3/UL (ref 0–0.2)
BASOPHILS NFR BLD AUTO: 0.4 %
CREAT SERPL-MCNC: 0.72 MG/DL (ref 0.51–0.95)
CRP SERPL-MCNC: <3 MG/L
EGFRCR SERPLBLD CKD-EPI 2021: >90 ML/MIN/1.73M2
EOSINOPHIL # BLD AUTO: 0.08 10E3/UL (ref 0–0.7)
EOSINOPHIL NFR BLD AUTO: 1.1 %
ERYTHROCYTE [DISTWIDTH] IN BLOOD BY AUTOMATED COUNT: 15.2 % (ref 10–15)
HCT VFR BLD AUTO: 36.5 % (ref 35–47)
HGB BLD-MCNC: 12.1 G/DL (ref 11.7–15.7)
IMM GRANULOCYTES # BLD: 0.06 10E3/UL
IMM GRANULOCYTES NFR BLD: 0.8 %
LYMPHOCYTES # BLD AUTO: 1.74 10E3/UL (ref 0.8–5.3)
LYMPHOCYTES NFR BLD AUTO: 23.3 %
MCH RBC QN AUTO: 29.2 PG (ref 26.5–33)
MCHC RBC AUTO-ENTMCNC: 33.2 G/DL (ref 31.5–36.5)
MCV RBC AUTO: 88 FL (ref 78–100)
MONOCYTES # BLD AUTO: 0.7 10E3/UL (ref 0–1.3)
MONOCYTES NFR BLD AUTO: 9.4 %
NEUTROPHILS # BLD AUTO: 4.87 10E3/UL (ref 1.6–8.3)
NEUTROPHILS NFR BLD AUTO: 65 %
PLATELET # BLD AUTO: 261 10E3/UL (ref 150–450)
RBC # BLD AUTO: 4.15 10E6/UL (ref 3.8–5.2)
WBC # BLD AUTO: 7.48 10E3/UL (ref 4–11)

## 2025-09-03 PROCEDURE — 84450 TRANSFERASE (AST) (SGOT): CPT

## 2025-09-03 PROCEDURE — 86140 C-REACTIVE PROTEIN: CPT

## 2025-09-03 PROCEDURE — 84460 ALANINE AMINO (ALT) (SGPT): CPT

## 2025-09-03 PROCEDURE — 36415 COLL VENOUS BLD VENIPUNCTURE: CPT

## 2025-09-03 PROCEDURE — 82565 ASSAY OF CREATININE: CPT

## 2025-09-03 PROCEDURE — 85025 COMPLETE CBC W/AUTO DIFF WBC: CPT
